# Patient Record
Sex: MALE | Race: WHITE | NOT HISPANIC OR LATINO | Employment: FULL TIME | URBAN - METROPOLITAN AREA
[De-identification: names, ages, dates, MRNs, and addresses within clinical notes are randomized per-mention and may not be internally consistent; named-entity substitution may affect disease eponyms.]

---

## 2017-01-03 NOTE — PROGRESS NOTES
"  SUBJECTIVE:                                                    Linden Cruz is a 46 year old male who presents to clinic today for the following health issues:    HPI    Patient in today for left ear issue. He has had left ear symptoms for the past month. He states he \"blew his nose and left ear drum popped\" while in [place] last month. The ear has been filling up with fluid since and has been painful with worsening ringing in the ear. He denies any drainage coming out of the ear. He has also had more sinus pressure and congestion lately and Flonase has been helping. He had a bottle of antibiotic ear drops that he had left over from a previous prescription that he used for 9 days but still feels ear is filled with fluid. He had similar symptoms last March and require 3 different antibiotics to clear the infection. He does fly a lot for work.     Problem list and histories reviewed & adjusted, as indicated.  Additional history: none    Problem list, Medication list, Allergies, and Medical/Social/Surgical histories reviewed in [facility] and updated as appropriate.    ROS:  GENERAL: Denies fever, fatigue, weakness, weight gain, or weight loss.  HEENT: Eyes-Denies pain, redness, loss of vision, double or blurred vision.     Ears/Nose- +Left ear pain/pressure and tinnitus, slight ear pressure on the right, sinus congestion. Denies loss of hearing, epistaxis, decreased sense of smell. Denies loss of sense of taste, dry mouth, or sore throat.   CARDIOVASCULAR: Denies chest pain, shortness of breath, irregular heartbeats,  palpitations, or edema.  RESPIRATORY: Denies cough, hemoptysis, and shortness of breath.    OBJECTIVE:                                                    /82 mmHg  Pulse 100  Temp(Src) 97.7  F (36.5  C) (Temporal)  Resp 12  Ht 5' 11\" (1.803 m)  Wt 216 lb (97.977 kg)  BMI 30.14 kg/m2  SpO2 96%  Body mass index is 30.14 kg/(m^2).  GENERAL: healthy, alert and no distress  EYES: Eyes grossly " normal to inspection, PERRL and conjunctivae and sclerae normal  HENT: Ear canals normal but bilateral TMs with mucoid effusions, more so on the left with left TM erythema. No obvious perforation or drainage. Nasal mucosa is erythematous.  NECK: no adenopathy, no asymmetry, masses, or scars and thyroid normal to palpation  RESP: lungs clear to auscultation - no rales, rhonchi or wheezes  CV: regular rate and rhythm, normal S1 S2, no S3 or S4, no murmur, click or rub, no peripheral edema and peripheral pulses strong       ASSESSMENT/PLAN:                                                        ICD-10-CM    1. Recurrent acute suppurative otitis media without spontaneous rupture of tympanic membrane of both sides H66.006 cefUROXime (CEFTIN) 500 MG tablet     predniSONE (DELTASONE) 20 MG tablet       He had similar symptoms last March and Ceftin seemed to work best to clear up the infection so will prescribe this as well as a 5 day course of prednisone to help with the increased sinus/ear pressure and inflammation.  Encouraged to drink plenty of fluids.  Continue with Flonase and also recommend he try over the counter Nettipot/sinus rinse.  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  Follow up if symptoms not improving.  If he continues to experience recurrent infections, may need to be seen by ENT.       Nik Nina PA-C  [facility]

## 2017-01-04 ENCOUNTER — OFFICE VISIT (OUTPATIENT)
Dept: FAMILY MEDICINE | Facility: OTHER | Age: 47
End: 2017-01-04
Payer: COMMERCIAL

## 2017-01-04 VITALS
RESPIRATION RATE: 12 BRPM | HEART RATE: 100 BPM | OXYGEN SATURATION: 96 % | HEIGHT: 71 IN | TEMPERATURE: 97.7 F | SYSTOLIC BLOOD PRESSURE: 128 MMHG | BODY MASS INDEX: 30.24 KG/M2 | WEIGHT: 216 LBS | DIASTOLIC BLOOD PRESSURE: 82 MMHG

## 2017-01-04 DIAGNOSIS — H66.006 RECURRENT ACUTE SUPPURATIVE OTITIS MEDIA WITHOUT SPONTANEOUS RUPTURE OF TYMPANIC MEMBRANE OF BOTH SIDES: Primary | ICD-10-CM

## 2017-01-04 PROCEDURE — 99213 OFFICE O/P EST LOW 20 MIN: CPT | Performed by: PHYSICIAN ASSISTANT

## 2017-01-04 RX ORDER — CEFUROXIME AXETIL 500 MG/1
500 TABLET ORAL 2 TIMES DAILY
Qty: 20 TABLET | Refills: 0 | Status: SHIPPED | OUTPATIENT
Start: 2017-01-04 | End: 2017-01-23

## 2017-01-04 RX ORDER — PREDNISONE 20 MG/1
20 TABLET ORAL DAILY
Qty: 5 TABLET | Refills: 0 | Status: SHIPPED | OUTPATIENT
Start: 2017-01-04 | End: 2017-01-23

## 2017-01-04 ASSESSMENT — PAIN SCALES - GENERAL: PAINLEVEL: NO PAIN (0)

## 2017-01-04 NOTE — MR AVS SNAPSHOT
After Visit Summary   1/4/2017    Linden Cruz    MRN: [id number]           Patient Information     Date Of Birth          1970        Visit Information        Provider Department      1/4/2017 10:15 AM Nik Nina PA-C [facility]        Today's Diagnoses     Recurrent acute suppurative otitis media without spontaneous rupture of tympanic membrane of both sides    -  1       Care Instructions    Will prescribe a 10 day course of antibiotics to take as directed. Take a probiotic or Activia yogurt daily while on this medication.  Will also prescribe a steroid to help with the sinus swelling and pressure.  Continue with the Flonase nasal spray. You can also try Nettipot or Sinu Rinse to help clear out your sinuses.  Drink plenty of fluids.  Follow up if symptoms are not improving.           Follow-ups after your visit        Who to contact     If you have questions or need follow up information about today's clinic visit or your schedule please contact [facility] directly at [phone].  Normal or non-critical lab and imaging results will be communicated to you by [employer], letter or phone within 4 business days after the clinic has received the results. If you do not hear from us within 7 days, please contact the clinic through [employer]occ[employer] or phone. If you have a critical or abnormal lab result, we will notify you by phone as soon as possible.  Submit refill requests through [employer] or call your pharmacy and they will forward the refill request to us. Please allow 3 business days for your refill to be completed.          Additional Information About Your Visit        Credit [employer] Information     [employer] gives you secure access to your electronic health record. If you see a primary care provider, you can also send messages to your care team and make appointments. If you have questions, please call your primary care clinic.  If you do not have a primary care  "provider, please call [phone] and they will assist you.        Care EveryWhere ID     This is your Care EveryWhere ID. This could be used by other organizations to access your [facility] medical records  [id number]        Your Vitals Were     Pulse Temperature Respirations Height BMI (Body Mass Index) Pulse Oximetry    100 97.7  F (36.5  C) (Temporal) 12 5' 11\" (1.803 m) 30.14 kg/m2 96%       Blood Pressure from Last 3 Encounters:   01/04/17 128/82   11/17/16 124/78   10/22/16 122/83    Weight from Last 3 Encounters:   01/04/17 216 lb (97.977 kg)   11/17/16 226 lb (102.513 kg)   08/24/16 224 lb (101.606 kg)              Today, you had the following     No orders found for display         Today's Medication Changes          These changes are accurate as of: 1/4/17 10:32 AM.  If you have any questions, ask your nurse or doctor.               Start taking these medicines.        Dose/Directions    cefUROXime 500 MG tablet   Commonly known as:  CEFTIN   Used for:  Recurrent acute suppurative otitis media without spontaneous rupture of tympanic membrane of both sides   Started by:  Nik Nina PA-C        Dose:  500 mg   Take 1 tablet (500 mg) by mouth 2 times daily   Quantity:  20 tablet   Refills:  0       predniSONE 20 MG tablet   Commonly known as:  DELTASONE   Used for:  Recurrent acute suppurative otitis media without spontaneous rupture of tympanic membrane of both sides   Started by:  Nik Nina PA-C        Dose:  20 mg   Take 1 tablet (20 mg) by mouth daily   Quantity:  5 tablet   Refills:  0            Where to get your medicines      These medications were sent to [place] [address] [facility] [place] - [address]  [address], [place] [address]     Phone:  [phone]    - cefUROXime 500 MG tablet  - predniSONE 20 MG tablet             Primary Care Provider Office Phone # Fax #    Nik Nina PA-C [phone]       [address]  [facility] [address]        Thank you!     Thank you for choosing [facility]  for your care. Our goal is always to provide you with excellent care. Hearing back from our patients is one way we can continue to improve our services. Please take a few minutes to complete the written survey that you may receive in the mail after your visit with us. Thank you!             Your Updated Medication List - Protect others around you: Learn how to safely use, store and throw away your medicines at www.disposemymeds.org.          This list is accurate as of: 1/4/17 10:32 AM.  Always use your most recent med list.                   Brand Name Dispense Instructions for use    ADVIL PO          cefUROXime 500 MG tablet    CEFTIN    20 tablet    Take 1 tablet (500 mg) by mouth 2 times daily       fluticasone 50 MCG/ACT spray    FLONASE    16 g    Spray 1-2 sprays into both nostrils daily       gemfibrozil 600 MG tablet    LOPID    180 tablet    Take 1 tablet (600 mg) by mouth 2 times daily       levothyroxine 125 MCG tablet    SYNTHROID/LEVOTHROID    90 tablet    Take 1 tablet (125 mcg) by mouth daily       MULTI VITAMIN MENS PO      Take  by mouth.       omeprazole 40 MG capsule    priLOSEC    90 capsule    TAKE ONE CAPSULE BY MOUTH EVERY DAY       predniSONE 20 MG tablet    DELTASONE    5 tablet    Take 1 tablet (20 mg) by mouth daily       SUMAtriptan 100 MG tablet    IMITREX    18 tablet    TAKE 1 TAB BY MOUTH WITH ONSET OF MIGRAINE, MAY REPEAT ONCE AFTER 2 HOURS. MAX 2 TABS/24 HOURS.       * tadalafil 20 MG tablet    CIALIS    6 tablet    Take 0.5-1 tablets (10-20 mg) by mouth daily as needed for erectile dysfunction Never use with nitroglycerin, terazosin or doxazosin.       * tadalafil 20 MG tablet    CIALIS    8 tablet    Take 0.5-1 tablets (10-20 mg) by mouth daily as needed for erectile dysfunction Never use with nitroglycerin, terazosin or doxazosin.       topiramate 50 MG tablet    TOPAMAX    60  tablet    TAKE ONE TABLET BY MOUTH TWICE DAILY       * Notice:  This list has 2 medication(s) that are the same as other medications prescribed for you. Read the directions carefully, and ask your doctor or other care provider to review them with you.

## 2017-01-04 NOTE — NURSING NOTE
"Chief Complaint   Patient presents with     Ear Problem     Panel Management     utd       Initial /82 mmHg  Pulse 100  Temp(Src) 97.7  F (36.5  C) (Temporal)  Resp 12  Ht 5' 11\" (1.803 m)  Wt 216 lb (97.977 kg)  BMI 30.14 kg/m2  SpO2 96% Estimated body mass index is 30.14 kg/(m^2) as calculated from the following:    Height as of this encounter: 5' 11\" (1.803 m).    Weight as of this encounter: 216 lb (97.977 kg).  BP completed using cuff size: regular    Precious Cruz CMA      "

## 2017-01-23 ENCOUNTER — OFFICE VISIT (OUTPATIENT)
Dept: OTOLARYNGOLOGY | Facility: CLINIC | Age: 47
End: 2017-01-23
Payer: COMMERCIAL

## 2017-01-23 ENCOUNTER — TELEPHONE (OUTPATIENT)
Dept: FAMILY MEDICINE | Facility: OTHER | Age: 47
End: 2017-01-23

## 2017-01-23 ENCOUNTER — OFFICE VISIT (OUTPATIENT)
Dept: AUDIOLOGY | Facility: CLINIC | Age: 47
End: 2017-01-23
Payer: COMMERCIAL

## 2017-01-23 VITALS — OXYGEN SATURATION: 96 % | TEMPERATURE: 97.8 F | WEIGHT: 222.1 LBS | HEIGHT: 72 IN | BODY MASS INDEX: 30.08 KG/M2

## 2017-01-23 DIAGNOSIS — H66.93 RECURRENT OTITIS MEDIA OF BOTH EARS: Primary | ICD-10-CM

## 2017-01-23 DIAGNOSIS — H65.23 CHRONIC SEROUS OTITIS MEDIA OF BOTH EARS: ICD-10-CM

## 2017-01-23 DIAGNOSIS — H90.5 SENSORINEURAL HEARING LOSS OF RIGHT EAR: ICD-10-CM

## 2017-01-23 DIAGNOSIS — H69.93 DYSFUNCTION OF EUSTACHIAN TUBE, BILATERAL: ICD-10-CM

## 2017-01-23 DIAGNOSIS — H93.12 TINNITUS, LEFT: ICD-10-CM

## 2017-01-23 DIAGNOSIS — J31.0 CHRONIC RHINITIS: Primary | ICD-10-CM

## 2017-01-23 DIAGNOSIS — H90.72 MIXED HEARING LOSS OF LEFT EAR: Primary | ICD-10-CM

## 2017-01-23 PROCEDURE — 99203 OFFICE O/P NEW LOW 30 MIN: CPT | Mod: 25 | Performed by: OTOLARYNGOLOGY

## 2017-01-23 PROCEDURE — 92567 TYMPANOMETRY: CPT | Performed by: AUDIOLOGIST

## 2017-01-23 PROCEDURE — 99207 ZZC NO CHARGE LOS: CPT | Performed by: AUDIOLOGIST

## 2017-01-23 PROCEDURE — 92557 COMPREHENSIVE HEARING TEST: CPT | Performed by: AUDIOLOGIST

## 2017-01-23 PROCEDURE — 92511 NASOPHARYNGOSCOPY: CPT | Performed by: OTOLARYNGOLOGY

## 2017-01-23 RX ORDER — FLUTICASONE PROPIONATE 50 MCG
2 SPRAY, SUSPENSION (ML) NASAL DAILY
Qty: 1 BOTTLE | Refills: 1 | Status: SHIPPED | OUTPATIENT
Start: 2017-01-23 | End: 2017-02-22

## 2017-01-23 NOTE — PROGRESS NOTES
AUDIOLOGY REPORT    SUMMARY: Audiology visit completed. See audiogram for results.      RECOMMENDATIONS: Follow-up with ENT.    Piyush Moraes  Audiologist  [place] License  #[id number]

## 2017-01-23 NOTE — TELEPHONE ENCOUNTER
Patient had been seen and was told that the next f/u he may need to go to the ENT. Pt at this time is looking at the next step he will need to take. He is looking for a return call to see if he need to see PCP before going to ENT or if he should just schedule with ENT next.     Alexandrea Suresh  Reception/ Scheduling

## 2017-01-23 NOTE — NURSING NOTE
Chief Complaint   Patient presents with     Consult     bilat ear problem       Initial Temp(Src) 97.8  F (36.6  C) (Temporal)  Ht 1.829 m (6')  Wt 100.744 kg (222 lb 1.6 oz)  BMI 30.12 kg/m2  SpO2 96% Estimated body mass index is 30.12 kg/(m^2) as calculated from the following:    Height as of this encounter: 1.829 m (6').    Weight as of this encounter: 100.744 kg (222 lb 1.6 oz).  BP completed using cuff size: NA (Not Taken)

## 2017-01-23 NOTE — TELEPHONE ENCOUNTER
I recommend he see ENT since he keeps getting these ear infections. Order has been placed. If he is having worsening symptoms before he can be seen by ENT, I will see him again.    Nik Nina PA-C

## 2017-01-23 NOTE — TELEPHONE ENCOUNTER
You saw him on 1 3/17    He had similar symptoms last March and Ceftin seemed to work best to clear up the infection so will prescribe this as well as a 5 day course of prednisone to help with the increased sinus/ear pressure and inflammation.  Encouraged to drink plenty of fluids.  Continue with Flonase and also recommend he try over the counter Nettipot/sinus rinse.  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  Follow up if symptoms not improving.  If he continues to experience recurrent infections, may need to be seen by ENT.
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
## 2017-02-08 ENCOUNTER — OFFICE VISIT (OUTPATIENT)
Dept: OTOLARYNGOLOGY | Facility: OTHER | Age: 47
End: 2017-02-08
Payer: COMMERCIAL

## 2017-02-08 DIAGNOSIS — H65.23 BILATERAL CHRONIC SEROUS OTITIS MEDIA: Primary | ICD-10-CM

## 2017-02-08 PROCEDURE — 99207 ZZC DROP WITH A PROCEDURE: CPT | Performed by: OTOLARYNGOLOGY

## 2017-02-08 PROCEDURE — 69433 CREATE EARDRUM OPENING: CPT | Mod: 50 | Performed by: OTOLARYNGOLOGY

## 2017-02-08 NOTE — PROGRESS NOTES
OTOLARYNGOLOGY PROCEDURE NOTE    SURGEON: Brittany Mejia.    ASSISTANT: none     PREOPERATIVE DIAGNOSIS: Chronic otitis media     POSTOPERATIVE DIAGNOSIS: Chronic otitis media.     SURGERY: Bilateral myringotomy with  Paparella 2000 type tube placement.     FINDINGS: very atelectatic ME space right ear, fluid bilat.    INDICATIONS: Above findings with mucoid fluid in the middle ear space.     BRIEF HISTORY: Patient is a 47 yo with a history of serous otitis media that was resistant to maximal medical therapy. The patient understands the risks and benefits of the surgery as well as alternatives, wishes to have it done and has agree to it.     DESCRIPTION OF PROCEDURE: The patient was taken to the procedure room,  appropriately positioned. We examined the left ear under the microscope. Cerumen was removed with a cerumen curet. TM was retracted fluid seen. Myringotomy was made post inferior  in a radial fashion close to umbo after top anasthesia with phenol. A large amount of serous fluid was suctioned, followed by placement of a  Paparella type tube. We next turned our attention to the right ear. We examined the right ear under the microscope. Again, cerumen was removed with a cerumen curet. TM was atelectatic draping the ossicles.  . Myringotomy was made post inferior in a radial fashion close to umbo. A small amount of serous fluid was suctioned, followed by placement of a  Paparella type tube. The patient tolerated procedure well and will return in 6 weeks..     BRITTANY MEJIA MD

## 2017-03-17 NOTE — PROGRESS NOTES
History of Present Illness - Linden Cruz is a 46 year old male who is status post bilateral myringotomy tube placement on 02/08/17.  There were no issues post operatively, and the patient is back to a regular diet and normal daily activity.  There has been drainage, but no  bleeding from the ears, no fevers or chills. He still has tinnitus on the left that start with initial ear infection 6 months ago but is not as  Severe as before and not disabling.    B/P: Data Unavailable, T: Data Unavailable, P: Data Unavailable, R: Data Unavailable  General - The patient is well nourished and well developed, and appears to have good nutritional status.    Head and Face - Normocephalic and atraumatic, with no gross asymmetry noted of the contour of the facial features.  The facial nerve is intact, with strong symmetric movements.  Eyes - Extraocular movements intact, and the pupils were reactive to light.  Sclera were not icteric or injected, conjunctiva were pink and moist.  Mouth - Examination of the oral cavity shows pink, healthy, moist mucosa.  No lesions or ulceration noted.  The dentition are in good repair.  The tongue is mobile and midline.  Ears - Examination of the ears showed myringotomy tubes in good position bilaterally.  The tympanic membranes were gray and translucent.  PE tubes in good position. No evidence of middle ear effusion, granulation tissue, or cholesteatoma.    Tympanometry - Pneumatic otoscopy was performed, both sides showed no movement of the tympanic membrane, consistent with open myringotomy tubes. Audio still shows SNHL drop at 2500 to 8000 Hz on the left.  We discuss this asymmetry and since word recognition is at 100% both sides and this was post infection issues would monitor closely.    A/P - Linden Cruz is status post bilateral myringotomy and tube placement.  No sign of complications at this point.  I have rediscussed water precautions, and will see the patient back in 6 months  for a routine tube check. I have also recommended the use of the post-op ear drops in the event of otorrhea during a URI.  If the drainage continues, however, they should come to me for earlier follow up. The patient will return in 9 to 10 months for tube/audio recheck may consider imaging in the future if asymmetry persists /worsens. Hearing protection discussed.  Savage Mejia MD

## 2017-03-20 ENCOUNTER — OFFICE VISIT (OUTPATIENT)
Dept: AUDIOLOGY | Facility: CLINIC | Age: 47
End: 2017-03-20
Payer: COMMERCIAL

## 2017-03-20 ENCOUNTER — OFFICE VISIT (OUTPATIENT)
Dept: OTOLARYNGOLOGY | Facility: CLINIC | Age: 47
End: 2017-03-20
Payer: COMMERCIAL

## 2017-03-20 VITALS — HEART RATE: 94 BPM | WEIGHT: 212 LBS | BODY MASS INDEX: 28.75 KG/M2 | OXYGEN SATURATION: 97 %

## 2017-03-20 DIAGNOSIS — E03.8 SUBCLINICAL HYPOTHYROIDISM: ICD-10-CM

## 2017-03-20 DIAGNOSIS — Z98.890 H/O MYRINGOTOMY: Primary | ICD-10-CM

## 2017-03-20 DIAGNOSIS — H90.3 SENSORINEURAL HEARING LOSS, ASYMMETRICAL: Primary | ICD-10-CM

## 2017-03-20 DIAGNOSIS — H93.12 LEFT-SIDED TINNITUS: ICD-10-CM

## 2017-03-20 DIAGNOSIS — H90.3 ASYMMETRICAL SENSORINEURAL HEARING LOSS: ICD-10-CM

## 2017-03-20 PROCEDURE — 92567 TYMPANOMETRY: CPT | Performed by: AUDIOLOGIST

## 2017-03-20 PROCEDURE — 99213 OFFICE O/P EST LOW 20 MIN: CPT | Performed by: OTOLARYNGOLOGY

## 2017-03-20 PROCEDURE — 99207 ZZC NO CHARGE LOS: CPT | Performed by: AUDIOLOGIST

## 2017-03-20 NOTE — MR AVS SNAPSHOT
After Visit Summary   3/20/2017    Linden Cruz    MRN: [id number]           Patient Information     Date Of Birth          1970        Visit Information        Provider Department      3/20/2017 8:30 AM Savage Mejia MD [facility]        Today's Diagnoses     H/O myringotomy    -  1    Asymmetrical sensorineural hearing loss           Follow-ups after your visit        Additional Services     AUDIOLOGY ADULT REFERRAL                 Your next 10 appointments already scheduled     Dec 18, 2017  8:00 AM CST   Return Visit with Piyush Mooney   [facility] ([facility]    [address] [place] [id number] [phone]            Dec 18, 2017  8:15 AM CST   Return Visit with Savage Mejia MD   [facility] ([address] [id number] [phone]              Who to contact     If you have questions or need follow up information about today's clinic visit or your schedule please contact [facility] directly at [phone].  Normal or non-critical lab and imaging results will be communicated to you by App DreamWorkshart, letter or phone within 4 business days after the clinic has received the results. If you do not hear from us within 7 days, please contact the clinic through Digital Lifeboatt or phone. If you have a critical or abnormal lab result, we will notify you by phone as soon as possible.  Submit refill requests through Questra or call your pharmacy and they will forward the refill request to us. Please allow 3 business days for your refill to be completed.          Additional Information About Your Visit        App DreamWorkshart Information     Questra gives you secure access to your electronic health record. If you see a primary care provider, you can also send messages to your care team and make appointments. If you have questions, please call your primary care  clinic.  If you do not have a primary care provider, please call [phone] and they will assist you.        Care EveryWhere ID     This is your Care EveryWhere ID. This could be used by other organizations to access your [place] medical records  [id number]        Your Vitals Were     Pulse Pulse Oximetry BMI (Body Mass Index)             94 97% 28.75 kg/m2          Blood Pressure from Last 3 Encounters:   01/04/17 128/82   11/17/16 124/78   10/22/16 122/83    Weight from Last 3 Encounters:   03/20/17 96.2 kg (212 lb)   01/23/17 100.7 kg (222 lb 1.6 oz)   01/04/17 98 kg (216 lb)              We Performed the Following     AUDIOLOGY ADULT REFERRAL        Primary Care Provider Office Phone # Fax #    Nik Nina PA-C [phone]       [facility] [address] [facility] [address]  [facility] [address]        Thank you!     Thank you for choosing [facility]  for your care. Our goal is always to provide you with excellent care. Hearing back from our patients is one way we can continue to improve our services. Please take a few minutes to complete the written survey that you may receive in the mail after your visit with us. Thank you!             Your Updated Medication List - Protect others around you: Learn how to safely use, store and throw away your medicines at www.disposemymeds.org.          This list is accurate as of: 3/20/17  9:41 AM.  Always use your most recent med list.                   Brand Name Dispense Instructions for use    ADVIL PO          fluticasone 50 MCG/ACT spray    FLONASE    16 g    Spray 1-2 sprays into both nostrils daily       gemfibrozil 600 MG tablet    LOPID    180 tablet    Take 1 tablet (600 mg) by mouth 2 times daily       levothyroxine 125 MCG tablet    SYNTHROID/LEVOTHROID    90 tablet    Take 1 tablet (125 mcg) by mouth daily       MULTI VITAMIN MENS PO      Take  by mouth.       omeprazole 40 MG capsule    priLOSEC    90 capsule     TAKE ONE CAPSULE BY MOUTH EVERY DAY       SUMAtriptan 100 MG tablet    IMITREX    18 tablet    TAKE 1 TAB BY MOUTH WITH ONSET OF MIGRAINE, MAY REPEAT ONCE AFTER 2 HOURS. MAX 2 TABS/24 HOURS.       tadalafil 20 MG tablet    CIALIS    8 tablet    Take 0.5-1 tablets (10-20 mg) by mouth daily as needed for erectile dysfunction Never use with nitroglycerin, terazosin or doxazosin.       topiramate 50 MG tablet    TOPAMAX    60 tablet    TAKE ONE TABLET BY MOUTH TWICE DAILY

## 2017-03-20 NOTE — MR AVS SNAPSHOT
After Visit Summary   3/20/2017    Linden Cruz    MRN: [id number]           Patient Information     Date Of Birth          1970        Visit Information        Provider Department      3/20/2017 8:15 AM Shahbaz Gregg [facility]        Today's Diagnoses     Sensorineural hearing loss, asymmetrical    -  1    Left-sided tinnitus           Follow-ups after your visit        Who to contact     If you have questions or need follow up information about today's clinic visit or your schedule please contact [facility] directly at [phone].  Normal or non-critical lab and imaging results will be communicated to you by Joyushart, letter or phone within 4 business days after the clinic has received the results. If you do not hear from us within 7 days, please contact the clinic through Neo Networkst or phone. If you have a critical or abnormal lab result, we will notify you by phone as soon as possible.  Submit refill requests through ReadyDock or call your pharmacy and they will forward the refill request to us. Please allow 3 business days for your refill to be completed.          Additional Information About Your Visit        MyChart Information     ReadyDock gives you secure access to your electronic health record. If you see a primary care provider, you can also send messages to your care team and make appointments. If you have questions, please call your primary care clinic.  If you do not have a primary care provider, please call [phone] and they will assist you.        Care EveryWhere ID     This is your Care EveryWhere ID. This could be used by other organizations to access your [place] medical records  [id number]         Blood Pressure from Last 3 Encounters:   01/04/17 128/82   11/17/16 124/78   10/22/16 122/83    Weight from Last 3 Encounters:   03/20/17 212 lb (96.2 kg)   01/23/17 222 lb 1.6 oz (100.7 kg)   01/04/17 216 lb (98 kg)              We  Performed the Following     AUDIOGRAM/TYMPANOGRAM - INTERFACE     TYMPANOMETRY        Primary Care Provider Office Phone # Fax #    Nik Nina PA-C [phone]       [facility] [address] [facility] [address]  [facility] [address]        Thank you!     Thank you for choosing [facility]  for your care. Our goal is always to provide you with excellent care. Hearing back from our patients is one way we can continue to improve our services. Please take a few minutes to complete the written survey that you may receive in the mail after your visit with us. Thank you!             Your Updated Medication List - Protect others around you: Learn how to safely use, store and throw away your medicines at www.disposemymeds.org.          This list is accurate as of: 3/20/17  8:48 AM.  Always use your most recent med list.                   Brand Name Dispense Instructions for use    ADVIL PO          fluticasone 50 MCG/ACT spray    FLONASE    16 g    Spray 1-2 sprays into both nostrils daily       gemfibrozil 600 MG tablet    LOPID    180 tablet    Take 1 tablet (600 mg) by mouth 2 times daily       levothyroxine 125 MCG tablet    SYNTHROID/LEVOTHROID    90 tablet    Take 1 tablet (125 mcg) by mouth daily       MULTI VITAMIN MENS PO      Take  by mouth.       omeprazole 40 MG capsule    priLOSEC    90 capsule    TAKE ONE CAPSULE BY MOUTH EVERY DAY       SUMAtriptan 100 MG tablet    IMITREX    18 tablet    TAKE 1 TAB BY MOUTH WITH ONSET OF MIGRAINE, MAY REPEAT ONCE AFTER 2 HOURS. MAX 2 TABS/24 HOURS.       tadalafil 20 MG tablet    CIALIS    8 tablet    Take 0.5-1 tablets (10-20 mg) by mouth daily as needed for erectile dysfunction Never use with nitroglycerin, terazosin or doxazosin.       topiramate 50 MG tablet    TOPAMAX    60 tablet    TAKE ONE TABLET BY MOUTH TWICE DAILY

## 2017-03-20 NOTE — NURSING NOTE
Chief Complaint   Patient presents with     Surgical Followup     Bilateral myringotomy with  Paparella 2000 type tube placement       Initial Pulse 94  Wt 96.2 kg (212 lb)  SpO2 97%  BMI 28.75 kg/m2 Estimated body mass index is 28.75 kg/(m^2) as calculated from the following:    Height as of 1/23/17: 1.829 m (6').    Weight as of this encounter: 96.2 kg (212 lb).  Medication Reconciliation: complete

## 2017-03-20 NOTE — TELEPHONE ENCOUNTER
synthroid     Last Written Prescription Date: 12/08/16  Last Quantity: 90, # refills: 0  Last Office Visit with [facility] or [facility] prescribing provider: 1/04/17        TSH   Date Value Ref Range Status   12/12/2016 1.88 0.40 - 4.00 mU/L Final

## 2017-03-20 NOTE — PROGRESS NOTES
Patient was referred to Audiology from ENT by Dr. Mejia for a hearing examination. Patient reports improvement in hearing post bilateral PE tube placement. Patient does report that he still is hearing ringing in his left ear.     Testing:    Otoscopy:   Clear canals free of cerumen with visible PE tubes bilaterally.      Tympanograms:   Tympanometric findings were unable to be obtained as a seal could not be maintained bilaterally.     Thresholds:   Puretone thresholds obtained with [employer] [id number] hadphones show normal hearing sensitivity through 4000 Hz then a mild to moderate sensorineural hearing loss in the right ear at 6000 Hz and beyond. The left ear shows normal hearing sensitivity through 2000 Hz then a drop to moderate sensorineural hearing loss at 3000 & 4000 Hz rising to mild sensorineural hearing loss at 6000 Hz and beyond  (see scanned audiogram). Speech reception thresholds were in agreement with puretone findings bilaterally. Speech discrimination scores were excellent bilaterally (Right: 100%; Left: 100%).     Discussed results with the patient.     Patient was returned to ENT for follow up.     Shahbaz Miller CCC-A  Licensed Audiologist  3/20/2017

## 2017-03-21 RX ORDER — LEVOTHYROXINE SODIUM 125 MCG
TABLET ORAL
Qty: 90 TABLET | Refills: 2 | Status: SHIPPED | OUTPATIENT
Start: 2017-03-21 | End: 2018-09-06

## 2017-03-21 NOTE — TELEPHONE ENCOUNTER
Prescription approved per [facility] Refill Protocol.  Katherin Ly, RN, BSN

## 2017-04-15 DIAGNOSIS — G43.109 MIGRAINE WITH AURA AND WITHOUT STATUS MIGRAINOSUS, NOT INTRACTABLE: ICD-10-CM

## 2017-04-17 RX ORDER — TOPIRAMATE 50 MG/1
TABLET, FILM COATED ORAL
Qty: 60 TABLET | Refills: 5 | Status: SHIPPED | OUTPATIENT
Start: 2017-04-17 | End: 2017-10-13

## 2017-04-17 NOTE — TELEPHONE ENCOUNTER
Routing refill request to provider for review/approval because:  A break in medication-should've been out 12/2016.    Katherin Ly, RN, BSN

## 2017-04-17 NOTE — TELEPHONE ENCOUNTER
topamax       Last Written Prescription Date: 06/30/16  Last Fill Quantity: 60, # refills: 5    Last Office Visit with [facility], [facility] or [facility] prescribing provider:   01/04/17  Future Office Visit:  NA    Creatinine   Date Value Ref Range Status   03/10/2016 0.97 0.66 - 1.25 mg/dL Final

## 2017-06-23 DIAGNOSIS — G43.109 MIGRAINE WITH AURA AND WITHOUT STATUS MIGRAINOSUS, NOT INTRACTABLE: ICD-10-CM

## 2017-06-26 RX ORDER — SUMATRIPTAN 100 MG/1
TABLET, FILM COATED ORAL
Qty: 18 TABLET | Refills: 5 | Status: SHIPPED | OUTPATIENT
Start: 2017-06-26 | End: 2018-02-22

## 2017-06-26 NOTE — TELEPHONE ENCOUNTER
imitrex      Last Written Prescription Date: 11/17/16  Last Fill Quantity: 18, # refills: 5  Last Office Visit with [facility], [facility] or [facility] prescribing provider: 01/04/17       BP Readings from Last 3 Encounters:   01/04/17 128/82   11/17/16 124/78   10/22/16 122/83

## 2017-06-26 NOTE — TELEPHONE ENCOUNTER
Prescription approved per [facility] Refill Protocol.  Jerry Saldivar, RN, BSN

## 2017-09-18 DIAGNOSIS — E78.1 HYPERTRIGLYCERIDEMIA: ICD-10-CM

## 2017-09-18 DIAGNOSIS — K21.9 GASTROESOPHAGEAL REFLUX DISEASE WITHOUT ESOPHAGITIS: ICD-10-CM

## 2017-09-18 NOTE — TELEPHONE ENCOUNTER
lopid       Last Written Prescription Date: 12/12/16  Last Fill Quantity: 180, # refills: 1    Last Office Visit with [facility], P or [facility] prescribing provider:  01/04/17   Future Office Visit:  NA    Cholesterol   Date Value Ref Range Status   12/12/2016 212 (H) <200 mg/dL Final     Comment:     Desirable:       <200 mg/dl     HDL Cholesterol   Date Value Ref Range Status   12/12/2016 37 (L) >39 mg/dL Final     LDL Cholesterol Calculated   Date Value Ref Range Status   12/12/2016 122 (H) <100 mg/dL Final     Comment:     Above desirable:  100-129 mg/dl   Borderline High:  130-159 mg/dL   High:             160-189 mg/dL   Very high:       >189 mg/dl       Triglycerides   Date Value Ref Range Status   12/12/2016 264 (H) <150 mg/dL Final     Comment:     Borderline high:  150-199 mg/dl   High:             200-499 mg/dl   Very high:       >499 mg/dl   Fasting specimen       Cholesterol/HDL Ratio   Date Value Ref Range Status   10/15/2015 10.6 (H) 0.0 - 5.0 Final     ALT   Date Value Ref Range Status   03/10/2016 30 0 - 70 U/L Final

## 2017-09-18 NOTE — TELEPHONE ENCOUNTER
prilosec      Last Written Prescription Date: 08/31/16  Last Fill Quantity: 90,  # refills: 3   Last Office Visit with [facility], [facility] or [facility] prescribing provider:   01/04/17

## 2017-09-19 RX ORDER — OMEPRAZOLE 40 MG/1
CAPSULE, DELAYED RELEASE ORAL
Qty: 90 CAPSULE | Refills: 0 | Status: SHIPPED | OUTPATIENT
Start: 2017-09-19 | End: 2018-01-07

## 2017-09-19 NOTE — TELEPHONE ENCOUNTER
omeprazole (PRILOSEC) 40 MG capsule  Prescription approved per [facility] Refill Protocol.    Patient is due for physical.     Please assist with scheduling.    Chrissy Fountain RN, BSN

## 2017-09-19 NOTE — TELEPHONE ENCOUNTER
gemfibrozil (LOPID) 600 MG tablet  Routing refill request to provider for review/approval because:  Labs out of range:  Fasting lipid panel  A break in medication, should have needed refills around 06/2017  Patient needs to be seen because:  Due for physical    Chrissy Fountain RN, BSN

## 2017-09-20 RX ORDER — GEMFIBROZIL 600 MG/1
TABLET, FILM COATED ORAL
Qty: 180 TABLET | Refills: 0 | Status: SHIPPED | OUTPATIENT
Start: 2017-09-20 | End: 2019-01-11

## 2017-10-06 ENCOUNTER — ALLIED HEALTH/NURSE VISIT (OUTPATIENT)
Dept: FAMILY MEDICINE | Facility: OTHER | Age: 47
End: 2017-10-06
Payer: COMMERCIAL

## 2017-10-06 DIAGNOSIS — Z23 NEED FOR PROPHYLACTIC VACCINATION AND INOCULATION AGAINST INFLUENZA: Primary | ICD-10-CM

## 2017-10-06 PROCEDURE — 90686 IIV4 VACC NO PRSV 0.5 ML IM: CPT

## 2017-10-06 PROCEDURE — 99207 ZZC NO CHARGE NURSE ONLY: CPT

## 2017-10-06 PROCEDURE — 90471 IMMUNIZATION ADMIN: CPT

## 2017-10-06 NOTE — MR AVS SNAPSHOT
After Visit Summary   10/6/2017    Linden Cruz    MRN: [id number]           Patient Information     Date Of Birth          1970        Visit Information        Provider Department      10/6/2017 3:30 PM NL FLU SHOT [facility]        Today's Diagnoses     Need for prophylactic vaccination and inoculation against influenza    -  1       Follow-ups after your visit        Your next 10 appointments already scheduled     Dec 18, 2017  8:00 AM CST   Return Visit with Piyush Hernandez   [facility] ([address] [id number] [phone]            Dec 18, 2017  8:15 AM CST   Return Visit with Savage Mejia MD   [facility] ([address] [address] [phone]              Who to contact     If you have questions or need follow up information about today's clinic visit or your schedule please contact [facility] directly at [phone].  Normal or non-critical lab and imaging results will be communicated to you by Likeable Localhart, letter or phone within 4 business days after the clinic has received the results. If you do not hear from us within 7 days, please contact the clinic through Likeable Localhart or phone. If you have a critical or abnormal lab result, we will notify you by phone as soon as possible.  Submit refill requests through [employer] or call your pharmacy and they will forward the refill request to us. Please allow 3 business days for your refill to be completed.          Additional Information About Your Visit        Likeable Localhart Information     [employer] gives you secure access to your electronic health record. If you see a primary care provider, you can also send messages to your care team and make appointments. If you have questions, please call your primary care clinic.  If you do not have a primary care provider, please  call [phone] and they will assist you.        Care EveryWhere ID     This is your Care EveryWhere ID. This could be used by other organizations to access your [place] medical records  [id number]         Blood Pressure from Last 3 Encounters:   01/04/17 128/82   11/17/16 124/78   10/22/16 122/83    Weight from Last 3 Encounters:   03/20/17 212 lb (96.2 kg)   01/23/17 222 lb 1.6 oz (100.7 kg)   01/04/17 216 lb (98 kg)              We Performed the Following     FLU VAC, SPLIT VIRUS IM > 3 YO (QUADRIVALENT) [57062]     Vaccine Administration, Initial [76928]        Primary Care Provider Office Phone # Fax #    Nik Nina PA-C [phone]       [address] [place] [address] [address]  [facility] [address]        Equal Access to Services     [facility] : Hadii aad ku hadasho Soomaali, waaxda luqadaha, qaybta kaalmada adeegyada, darien lawler haypinkyn richard shaw . So [facility] [phone].    ATENCIÓN: Si habla español, tiene a krishnamurthy disposición servicios gratuitos de asistencia lingüística. Llame al [phone].    We comply with applicable federal civil rights laws and [place] laws. We do not discriminate on the basis of race, color, national origin, age, disability, sex, sexual orientation, or gender identity.            Thank you!     Thank you for choosing [facility]  for your care. Our goal is always to provide you with excellent care. Hearing back from our patients is one way we can continue to improve our services. Please take a few minutes to complete the written survey that you may receive in the mail after your visit with us. Thank you!             Your Updated Medication List - Protect others around you: Learn how to safely use, store and throw away your medicines at www.disposemymeds.org.          This list is accurate as of: 10/6/17  3:49 PM.  Always use your most recent med list.                   Brand Name Dispense Instructions for use Diagnosis    ADVIL PO            fluticasone 50 MCG/ACT spray    FLONASE    16 g    Spray 1-2 sprays into both nostrils daily    Left otitis media with spontaneous rupture of eardrum, Chronic rhinitis       gemfibrozil 600 MG tablet    LOPID    180 tablet    TAKE 1 TABLET (600 MG) BY MOUTH 2 TIMES DAILY    Hypertriglyceridemia       MULTI VITAMIN MENS PO      Take  by mouth.        omeprazole 40 MG capsule    priLOSEC    90 capsule    TAKE ONE CAPSULE BY MOUTH EVERY DAY    Gastroesophageal reflux disease without esophagitis       SUMAtriptan 100 MG tablet    IMITREX    18 tablet    TAKE 1 TAB BY MOUTH WITH ONSET OF MIGRAINE, MAY REPEAT ONCE AFTER 2 HOURS. MAX 2 TABS/24 HOURS.    Migraine with aura and without status migrainosus, not intractable       SYNTHROID 125 MCG tablet   Generic drug:  levothyroxine     90 tablet    TAKE 1 TABLET BY MOUTH ONCE DAILY    Subclinical hypothyroidism       tadalafil 20 MG tablet    CIALIS    8 tablet    Take 0.5-1 tablets (10-20 mg) by mouth daily as needed for erectile dysfunction Never use with nitroglycerin, terazosin or doxazosin.    Erectile dysfunction, unspecified erectile dysfunction type       topiramate 50 MG tablet    TOPAMAX    60 tablet    TAKE 1 TABLET BY MOUTH TWICE A DAY    Migraine with aura and without status migrainosus, not intractable

## 2017-10-09 ENCOUNTER — OFFICE VISIT (OUTPATIENT)
Dept: ORTHOPEDICS | Facility: OTHER | Age: 47
End: 2017-10-09
Payer: COMMERCIAL

## 2017-10-09 VITALS — TEMPERATURE: 98.1 F | BODY MASS INDEX: 30.2 KG/M2 | HEIGHT: 72 IN | WEIGHT: 223 LBS

## 2017-10-09 DIAGNOSIS — S42.102A CLOSED FRACTURE OF LEFT SCAPULA, UNSPECIFIED PART OF SCAPULA, INITIAL ENCOUNTER: Primary | ICD-10-CM

## 2017-10-09 PROCEDURE — 23570 CLTX SCAPULAR FX W/O MNPJ: CPT | Performed by: PHYSICAL MEDICINE & REHABILITATION

## 2017-10-09 ASSESSMENT — PAIN SCALES - GENERAL: PAINLEVEL: MILD PAIN (3)

## 2017-10-09 NOTE — NURSING NOTE
Chief Complaint   Patient presents with     Consult     left shoulder injury- DOI: 10/7/2017       Initial Temp 98.1  F (36.7  C) (Temporal)  Ht 6' (1.829 m)  Wt 223 lb (101.2 kg)  BMI 30.24 kg/m2 Estimated body mass index is 30.24 kg/(m^2) as calculated from the following:    Height as of this encounter: 6' (1.829 m).    Weight as of this encounter: 223 lb (101.2 kg).  Medication Reconciliation: km Pradhan/SUNIHTA

## 2017-10-09 NOTE — PATIENT INSTRUCTIONS
Wear immobilizer, take off for bathing  Limit shoulder movement.  Do elbow and forearm range of motion as performed.  Use the percocet as needed for severe pain.    Follow up in 2 weeks.

## 2017-10-09 NOTE — MR AVS SNAPSHOT
After Visit Summary   10/9/2017    Linden Cruz    MRN: [id number]           Patient Information     Date Of Birth          1970        Visit Information        Provider Department      10/9/2017 3:20 PM Sierra Schwab MD [facility]        Care Instructions    Wear immobilizer, take off for bathing  Limit shoulder movement, elbow and forearm range of motion.  Use the percocet as needed for severe pain.    Follow up in 2 weeks.           Follow-ups after your visit        Your next 10 appointments already scheduled     Dec 18, 2017  8:00 AM CST   [facility] Visit with Piyush Hernandez   [facility]    [address] [place] [id number] [phone]            Dec 18, 2017  8:15 AM CST   Return Visit with Savage Mejia MD   [facility]    [address] [place] [id number] [phone]              Who to contact     If you have questions or need follow up information about today's clinic visit or your schedule please contact [facility] directly at [phone].  Normal or non-critical lab and imaging results will be communicated to you by [employer], letter or phone within 4 business days after the clinic has received the results. If you do not hear from us within 7 days, please contact the clinic through [employer] or phone. If you have a critical or abnormal lab result, we will notify you by phone as soon as possible.  Submit refill requests through [employer] or call your pharmacy and they will forward the refill request to us. Please allow 3 business days for your refill to be completed.          Additional Information About Your Visit        MyChart Information     [employer] gives you secure access to your electronic health record. If you see a primary care provider, you can also send messages to your care team and make appointments. If  you have questions, please call your primary care clinic.  If you do not have a primary care provider, please call [phone] and they will assist you.        Care EveryWhere ID     This is your Care EveryWhere ID. This could be used by other organizations to access your [facility] medical records  [id number]        Your Vitals Were     Temperature Height BMI (Body Mass Index)             98.1  F (36.7  C) (Temporal) 6' (1.829 m) 30.24 kg/m2          Blood Pressure from Last 3 Encounters:   01/04/17 128/82   11/17/16 124/78   10/22/16 122/83    Weight from Last 3 Encounters:   10/09/17 223 lb (101.2 kg)   03/20/17 212 lb (96.2 kg)   01/23/17 222 lb 1.6 oz (100.7 kg)              Today, you had the following     No orders found for display       Primary Care Provider Office Phone # Fax #    Nik Nina PA-C [phone]       [address] [place] [id number]        Equal Access to Services     [facility]: Hadii bernabe ku hadasho Sodanny, waaxda luqadaha, qaybta kaalmada brown, darien shaw . So [facility] [phone].    ATENCIÓN: Si habla español, tiene a krishnamurthy disposición servicios gratuitos de asistencia lingüística. [facility] [phone].    We comply with applicable federal civil rights laws and [place] laws. We do not discriminate on the basis of race, color, national origin, age, disability, sex, sexual orientation, or gender identity.            Thank you!     Thank you for choosing [facility]  for your care. Our goal is always to provide you with excellent care. Hearing back from our patients is one way we can continue to improve our services. Please take a few minutes to complete the written survey that you may receive in the mail after your visit with us. Thank you!             Your Updated Medication List - Protect others around you: Learn how to safely use, store and throw away your medicines at www.disposemymeds.org.           This list is accurate as of: 10/9/17  4:00 PM.  Always use your most recent med list.                   Brand Name Dispense Instructions for use Diagnosis    ADVIL PO           fluticasone 50 MCG/ACT spray    FLONASE    16 g    Spray 1-2 sprays into both nostrils daily    Left otitis media with spontaneous rupture of eardrum, Chronic rhinitis       gemfibrozil 600 MG tablet    LOPID    180 tablet    TAKE 1 TABLET (600 MG) BY MOUTH 2 TIMES DAILY    Hypertriglyceridemia       MULTI VITAMIN MENS PO      Take  by mouth.        omeprazole 40 MG capsule    priLOSEC    90 capsule    TAKE ONE CAPSULE BY MOUTH EVERY DAY    Gastroesophageal reflux disease without esophagitis       SUMAtriptan 100 MG tablet    IMITREX    18 tablet    TAKE 1 TAB BY MOUTH WITH ONSET OF MIGRAINE, MAY REPEAT ONCE AFTER 2 HOURS. MAX 2 TABS/24 HOURS.    Migraine with aura and without status migrainosus, not intractable       SYNTHROID 125 MCG tablet   Generic drug:  levothyroxine     90 tablet    TAKE 1 TABLET BY MOUTH ONCE DAILY    Subclinical hypothyroidism       tadalafil 20 MG tablet    CIALIS    8 tablet    Take 0.5-1 tablets (10-20 mg) by mouth daily as needed for erectile dysfunction Never use with nitroglycerin, terazosin or doxazosin.    Erectile dysfunction, unspecified erectile dysfunction type       topiramate 50 MG tablet    TOPAMAX    60 tablet    TAKE 1 TABLET BY MOUTH TWICE A DAY    Migraine with aura and without status migrainosus, not intractable
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
## 2017-10-11 NOTE — PATIENT INSTRUCTIONS
Preventive Health Recommendations  Male Ages 40 to 49    Yearly exam:             See your health care provider every year in order to  o   Review health changes.   o   Discuss preventive care.    o   Review your medicines if your doctor has prescribed any.    You should be tested each year for STDs (sexually transmitted diseases) if you re at risk.     Have a cholesterol test every 5 years.     Have a colonoscopy (test for colon cancer) if someone in your family has had colon cancer or polyps before age 50.     After age 45, have a diabetes test (fasting glucose). If you are at risk for diabetes, you should have this test every 3 years.      Talk with your health care provider about whether or not a prostate cancer screening test (PSA) is right for you.    Shots: Get a flu shot each year. Get a tetanus shot every 10 years.     Nutrition:    Eat at least 5 servings of fruits and vegetables daily.     Eat whole-grain bread, whole-wheat pasta and brown rice instead of white grains and rice.     Talk to your provider about Calcium and Vitamin D.     Lifestyle    Exercise for at least 150 minutes a week (30 minutes a day, 5 days a week). This will help you control your weight and prevent disease.     Limit alcohol to one drink per day.     No smoking.     Wear sunscreen to prevent skin cancer.     See your dentist every six months for an exam and cleaning.      Send a message to Dr. Schwab for a refill of the Percocet and ask her any other questions you may have.  Continue with the immobilizer.    Complete fasting labs next week.    Follow up annually if all labs are stable.

## 2017-10-13 ENCOUNTER — OFFICE VISIT (OUTPATIENT)
Dept: FAMILY MEDICINE | Facility: OTHER | Age: 47
End: 2017-10-13
Payer: COMMERCIAL

## 2017-10-13 ENCOUNTER — MYC MEDICAL ADVICE (OUTPATIENT)
Dept: ORTHOPEDICS | Facility: OTHER | Age: 47
End: 2017-10-13

## 2017-10-13 VITALS
BODY MASS INDEX: 30.34 KG/M2 | TEMPERATURE: 98.1 F | RESPIRATION RATE: 18 BRPM | SYSTOLIC BLOOD PRESSURE: 136 MMHG | HEIGHT: 72 IN | WEIGHT: 224 LBS | DIASTOLIC BLOOD PRESSURE: 80 MMHG | HEART RATE: 98 BPM | OXYGEN SATURATION: 97 %

## 2017-10-13 DIAGNOSIS — E78.1 HYPERTRIGLYCERIDEMIA: ICD-10-CM

## 2017-10-13 DIAGNOSIS — S42.102A CLOSED FRACTURE OF LEFT SCAPULA, UNSPECIFIED PART OF SCAPULA, INITIAL ENCOUNTER: Primary | ICD-10-CM

## 2017-10-13 DIAGNOSIS — R73.01 IMPAIRED FASTING GLUCOSE: ICD-10-CM

## 2017-10-13 DIAGNOSIS — E03.8 SUBCLINICAL HYPOTHYROIDISM: ICD-10-CM

## 2017-10-13 DIAGNOSIS — Z00.00 ENCOUNTER FOR ROUTINE ADULT HEALTH EXAMINATION WITHOUT ABNORMAL FINDINGS: Primary | ICD-10-CM

## 2017-10-13 DIAGNOSIS — K21.9 GASTROESOPHAGEAL REFLUX DISEASE WITHOUT ESOPHAGITIS: ICD-10-CM

## 2017-10-13 DIAGNOSIS — E78.5 HYPERLIPIDEMIA LDL GOAL <130: ICD-10-CM

## 2017-10-13 DIAGNOSIS — S42.102D CLOSED FRACTURE OF LEFT SCAPULA WITH ROUTINE HEALING, UNSPECIFIED PART OF SCAPULA, SUBSEQUENT ENCOUNTER: ICD-10-CM

## 2017-10-13 DIAGNOSIS — G43.109 MIGRAINE WITH AURA AND WITHOUT STATUS MIGRAINOSUS, NOT INTRACTABLE: ICD-10-CM

## 2017-10-13 PROCEDURE — 99396 PREV VISIT EST AGE 40-64: CPT | Performed by: PHYSICIAN ASSISTANT

## 2017-10-13 RX ORDER — TOPIRAMATE 50 MG/1
TABLET, FILM COATED ORAL
Qty: 60 TABLET | Refills: 5 | Status: SHIPPED | OUTPATIENT
Start: 2017-10-13 | End: 2018-09-14

## 2017-10-13 RX ORDER — OXYCODONE AND ACETAMINOPHEN 5; 325 MG/1; MG/1
1-2 TABLET ORAL EVERY 6 HOURS PRN
Qty: 20 TABLET | Refills: 0 | Status: SHIPPED | OUTPATIENT
Start: 2017-10-13 | End: 2018-09-14

## 2017-10-13 RX ORDER — GEMFIBROZIL 600 MG/1
TABLET, FILM COATED ORAL
Qty: 180 TABLET | Refills: 0 | Status: CANCELLED | OUTPATIENT
Start: 2017-10-13

## 2017-10-13 RX ORDER — OXYCODONE AND ACETAMINOPHEN 5; 325 MG/1; MG/1
1-2 TABLET ORAL
COMMUNITY
Start: 2017-10-07 | End: 2017-10-13

## 2017-10-13 RX ORDER — LEVOTHYROXINE SODIUM 125 UG/1
125 TABLET ORAL DAILY
Qty: 90 TABLET | Refills: 2 | Status: CANCELLED | OUTPATIENT
Start: 2017-10-13

## 2017-10-13 RX ORDER — OXYCODONE AND ACETAMINOPHEN 5; 325 MG/1; MG/1
1-2 TABLET ORAL
Status: CANCELLED | OUTPATIENT
Start: 2017-10-13

## 2017-10-13 RX ORDER — OMEPRAZOLE 40 MG/1
40 CAPSULE, DELAYED RELEASE ORAL DAILY
Qty: 90 CAPSULE | Refills: 0 | Status: CANCELLED | OUTPATIENT
Start: 2017-10-13

## 2017-10-13 RX ORDER — SUMATRIPTAN 100 MG/1
TABLET, FILM COATED ORAL
Qty: 18 TABLET | Refills: 5 | Status: CANCELLED | OUTPATIENT
Start: 2017-10-13

## 2017-10-13 NOTE — NURSING NOTE
"Chief Complaint   Patient presents with     Physical     Panel Management     height       Initial /80 (BP Location: Right arm, Patient Position: Chair, Cuff Size: Adult Regular)  Pulse 98  Temp 98.1  F (36.7  C) (Temporal)  Resp 18  Ht 5' 11.65\" (1.82 m)  Wt 224 lb (101.6 kg)  SpO2 97%  BMI 30.67 kg/m2 Estimated body mass index is 30.67 kg/(m^2) as calculated from the following:    Height as of this encounter: 5' 11.65\" (1.82 m).    Weight as of this encounter: 224 lb (101.6 kg).  Medication Reconciliation: complete     Precious Cruz CMA      "

## 2017-10-13 NOTE — TELEPHONE ENCOUNTER
Spoke with pt. Informed him his prescription is at the  at [place] for . Continue shoulder immobilization until follow up. He may do elbow range of motion until then.     All questions invited and answered.     Layne Adams M.Ed., ATR, ATC

## 2017-10-13 NOTE — MR AVS SNAPSHOT
After Visit Summary   10/13/2017    Linden Cruz    MRN: [id number]           Patient Information     Date Of Birth          1970        Visit Information        Provider Department      10/13/2017 9:00 AM Nik Nina PA-C [facility]        Today's Diagnoses     Encounter for routine adult health examination without abnormal findings    -  1    Subclinical hypothyroidism        Closed fracture of left scapula with routine healing, unspecified part of scapula, subsequent encounter        Migraine with aura and without status migrainosus, not intractable        Hypertriglyceridemia        Gastroesophageal reflux disease without esophagitis        Impaired fasting glucose        Hyperlipidemia LDL goal <130        Gastroesophageal reflux disease, esophagitis presence not specified          Care Instructions      Preventive Health Recommendations  Male Ages 40 to 49    Yearly exam:             See your health care provider every year in order to  o   Review health changes.   o   Discuss preventive care.    o   Review your medicines if your doctor has prescribed any.    You should be tested each year for STDs (sexually transmitted diseases) if you re at risk.     Have a cholesterol test every 5 years.     Have a colonoscopy (test for colon cancer) if someone in your family has had colon cancer or polyps before age 50.     After age 45, have a diabetes test (fasting glucose). If you are at risk for diabetes, you should have this test every 3 years.      Talk with your health care provider about whether or not a prostate cancer screening test (PSA) is right for you.    Shots: Get a flu shot each year. Get a tetanus shot every 10 years.     Nutrition:    Eat at least 5 servings of fruits and vegetables daily.     Eat whole-grain bread, whole-wheat pasta and brown rice instead of white grains and rice.     Talk to your provider about Calcium and Vitamin D.      Lifestyle    Exercise for at least 150 minutes a week (30 minutes a day, 5 days a week). This will help you control your weight and prevent disease.     Limit alcohol to one drink per day.     No smoking.     Wear sunscreen to prevent skin cancer.     See your dentist every six months for an exam and cleaning.      Send a message to Dr. Schwab for a refill of the Percocet and ask her any other questions you may have.  Continue with the immobilizer.    Complete fasting labs next week.    Follow up annually if all labs are stable.              Follow-ups after your visit        Follow-up notes from your care team     Return in about 1 year (around 10/13/2018) for Routine Visit, Lab Work.      Your next 10 appointments already scheduled     Oct 23, 2017  3:00 PM CDT   [facility] with Sierra Schwab MD   [facility]    [address] [place] [id number]   [phone]            Dec 18, 2017  8:00 AM CST   Return Visit with Piyush Hernandez   [facility] ([facility])    [address] [place] [address]   [phone]            Dec 18, 2017  8:15 AM CST   Return Visit with Savage Mejia MD   [facility] ([address] [address]   [phone]              Future tests that were ordered for you today     Open Future Orders        Priority Expected Expires Ordered    Comprehensive metabolic panel (BMP + Alb, Alk Phos, ALT, AST, Total. Bili, TP) Routine 10/18/2017 11/6/2017 10/13/2017    Lipid panel reflex to direct LDL Routine 10/18/2017 11/6/2017 10/13/2017    TSH with free T4 reflex Routine 10/18/2017 11/6/2017 10/13/2017            Who to contact     If you have questions or need follow up information about today's clinic visit or your schedule please contact [facility] directly at [phone].  Normal  "or non-critical lab and imaging results will be communicated to you by MyChart, letter or phone within 4 business days after the clinic has received the results. If you do not hear from us within 7 days, please contact the clinic through [employer] or phone. If you have a critical or abnormal lab result, we will notify you by phone as soon as possible.  Submit refill requests through [employer] or call your pharmacy and they will forward the refill request to us. Please allow 3 business days for your refill to be completed.          Additional Information About Your Visit        HowbuyharAVIA Information     [employer] gives you secure access to your electronic health record. If you see a primary care provider, you can also send messages to your care team and make appointments. If you have questions, please call your primary care clinic.  If you do not have a primary care provider, please call [phone] and they will assist you.        Care EveryWhere ID     This is your Care EveryWhere ID. This could be used by other organizations to access your [place] medical records  [id number]        Your Vitals Were     Pulse Temperature Respirations Height Pulse Oximetry BMI (Body Mass Index)    98 98.1  F (36.7  C) (Temporal) 18 5' 11.65\" (1.82 m) 97% 30.67 kg/m2       Blood Pressure from Last 3 Encounters:   10/13/17 136/80   01/04/17 128/82   11/17/16 124/78    Weight from Last 3 Encounters:   10/13/17 224 lb (101.6 kg)   10/09/17 223 lb (101.2 kg)   03/20/17 212 lb (96.2 kg)                 Today's Medication Changes          These changes are accurate as of: 10/13/17  9:38 AM.  If you have any questions, ask your nurse or doctor.               These medicines have changed or have updated prescriptions.        Dose/Directions    topiramate 50 MG tablet   Commonly known as:  TOPAMAX   This may have changed:  See the new instructions.   Used for:  Migraine with aura and without status migrainosus, not intractable   Changed by:  " Nik Nina PA-C        TAKE 1 TABLET BY MOUTH TWICE A DAY   Quantity:  60 tablet   Refills:  5            Where to get your medicines      These medications were sent to [place] [address] [place] [facility] [place] - [address] [facility]  [address] [facility], [place] [address]     Phone:  [phone]     topiramate 50 MG tablet                Primary Care Provider Office Phone # Fax #    Nik Nian PA-C [phone]       [address] [facility] [facility] [address]  [facility] [address]        Equal Access to Services     [facility]: Hadii aad ku hadasho Soomaali, waaxda luqadaha, qaybta kaalmada adeegyada, waxay nicolein haypinkyn richard shaw . So [facility] [phone].    ATENCIÓN: Si habla español, tiene a krishnamurthy disposición servicios gratuitos de asistencia lingüística. [facility] [phone].    We comply with applicable federal civil rights laws and [place] laws. We do not discriminate on the basis of race, color, national origin, age, disability, sex, sexual orientation, or gender identity.            Thank you!     Thank you for choosing [facility]  for your care. Our goal is always to provide you with excellent care. Hearing back from our patients is one way we can continue to improve our services. Please take a few minutes to complete the written survey that you may receive in the mail after your visit with us. Thank you!             Your Updated Medication List - Protect others around you: Learn how to safely use, store and throw away your medicines at www.disposemymeds.org.          This list is accurate as of: 10/13/17  9:38 AM.  Always use your most recent med list.                   Brand Name Dispense Instructions for use Diagnosis    ADVIL PO           fluticasone 50 MCG/ACT spray    FLONASE    16 g    Spray 1-2 sprays into both nostrils daily    Left otitis media with spontaneous rupture of eardrum, Chronic rhinitis       gemfibrozil 600 MG tablet    LOPID    180 tablet    TAKE 1 TABLET (600  MG) BY MOUTH 2 TIMES DAILY    Hypertriglyceridemia       MULTI VITAMIN MENS PO      Take  by mouth.        omeprazole 40 MG capsule    priLOSEC    90 capsule    TAKE ONE CAPSULE BY MOUTH EVERY DAY    Gastroesophageal reflux disease without esophagitis       oxyCODONE-acetaminophen 5-325 MG per tablet    PERCOCET     Take 1-2 tablets by mouth        SUMAtriptan 100 MG tablet    IMITREX    18 tablet    TAKE 1 TAB BY MOUTH WITH ONSET OF MIGRAINE, MAY REPEAT ONCE AFTER 2 HOURS. MAX 2 TABS/24 HOURS.    Migraine with aura and without status migrainosus, not intractable       SYNTHROID 125 MCG tablet   Generic drug:  levothyroxine     90 tablet    TAKE 1 TABLET BY MOUTH ONCE DAILY    Subclinical hypothyroidism       tadalafil 20 MG tablet    CIALIS    8 tablet    Take 0.5-1 tablets (10-20 mg) by mouth daily as needed for erectile dysfunction Never use with nitroglycerin, terazosin or doxazosin.    Erectile dysfunction, unspecified erectile dysfunction type       topiramate 50 MG tablet    TOPAMAX    60 tablet    TAKE 1 TABLET BY MOUTH TWICE A DAY    Migraine with aura and without status migrainosus, not intractable

## 2017-10-20 ENCOUNTER — OFFICE VISIT (OUTPATIENT)
Dept: ORTHOPEDICS | Facility: OTHER | Age: 47
End: 2017-10-20
Payer: COMMERCIAL

## 2017-10-20 ENCOUNTER — RADIANT APPOINTMENT (OUTPATIENT)
Dept: GENERAL RADIOLOGY | Facility: OTHER | Age: 47
End: 2017-10-20
Attending: PHYSICAL MEDICINE & REHABILITATION
Payer: COMMERCIAL

## 2017-10-20 ENCOUNTER — OFFICE VISIT (OUTPATIENT)
Dept: ORTHOPEDICS | Facility: CLINIC | Age: 47
End: 2017-10-20
Payer: COMMERCIAL

## 2017-10-20 VITALS — HEIGHT: 72 IN | TEMPERATURE: 97.6 F | BODY MASS INDEX: 30.34 KG/M2 | WEIGHT: 224 LBS

## 2017-10-20 VITALS
HEIGHT: 72 IN | WEIGHT: 224 LBS | SYSTOLIC BLOOD PRESSURE: 134 MMHG | BODY MASS INDEX: 30.34 KG/M2 | DIASTOLIC BLOOD PRESSURE: 92 MMHG

## 2017-10-20 DIAGNOSIS — S42.142D CLOSED DISPLACED FRACTURE OF GLENOID CAVITY OF LEFT SCAPULA WITH ROUTINE HEALING, SUBSEQUENT ENCOUNTER: Primary | ICD-10-CM

## 2017-10-20 DIAGNOSIS — S42.102A CLOSED FRACTURE OF LEFT SCAPULA, UNSPECIFIED PART OF SCAPULA, INITIAL ENCOUNTER: ICD-10-CM

## 2017-10-20 PROCEDURE — 73010 X-RAY EXAM OF SHOULDER BLADE: CPT | Mod: LT

## 2017-10-20 PROCEDURE — 99204 OFFICE O/P NEW MOD 45 MIN: CPT | Performed by: ORTHOPAEDIC SURGERY

## 2017-10-20 PROCEDURE — 99207 ZZC FRACTURE CARE IN GLOBAL PERIOD: CPT | Performed by: PHYSICAL MEDICINE & REHABILITATION

## 2017-10-20 RX ORDER — OXYCODONE AND ACETAMINOPHEN 5; 325 MG/1; MG/1
1 TABLET ORAL EVERY 6 HOURS PRN
Qty: 60 TABLET | Refills: 0 | Status: SHIPPED | OUTPATIENT
Start: 2017-10-20 | End: 2017-11-06

## 2017-10-20 ASSESSMENT — PAIN SCALES - GENERAL: PAINLEVEL: MODERATE PAIN (5)

## 2017-10-20 NOTE — PROGRESS NOTES
Sports Medicine Clinic Visit - Interim History October 20, 2017    Initial Visit Date 10/9/17  Initial Injury Date 10/7/17    PCP: Nik Nina REHANA Cruz is a 47 year old male who is seen in f/u up for a left scapular fracture.  Since last visit on 10/9/17 patient has continued to have pain. He reports slight improvement. He is able to put the splint on by himself most days. Showering and dressing has improved.  He rates the pain at a 3/10 currently.  Symptoms are relieved with Percocet and rest.  Symptoms are worsened by any outward movement of the shoulder. He is flying for work next week.     - Now ~ 2 weeks from initial injury      Review of Systems  Musculoskeletal: as above  Remainder of review of systems is negative including constitutional, eyes, ENT, CV, pulmonary, GI, , endocrine, skin, hematologic, and neurologic except as noted in HPI or medical history.    History reviewed. No pertinent past surgical/medical/family/social history other than as mentioned in HPI.    Past Medical History:   Diagnosis Date     Chest pain      Esophageal reflux      Migraines      Mixed hyperlipidemia      DANIEL (obstructive sleep apnea)      Other specified gastritis without mention of hemorrhage     H. Pylori, diag 12/06     Past Surgical History:   Procedure Laterality Date     CHOLECYSTECTOMY, LAPOROSCOPIC  2004     SURGICAL HISTORY OF -   12/2006    upper GI, B[place]gi     Family History   Problem Relation Age of Onset     DIABETES Maternal Grandmother      C.A.D. Maternal Grandfather      DIABETES Maternal Grandfather      CANCER Maternal Grandfather      lung cancer     CEREBROVASCULAR DISEASE Paternal Grandmother      C.A.D. Paternal Grandfather      Other - See Comments Mother      bilateral carotid occlusion, SVG bypass to both.     Asthma No family hx of      Hypertension No family hx of      Breast Cancer No family hx of      Cancer - colorectal No family hx of      Prostate Cancer No family hx of       Alcohol/Drug No family hx of      Allergies No family hx of      Alzheimer Disease No family hx of      Anesthesia Reaction No family hx of      Arthritis No family hx of      Blood Disease No family hx of      Cardiovascular No family hx of      Circulatory No family hx of      Congenital Anomalies No family hx of      Connective Tissue Disorder No family hx of      Depression No family hx of      Endocrine Disease No family hx of      Eye Disorder No family hx of      Genetic Disorder No family hx of      GASTROINTESTINAL DISEASE No family hx of      Genitourinary Problems No family hx of      Gynecology No family hx of      HEART DISEASE No family hx of      Lipids No family hx of      Musculoskeletal Disorder No family hx of      Neurologic Disorder No family hx of      Obesity No family hx of      OSTEOPOROSIS No family hx of      Psychotic Disorder No family hx of      Respiratory No family hx of      Thyroid Disease No family hx of      Hearing Loss No family hx of      Coronary Artery Disease No family hx of      Mental Illness No family hx of      Depression/Anxiety No family hx of      Known Genetic Syndrome No family hx of      Social History     Social History     Marital status:      Spouse name: N/A     Number of children: 0     Years of education: N/A     Occupational History      [employer] construction     Social History Main Topics     Smoking status: Former Smoker     Types: Cigarettes     Quit date: 1/1/2000     Smokeless tobacco: Never Used     Alcohol use 0.0 oz/week     0 Standard drinks or equivalent per week      Comment: 1/month     Drug use: No     Sexual activity: Yes     Partners: Female     Birth control/ protection: Surgical      Comment: vasectomy     Other Topics Concern     Parent/Sibling W/ Cabg, Mi Or Angioplasty Before 65f 55m? No     Caffeine Concern Yes     large intake per day     Special Diet Yes     low cholesterol      Exercise No     Social  "History Narrative    Dairy/d 2-3 servings/d.     Caffeine 2-3 servings/d    Exercise 1-2 x week    Sunscreen used - NO, uses sunscreen in summer months    Seatbelts used - YES    Working smoke/CO detectors in the home - YES    Guns stored in the home - YES    Self Breast Exams - NA    Self Testicular Exam - YES    Eye Exam up to date - YES    Dental Exam up to date - YES    Pap Smear up to date - NA    Mammogram up to date - NA    PSA up to date - NA    Dexa Scan up to date - NA    Flex Sig / Colonoscopy up to date - NA    Immunizations up to date - YES    Abuse: Current or Past(Physical, Sexual or Emotional)- NO    Do you feel safe in your environment - YES    Claudia Salamanca CMA  1/29/2010               Current Outpatient Prescriptions   Medication     topiramate (TOPAMAX) 50 MG tablet     oxyCODONE-acetaminophen (PERCOCET) 5-325 MG per tablet     gemfibrozil (LOPID) 600 MG tablet     omeprazole (PRILOSEC) 40 MG capsule     SUMAtriptan (IMITREX) 100 MG tablet     SYNTHROID 125 MCG tablet     tadalafil (CIALIS) 20 MG tablet     fluticasone (FLONASE) 50 MCG/ACT nasal spray     Ibuprofen (ADVIL PO)     Multiple Vitamin (MULTI VITAMIN MENS PO)     No current facility-administered medications for this visit.      Allergies   Allergen Reactions     No Known Drug Allergies          Objective:  BP (!) 134/92  Ht 5' 11.75\" (1.822 m)  Wt 224 lb (101.6 kg)  BMI 30.59 kg/m2    General: Alert and in no distress    Head: Normocephalic, atraumatic  Eyes: no scleral icterus or conjunctival erythema   Oropharynx:  Mucous membranes moist  Skin: no erythema, petechiae, or jaundice  CV: regular rhythm by palpation, 2+ distal pulses  Resp: normal respiratory effort without conversational dyspnea   Psych: normal mood and affect    Gait: Non-antalgic, appropriate coordination and balance   Neuro: Motor strength and sensation as noted below    Musculoskeletal:    Bilateral Shoulder exam    Inspection and Posture:       rounded " shoulders and upper back    Skin:        Ecchymosis on left anterior shoulder    Palpation:  -No real tenderness over the left shoulder    ROM:   -Shoulder ROM not tested today.      Strength:       -Shoulder not tested       elbow flexion 4+/5 left, 5/5 right       elbow extension 4/5 left, 5/5 right       wrist flexion 5/5 bilaterally       wrist extension 5/5 bilaterally        strength 5/5 bilaterally       finger abduction 5/5 bilaterally    Sensation:        normal sensation over shoulder and upper extremity     Radiology:  Independent visualization of images performed.  Recent Results (from the past 744 hour(s))   XR Scapula Left    Narrative    LEFT SCAPULA   10/20/2017 9:32 AM     HISTORY: Fracture of unspecified part of scapula, left shoulder,  initial encounter for closed fracture.    COMPARISON: Outside left shoulder x-rays dated 10/7/2017.       Impression    IMPRESSION: Comminuted fracture of the left scapula at the base of the  glenoid is slightly impacted on the current study which was not seen  on the prior study. Displaced small inferior fragment is again noted.  No other changes.    I discussed these findings and changes with Dr. Schwab on 10/20/2017  at 9:53 AM.       Assessment:  1. Closed fracture of left scapula with routine healing, unspecified part of scapula, subsequent encounter        Plan:  Discussed the assessment with the patient and developed a plan together:  -X-ray shows the comminuted fracture of the left scapula at the base of the glenoid that is now more impacted.  Displaced small inferior fragment is again seen.  He is continuing to have significant pain.  I have scheduled him to see Dr. Deng is morning for further evaluation adm to determine if a surgical approach is necessary.      -Follow up with me as needed.  Please call with questions or concerns.      Gladis Schwab MD, CAQ  [facility]

## 2017-10-20 NOTE — MR AVS SNAPSHOT
After Visit Summary   10/20/2017    Linden Cruz    MRN: [id number]           Patient Information     Date Of Birth          1970        Visit Information        Provider Department      10/20/2017 9:00 AM Sierra Schwab MD [facility]        Today's Diagnoses     Closed displaced fracture of glenoid cavity of left scapula with routine healing, subsequent encounter    -  1       Follow-ups after your visit        Follow-up notes from your care team     Return if symptoms worsen or fail to improve.      Your next 10 appointments already scheduled     Oct 20, 2017 10:40 AM CDT   New Visit with Johana Deng MD   [facility] ([address] [address] [phone]            Dec 18, 2017  8:00 AM CST   Return Visit with Piyush Hernandez   [facility] ([address] [address] [phone]            Dec 18, 2017  8:15 AM CST   [facility] with Savage [facility]ia MD   [facility]    [address] [place] [address] [phone]              Who to contact     If you have questions or need follow up information about today's clinic visit or your schedule please contact [facility] directly at [phone].  Normal or non-critical lab and imaging results will be communicated to you by MyChart, letter or phone within 4 business days after the clinic has received the results. If you do not hear from us within 7 days, please contact the clinic through MyChart or phone. If you have a critical or abnormal lab result, we will notify you by phone as soon as possible.  Submit refill requests through [employer] or call your pharmacy and they will forward the refill request to us. Please allow 3 business days for your refill to be completed.          Additional  "Information About Your Visit        MyChart Information     [employer] gives you secure access to your electronic health record. If you see a primary care provider, you can also send messages to your care team and make appointments. If you have questions, please call your primary care clinic.  If you do not have a primary care provider, please call [phone] and they will assist you.        Care EveryWhere ID     This is your Care EveryWhere ID. This could be used by other organizations to access your [place] medical records  [id number]        Your Vitals Were     Height BMI (Body Mass Index)                5' 11.75\" (1.822 m) 30.59 kg/m2           Blood Pressure from Last 3 Encounters:   10/20/17 (!) 134/92   10/13/17 136/80   01/04/17 128/82    Weight from Last 3 Encounters:   10/20/17 224 lb (101.6 kg)   10/13/17 224 lb (101.6 kg)   10/09/17 223 lb (101.2 kg)               Primary Care Provider Office Phone # Fax #    Nik Nina PA-C [phone]       [address] [facility] [address]  [facility] [address]        Equal Access to Services     [facility] : Hadii aad ku hadasho Soomaali, waaxda luqadaha, qaybta kaalmada adeegyada, darien lawler haypinkyn richard truongn ah. So [facility] [phone].    ATENCIÓN: Si habla español, tiene a krishnamurthy disposición servicios gratuitos de asistencia lingüística. Amara al [phone].    We comply with applicable federal civil rights laws and [place] laws. We do not discriminate on the basis of race, color, national origin, age, disability, sex, sexual orientation, or gender identity.            Thank you!     Thank you for choosing [facility]  for your care. Our goal is always to provide you with excellent care. Hearing back from our patients is one way we can continue to improve our services. Please take a few minutes to complete the written survey that you may receive in the mail after your visit with us. Thank you!             Your Updated " Medication List - Protect others around you: Learn how to safely use, store and throw away your medicines at www.disposemymeds.org.          This list is accurate as of: 10/20/17 10:10 AM.  Always use your most recent med list.                   Brand Name Dispense Instructions for use Diagnosis    ADVIL PO           fluticasone 50 MCG/ACT spray    FLONASE    16 g    Spray 1-2 sprays into both nostrils daily    Left otitis media with spontaneous rupture of eardrum, Chronic rhinitis       gemfibrozil 600 MG tablet    LOPID    180 tablet    TAKE 1 TABLET (600 MG) BY MOUTH 2 TIMES DAILY    Hypertriglyceridemia       MULTI VITAMIN MENS PO      Take  by mouth.        omeprazole 40 MG capsule    priLOSEC    90 capsule    TAKE ONE CAPSULE BY MOUTH EVERY DAY    Gastroesophageal reflux disease without esophagitis       oxyCODONE-acetaminophen 5-325 MG per tablet    PERCOCET    20 tablet    Take 1-2 tablets by mouth every 6 hours as needed for moderate to severe pain    Closed fracture of left scapula, unspecified part of scapula, initial encounter       SUMAtriptan 100 MG tablet    IMITREX    18 tablet    TAKE 1 TAB BY MOUTH WITH ONSET OF MIGRAINE, MAY REPEAT ONCE AFTER 2 HOURS. MAX 2 TABS/24 HOURS.    Migraine with aura and without status migrainosus, not intractable       SYNTHROID 125 MCG tablet   Generic drug:  levothyroxine     90 tablet    TAKE 1 TABLET BY MOUTH ONCE DAILY    Subclinical hypothyroidism       tadalafil 20 MG tablet    CIALIS    8 tablet    Take 0.5-1 tablets (10-20 mg) by mouth daily as needed for erectile dysfunction Never use with nitroglycerin, terazosin or doxazosin.    Erectile dysfunction, unspecified erectile dysfunction type       topiramate 50 MG tablet    TOPAMAX    60 tablet    TAKE 1 TABLET BY MOUTH TWICE A DAY    Migraine with aura and without status migrainosus, not intractable
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
## 2017-10-20 NOTE — MR AVS SNAPSHOT
After Visit Summary   10/20/2017    Linden Cruz    MRN: [id number]           Patient Information     Date Of Birth          1970        Visit Information        Provider Department      10/20/2017 10:40 AM Johana Deng MD [facility]        Today's Diagnoses     Closed fracture of left scapula, unspecified part of scapula, initial encounter           Follow-ups after your visit        Your next 10 appointments already scheduled     Nov 06, 2017  9:00 AM CST   Return Visit with Johana Deng MD   [facility] ([address] [id number] [phone]            Dec 18, 2017  8:00 AM CST   Return Visit with Piyush Hernandez   [facility] ([address] [id number] [phone]            Dec 18, 2017  8:15 AM CST   Return Visit with Savage Mejia MD   [facility] ([address] [phone]              Who to contact     If you have questions or need follow up information about today's clinic visit or your schedule please contact [facility] directly at [phone].  Normal or non-critical lab and imaging results will be communicated to you by MyChart, letter or phone within 4 business days after the clinic has received the results. If you do not hear from us within 7 days, please contact the clinic through MyChart or phone. If you have a critical or abnormal lab result, we will notify you by phone as soon as possible.  Submit refill requests through [employer] or call your pharmacy and they will forward the refill request to us. Please allow 3 business days for your refill to be completed.          Additional Information About Your Visit        [employer] Information     [employer] gives you secure access to your electronic health record.  "If you see a primary care provider, you can also send messages to your care team and make appointments. If you have questions, please call your primary care clinic.  If you do not have a primary care provider, please call [phone] and they will assist you.        Care EveryWhere ID     This is your Care EveryWhere ID. This could be used by other organizations to access your [place] medical records  [id number]        Your Vitals Were     Temperature Height BMI (Body Mass Index)             97.6  F (36.4  C) 1.822 m (5' 11.75\") 30.59 kg/m2          Blood Pressure from Last 3 Encounters:   10/20/17 (!) 134/92   10/13/17 136/80   01/04/17 128/82    Weight from Last 3 Encounters:   10/20/17 101.6 kg (224 lb)   10/20/17 101.6 kg (224 lb)   10/13/17 101.6 kg (224 lb)              Today, you had the following     No orders found for display         Today's Medication Changes          These changes are accurate as of: 10/20/17 12:47 PM.  If you have any questions, ask your nurse or doctor.               These medicines have changed or have updated prescriptions.        Dose/Directions    * oxyCODONE-acetaminophen 5-325 MG per tablet   Commonly known as:  PERCOCET   This may have changed:  Another medication with the same name was added. Make sure you understand how and when to take each.   Used for:  Closed fracture of left scapula, unspecified part of scapula, initial encounter   Changed by:  Sierra Schwab MD        Dose:  1-2 tablet   Take 1-2 tablets by mouth every 6 hours as needed for moderate to severe pain   Quantity:  20 tablet   Refills:  0       * oxyCODONE-acetaminophen 5-325 MG per tablet   Commonly known as:  PERCOCET   This may have changed:  You were already taking a medication with the same name, and this prescription was added. Make sure you understand how and when to take each.   Used for:  Closed fracture of left scapula, unspecified part of scapula, initial encounter   Changed by:  " Johana Deng MD        Dose:  1 tablet   Take 1 tablet by mouth every 6 hours as needed for moderate to severe pain   Quantity:  60 tablet   Refills:  0       * Notice:  This list has 2 medication(s) that are the same as other medications prescribed for you. Read the directions carefully, and ask your doctor or other care provider to review them with you.         Where to get your medicines      Some of these will need a paper prescription and others can be bought over the counter.  Ask your nurse if you have questions.     Bring a paper prescription for each of these medications     oxyCODONE-acetaminophen 5-325 MG per tablet                Primary Care Provider Office Phone # Fax #    Nik Daryl Nina PA-C [phone]       [address] [address]        Equal Access to Services     SMILEY CRUZ : Pedrito blanko Sodanny, waaxda luqadaha, qaybta kaalmada adeegyada, darien shaw . [facility] [phone].    ATENCIÓN: Si habla español, tiene a krishnamurthy disposición servicios gratuitos de asistencia lingüística. [facility] [phone].    We comply with applicable federal civil rights laws and [place] laws. We do not discriminate on the basis of race, color, national origin, age, disability, sex, sexual orientation, or gender identity.            Thank you!     Thank you for choosing [facility]  for your care. Our goal is always to provide you with excellent care. Hearing back from our patients is one way we can continue to improve our services. Please take a few minutes to complete the written survey that you may receive in the mail after your visit with us. Thank you!             Your Updated Medication List - Protect others around you: Learn how to safely use, store and throw away your medicines at www.disposemymeds.org.          This list is accurate as of: 10/20/17 12:47 PM.  Always use your most recent med list.                    Brand Name Dispense Instructions for use Diagnosis    ADVIL PO           fluticasone 50 MCG/ACT spray    FLONASE    16 g    Spray 1-2 sprays into both nostrils daily    Left otitis media with spontaneous rupture of eardrum, Chronic rhinitis       gemfibrozil 600 MG tablet    LOPID    180 tablet    TAKE 1 TABLET (600 MG) BY MOUTH 2 TIMES DAILY    Hypertriglyceridemia       MULTI VITAMIN MENS PO      Take  by mouth.        omeprazole 40 MG capsule    priLOSEC    90 capsule    TAKE ONE CAPSULE BY MOUTH EVERY DAY    Gastroesophageal reflux disease without esophagitis       * oxyCODONE-acetaminophen 5-325 MG per tablet    PERCOCET    20 tablet    Take 1-2 tablets by mouth every 6 hours as needed for moderate to severe pain    Closed fracture of left scapula, unspecified part of scapula, initial encounter       * oxyCODONE-acetaminophen 5-325 MG per tablet    PERCOCET    60 tablet    Take 1 tablet by mouth every 6 hours as needed for moderate to severe pain    Closed fracture of left scapula, unspecified part of scapula, initial encounter       SUMAtriptan 100 MG tablet    IMITREX    18 tablet    TAKE 1 TAB BY MOUTH WITH ONSET OF MIGRAINE, MAY REPEAT ONCE AFTER 2 HOURS. MAX 2 TABS/24 HOURS.    Migraine with aura and without status migrainosus, not intractable       SYNTHROID 125 MCG tablet   Generic drug:  levothyroxine     90 tablet    TAKE 1 TABLET BY MOUTH ONCE DAILY    Subclinical hypothyroidism       tadalafil 20 MG tablet    CIALIS    8 tablet    Take 0.5-1 tablets (10-20 mg) by mouth daily as needed for erectile dysfunction Never use with nitroglycerin, terazosin or doxazosin.    Erectile dysfunction, unspecified erectile dysfunction type       topiramate 50 MG tablet    TOPAMAX    60 tablet    TAKE 1 TABLET BY MOUTH TWICE A DAY    Migraine with aura and without status migrainosus, not intractable       * Notice:  This list has 2 medication(s) that are the same as other medications prescribed for you. Read  the directions carefully, and ask your doctor or other care provider to review them with you.
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
## 2017-10-30 ENCOUNTER — TELEPHONE (OUTPATIENT)
Dept: FAMILY MEDICINE | Facility: OTHER | Age: 47
End: 2017-10-30

## 2017-10-30 DIAGNOSIS — N52.9 ERECTILE DYSFUNCTION, UNSPECIFIED ERECTILE DYSFUNCTION TYPE: ICD-10-CM

## 2017-10-30 RX ORDER — TADALAFIL 20 MG/1
10-20 TABLET ORAL DAILY PRN
Qty: 8 TABLET | Refills: 11 | Status: CANCELLED | OUTPATIENT
Start: 2017-10-30

## 2017-11-01 ENCOUNTER — MYC MEDICAL ADVICE (OUTPATIENT)
Dept: FAMILY MEDICINE | Facility: OTHER | Age: 47
End: 2017-11-01

## 2017-11-01 DIAGNOSIS — M54.41 ACUTE RIGHT-SIDED LOW BACK PAIN WITH RIGHT-SIDED SCIATICA: Primary | ICD-10-CM

## 2017-11-01 NOTE — TELEPHONE ENCOUNTER
Will route to JM to review/advise. Are you willing to write referral or should patient be seen again? Last OV/physical 10/13.  Precious Cruz, CMA

## 2017-11-02 ENCOUNTER — THERAPY VISIT (OUTPATIENT)
Dept: PHYSICAL THERAPY | Facility: CLINIC | Age: 47
End: 2017-11-02
Payer: COMMERCIAL

## 2017-11-02 DIAGNOSIS — M54.41 ACUTE RIGHT-SIDED LOW BACK PAIN WITH RIGHT-SIDED SCIATICA: Primary | ICD-10-CM

## 2017-11-02 PROCEDURE — 97110 THERAPEUTIC EXERCISES: CPT | Mod: GP | Performed by: PHYSICAL THERAPIST

## 2017-11-02 PROCEDURE — 97140 MANUAL THERAPY 1/> REGIONS: CPT | Mod: GP | Performed by: PHYSICAL THERAPIST

## 2017-11-02 PROCEDURE — 97161 PT EVAL LOW COMPLEX 20 MIN: CPT | Mod: GP | Performed by: PHYSICAL THERAPIST
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
## 2017-11-06 ENCOUNTER — RADIANT APPOINTMENT (OUTPATIENT)
Dept: GENERAL RADIOLOGY | Facility: CLINIC | Age: 47
End: 2017-11-06
Attending: ORTHOPAEDIC SURGERY
Payer: COMMERCIAL

## 2017-11-06 ENCOUNTER — OFFICE VISIT (OUTPATIENT)
Dept: ORTHOPEDICS | Facility: CLINIC | Age: 47
End: 2017-11-06
Payer: COMMERCIAL

## 2017-11-06 VITALS — WEIGHT: 213 LBS | BODY MASS INDEX: 29.09 KG/M2 | TEMPERATURE: 97.3 F

## 2017-11-06 DIAGNOSIS — S42.102A CLOSED FRACTURE OF LEFT SCAPULA, UNSPECIFIED PART OF SCAPULA, INITIAL ENCOUNTER: ICD-10-CM

## 2017-11-06 DIAGNOSIS — S42.142D CLOSED DISPLACED FRACTURE OF GLENOID CAVITY OF LEFT SCAPULA WITH ROUTINE HEALING, SUBSEQUENT ENCOUNTER: ICD-10-CM

## 2017-11-06 DIAGNOSIS — S42.102S CLOSED FRACTURE OF LEFT SCAPULA, UNSPECIFIED PART OF SCAPULA, SEQUELA: Primary | ICD-10-CM

## 2017-11-06 DIAGNOSIS — S42.102S CLOSED FRACTURE OF LEFT SCAPULA, UNSPECIFIED PART OF SCAPULA, SEQUELA: ICD-10-CM

## 2017-11-06 PROCEDURE — 99214 OFFICE O/P EST MOD 30 MIN: CPT | Performed by: ORTHOPAEDIC SURGERY

## 2017-11-06 PROCEDURE — 73010 X-RAY EXAM OF SHOULDER BLADE: CPT | Mod: TC

## 2017-11-06 RX ORDER — OXYCODONE AND ACETAMINOPHEN 5; 325 MG/1; MG/1
1 TABLET ORAL EVERY 6 HOURS PRN
Qty: 60 TABLET | Refills: 0 | Status: SHIPPED | OUTPATIENT
Start: 2017-11-06 | End: 2017-12-08

## 2017-11-06 ASSESSMENT — PAIN SCALES - GENERAL: PAINLEVEL: MILD PAIN (2)
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
## 2017-11-06 NOTE — MR AVS SNAPSHOT
After Visit Summary   11/6/2017    Linden Cruz    MRN: [id number]           Patient Information     Date Of Birth          1970        Visit Information        Provider Department      11/6/2017 9:00 AM Johana Deng MD [facility]        Today's Diagnoses     Closed fracture of left scapula, unspecified part of scapula, sequela    -  1    Closed fracture of left scapula, unspecified part of scapula, initial encounter        Closed displaced fracture of glenoid cavity of left scapula with routine healing, subsequent encounter           Follow-ups after your visit        Additional Services     [facility] PT, HAND, AND CHIROPRACTIC REFERRAL       **This order will print in the [facility] Scheduling Office**    Physical Therapy, Hand Therapy and Chiropractic Care are available through:    *[facility]  *[facility]  *[facility]    Call one number to schedule at any of the above locations: [phone].    Your provider has referred you to: Physical Therapy at [facility] or [facility]    Indication/Reason for Referral: left shoulder ROM  Onset of Illness: 10/7/17  Therapy Orders: Evaluate and Treat  Special Programs: already enrolled for right hip  Special Request: Toni Weaver      Additional Comments for the Therapist or Chiropractor:     Please be aware that coverage of these services is subject to the terms and limitations of your health insurance plan.  Call member services at your health plan with any benefit or coverage questions.      Please bring the following to your appointment:    *Your personal calendar for scheduling future appointments  *Comfortable clothing                  Your next 10 appointments already scheduled     Nov 10, 2017  2:50 PM CST   [facility] Spine with Keenan Bryant PT   [facility] ([facility]  )    [address]  [facility] [address]    [phone]            Nov 13, 2017  9:00 AM CST   [facility] Spine with Keenan Bryant PT   [facility] ([facility] [place]  )    [address]  [place] [address]   [phone]            Dec 08, 2017 11:10 AM CST   [facility] with Joh[facility]eng MD   [facility]    [address] [place] [id number] [phone]            Dec 18, 2017  8:00 AM CST   Return Visit with Piyush Hernandez   [facility] ([address] [phone] [phone]            Dec 18, 2017  8:15 AM CST   Return Visit with Savage Mejia MD   [facility] ([address] [phone] [phone]              Who to contact     If you have questions or need follow up information about today's clinic visit or your schedule please contact [facility] directly at [phone].  Normal or non-critical lab and imaging results will be communicated to you by WestWinghart, letter or phone within 4 business days after the clinic has received the results. If you do not hear from us within 7 days, please contact the clinic through [employer]Wing[employer] or phone. If you have a critical or abnormal lab result, we will notify you by phone as soon as possible.  Submit refill requests through ChartsNow (now Music[employer]ubed) or call your pharmacy and they will forward the refill request to us. Please allow 3 business days for your refill to be completed.          Additional Information About Your Visit        WestWinghart Information     [employer] (now [employer]) gives you secure access to your electronic health record. If you see a primary care provider, you can also send messages to your care team and make appointments. If you have questions, please call your primary care clinic.  If you do not have a primary care provider, please call  [phone] and they will assist you.        Care EveryWhere ID     This is your Care EveryWhere ID. This could be used by other organizations to access your [place] medical records  [id number]        Your Vitals Were     Temperature BMI (Body Mass Index)                97.3  F (36.3  C) 29.09 kg/m2           Blood Pressure from Last 3 Encounters:   10/20/17 (!) 134/92   10/13/17 136/80   01/04/17 128/82    Weight from Last 3 Encounters:   11/06/17 96.6 kg (213 lb)   10/20/17 101.6 kg (224 lb)   10/20/17 101.6 kg (224 lb)              We Performed the Following     [facility] PT, HAND, AND CHIROPRACTIC REFERRAL          Where to get your medicines      Some of these will need a paper prescription and others can be bought over the counter.  Ask your nurse if you have questions.     Bring a paper prescription for each of these medications     oxyCODONE-acetaminophen 5-325 MG per tablet          Primary Care Provider Office Phone # Fax #    Nik Nina PA-C [phone]       [address] [place] [address] 100  [facility] [address]        Equal Access to Services     [facility]: Hadii bernabe blanko Sodanny, waaxda luqadaha, qaybta kaalmada adealberto, darien shaw . So [facility] [phone].    ATENCIÓN: Si habla español, tiene a krishnamurthy disposición servicios gratuitos de asistencia lingüística. Llame al [phone].    We comply with applicable federal civil rights laws and [place] laws. We do not discriminate on the basis of race, color, national origin, age, disability, sex, sexual orientation, or gender identity.            Thank you!     Thank you for choosing [facility]  for your care. Our goal is always to provide you with excellent care. Hearing back from our patients is one way we can continue to improve our services. Please take a few minutes to complete the written survey that you may receive in the mail after your visit with us. Thank you!             Your  Updated Medication List - Protect others around you: Learn how to safely use, store and throw away your medicines at www.disposemymeds.org.          This list is accurate as of: 11/6/17  4:12 PM.  Always use your most recent med list.                   Brand Name Dispense Instructions for use Diagnosis    ADVIL PO           fluticasone 50 MCG/ACT spray    FLONASE    16 g    Spray 1-2 sprays into both nostrils daily    Left otitis media with spontaneous rupture of eardrum, Chronic rhinitis       gemfibrozil 600 MG tablet    LOPID    180 tablet    TAKE 1 TABLET (600 MG) BY MOUTH 2 TIMES DAILY    Hypertriglyceridemia       MULTI VITAMIN MENS PO      Take  by mouth.        omeprazole 40 MG capsule    priLOSEC    90 capsule    TAKE ONE CAPSULE BY MOUTH EVERY DAY    Gastroesophageal reflux disease without esophagitis       * oxyCODONE-acetaminophen 5-325 MG per tablet    PERCOCET    20 tablet    Take 1-2 tablets by mouth every 6 hours as needed for moderate to severe pain    Closed fracture of left scapula, unspecified part of scapula, initial encounter       * oxyCODONE-acetaminophen 5-325 MG per tablet    PERCOCET    60 tablet    Take 1 tablet by mouth every 6 hours as needed for moderate to severe pain    Closed fracture of left scapula, unspecified part of scapula, initial encounter       SUMAtriptan 100 MG tablet    IMITREX    18 tablet    TAKE 1 TAB BY MOUTH WITH ONSET OF MIGRAINE, MAY REPEAT ONCE AFTER 2 HOURS. MAX 2 TABS/24 HOURS.    Migraine with aura and without status migrainosus, not intractable       SYNTHROID 125 MCG tablet   Generic drug:  levothyroxine     90 tablet    TAKE 1 TABLET BY MOUTH ONCE DAILY    Subclinical hypothyroidism       tadalafil 20 MG tablet    CIALIS    8 tablet    Take 0.5-1 tablets (10-20 mg) by mouth daily as needed for erectile dysfunction Never use with nitroglycerin, terazosin or doxazosin.    Erectile dysfunction, unspecified erectile dysfunction type       topiramate 50 MG  tablet    TOPAMAX    60 tablet    TAKE 1 TABLET BY MOUTH TWICE A DAY    Migraine with aura and without status migrainosus, not intractable       * Notice:  This list has 2 medication(s) that are the same as other medications prescribed for you. Read the directions carefully, and ask your doctor or other care provider to review them with you.
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
## 2017-11-07 ENCOUNTER — TELEPHONE (OUTPATIENT)
Dept: FAMILY MEDICINE | Facility: OTHER | Age: 47
End: 2017-11-07

## 2017-11-07 NOTE — TELEPHONE ENCOUNTER
Prior Authorization Retail Medication Request  Medication/Dose: Cialis   Diagnosis and ICD code:   New/Renewal/Insurance Change PA:   Previously Tried and Failed Therapies:     Insurance ID (if provided): [employer]   Insurance Phone (if provided): [phone]    Any additional info from fax request:     If you received a fax notification from an outside Pharmacy:  Pharmacy Name: [facility]   Pharmacy #:  Pharmacy Fax:

## 2017-11-08 NOTE — TELEPHONE ENCOUNTER
[facility] Prior Authorization Team   Phone: [phone]  Fax: [phone]    PA Initiation    Medication: Cialis   Insurance Company: [employer] - Phone [phone] Fax [phone]  Pharmacy Filling the Rx: [id number] [place] [facility] [place] [facility] [address] [address] [place] [address]  Filling Pharmacy Phone: [phone]  Filling Pharmacy Fax: [phone]  Start Date: 11/8/2017

## 2017-11-10 ENCOUNTER — THERAPY VISIT (OUTPATIENT)
Dept: PHYSICAL THERAPY | Facility: CLINIC | Age: 47
End: 2017-11-10
Payer: COMMERCIAL

## 2017-11-10 DIAGNOSIS — M54.41 ACUTE RIGHT-SIDED LOW BACK PAIN WITH RIGHT-SIDED SCIATICA: ICD-10-CM

## 2017-11-10 PROCEDURE — 97140 MANUAL THERAPY 1/> REGIONS: CPT | Mod: GP | Performed by: PHYSICAL THERAPIST

## 2017-11-10 PROCEDURE — 97110 THERAPEUTIC EXERCISES: CPT | Mod: GP | Performed by: PHYSICAL THERAPIST

## 2017-11-13 ENCOUNTER — THERAPY VISIT (OUTPATIENT)
Dept: PHYSICAL THERAPY | Facility: CLINIC | Age: 47
End: 2017-11-13
Payer: COMMERCIAL

## 2017-11-13 DIAGNOSIS — M54.41 ACUTE RIGHT-SIDED LOW BACK PAIN WITH RIGHT-SIDED SCIATICA: ICD-10-CM

## 2017-11-13 PROCEDURE — 97140 MANUAL THERAPY 1/> REGIONS: CPT | Mod: GP | Performed by: PHYSICAL THERAPIST

## 2017-11-13 PROCEDURE — 97110 THERAPEUTIC EXERCISES: CPT | Mod: GP | Performed by: PHYSICAL THERAPIST

## 2017-11-21 ENCOUNTER — THERAPY VISIT (OUTPATIENT)
Dept: PHYSICAL THERAPY | Facility: CLINIC | Age: 47
End: 2017-11-21
Payer: COMMERCIAL

## 2017-11-21 DIAGNOSIS — R73.01 IMPAIRED FASTING GLUCOSE: ICD-10-CM

## 2017-11-21 DIAGNOSIS — E78.5 HYPERLIPIDEMIA LDL GOAL <130: ICD-10-CM

## 2017-11-21 DIAGNOSIS — M25.512 ACUTE PAIN OF LEFT SHOULDER: Primary | ICD-10-CM

## 2017-11-21 DIAGNOSIS — E03.8 SUBCLINICAL HYPOTHYROIDISM: ICD-10-CM

## 2017-11-21 DIAGNOSIS — E78.1 HYPERTRIGLYCERIDEMIA: ICD-10-CM

## 2017-11-21 DIAGNOSIS — Z00.00 ENCOUNTER FOR ROUTINE ADULT HEALTH EXAMINATION WITHOUT ABNORMAL FINDINGS: ICD-10-CM

## 2017-11-21 LAB
ALBUMIN SERPL-MCNC: 4.3 G/DL (ref 3.4–5)
ALP SERPL-CCNC: 134 U/L (ref 40–150)
ALT SERPL W P-5'-P-CCNC: 28 U/L (ref 0–70)
ANION GAP SERPL CALCULATED.3IONS-SCNC: 8 MMOL/L (ref 3–14)
AST SERPL W P-5'-P-CCNC: 17 U/L (ref 0–45)
BILIRUB SERPL-MCNC: 0.4 MG/DL (ref 0.2–1.3)
BUN SERPL-MCNC: 16 MG/DL (ref 7–30)
CALCIUM SERPL-MCNC: 8.6 MG/DL (ref 8.5–10.1)
CHLORIDE SERPL-SCNC: 110 MMOL/L (ref 94–109)
CHOLEST SERPL-MCNC: 189 MG/DL
CO2 SERPL-SCNC: 22 MMOL/L (ref 20–32)
CREAT SERPL-MCNC: 0.87 MG/DL (ref 0.66–1.25)
GFR SERPL CREATININE-BSD FRML MDRD: >90 ML/MIN/1.7M2
GLUCOSE SERPL-MCNC: 105 MG/DL (ref 70–99)
HDLC SERPL-MCNC: 42 MG/DL
LDLC SERPL CALC-MCNC: 97 MG/DL
NONHDLC SERPL-MCNC: 147 MG/DL
POTASSIUM SERPL-SCNC: 4.1 MMOL/L (ref 3.4–5.3)
PROT SERPL-MCNC: 7.9 G/DL (ref 6.8–8.8)
SODIUM SERPL-SCNC: 140 MMOL/L (ref 133–144)
TRIGL SERPL-MCNC: 248 MG/DL
TSH SERPL DL<=0.005 MIU/L-ACNC: 3.39 MU/L (ref 0.4–4)

## 2017-11-21 PROCEDURE — 80053 COMPREHEN METABOLIC PANEL: CPT | Performed by: PHYSICIAN ASSISTANT

## 2017-11-21 PROCEDURE — 80061 LIPID PANEL: CPT | Performed by: PHYSICIAN ASSISTANT

## 2017-11-21 PROCEDURE — 97110 THERAPEUTIC EXERCISES: CPT | Mod: GP | Performed by: PHYSICAL THERAPIST

## 2017-11-21 PROCEDURE — 84443 ASSAY THYROID STIM HORMONE: CPT | Performed by: PHYSICIAN ASSISTANT

## 2017-11-21 PROCEDURE — 97161 PT EVAL LOW COMPLEX 20 MIN: CPT | Mod: GP | Performed by: PHYSICAL THERAPIST

## 2017-11-21 PROCEDURE — 36415 COLL VENOUS BLD VENIPUNCTURE: CPT | Performed by: PHYSICIAN ASSISTANT

## 2017-11-21 NOTE — MR AVS SNAPSHOT
After Visit Summary   11/21/2017    Linden Cruz    MRN: [id number]           Patient Information     Date Of Birth          1970        Visit Information        Provider Department      11/21/2017 8:00 AM Keenan Bryant, PT [facility]        Today's Diagnoses     Acute pain of left shoulder    -  1       Follow-ups after your visit        Your next 10 appointments already scheduled     Nov 30, 2017  3:10 PM CST   [facility] Extremity with Keenan Bryant PT   [place] [facility] ([facility] [place]  )    [address]  [facility] [address]   [phone]            Dec 08, 2017 11:10 AM CST   Return Visit with Johana Deng MD   [facility] ([address] [address] [phone]            Dec 08, 2017  2:50 PM CST   [facility] Extremity with Keenan Bryant PT   [facility] ([facility] [place]  )    [address]  [facility] [address]   [phone]            Dec 18, 2017  8:00 AM CST   Return Visit with Piyush Hernandez   [facility] ([address] [phone] [phone]            Dec 18, 2017  8:15 AM CST   Return Visit with Savage Mejia MD   [facility] ([address] [phone] [phone]              Who to contact     If you have questions or need follow up information about today's clinic visit or your schedule please contact [facility] [facility] directly at [phone].  Normal or non-critical lab and imaging results will be communicated to you by MyChart, letter or phone within 4 business days after the clinic has received the results. If you do not hear from us  within 7 days, please contact the clinic through [employer] or phone. If you have a critical or abnormal lab result, we will notify you by phone as soon as possible.  Submit refill requests through Apica or call your pharmacy and they will forward the refill request to us. Please allow 3 business days for your refill to be completed.          Additional Information About Your Visit        Issuehart Information     Apica gives you secure access to your electronic health record. If you see a primary care provider, you can also send messages to your care team and make appointments. If you have questions, please call your primary care clinic.  If you do not have a primary care provider, please call [phone] and they will assist you.        Care EveryWhere ID     This is your Care EveryWhere ID. This could be used by other organizations to access your [place] medical records  [id number]         Blood Pressure from Last 3 Encounters:   10/20/17 (!) 134/92   10/13/17 136/80   01/04/17 128/82    Weight from Last 3 Encounters:   11/06/17 96.6 kg (213 lb)   10/20/17 101.6 kg (224 lb)   10/20/17 101.6 kg (224 lb)              We Performed the Following     [facility] PT EVAL, LOW COMPLEXITY     [facility] INITIAL EVAL REPORT     THERAPEUTIC EXERCISES        Primary Care Provider Office Phone # Fax #    Nik Nina PA-C [phone]       [address] [address]        Equal Access to Services     [facility]: Hadii aad ku hadasho Soomaali, waaxda luqadaha, qaybta kaalmada adeegyada, darien shaw . So [facility] [phone].    ATENCIÓN: Si habla español, tiene a krishnamurthy disposición servicios gratuitos de asistencia lingüística. Llame al [phone].    We comply with applicable federal civil rights laws and [place] laws. We do not discriminate on the basis of race, color, national origin, age, disability, sex, sexual orientation, or gender identity.            Thank you!      Thank you for choosing [place] [facility]  for your care. Our goal is always to provide you with excellent care. Hearing back from our patients is one way we can continue to improve our services. Please take a few minutes to complete the written survey that you may receive in the mail after your visit with us. Thank you!             Your Updated Medication List - Protect others around you: Learn how to safely use, store and throw away your medicines at www.disposemymeds.org.          This list is accurate as of: 11/21/17  9:18 AM.  Always use your most recent med list.                   Brand Name Dispense Instructions for use Diagnosis    ADVIL PO           fluticasone 50 MCG/ACT spray    FLONASE    16 g    Spray 1-2 sprays into both nostrils daily    Left otitis media with spontaneous rupture of eardrum, Chronic rhinitis       gemfibrozil 600 MG tablet    LOPID    180 tablet    TAKE 1 TABLET (600 MG) BY MOUTH 2 TIMES DAILY    Hypertriglyceridemia       MULTI VITAMIN MENS PO      Take  by mouth.        omeprazole 40 MG capsule    priLOSEC    90 capsule    TAKE ONE CAPSULE BY MOUTH EVERY DAY    Gastroesophageal reflux disease without esophagitis       * oxyCODONE-acetaminophen 5-325 MG per tablet    PERCOCET    20 tablet    Take 1-2 tablets by mouth every 6 hours as needed for moderate to severe pain    Closed fracture of left scapula, unspecified part of scapula, initial encounter       * oxyCODONE-acetaminophen 5-325 MG per tablet    PERCOCET    60 tablet    Take 1 tablet by mouth every 6 hours as needed for moderate to severe pain    Closed fracture of left scapula, unspecified part of scapula, initial encounter       SUMAtriptan 100 MG tablet    IMITREX    18 tablet    TAKE 1 TAB BY MOUTH WITH ONSET OF MIGRAINE, MAY REPEAT ONCE AFTER 2 HOURS. MAX 2 TABS/24 HOURS.    Migraine with aura and without status migrainosus, not intractable       SYNTHROID 125 MCG tablet    Generic drug:  levothyroxine     90 tablet    TAKE 1 TABLET BY MOUTH ONCE DAILY    Subclinical hypothyroidism       tadalafil 20 MG tablet    CIALIS    8 tablet    Take 0.5-1 tablets (10-20 mg) by mouth daily as needed for erectile dysfunction Never use with nitroglycerin, terazosin or doxazosin.    Erectile dysfunction, unspecified erectile dysfunction type       topiramate 50 MG tablet    TOPAMAX    60 tablet    TAKE 1 TABLET BY MOUTH TWICE A DAY    Migraine with aura and without status migrainosus, not intractable       * Notice:  This list has 2 medication(s) that are the same as other medications prescribed for you. Read the directions carefully, and ask your doctor or other care provider to review them with you.
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
## 2017-11-23 ENCOUNTER — MYC MEDICAL ADVICE (OUTPATIENT)
Dept: FAMILY MEDICINE | Facility: OTHER | Age: 47
End: 2017-11-23

## 2017-11-23 DIAGNOSIS — E03.8 SUBCLINICAL HYPOTHYROIDISM: ICD-10-CM

## 2017-11-23 DIAGNOSIS — N52.9 ERECTILE DYSFUNCTION, UNSPECIFIED ERECTILE DYSFUNCTION TYPE: ICD-10-CM

## 2017-11-24 RX ORDER — TADALAFIL 20 MG/1
TABLET ORAL
Qty: 8 TABLET | Refills: 10 | Status: SHIPPED | OUTPATIENT
Start: 2017-11-24 | End: 2018-12-14

## 2017-11-24 RX ORDER — LEVOTHYROXINE SODIUM 125 UG/1
125 TABLET ORAL DAILY
Qty: 90 TABLET | Refills: 2 | Status: CANCELLED | OUTPATIENT
Start: 2017-11-24

## 2017-11-24 NOTE — TELEPHONE ENCOUNTER
cialis  Prescription approved per [facility] [place] [place] Refill Protocol.    Synthroid  rx was sent to pharmacy on 3/21/17 for a 3 month supply with 2 refills.  Pt shouldn't be out until the end of December.    Kym Damon RN

## 2017-11-30 ENCOUNTER — MYC MEDICAL ADVICE (OUTPATIENT)
Dept: OTOLARYNGOLOGY | Facility: CLINIC | Age: 47
End: 2017-11-30

## 2017-11-30 ENCOUNTER — THERAPY VISIT (OUTPATIENT)
Dept: PHYSICAL THERAPY | Facility: CLINIC | Age: 47
End: 2017-11-30
Payer: COMMERCIAL

## 2017-11-30 DIAGNOSIS — M54.41 ACUTE RIGHT-SIDED LOW BACK PAIN WITH RIGHT-SIDED SCIATICA: ICD-10-CM

## 2017-11-30 PROCEDURE — 97140 MANUAL THERAPY 1/> REGIONS: CPT | Mod: GP | Performed by: PHYSICAL THERAPIST

## 2017-11-30 PROCEDURE — 97110 THERAPEUTIC EXERCISES: CPT | Mod: GP | Performed by: PHYSICAL THERAPIST

## 2017-12-01 NOTE — TELEPHONE ENCOUNTER
Writer had to adjust appt by 15 minutes. LM for pt to disregard [employer] message and informed of new appt time.  Rhonda Williamson RN  [facility]  12/1/2017 1:49 PM

## 2017-12-08 ENCOUNTER — RADIANT APPOINTMENT (OUTPATIENT)
Dept: GENERAL RADIOLOGY | Facility: CLINIC | Age: 47
End: 2017-12-08
Attending: ORTHOPAEDIC SURGERY
Payer: COMMERCIAL

## 2017-12-08 ENCOUNTER — THERAPY VISIT (OUTPATIENT)
Dept: PHYSICAL THERAPY | Facility: CLINIC | Age: 47
End: 2017-12-08
Payer: COMMERCIAL

## 2017-12-08 ENCOUNTER — OFFICE VISIT (OUTPATIENT)
Dept: ORTHOPEDICS | Facility: CLINIC | Age: 47
End: 2017-12-08
Payer: COMMERCIAL

## 2017-12-08 VITALS — HEIGHT: 71 IN | TEMPERATURE: 98.2 F | WEIGHT: 209 LBS | BODY MASS INDEX: 29.26 KG/M2

## 2017-12-08 DIAGNOSIS — S42.102A CLOSED FRACTURE OF LEFT SCAPULA, UNSPECIFIED PART OF SCAPULA, INITIAL ENCOUNTER: ICD-10-CM

## 2017-12-08 DIAGNOSIS — M25.512 ACUTE PAIN OF LEFT SHOULDER: ICD-10-CM

## 2017-12-08 DIAGNOSIS — S42.142D CLOSED DISPLACED FRACTURE OF GLENOID CAVITY OF LEFT SCAPULA WITH ROUTINE HEALING, SUBSEQUENT ENCOUNTER: Primary | ICD-10-CM

## 2017-12-08 DIAGNOSIS — S42.142D CLOSED DISPLACED FRACTURE OF GLENOID CAVITY OF LEFT SCAPULA WITH ROUTINE HEALING, SUBSEQUENT ENCOUNTER: ICD-10-CM

## 2017-12-08 PROCEDURE — 73010 X-RAY EXAM OF SHOULDER BLADE: CPT | Mod: TC

## 2017-12-08 PROCEDURE — 97140 MANUAL THERAPY 1/> REGIONS: CPT | Mod: GP | Performed by: PHYSICAL THERAPIST

## 2017-12-08 PROCEDURE — 99214 OFFICE O/P EST MOD 30 MIN: CPT | Performed by: ORTHOPAEDIC SURGERY

## 2017-12-08 PROCEDURE — 97110 THERAPEUTIC EXERCISES: CPT | Mod: GP | Performed by: PHYSICAL THERAPIST

## 2017-12-08 RX ORDER — OXYCODONE AND ACETAMINOPHEN 5; 325 MG/1; MG/1
1-2 TABLET ORAL
Qty: 50 TABLET | Refills: 0 | Status: SHIPPED | OUTPATIENT
Start: 2017-12-08 | End: 2018-09-14

## 2017-12-08 ASSESSMENT — PAIN SCALES - GENERAL: PAINLEVEL: MILD PAIN (3)
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
## 2017-12-08 NOTE — MR AVS SNAPSHOT
After Visit Summary   12/8/2017    Linden Cruz    MRN: [id number]           Patient Information     Date Of Birth          1970        Visit Information        Provider Department      12/8/2017 11:10 AM Johana Deng MD [facility]        Today's Diagnoses     Closed displaced fracture of glenoid cavity of left scapula with routine healing, subsequent encounter    -  1    Closed fracture of left scapula, unspecified part of scapula, initial encounter          Care Instructions    Encounter Diagnosis   Name Primary?     Closed displaced fracture of glenoid cavity of left scapula with routine healing, subsequent encounter Yes     Rest, ice and elevate above heart level as needed for pain control  1. We feel that the bone healing is done but there is still recovery that needs to take place.   2. range of motion is the most important now.  Stretching in the shower is good too.  Wall walks there is good.    3.  We have exercises below to do.    4. Gentle use and movement.  5. Listen to your body and let the pain guide your activity, as in slow down if you are having a lot of pain, but increase activity gradually as the pain decreases.   6. No sling at this point.  7. Difficulty sleeping is very common.  We recommend a recliner or lots of pillows.  8. We refilled you pain meds to take before bed.  We did percocet #50 tabs to take at night.  9. Continue formal physical therapy.    10. We talked about a cortisone injection today, but we decided to hold off for now.    11.  Follow up with Johana Deng MD and/or Anselmo Enrique PA-C in 4-6 weeks. For one last xray.      Exercises for Shoulder Flexibility: External Rotation  This stretch can help restore shoulder flexibility and relieve pain over time. When stretching, be sure to breathe deeply. Follow any special instructions from your doctor or physical therapist:     a doorway. Grasp the doorjamb with the hand on  the frozen side. Your arm should be bent.    With the other hand, hold the elbow on the frozen side firmly against your body.    Standing in the same spot, rotate your body away from the doorjamb. Stop when you feel the stretch in the shoulder. At first, try to hold the stretch for 5 seconds.    Work up to doing 3 sets of this stretch, 3 times a day. Work up to holding the stretch for 30 to 60 seconds.  Note: Keep your arms as still as you can. Over time, rotate your body a little more to enhance the stretch. But be careful not to twist your back.        Exercises for Shoulder Flexibility: Internal Rotation    This stretch can help restore shoulder flexibility and relieve pain over time. When stretching, be sure to breathe deeply. Follow any special instructions from your healthcare provider or physical therapist.    While seated, move the arm on the side you want to stretch toward the middle of your back. The palm of your hand should face out.    Cup your other hand under the hand that s behind your back. Gently push your cupped hand upward until you feel the stretch in the shoulder. Try to hold the stretch for 5 seconds.    Work up to doing 3 sets of this stretch, 3 times a day. Work up to holding the stretch for 30 to 60 seconds.  Note: Keep your back straight. It s OK if your hand can t reach the middle of your back. Instead, start the stretch with your hand as close as you can get it to the middle of your back.      Exercises for Shoulder Flexibility: Wall Walk  Improving your flexibility can reduce pain. Stretching exercises also can help increase your range of pain-free motion. Breathe normally when you exercise. Use smooth, fluid movements.  Note: Follow any special instructions you are given. If you feel pain, stop the exercise. If the pain continues after stopping, call your healthcare provider:    Stand with your shoulder about 2 feet from the wall.    Raise your arm to shoulder level and gently  walk   your fingers up the wall as high as you can.    Hold for a few seconds. Then walk your fingers back down.    Repeat 3 times. Move closer to the wall as you repeat.    Build up to holding each stretch for 30 seconds.  Caution: Do this stretch only if your healthcare provider recommends it. Don t do it when you are first injured.            7438-3215 [employer]. [address] [place] [address]. All rights reserved. This information is not intended as a substitute for professional medical care. Always follow your healthcare professional's instructions.          [employer] and [employer] may offer reliable information regarding your diagnosis and treatment plan.    THANK YOU for coming in today. If you receive a survey via Fuze or mail please let us know if there was anything you especially appreciated today or if there is any way we can improve our clinic. We appreciate your input.    GENERAL INFORMATION:  Our hours are:  Monday :     Clinic 7:30 AM-430 PM ([facility])  Tuesday:      Operating Room All Day ([facility])  Wednesday: Clinic 7:30 AM - 11:15 AM ([facility])             Clinic 1:00 PM - 4:00PM ([facility])  Thursday:     Administrative Day  Friday:          Clinic 7:30 AM - 11:15 AM ([facility])            Clinic 1:00 PM - 4:00 PM ([facility])      Bone and Joint Service Line for any issues or concerns: [phone]      We are not in the office Thursdays. Therefore non- urgent calls and medical messages received on Thursday will be addressed when we are back in the office on Wednesday. Urgent matters will be reviewed and addressed by one of our partners in the office as needed.    If lab work was done today as part of your evaluation you will generally be contacted via Fuze, mail, or phone with the results within 1-5 days. If there is an alarming  result we will contact you by phone. Lab results come back at varying times, I generally wait until all labs are resulted before making comments on results. Please note labs are automatically released to [employer] (if you have signed up for it) once available-at times you may see these prior to my having a chance to review them as well.    If you need refills please contact your pharmacist. They will send a refill request to me to review. Please allow 3 business days for us to process all refill requests. All narcotic refills should be handled in the clinic at the time of your visit.           Follow-ups after your visit        Your next 10 appointments already scheduled     Dec 08, 2017  2:50 PM CST   KARLY Extremity with Keenan Bryant PT   [facility] ([facility]  )    [address]  [facility] [id number] [phone]            Dec 11, 2017  9:00 AM CST   Return Visit with Piyush Hernandez   [facility] ([address] [id number] [phone]            Dec 11, 2017  9:30 AM CST   Return Visit with Savage Mejia MD   [facility] ([address] [phone] [phone]            Dec 15, 2017  3:30 PM CST   KARLY Extremity with Keenan Bryant PT   [facility] ([facility]  )    [address]  [facility] [address]   [phone]              Who to contact     If you have questions or need follow up information about today's clinic visit or your schedule please contact [facility] directly at [phone].  Normal or non-critical lab and imaging results will be communicated to you by P[employer]P-Nexthart, letter or phone within 4 business days after the clinic has received the results. If you do not hear from us within 7 days, please  "contact the clinic through [employer] or phone. If you have a critical or abnormal lab result, we will notify you by phone as soon as possible.  Submit refill requests through [employer] or call your pharmacy and they will forward the refill request to us. Please allow 3 business days for your refill to be completed.          Additional Information About Your Visit        [employer] Information     [employer] gives you secure access to your electronic health record. If you see a primary care provider, you can also send messages to your care team and make appointments. If you have questions, please call your primary care clinic.  If you do not have a primary care provider, please call [phone] and they will assist you.        Care EveryWhere ID     This is your Care EveryWhere ID. This could be used by other organizations to access your [facility] medical records  [id number]        Your Vitals Were     Temperature Height BMI (Body Mass Index)             98.2  F (36.8  C) 1.803 m (5' 11\") 29.15 kg/m2          Blood Pressure from Last 3 Encounters:   10/20/17 (!) 134/92   10/13/17 136/80   01/04/17 128/82    Weight from Last 3 Encounters:   12/08/17 94.8 kg (209 lb)   11/06/17 96.6 kg (213 lb)   10/20/17 101.6 kg (224 lb)                 Today's Medication Changes          These changes are accurate as of: 12/8/17 12:28 PM.  If you have any questions, ask your nurse or doctor.               These medicines have changed or have updated prescriptions.        Dose/Directions    * oxyCODONE-acetaminophen 5-325 MG per tablet   Commonly known as:  PERCOCET   This may have changed:  Another medication with the same name was changed. Make sure you understand how and when to take each.   Used for:  Closed fracture of left scapula, unspecified part of scapula, initial encounter   Changed by:  Sierra Schwab MD        Dose:  1-2 tablet   Take 1-2 tablets by mouth every 6 hours as needed for moderate to severe pain "   Quantity:  20 tablet   Refills:  0       * oxyCODONE-acetaminophen 5-325 MG per tablet   Commonly known as:  PERCOCET   This may have changed:    - how much to take  - when to take this   Used for:  Closed fracture of left scapula, unspecified part of scapula, initial encounter   Changed by:  Johana Deng MD        Dose:  1-2 tablet   Take 1-2 tablets by mouth nightly as needed for moderate to severe pain   Quantity:  50 tablet   Refills:  0       * Notice:  This list has 2 medication(s) that are the same as other medications prescribed for you. Read the directions carefully, and ask your doctor or other care provider to review them with you.         Where to get your medicines      Some of these will need a paper prescription and others can be bought over the counter.  Ask your nurse if you have questions.     Bring a paper prescription for each of these medications     oxyCODONE-acetaminophen 5-325 MG per tablet                Primary Care Provider Office Phone # Fax #    Nik Nina PA-C [phone]       [address] [place] [id number]        Equal Access to Services     [facility]: Hadii bernabe orta hadasho Soomaali, waaxda luqadaha, qaybta kaalmada adeegyadebra, darien shaw . So [facility] [phone].    ATENCIÓN: Si habla español, tiene a krishnamurthy disposición servicios gratuitos de asistencia lingüística. [facility] [phone].    We comply with applicable federal civil rights laws and [place] laws. We do not discriminate on the basis of race, color, national origin, age, disability, sex, sexual orientation, or gender identity.            Thank you!     Thank you for choosing [facility]  for your care. Our goal is always to provide you with excellent care. Hearing back from our patients is one way we can continue to improve our services. Please take a few minutes to complete the written survey that you may receive in the mail after  your visit with us. Thank you!             Your Updated Medication List - Protect others around you: Learn how to safely use, store and throw away your medicines at www.disposemymeds.org.          This list is accurate as of: 12/8/17 12:28 PM.  Always use your most recent med list.                   Brand Name Dispense Instructions for use Diagnosis    ADVIL PO           fluticasone 50 MCG/ACT spray    FLONASE    16 g    Spray 1-2 sprays into both nostrils daily    Left otitis media with spontaneous rupture of eardrum, Chronic rhinitis       gemfibrozil 600 MG tablet    LOPID    180 tablet    TAKE 1 TABLET (600 MG) BY MOUTH 2 TIMES DAILY    Hypertriglyceridemia       MULTI VITAMIN MENS PO      Take  by mouth.        omeprazole 40 MG capsule    priLOSEC    90 capsule    TAKE ONE CAPSULE BY MOUTH EVERY DAY    Gastroesophageal reflux disease without esophagitis       * oxyCODONE-acetaminophen 5-325 MG per tablet    PERCOCET    20 tablet    Take 1-2 tablets by mouth every 6 hours as needed for moderate to severe pain    Closed fracture of left scapula, unspecified part of scapula, initial encounter       * oxyCODONE-acetaminophen 5-325 MG per tablet    PERCOCET    50 tablet    Take 1-2 tablets by mouth nightly as needed for moderate to severe pain    Closed fracture of left scapula, unspecified part of scapula, initial encounter       SUMAtriptan 100 MG tablet    IMITREX    18 tablet    TAKE 1 TAB BY MOUTH WITH ONSET OF MIGRAINE, MAY REPEAT ONCE AFTER 2 HOURS. MAX 2 TABS/24 HOURS.    Migraine with aura and without status migrainosus, not intractable       SYNTHROID 125 MCG tablet   Generic drug:  levothyroxine     90 tablet    TAKE 1 TABLET BY MOUTH ONCE DAILY    Subclinical hypothyroidism       tadalafil 20 MG tablet    CIALIS    8 tablet    Take 0.5-1 tablets as needed for erectile dysfunction. Never use with nitroglycerin, terazosin or doxazosin.    Erectile dysfunction, unspecified erectile dysfunction type        topiramate 50 MG tablet    TOPAMAX    60 tablet    TAKE 1 TABLET BY MOUTH TWICE A DAY    Migraine with aura and without status migrainosus, not intractable       * Notice:  This list has 2 medication(s) that are the same as other medications prescribed for you. Read the directions carefully, and ask your doctor or other care provider to review them with you.

## 2017-12-08 NOTE — PROGRESS NOTES
History of Present Illness - Linden Cruz is a 47 year old male presenting in clinic today for a recheck on ears.      Present Symptoms include: Hearing loss in right ear and they are getting worse.  Linden denies otolgia and otorhea.    He is feeling better.  The left ear is doing well.  He was told the right tube was out at his routine physical.      Past Medical History -   Past Medical History:   Diagnosis Date     Chest pain      Esophageal reflux      Migraines      Mixed hyperlipidemia      DANIEL (obstructive sleep apnea)      Other specified gastritis without mention of hemorrhage     H. Pylori, diag 12/06       Current Medications -   Current Outpatient Prescriptions:      tadalafil (CIALIS) 20 MG tablet, Take 0.5-1 tablets as needed for erectile dysfunction. Never use with nitroglycerin, terazosin or doxazosin., Disp: 8 tablet, Rfl: 10     oxyCODONE-acetaminophen (PERCOCET) 5-325 MG per tablet, Take 1 tablet by mouth every 6 hours as needed for moderate to severe pain, Disp: 60 tablet, Rfl: 0     topiramate (TOPAMAX) 50 MG tablet, TAKE 1 TABLET BY MOUTH TWICE A DAY, Disp: 60 tablet, Rfl: 5     oxyCODONE-acetaminophen (PERCOCET) 5-325 MG per tablet, Take 1-2 tablets by mouth every 6 hours as needed for moderate to severe pain, Disp: 20 tablet, Rfl: 0     gemfibrozil (LOPID) 600 MG tablet, TAKE 1 TABLET (600 MG) BY MOUTH 2 TIMES DAILY, Disp: 180 tablet, Rfl: 0     omeprazole (PRILOSEC) 40 MG capsule, TAKE ONE CAPSULE BY MOUTH EVERY DAY, Disp: 90 capsule, Rfl: 0     SUMAtriptan (IMITREX) 100 MG tablet, TAKE 1 TAB BY MOUTH WITH ONSET OF MIGRAINE, MAY REPEAT ONCE AFTER 2 HOURS. MAX 2 TABS/24 HOURS., Disp: 18 tablet, Rfl: 5     SYNTHROID 125 MCG tablet, TAKE 1 TABLET BY MOUTH ONCE DAILY, Disp: 90 tablet, Rfl: 2     fluticasone (FLONASE) 50 MCG/ACT nasal spray, Spray 1-2 sprays into both nostrils daily (Patient not taking: Reported on 10/13/2017), Disp: 16 g, Rfl: 3     Ibuprofen (ADVIL PO), , Disp: , Rfl:       Multiple Vitamin (MULTI VITAMIN MENS PO), Take  by mouth., Disp: , Rfl:     Allergies -   Allergies   Allergen Reactions     No Known Drug Allergies        Social History -   Social History     Social History     Marital status:      Spouse name: N/A     Number of children: 0     Years of education: N/A     Occupational History      [employer]     road construction     Social History Main Topics     Smoking status: Former Smoker     Types: Cigarettes     Quit date: 1/1/2000     Smokeless tobacco: Never Used     Alcohol use 0.0 oz/week     0 Standard drinks or equivalent per week      Comment: 1/month     Drug use: No     Sexual activity: Yes     Partners: Female     Birth control/ protection: Surgical      Comment: vasectomy     Other Topics Concern     Parent/Sibling W/ Cabg, Mi Or Angioplasty Before 65f 55m? No     Caffeine Concern Yes     large intake per day     Special Diet Yes     low cholesterol      Exercise No     Social History Narrative    Dairy/d 2-3 servings/d.     Caffeine 2-3 servings/d    Exercise 1-2 x week    Sunscreen used - NO, uses sunscreen in summer months    Seatbelts used - YES    Working smoke/CO detectors in the home - YES    Guns stored in the home - YES    Self Breast Exams - NA    Self Testicular Exam - YES    Eye Exam up to date - YES    Dental Exam up to date - YES    Pap Smear up to date - NA    Mammogram up to date - NA    PSA up to date - NA    Dexa Scan up to date - NA    Flex Sig / Colonoscopy up to date - NA    Immunizations up to date - YES    Abuse: Current or Past(Physical, Sexual or Emotional)- NO    Do you feel safe in your environment - YES    Claudia Salamanca [facility]  1/29/2010               Family History -   Family History   Problem Relation Age of Onset     DIABETES Maternal Grandmother      C.A.D. Maternal Grandfather      DIABETES Maternal Grandfather      CANCER Maternal Grandfather      lung cancer     CEREBROVASCULAR DISEASE Paternal Grandmother       WILMRA Paternal Grandfather      Other - See Comments Mother      bilateral carotid occlusion, SVG bypass to both.     Asthma No family hx of      Hypertension No family hx of      Breast Cancer No family hx of      Cancer - colorectal No family hx of      Prostate Cancer No family hx of      Alcohol/Drug No family hx of      Allergies No family hx of      Alzheimer Disease No family hx of      Anesthesia Reaction No family hx of      Arthritis No family hx of      Blood Disease No family hx of      Cardiovascular No family hx of      Circulatory No family hx of      Congenital Anomalies No family hx of      Connective Tissue Disorder No family hx of      Depression No family hx of      Endocrine Disease No family hx of      Eye Disorder No family hx of      Genetic Disorder No family hx of      GASTROINTESTINAL DISEASE No family hx of      Genitourinary Problems No family hx of      Gynecology No family hx of      HEART DISEASE No family hx of      Lipids No family hx of      Musculoskeletal Disorder No family hx of      Neurologic Disorder No family hx of      Obesity No family hx of      OSTEOPOROSIS No family hx of      Psychotic Disorder No family hx of      Respiratory No family hx of      Thyroid Disease No family hx of      Hearing Loss No family hx of      Coronary Artery Disease No family hx of      Mental Illness No family hx of      Depression/Anxiety No family hx of      Known Genetic Syndrome No family hx of        Review of Systems - As per HPI and PMHx, otherwise review of system review of the head and neck negative.    Physical Exam  Pulse 91  Wt 93.9 kg (207 lb)  SpO2 98%  BMI 28.87 kg/m2  BMI: Body mass index is 28.87 kg/(m^2).    General - The patient is well nourished and well developed, and appears to have good nutritional status.  Alert and oriented to person and place, answers questions and cooperates with examination appropriately.    SKIN - No suspicious lesions or rashes.  Respiration -  No respiratory distress.  Head and Face - Normocephalic and atraumatic, with no gross asymmetry noted of the contour of the facial features.  The facial nerve is intact, with strong symmetric movements.    Voice and Breathing - The patient was breathing comfortably without the use of accessory muscles. There was no wheezing, stridor, or stertor.  The patients voice was clear and strong, and had appropriate pitch and quality.    Ears - Bilateral pinna and EACs with normal appearing overlying skin. Left ear tube is still in and looks very good.  The right tube is out and he has fluid behind the TM.    Eyes - Extraocular movements intact.  Sclera were not icteric or injected, conjunctiva were pink and moist.    Mouth - Examination of the oral cavity showed pink, healthy oral mucosa. No lesions or ulcerations noted.  The tongue was mobile and midline, and the dentition were in good condition.      Throat - The walls of the oropharynx were smooth, pink, moist, symmetric, and had no lesions or ulcerations.  The tonsillar pillars and soft palate were symmetric.  The uvula was midline on elevation.    Neck - Normal midline excursion of the laryngotracheal complex during swallowing.  Full range of motion on passive movement.  Palpation of the occipital, submental, submandibular, internal jugular chain, and supraclavicular nodes did not demonstrate any abnormal lymph nodes or masses.  The carotid pulse was palpable bilaterally.  Palpation of the thyroid was soft and smooth, with no nodules or goiter appreciated.  The trachea was mobile and midline.    Nose - External contour is symmetric, no gross deflection or scars.  Nasal mucosa is pink and moist with no abnormal mucus.  The septum was midline and non-obstructive, turbinates of normal size and position.  No polyps, masses, or purulence noted on examination.    Neuro - Nonfocal neuro exam is normal, CN 2 through 12 intact, normal gait and muscle tone.      Performed in  clinic today:  Audiologic Studies - An audiogram and tympanogram were performed today as part of the evaluation and personally reviewed. The tympanogram shows Type AS curves on the right and Type could not seal curves on the left, with flat canal volumes and middle ear pressures.  The audiogram showed moderate conductive loss on the right and mild SNHL loss on the left.            A/P - Linden Cruz is a 47 year old male with history of ear tubes and Serous otitis media in the right ear.      Since he has fluid again in the right ear I would like to place triune tube.  At that time I would like to replace both tubes with Triune tubes since he has a long history of ear infections and fluid.  I will have staff schedule this for a Wednesday in [place].      Progress note was partially captured by Johana Blanchard MA and reviewed/addended by Dr. Mejia for accuracy and content.      Savage Mejia MD
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
## 2017-12-11 ENCOUNTER — OFFICE VISIT (OUTPATIENT)
Dept: AUDIOLOGY | Facility: CLINIC | Age: 47
End: 2017-12-11
Payer: COMMERCIAL

## 2017-12-11 ENCOUNTER — OFFICE VISIT (OUTPATIENT)
Dept: OTOLARYNGOLOGY | Facility: CLINIC | Age: 47
End: 2017-12-11
Payer: COMMERCIAL

## 2017-12-11 ENCOUNTER — THERAPY VISIT (OUTPATIENT)
Dept: PHYSICAL THERAPY | Facility: CLINIC | Age: 47
End: 2017-12-11
Payer: COMMERCIAL

## 2017-12-11 VITALS — HEART RATE: 91 BPM | WEIGHT: 207 LBS | OXYGEN SATURATION: 98 % | BODY MASS INDEX: 28.87 KG/M2

## 2017-12-11 DIAGNOSIS — H65.23 BILATERAL CHRONIC SEROUS OTITIS MEDIA: Primary | ICD-10-CM

## 2017-12-11 DIAGNOSIS — M54.41 ACUTE RIGHT-SIDED LOW BACK PAIN WITH RIGHT-SIDED SCIATICA: ICD-10-CM

## 2017-12-11 DIAGNOSIS — H90.A22 SENSORINEURAL HEARING LOSS (SNHL) OF LEFT EAR WITH RESTRICTED HEARING OF RIGHT EAR: ICD-10-CM

## 2017-12-11 DIAGNOSIS — H90.A11 CONDUCTIVE HEARING LOSS OF RIGHT EAR WITH RESTRICTED HEARING OF LEFT EAR: Primary | ICD-10-CM

## 2017-12-11 PROCEDURE — 99207 ZZC NO CHARGE LOS: CPT | Performed by: AUDIOLOGIST

## 2017-12-11 PROCEDURE — 99213 OFFICE O/P EST LOW 20 MIN: CPT | Performed by: OTOLARYNGOLOGY

## 2017-12-11 PROCEDURE — 92557 COMPREHENSIVE HEARING TEST: CPT | Performed by: AUDIOLOGIST

## 2017-12-11 PROCEDURE — 97110 THERAPEUTIC EXERCISES: CPT | Mod: GP | Performed by: PHYSICAL THERAPIST

## 2017-12-11 PROCEDURE — 97140 MANUAL THERAPY 1/> REGIONS: CPT | Mod: GP | Performed by: PHYSICAL THERAPIST

## 2017-12-11 PROCEDURE — 92550 TYMPANOMETRY & REFLEX THRESH: CPT | Mod: 52 | Performed by: AUDIOLOGIST

## 2017-12-11 NOTE — MR AVS SNAPSHOT
After Visit Summary   12/11/2017    Linden Cruz    MRN: [id number]           Patient Information     Date Of Birth          1970        Visit Information        Provider Department      12/11/2017 9:00 AM Ai Valle AuD [facility]        Today's Diagnoses     Conductive hearing loss of right ear with restricted hearing of left ear    -  1    Sensorineural hearing loss (SNHL) of left ear with restricted hearing of right ear           Follow-ups after your visit        Your next 10 appointments already scheduled     Dec 11, 2017  1:30 PM CST   KARLY Spine with Keenan Bryant, PT   [place] [facility] ([facility]  )    [address]  [facility] [address]   [phone]            Dec 15, 2017  3:30 PM CST   KARLY Extremity with Keenan Bryant PT   [place] [facility] ([facility]  )    [address]  [facility] [address]   [phone]            Dec 20, 2017 11:30 AM CST   Return Visit with Savage Mejia MD   [facility]    [address] [place] [address]  [address]  [facility] [address]   [phone]            Dec 22, 2017  7:00 AM CST   KARLY Extremity with Keenan Bryant PT   [place] [facility] ([facility]  )    [address]  [facility] [address]   [phone]            Jan 08, 2018  9:00 AM CST   Return Visit with Johana Deng MD   [facility]    [address] [facility] [address] [phone]              Who to contact     If you have questions or need follow up information about today's clinic visit or your schedule please contact [facility] directly at [phone].  Normal or non-critical lab and imaging results will be communicated to you by MyChart, letter or  phone within 4 business days after the clinic has received the results. If you do not hear from us within 7 days, please contact the clinic through [employer] Wallet or phone. If you have a critical or abnormal lab result, we will notify you by phone as soon as possible.  Submit refill requests through [employer] Wallet or call your pharmacy and they will forward the refill request to us. Please allow 3 business days for your refill to be completed.          Additional Information About Your Visit        [employer] Top-Level Domain Information     Advice Wallet gives you secure access to your electronic health record. If you see a primary care provider, you can also send messages to your care team and make appointments. If you have questions, please call your primary care clinic.  If you do not have a primary care provider, please call [phone] and they will assist you.        Care EveryWhere ID     This is your Care EveryWhere ID. This could be used by other organizations to access your [place] medical records  [id number]         Blood Pressure from Last 3 Encounters:   10/20/17 (!) 134/92   10/13/17 136/80   01/04/17 128/82    Weight from Last 3 Encounters:   12/11/17 207 lb (93.9 kg)   12/08/17 209 lb (94.8 kg)   11/06/17 213 lb (96.6 kg)              We Performed the Following     AUDIOGRAM/TYMPANOGRAM - INTERFACE     COMPREHENSIVE HEARING TEST     TYMPANOMETRY AND REFLEX THRESHOLD MEASUREMENTS        Primary Care Provider Office Phone # Fax #    Nik Nina PA-C [phone]       [address] [facility]  [facility] [address]        Equal Access to Services     AFSANEH CRUZ : Hadii aad ku hadasho Soomaali, waaxda luqadaha, qaybta kaalmada adeegyada, darien shaw . So [facility] [phone].    ATENCIÓN: Si habla español, tiene a krishnamurthy disposición servicios gratuitos de asistencia lingüística. Llame al [phone].    We comply with applicable federal civil rights laws and [place] laws. We do not discriminate  on the basis of race, color, national origin, age, disability, sex, sexual orientation, or gender identity.            Thank you!     Thank you for choosing [facility]  for your care. Our goal is always to provide you with excellent care. Hearing back from our patients is one way we can continue to improve our services. Please take a few minutes to complete the written survey that you may receive in the mail after your visit with us. Thank you!             Your Updated Medication List - Protect others around you: Learn how to safely use, store and throw away your medicines at www.disposemymeds.org.          This list is accurate as of: 12/11/17 11:23 AM.  Always use your most recent med list.                   Brand Name Dispense Instructions for use Diagnosis    ADVIL PO           fluticasone 50 MCG/ACT spray    FLONASE    16 g    Spray 1-2 sprays into both nostrils daily    Left otitis media with spontaneous rupture of eardrum, Chronic rhinitis       gemfibrozil 600 MG tablet    LOPID    180 tablet    TAKE 1 TABLET (600 MG) BY MOUTH 2 TIMES DAILY    Hypertriglyceridemia       MULTI VITAMIN MENS PO      Take  by mouth.        omeprazole 40 MG capsule    priLOSEC    90 capsule    TAKE ONE CAPSULE BY MOUTH EVERY DAY    Gastroesophageal reflux disease without esophagitis       * oxyCODONE-acetaminophen 5-325 MG per tablet    PERCOCET    20 tablet    Take 1-2 tablets by mouth every 6 hours as needed for moderate to severe pain    Closed fracture of left scapula, unspecified part of scapula, initial encounter       * oxyCODONE-acetaminophen 5-325 MG per tablet    PERCOCET    50 tablet    Take 1-2 tablets by mouth nightly as needed for moderate to severe pain    Closed fracture of left scapula, unspecified part of scapula, initial encounter       SUMAtriptan 100 MG tablet    IMITREX    18 tablet    TAKE 1 TAB BY MOUTH WITH ONSET OF MIGRAINE, MAY REPEAT ONCE AFTER 2 HOURS. MAX 2 TABS/24 HOURS.    Migraine  with aura and without status migrainosus, not intractable       SYNTHROID 125 MCG tablet   Generic drug:  levothyroxine     90 tablet    TAKE 1 TABLET BY MOUTH ONCE DAILY    Subclinical hypothyroidism       tadalafil 20 MG tablet    CIALIS    8 tablet    Take 0.5-1 tablets as needed for erectile dysfunction. Never use with nitroglycerin, terazosin or doxazosin.    Erectile dysfunction, unspecified erectile dysfunction type       topiramate 50 MG tablet    TOPAMAX    60 tablet    TAKE 1 TABLET BY MOUTH TWICE A DAY    Migraine with aura and without status migrainosus, not intractable       * Notice:  This list has 2 medication(s) that are the same as other medications prescribed for you. Read the directions carefully, and ask your doctor or other care provider to review them with you.

## 2017-12-11 NOTE — NURSING NOTE
"Chief Complaint   Patient presents with     RECHECK     Ear Problem       Initial Pulse 91  Wt 93.9 kg (207 lb)  SpO2 98%  BMI 28.87 kg/m2 Estimated body mass index is 28.87 kg/(m^2) as calculated from the following:    Height as of 12/8/17: 1.803 m (5' 11\").    Weight as of this encounter: 93.9 kg (207 lb).  Medication Reconciliation: complete  "

## 2017-12-11 NOTE — MR AVS SNAPSHOT
After Visit Summary   12/11/2017    Linden Cruz    MRN: [id number]           Patient Information     Date Of Birth          1970        Visit Information        Provider Department      12/11/2017 9:30 AM Savage Mejia MD [facility]        Today's Diagnoses     H/O myringotomy    -  1       Follow-ups after your visit        Additional Services     AUDIOLOGY ADULT REFERRAL                 Your next 10 appointments already scheduled     Dec 11, 2017  1:30 PM CST   KARLY Spine with Keenan Bryant, PT   [place] [facility] ([facility]  )    [address]  [facility] [address]   [phone]            Dec 15, 2017  3:30 PM CST   KARLY Extremity with Keenan Bryant PT   [place] [facility] ([facility]  )    [address]  [facility] [address]   [phone]            Dec 20, 2017 11:30 AM CST   Return Visit with Savage [facility]jia MD   [facility]    [address] [place] [address]  [address]  [facility] [address]   [phone]            Dec 22, 2017  7:00 AM CST   KARLY Extremity with Keenan Bryant PT   [place] [facility] ([facility]  )    [address]  [facility] [address]   [phone]            Jan 08, 2018  9:00 AM CST   Return Visit with Johana Deng MD   [facility]    [address] [place] [id number] [phone]              Who to contact     If you have questions or need follow up information about today's clinic visit or your schedule please contact [facility] directly at [phone].  Normal or non-critical lab and imaging results will be communicated to you by MyChart, letter or phone within 4 business days after the clinic has received the results. If you do not hear  from us within 7 days, please contact the clinic through AppTap or phone. If you have a critical or abnormal lab result, we will notify you by phone as soon as possible.  Submit refill requests through AppTap or call your pharmacy and they will forward the refill request to us. Please allow 3 business days for your refill to be completed.          Additional Information About Your Visit        [email or web address] Information     AppTap gives you secure access to your electronic health record. If you see a primary care provider, you can also send messages to your care team and make appointments. If you have questions, please call your primary care clinic.  If you do not have a primary care provider, please call [phone] and they will assist you.        Care EveryWhere ID     This is your Care EveryWhere ID. This could be used by other organizations to access your [place] medical records  [id number]        Your Vitals Were     Pulse Pulse Oximetry BMI (Body Mass Index)             91 98% 28.87 kg/m2          Blood Pressure from Last 3 Encounters:   10/20/17 (!) 134/92   10/13/17 136/80   01/04/17 128/82    Weight from Last 3 Encounters:   12/11/17 93.9 kg (207 lb)   12/08/17 94.8 kg (209 lb)   11/06/17 96.6 kg (213 lb)              We Performed the Following     AUDIOLOGY ADULT REFERRAL        Primary Care Provider Office Phone # Fax #    Nik Nina PA-C [phone]       [address] [facility] [address]  [facility] [address]        Equal Access to Services     [facility] NANCY : Hadii aad ku hadasho Soomaali, waaxda luqadaha, qaybta kaalmada adeegyada, waxTheLocker nuris shaw . So [facility] [phone].    ATENCIÓN: Si habla lidia, tiene a krishnamurthy disposición servicios gratuitos de asistencia lingüística. Amara al [phone].    We comply with applicable federal civil rights laws and [place] laws. We do not discriminate on the basis of race, color, national origin, age, disability, sex, sexual  orientation, or gender identity.            Thank you!     Thank you for choosing [facility]  for your care. Our goal is always to provide you with excellent care. Hearing back from our patients is one way we can continue to improve our services. Please take a few minutes to complete the written survey that you may receive in the mail after your visit with us. Thank you!             Your Updated Medication List - Protect others around you: Learn how to safely use, store and throw away your medicines at www.disposemymeds.org.          This list is accurate as of: 12/11/17 10:30 AM.  Always use your most recent med list.                   Brand Name Dispense Instructions for use Diagnosis    ADVIL PO           fluticasone 50 MCG/ACT spray    FLONASE    16 g    Spray 1-2 sprays into both nostrils daily    Left otitis media with spontaneous rupture of eardrum, Chronic rhinitis       gemfibrozil 600 MG tablet    LOPID    180 tablet    TAKE 1 TABLET (600 MG) BY MOUTH 2 TIMES DAILY    Hypertriglyceridemia       MULTI VITAMIN MENS PO      Take  by mouth.        omeprazole 40 MG capsule    priLOSEC    90 capsule    TAKE ONE CAPSULE BY MOUTH EVERY DAY    Gastroesophageal reflux disease without esophagitis       * oxyCODONE-acetaminophen 5-325 MG per tablet    PERCOCET    20 tablet    Take 1-2 tablets by mouth every 6 hours as needed for moderate to severe pain    Closed fracture of left scapula, unspecified part of scapula, initial encounter       * oxyCODONE-acetaminophen 5-325 MG per tablet    PERCOCET    50 tablet    Take 1-2 tablets by mouth nightly as needed for moderate to severe pain    Closed fracture of left scapula, unspecified part of scapula, initial encounter       SUMAtriptan 100 MG tablet    IMITREX    18 tablet    TAKE 1 TAB BY MOUTH WITH ONSET OF MIGRAINE, MAY REPEAT ONCE AFTER 2 HOURS. MAX 2 TABS/24 HOURS.    Migraine with aura and without status migrainosus, not intractable       SYNTHROID  125 MCG tablet   Generic drug:  levothyroxine     90 tablet    TAKE 1 TABLET BY MOUTH ONCE DAILY    Subclinical hypothyroidism       tadalafil 20 MG tablet    CIALIS    8 tablet    Take 0.5-1 tablets as needed for erectile dysfunction. Never use with nitroglycerin, terazosin or doxazosin.    Erectile dysfunction, unspecified erectile dysfunction type       topiramate 50 MG tablet    TOPAMAX    60 tablet    TAKE 1 TABLET BY MOUTH TWICE A DAY    Migraine with aura and without status migrainosus, not intractable       * Notice:  This list has 2 medication(s) that are the same as other medications prescribed for you. Read the directions carefully, and ask your doctor or other care provider to review them with you.

## 2017-12-11 NOTE — PROGRESS NOTES
AUDIOLOGY REPORT     SUMMARY: Audiology visit completed. See audiogram for results.     RECOMMENDATIONS: Follow-up with ENT    Tracy Waldrop Licensed Audiologist #[id number]

## 2017-12-11 NOTE — LETTER
12/11/2017         RE: Linden Cruz  [address] [facility] [address]        Dear Colleague,    Thank you for referring your patient, Linden Cruz, to the [facility]. Please see a copy of my visit note below.    History of Present Illness - Linden Cruz is a 47 year old male presenting in clinic today for a recheck on ears.      Present Symptoms include: Hearing loss in right ear and they are getting worse.  Linden denies otolgia and otorhea.    He is feeling better.  The left ear is doing well.  He was told the right tube was out at his routine physical.      Past Medical History -   Past Medical History:   Diagnosis Date     Chest pain      Esophageal reflux      Migraines      Mixed hyperlipidemia      DANIEL (obstructive sleep apnea)      Other specified gastritis without mention of hemorrhage     H. Pylori, diag 12/06       Current Medications -   Current Outpatient Prescriptions:      tadalafil (CIALIS) 20 MG tablet, Take 0.5-1 tablets as needed for erectile dysfunction. Never use with nitroglycerin, terazosin or doxazosin., Disp: 8 tablet, Rfl: 10     oxyCODONE-acetaminophen (PERCOCET) 5-325 MG per tablet, Take 1 tablet by mouth every 6 hours as needed for moderate to severe pain, Disp: 60 tablet, Rfl: 0     topiramate (TOPAMAX) 50 MG tablet, TAKE 1 TABLET BY MOUTH TWICE A DAY, Disp: 60 tablet, Rfl: 5     oxyCODONE-acetaminophen (PERCOCET) 5-325 MG per tablet, Take 1-2 tablets by mouth every 6 hours as needed for moderate to severe pain, Disp: 20 tablet, Rfl: 0     gemfibrozil (LOPID) 600 MG tablet, TAKE 1 TABLET (600 MG) BY MOUTH 2 TIMES DAILY, Disp: 180 tablet, Rfl: 0     omeprazole (PRILOSEC) 40 MG capsule, TAKE ONE CAPSULE BY MOUTH EVERY DAY, Disp: 90 capsule, Rfl: 0     SUMAtriptan (IMITREX) 100 MG tablet, TAKE 1 TAB BY MOUTH WITH ONSET OF MIGRAINE, MAY REPEAT ONCE AFTER 2 HOURS. MAX 2 TABS/24 HOURS., Disp: 18 tablet, Rfl: 5     SYNTHROID 125 MCG tablet, TAKE 1  TABLET BY MOUTH ONCE DAILY, Disp: 90 tablet, Rfl: 2     fluticasone (FLONASE) 50 MCG/ACT nasal spray, Spray 1-2 sprays into both nostrils daily (Patient not taking: Reported on 10/13/2017), Disp: 16 g, Rfl: 3     Ibuprofen (ADVIL PO), , Disp: , Rfl:      Multiple Vitamin (MULTI VITAMIN MENS PO), Take  by mouth., Disp: , Rfl:     Allergies -   Allergies   Allergen Reactions     No Known Drug Allergies        Social History -   Social History     Social History     Marital status:      Spouse name: N/A     Number of children: 0     Years of education: N/A     Occupational History      [employer]     road construction     Social History Main Topics     Smoking status: Former Smoker     Types: Cigarettes     Quit date: 1/1/2000     Smokeless tobacco: Never Used     Alcohol use 0.0 oz/week     0 Standard drinks or equivalent per week      Comment: 1/month     Drug use: No     Sexual activity: Yes     Partners: Female     Birth control/ protection: Surgical      Comment: vasectomy     Other Topics Concern     Parent/Sibling W/ Cabg, Mi Or Angioplasty Before 65f 55m? No     Caffeine Concern Yes     large intake per day     Special Diet Yes     low cholesterol      Exercise No     Social History Narrative    Dairy/d 2-3 servings/d.     Caffeine 2-3 servings/d    Exercise 1-2 x week    Sunscreen used - NO, uses sunscreen in summer months    Seatbelts used - YES    Working smoke/CO detectors in the home - YES    Guns stored in the home - YES    Self Breast Exams - NA    Self Testicular Exam - YES    Eye Exam up to date - YES    Dental Exam up to date - YES    Pap Smear up to date - NA    Mammogram up to date - NA    PSA up to date - NA    Dexa Scan up to date - NA    Flex Sig / Colonoscopy up to date - NA    Immunizations up to date - YES    Abuse: Current or Past(Physical, Sexual or Emotional)- NO    Do you feel safe in your environment - YES    Claudia Salamanca CMA  1/29/2010               Family History -    Family History   Problem Relation Age of Onset     DIABETES Maternal Grandmother      C.A.D. Maternal Grandfather      DIABETES Maternal Grandfather      CANCER Maternal Grandfather      lung cancer     CEREBROVASCULAR DISEASE Paternal Grandmother      C.A.D. Paternal Grandfather      Other - See Comments Mother      bilateral carotid occlusion, SVG bypass to both.     Asthma No family hx of      Hypertension No family hx of      Breast Cancer No family hx of      Cancer - colorectal No family hx of      Prostate Cancer No family hx of      Alcohol/Drug No family hx of      Allergies No family hx of      Alzheimer Disease No family hx of      Anesthesia Reaction No family hx of      Arthritis No family hx of      Blood Disease No family hx of      Cardiovascular No family hx of      Circulatory No family hx of      Congenital Anomalies No family hx of      Connective Tissue Disorder No family hx of      Depression No family hx of      Endocrine Disease No family hx of      Eye Disorder No family hx of      Genetic Disorder No family hx of      GASTROINTESTINAL DISEASE No family hx of      Genitourinary Problems No family hx of      Gynecology No family hx of      HEART DISEASE No family hx of      Lipids No family hx of      Musculoskeletal Disorder No family hx of      Neurologic Disorder No family hx of      Obesity No family hx of      OSTEOPOROSIS No family hx of      Psychotic Disorder No family hx of      Respiratory No family hx of      Thyroid Disease No family hx of      Hearing Loss No family hx of      Coronary Artery Disease No family hx of      Mental Illness No family hx of      Depression/Anxiety No family hx of      Known Genetic Syndrome No family hx of        Review of Systems - As per HPI and PMHx, otherwise review of system review of the head and neck negative.    Physical Exam  Pulse 91  Wt 93.9 kg (207 lb)  SpO2 98%  BMI 28.87 kg/m2  BMI: Body mass index is 28.87 kg/(m^2).    General - The  patient is well nourished and well developed, and appears to have good nutritional status.  Alert and oriented to person and place, answers questions and cooperates with examination appropriately.    SKIN - No suspicious lesions or rashes.  Respiration - No respiratory distress.  Head and Face - Normocephalic and atraumatic, with no gross asymmetry noted of the contour of the facial features.  The facial nerve is intact, with strong symmetric movements.    Voice and Breathing - The patient was breathing comfortably without the use of accessory muscles. There was no wheezing, stridor, or stertor.  The patients voice was clear and strong, and had appropriate pitch and quality.    Ears - Bilateral pinna and EACs with normal appearing overlying skin. Left ear tube is still in and looks very good.  The right tube is out and he has fluid behind the TM.    Eyes - Extraocular movements intact.  Sclera were not icteric or injected, conjunctiva were pink and moist.    Mouth - Examination of the oral cavity showed pink, healthy oral mucosa. No lesions or ulcerations noted.  The tongue was mobile and midline, and the dentition were in good condition.      Throat - The walls of the oropharynx were smooth, pink, moist, symmetric, and had no lesions or ulcerations.  The tonsillar pillars and soft palate were symmetric.  The uvula was midline on elevation.    Neck - Normal midline excursion of the laryngotracheal complex during swallowing.  Full range of motion on passive movement.  Palpation of the occipital, submental, submandibular, internal jugular chain, and supraclavicular nodes did not demonstrate any abnormal lymph nodes or masses.  The carotid pulse was palpable bilaterally.  Palpation of the thyroid was soft and smooth, with no nodules or goiter appreciated.  The trachea was mobile and midline.    Nose - External contour is symmetric, no gross deflection or scars.  Nasal mucosa is pink and moist with no abnormal mucus.   The septum was midline and non-obstructive, turbinates of normal size and position.  No polyps, masses, or purulence noted on examination.    Neuro - Nonfocal neuro exam is normal, CN 2 through 12 intact, normal gait and muscle tone.      Performed in clinic today:  Audiologic Studies - An audiogram and tympanogram were performed today as part of the evaluation and personally reviewed. The tympanogram shows Type AS curves on the right and Type could not seal curves on the left, with flat canal volumes and middle ear pressures.  The audiogram showed moderate conductive loss on the right and mild SNHL loss on the left.            A/P - Linden Cruz is a 47 year old male with history of ear tubes and Serous otitis media in the right ear.      Since he has fluid again in the right ear I would like to place triune tube.  At that time I would like to replace both tubes with Triune tubes since he has a long history of ear infections and fluid.  I will have staff schedule this for a Wednesday in [place].      Progress note was partially captured by Johana Blanchard MA and reviewed/addended by Dr. Mejia for accuracy and content.      Savage Mejia MD        Again, thank you for allowing me to participate in the care of your patient.        Sincerely,        Savage Mejia MD, MD

## 2017-12-15 ENCOUNTER — THERAPY VISIT (OUTPATIENT)
Dept: PHYSICAL THERAPY | Facility: CLINIC | Age: 47
End: 2017-12-15
Payer: COMMERCIAL

## 2017-12-15 DIAGNOSIS — M25.512 ACUTE PAIN OF LEFT SHOULDER: ICD-10-CM

## 2017-12-15 PROCEDURE — 97110 THERAPEUTIC EXERCISES: CPT | Mod: GP | Performed by: PHYSICAL THERAPIST

## 2017-12-15 PROCEDURE — 97140 MANUAL THERAPY 1/> REGIONS: CPT | Mod: GP | Performed by: PHYSICAL THERAPIST

## 2017-12-20 ENCOUNTER — OFFICE VISIT (OUTPATIENT)
Dept: OTOLARYNGOLOGY | Facility: OTHER | Age: 47
End: 2017-12-20
Payer: COMMERCIAL

## 2017-12-20 DIAGNOSIS — H65.23 BILATERAL CHRONIC SEROUS OTITIS MEDIA: Primary | ICD-10-CM

## 2017-12-20 PROCEDURE — 99207 ZZC DROP WITH A PROCEDURE: CPT | Mod: 25 | Performed by: OTOLARYNGOLOGY

## 2017-12-20 PROCEDURE — 69433 CREATE EARDRUM OPENING: CPT | Mod: 50 | Performed by: OTOLARYNGOLOGY

## 2017-12-20 NOTE — MR AVS SNAPSHOT
After Visit Summary   12/20/2017    Linden Cruz    MRN: [id number]           Patient Information     Date Of Birth          1970        Visit Information        Provider Department      12/20/2017 11:30 AM Savage Mejia MD [facility]        Today's Diagnoses     Bilateral chronic serous otitis media    -  1       Follow-ups after your visit        Your next 10 appointments already scheduled     Dec 22, 2017  7:00 AM CST   KARLY Extremity with Keenan Bryant PT   [facility] ([facility]  )    [address]  [facility] [address]   [phone]            Dec 29, 2017  4:50 PM CST   KARLY Extremity with Keenan Bryant PT   [facility] ([facility]  )    [address]  [facility] [address]   [phone]            Jan 08, 2018  9:00 AM CST   Return Visit with Johana Deng MD   [facility] ([address] [address] [phone]            Jan 29, 2018  9:30 AM CST   Return Visit with Piyush Mooney   [facility] ([address] [id number] [phone]            Jan 29, 2018 10:00 AM CST   Return Visit with Savage Mejia MD   [facility] ([address] [id number] [phone]              Who to contact     If you have questions or need follow up information about today's clinic visit or your schedule please contact [facility] directly at [phone].  Normal or non-critical lab and imaging results will be communicated to you by MyChart, letter or phone within 4 business days after the clinic has received the results. If you do not hear from us within 7 days, please contact the clinic through MyChart or phone.  If you have a critical or abnormal lab result, we will notify you by phone as soon as possible.  Submit refill requests through [employer] or call your pharmacy and they will forward the refill request to us. Please allow 3 business days for your refill to be completed.          Additional Information About Your Visit        [employer] Information     [employer] gives you secure access to your electronic health record. If you see a primary care provider, you can also send messages to your care team and make appointments. If you have questions, please call your primary care clinic.  If you do not have a primary care provider, please call [phone] and they will assist you.        Care EveryWhere ID     This is your Care EveryWhere ID. This could be used by other organizations to access your [facility] medical records  [id number]         Blood Pressure from Last 3 Encounters:   10/20/17 (!) 134/92   10/13/17 136/80   01/04/17 128/82    Weight from Last 3 Encounters:   12/11/17 93.9 kg (207 lb)   12/08/17 94.8 kg (209 lb)   11/06/17 96.6 kg (213 lb)              We Performed the Following     TYMPANOSTOMY W LOCAL/TOPICAL ANESTH        Primary Care Provider Office Phone # Fax #    Nik Nina PA-C [phone]       [address] [place] [address]        Equal Access to Services     [facility]: Hadii aad ku hadasho Soomaali, waaxda luqadaha, qaybta kaalmada adeegyada, darien shaw . So [facility] [phone].    ATENCIÓN: Si habla español, tiene a krishnamurthy disposición servicios gratuitos de asistencia lingüística. Llame al [phone].    We comply with applicable federal civil rights laws and [place] laws. We do not discriminate on the basis of race, color, national origin, age, disability, sex, sexual orientation, or gender identity.            Thank you!     Thank you for choosing [facility]  for your care. Our goal is always to provide you with  excellent care. Hearing back from our patients is one way we can continue to improve our services. Please take a few minutes to complete the written survey that you may receive in the mail after your visit with us. Thank you!             Your Updated Medication List - Protect others around you: Learn how to safely use, store and throw away your medicines at www.disposemymeds.org.          This list is accurate as of: 12/20/17  1:55 PM.  Always use your most recent med list.                   Brand Name Dispense Instructions for use Diagnosis    ADVIL PO           fluticasone 50 MCG/ACT spray    FLONASE    16 g    Spray 1-2 sprays into both nostrils daily    Left otitis media with spontaneous rupture of eardrum, Chronic rhinitis       gemfibrozil 600 MG tablet    LOPID    180 tablet    TAKE 1 TABLET (600 MG) BY MOUTH 2 TIMES DAILY    Hypertriglyceridemia       MULTI VITAMIN MENS PO      Take  by mouth.        omeprazole 40 MG capsule    priLOSEC    90 capsule    TAKE ONE CAPSULE BY MOUTH EVERY DAY    Gastroesophageal reflux disease without esophagitis       * oxyCODONE-acetaminophen 5-325 MG per tablet    PERCOCET    20 tablet    Take 1-2 tablets by mouth every 6 hours as needed for moderate to severe pain    Closed fracture of left scapula, unspecified part of scapula, initial encounter       * oxyCODONE-acetaminophen 5-325 MG per tablet    PERCOCET    50 tablet    Take 1-2 tablets by mouth nightly as needed for moderate to severe pain    Closed fracture of left scapula, unspecified part of scapula, initial encounter       SUMAtriptan 100 MG tablet    IMITREX    18 tablet    TAKE 1 TAB BY MOUTH WITH ONSET OF MIGRAINE, MAY REPEAT ONCE AFTER 2 HOURS. MAX 2 TABS/24 HOURS.    Migraine with aura and without status migrainosus, not intractable       SYNTHROID 125 MCG tablet   Generic drug:  levothyroxine     90 tablet    TAKE 1 TABLET BY MOUTH ONCE DAILY    Subclinical hypothyroidism       tadalafil 20 MG tablet    CIALIS     8 tablet    Take 0.5-1 tablets as needed for erectile dysfunction. Never use with nitroglycerin, terazosin or doxazosin.    Erectile dysfunction, unspecified erectile dysfunction type       topiramate 50 MG tablet    TOPAMAX    60 tablet    TAKE 1 TABLET BY MOUTH TWICE A DAY    Migraine with aura and without status migrainosus, not intractable       * Notice:  This list has 2 medication(s) that are the same as other medications prescribed for you. Read the directions carefully, and ask your doctor or other care provider to review them with you.

## 2017-12-20 NOTE — LETTER
12/20/2017         RE: Linden Cruz  [address] [facility] [address]        Dear Colleague,    Thank you for referring your patient, Linden Cruz, to the [facility]. Please see a copy of my visit note below.    Procedure: Bilateral Myringotomy with Tube Placement      Technique- After discussion of the risks and benefits of myringotomy, including the risk of otorrhea and residual persistent perforation, informed consent was signed and placed in the chart.  I began with the right side.  I proceeded to position the patient in a semi-supine position in the examination chair.  Using the binocular surgical microscope, I then proceeded to clean the canal of cerumen and squamous debris.  I was able to see the tympanic membrane.  Using a small suction tip, I applied a tiny coating of phenol onto the tympanic membrane.  After visualizing a good maria elena, I then proceeded to use a myringotomy knife to make a radially oriented incision in the tympanic membrane.  the middle ear was filled with fluid. I suctioned the serous fluid.  Next, I proceeded to place a triune tube through the incision. I repeated the procedure on the left side. After confirming good positioning and a clearly visible open tube, the procedure was complete.  Significant amount of fluid and atelectasis was appreciated on the right side.      A/P - The patient was instructed on water precautions and given a prescription for otic antibiotic drops to use for three days.  I will see them in 2 to 4 weeks for follow up and repeat audiogram.  The patient was instructed to call in or return sooner if there was any drainage from the ear.      This document serves as a record of the services and decisions personally performed and made by Dr. Savage Mejia MD. It was created on his behalf by Yuliana Holloway, a trained medical scribe. The creation of this document is based the provider's statements to the medical  zeynep.  Yuliana Holloway 1:05 PM 12/20/2017    Provider:   The information in this document, created by the medical scribe for me, accurately reflects the services I personally performed and the decisions made by me. I have reviewed and approved this document for accuracy prior to leaving the patient care area.  Dr. Savage Mejia MD 1:05 PM 12/20/2017      Again, thank you for allowing me to participate in the care of your patient.        Sincerely,        Savage Mejia MD, MD

## 2017-12-20 NOTE — PROGRESS NOTES
Procedure: Bilateral Myringotomy with Tube Placement      Technique- After discussion of the risks and benefits of myringotomy, including the risk of otorrhea and residual persistent perforation, informed consent was signed and placed in the chart.  I began with the right side.  I proceeded to position the patient in a semi-supine position in the examination chair.  Using the binocular surgical microscope, I then proceeded to clean the canal of cerumen and squamous debris.  I was able to see the tympanic membrane.  Using a small suction tip, I applied a tiny coating of phenol onto the tympanic membrane.  After visualizing a good maria elena, I then proceeded to use a myringotomy knife to make a radially oriented incision in the tympanic membrane.  the middle ear was filled with fluid. I suctioned the serous fluid.  Next, I proceeded to place a triune tube through the incision. I repeated the procedure on the left side. After confirming good positioning and a clearly visible open tube, the procedure was complete.  Significant amount of fluid and atelectasis was appreciated on the right side.      A/P - The patient was instructed on water precautions and given a prescription for otic antibiotic drops to use for three days.  I will see them in 2 to 4 weeks for follow up and repeat audiogram.  The patient was instructed to call in or return sooner if there was any drainage from the ear.      This document serves as a record of the services and decisions personally performed and made by Dr. Savage Mejia MD. It was created on his behalf by Yuliana Holloway, a trained medical scribe. The creation of this document is based the provider's statements to the medical scribe.  Yuliana Holloway 1:05 PM 12/20/2017    Provider:   The information in this document, created by the medical scribe for me, accurately reflects the services I personally performed and the decisions made by me. I have reviewed and approved this document for  accuracy prior to leaving the patient care area.  Dr. Savage Mejia MD 1:05 PM 12/20/2017

## 2017-12-22 ENCOUNTER — THERAPY VISIT (OUTPATIENT)
Dept: PHYSICAL THERAPY | Facility: CLINIC | Age: 47
End: 2017-12-22
Payer: COMMERCIAL

## 2017-12-22 DIAGNOSIS — M54.41 ACUTE RIGHT-SIDED LOW BACK PAIN WITH RIGHT-SIDED SCIATICA: ICD-10-CM

## 2017-12-22 PROCEDURE — 97110 THERAPEUTIC EXERCISES: CPT | Mod: GP | Performed by: PHYSICAL THERAPIST

## 2017-12-22 PROCEDURE — 97140 MANUAL THERAPY 1/> REGIONS: CPT | Mod: GP | Performed by: PHYSICAL THERAPIST

## 2017-12-22 NOTE — PROGRESS NOTES
[facility] Evaluation  Subjective:    HPI  Oswestry Score: 28 %                 Objective:    System    Physical Exam    General     ROS    Assessment/Plan:      PROGRESS  REPORT    Progress reporting period is from 11/2/17 to 12/22/17.       SUBJECTIVE  Subjective changes noted by patient:  low back has been more sore in last week, having some pain into R thigh, flying to [place] for work did irritate his low back some, will have some pain with driving using R leg forward on gas pedal, sleeping continues to be a struggle (mostly related to shoulder issue)    Current Pain level: 4/10.     Previous pain level was  NA Initial Pain level: 8/10.   Changes in function:  Yes (See Goal flowsheet attached for changes in current functional level)  Adverse reaction to treatment or activity: activity - prolonged sitting/standing    OBJECTIVE  Changes noted in objective findings:    Objective: - standing forward flex, - march, lumbar ROM: flex 90%, ext 75%, R SB 90%, LS B 90%, hypomobile T6-T12, pain PA L2, hypertonicity R QL, repeated ext on REPEX significant improvement in pain low back,      ASSESSMENT/PLAN  Updated problem list and treatment plan: Diagnosis 1:  Low back pain consistent with L2-L3 disc irritation with radic to R thigh    Pain -  hot/cold therapy, manual therapy, education, directional preference exercise and home program  Decreased ROM/flexibility - manual therapy, therapeutic exercise and home program  Decreased joint mobility - manual therapy, therapeutic exercise and home program  Decreased function - therapeutic activities and home program  STG/LTGs have been met or progress has been made towards goals:  Yes (See Goal flow sheet completed today.)  Assessment of Progress: The patient's condition is improving.  Self Management Plans:  Patient has been instructed in a home treatment program.  I have re-evaluated this patient and find that the nature, scope, duration and intensity of the  therapy is appropriate for the medical condition of the patient.  Linden continues to require the following intervention to meet STG and LTG's:  PT    Recommendations:  This patient would benefit from continued therapy.     Frequency:  1 X/2 week, once daily  Duration:  for 8 weeks          Please refer to the daily flowsheet for treatment today, total treatment time and time spent performing 1:1 timed codes.      Keenan Bryant,PT, DPT, OCS

## 2017-12-22 NOTE — MR AVS SNAPSHOT
After Visit Summary   12/22/2017    Linden Cruz    MRN: [id number]           Patient Information     Date Of Birth          1970        Visit Information        Provider Department      12/22/2017 7:00 AM Keenan Bryant, PT [facility] Physical Therapy        Today's Diagnoses     Acute right-sided low back pain with right-sided sciatica           Follow-ups after your visit        Your next 10 appointments already scheduled     Dec 29, 2017  4:50 PM CST   KARLY Extremity with Keenan Bryant PT   [facility] Physical Therapy ([place]  )    [address]  [facility] [address]   [phone]            Jan 08, 2018  9:00 AM CST   Return Visit with Johana Deng MD   [address] [address]   [phone]            Jan 29, 2018  9:30 AM CST   Return Visit with Piyush Mooney   [facility] ([address] [address]   [phone]            Jan 29, 2018 10:00 AM CST   Return Visit with Savage Mejia MD   [facility] ([address] [address]   [phone]              Who to contact     If you have questions or need follow up information about today's clinic visit or your schedule please contact [facility] PHYSICAL THERAPY directly at [phone].  Normal or non-critical lab and imaging results will be communicated to you by MyChart, letter or phone within 4 business days after the clinic has received the results. If you do not hear from us within 7 days, please contact the clinic through MyChart or phone. If you have a critical or abnormal lab result, we will notify you by phone as soon as possible.  Submit refill requests through [employer] or call  your pharmacy and they will forward the refill request to us. Please allow 3 business days for your refill to be completed.          Additional Information About Your Visit        [employer] Information     [employer] gives you secure access to your electronic health record. If you see a primary care provider, you can also send messages to your care team and make appointments. If you have questions, please call your primary care clinic.  If you do not have a primary care provider, please call [phone] and they will assist you.        Care EveryWhere ID     This is your Care EveryWhere ID. This could be used by other organizations to access your [facility] medical records  [id number]         Blood Pressure from Last 3 Encounters:   10/20/17 (!) 134/92   10/13/17 136/80   01/04/17 128/82    Weight from Last 3 Encounters:   12/11/17 93.9 kg (207 lb)   12/08/17 94.8 kg (209 lb)   11/06/17 96.6 kg (213 lb)              We Performed the Following     KARLY PROGRESS NOTES REPORT     MANUAL THER TECH,1+REGIONS,EA 15 MIN     THERAPEUTIC EXERCISES        Primary Care Provider Office Phone # Fax #    Nik Nina PA-C [phone]       [address]  [facility] [address]        Equal Access to Services     SMILEY CRUZ : Hadii aad ku hadasho Soomaali, waaxda luqadaha, qaybta kaalmada adeegyada, darien benavides. So [facility] [phone].    ATENCIÓN: Si habla español, tiene a krishnamurthy disposición servicios gratuitos de asistencia lingüística. Llmariah al [phone].    We comply with applicable federal civil rights laws and [place] laws. We do not discriminate on the basis of race, color, national origin, age, disability, sex, sexual orientation, or gender identity.            Thank you!     Thank you for choosing [facility]  for your care. Our goal is always to provide you with excellent care. Hearing back from our patients is one way  we can continue to improve our services. Please take a few minutes to complete the written survey that you may receive in the mail after your visit with us. Thank you!             Your Updated Medication List - Protect others around you: Learn how to safely use, store and throw away your medicines at www.disposemymeds.org.          This list is accurate as of: 12/22/17  8:13 AM.  Always use your most recent med list.                   Brand Name Dispense Instructions for use Diagnosis    ADVIL PO           fluticasone 50 MCG/ACT spray    FLONASE    16 g    Spray 1-2 sprays into both nostrils daily    Left otitis media with spontaneous rupture of eardrum, Chronic rhinitis       gemfibrozil 600 MG tablet    LOPID    180 tablet    TAKE 1 TABLET (600 MG) BY MOUTH 2 TIMES DAILY    Hypertriglyceridemia       MULTI VITAMIN MENS PO      Take  by mouth.        omeprazole 40 MG capsule    priLOSEC    90 capsule    TAKE ONE CAPSULE BY MOUTH EVERY DAY    Gastroesophageal reflux disease without esophagitis       * oxyCODONE-acetaminophen 5-325 MG per tablet    PERCOCET    20 tablet    Take 1-2 tablets by mouth every 6 hours as needed for moderate to severe pain    Closed fracture of left scapula, unspecified part of scapula, initial encounter       * oxyCODONE-acetaminophen 5-325 MG per tablet    PERCOCET    50 tablet    Take 1-2 tablets by mouth nightly as needed for moderate to severe pain    Closed fracture of left scapula, unspecified part of scapula, initial encounter       SUMAtriptan 100 MG tablet    IMITREX    18 tablet    TAKE 1 TAB BY MOUTH WITH ONSET OF MIGRAINE, MAY REPEAT ONCE AFTER 2 HOURS. MAX 2 TABS/24 HOURS.    Migraine with aura and without status migrainosus, not intractable       SYNTHROID 125 MCG tablet   Generic drug:  levothyroxine     90 tablet    TAKE 1 TABLET BY MOUTH ONCE DAILY    Subclinical hypothyroidism       tadalafil 20 MG tablet    CIALIS    8 tablet    Take 0.5-1 tablets as needed for erectile  dysfunction. Never use with nitroglycerin, terazosin or doxazosin.    Erectile dysfunction, unspecified erectile dysfunction type       topiramate 50 MG tablet    TOPAMAX    60 tablet    TAKE 1 TABLET BY MOUTH TWICE A DAY    Migraine with aura and without status migrainosus, not intractable       * Notice:  This list has 2 medication(s) that are the same as other medications prescribed for you. Read the directions carefully, and ask your doctor or other care provider to review them with you.

## 2017-12-29 ENCOUNTER — THERAPY VISIT (OUTPATIENT)
Dept: PHYSICAL THERAPY | Facility: CLINIC | Age: 47
End: 2017-12-29
Payer: COMMERCIAL

## 2017-12-29 DIAGNOSIS — M25.512 ACUTE PAIN OF LEFT SHOULDER: ICD-10-CM

## 2017-12-29 PROCEDURE — 97140 MANUAL THERAPY 1/> REGIONS: CPT | Mod: GP | Performed by: PHYSICAL THERAPIST

## 2017-12-29 PROCEDURE — 97110 THERAPEUTIC EXERCISES: CPT | Mod: GP | Performed by: PHYSICAL THERAPIST

## 2018-01-07 DIAGNOSIS — K21.9 GASTROESOPHAGEAL REFLUX DISEASE WITHOUT ESOPHAGITIS: ICD-10-CM

## 2018-01-08 ENCOUNTER — THERAPY VISIT (OUTPATIENT)
Dept: PHYSICAL THERAPY | Facility: CLINIC | Age: 48
End: 2018-01-08
Payer: COMMERCIAL

## 2018-01-08 ENCOUNTER — OFFICE VISIT (OUTPATIENT)
Dept: ORTHOPEDICS | Facility: CLINIC | Age: 48
End: 2018-01-08
Payer: COMMERCIAL

## 2018-01-08 ENCOUNTER — RADIANT APPOINTMENT (OUTPATIENT)
Dept: GENERAL RADIOLOGY | Facility: CLINIC | Age: 48
End: 2018-01-08
Attending: ORTHOPAEDIC SURGERY
Payer: COMMERCIAL

## 2018-01-08 VITALS — HEIGHT: 71 IN | TEMPERATURE: 97.7 F | BODY MASS INDEX: 29.26 KG/M2 | WEIGHT: 209 LBS

## 2018-01-08 DIAGNOSIS — M25.512 ACUTE PAIN OF LEFT SHOULDER: ICD-10-CM

## 2018-01-08 DIAGNOSIS — M75.02 ADHESIVE CAPSULITIS OF LEFT SHOULDER: ICD-10-CM

## 2018-01-08 DIAGNOSIS — S42.142D CLOSED DISPLACED FRACTURE OF GLENOID CAVITY OF LEFT SCAPULA WITH ROUTINE HEALING, SUBSEQUENT ENCOUNTER: Primary | ICD-10-CM

## 2018-01-08 DIAGNOSIS — S42.142D CLOSED DISPLACED FRACTURE OF GLENOID CAVITY OF LEFT SCAPULA WITH ROUTINE HEALING, SUBSEQUENT ENCOUNTER: ICD-10-CM

## 2018-01-08 PROCEDURE — 73010 X-RAY EXAM OF SHOULDER BLADE: CPT | Mod: TC

## 2018-01-08 PROCEDURE — 20610 DRAIN/INJ JOINT/BURSA W/O US: CPT | Mod: LT | Performed by: ORTHOPAEDIC SURGERY

## 2018-01-08 PROCEDURE — 97110 THERAPEUTIC EXERCISES: CPT | Mod: GP | Performed by: PHYSICAL THERAPIST

## 2018-01-08 PROCEDURE — 97140 MANUAL THERAPY 1/> REGIONS: CPT | Mod: GP | Performed by: PHYSICAL THERAPIST

## 2018-01-08 PROCEDURE — 99213 OFFICE O/P EST LOW 20 MIN: CPT | Mod: 25 | Performed by: ORTHOPAEDIC SURGERY

## 2018-01-08 ASSESSMENT — PAIN SCALES - GENERAL: PAINLEVEL: MILD PAIN (3)

## 2018-01-08 NOTE — MR AVS SNAPSHOT
After Visit Summary   1/8/2018    Linden Cruz    MRN: [id number]           Patient Information     Date Of Birth          1970        Visit Information        Provider Department      1/8/2018 7:40 AM Keenan Bryant, PT [facility] Physical Therapy        Today's Diagnoses     Acute pain of left shoulder           Follow-ups after your visit        Your next 10 appointments already scheduled     Jan 08, 2018  8:45 AM CST   (Arrive by 8:30 AM)   XR SCAPULA LEFT with PHXRSP1   [address] [address]              Please bring a list of your current medicines to your exam. (Include vitamins, minerals and over-thecounter medicines.) Leave your valuables at home.  Tell your doctor if there is a chance you may be pregnant.  You do not need to do anything special for this exam.            Jan 08, 2018  9:00 AM CST   Return Visit with Johana Deng MD   [facility] ([address] [address]   [phone]            Jan 19, 2018  2:50 PM CST   KARLY Extremity with Keenan Bryant PT   [place] [facility] ([facility]  )    [address] [place] [address] [address]  [facility] [address]   [phone]            Jan 29, 2018  9:30 AM CST   Return Visit with Piyush Mooney   [address] [address]   [phone]            Jan 29, 2018 10:00 AM CST   Return Visit with Savage Mejia MD   [facility] ([address] [address] [phone]              Future tests that were ordered for you today     Open Future Orders        Priority Expected Expires Ordered    XR Scapula Left Routine 1/5/2018 1/5/2019 1/5/2018            Who to  contact     If you have questions or need follow up information about today's clinic visit or your schedule please contact [facility] directly at [phone].  Normal or non-critical lab and imaging results will be communicated to you by MyChart, letter or phone within 4 business days after the clinic has received the results. If you do not hear from us within 7 days, please contact the clinic through MyChart or phone. If you have a critical or abnormal lab result, we will notify you by phone as soon as possible.  Submit refill requests through myhomemove or call your pharmacy and they will forward the refill request to us. Please allow 3 business days for your refill to be completed.          Additional Information About Your Visit        [employer] Information     [employer] gives you secure access to your electronic health record. If you see a primary care provider, you can also send messages to your care team and make appointments. If you have questions, please call your primary care clinic.  If you do not have a primary care provider, please call [phone] and they will assist you.        Care EveryWhere ID     This is your Care EveryWhere ID. This could be used by other organizations to access your [facility] medical records  [id number]         Blood Pressure from Last 3 Encounters:   10/20/17 (!) 134/92   10/13/17 136/80   01/04/17 128/82    Weight from Last 3 Encounters:   12/11/17 93.9 kg (207 lb)   12/08/17 94.8 kg (209 lb)   11/06/17 96.6 kg (213 lb)              We Performed the Following     KARLY PROGRESS NOTES REPORT     MANUAL THER TECH,1+REGIONS,EA 15 MIN     THERAPEUTIC EXERCISES        Primary Care Provider Office Phone # Fax #    Nik Nina PA-C [phone]       [address] [facility] [facility] [address]  [facility] [address]        Equal Access to Services     SMILEY CRUZ : Pedrito Corona, bianka buckley, vel dasilva,  darien lawler saundrapinkyghazal lindquist'aan ah. So [facility] [phone].    ATENCIÓN: Si nixonla lidia, tiene a krishnamurthy disposición servicios gratuitos de asistencia lingüística. Amara larson [phone].    We comply with applicable federal civil rights laws and [place] laws. We do not discriminate on the basis of race, color, national origin, age, disability, sex, sexual orientation, or gender identity.            Thank you!     Thank you for choosing [facility]  for your care. Our goal is always to provide you with excellent care. Hearing back from our patients is one way we can continue to improve our services. Please take a few minutes to complete the written survey that you may receive in the mail after your visit with us. Thank you!             Your Updated Medication List - Protect others around you: Learn how to safely use, store and throw away your medicines at www.disposemymeds.org.          This list is accurate as of: 1/8/18  8:34 AM.  Always use your most recent med list.                   Brand Name Dispense Instructions for use Diagnosis    ADVIL PO           fluticasone 50 MCG/ACT spray    FLONASE    16 g    Spray 1-2 sprays into both nostrils daily    Left otitis media with spontaneous rupture of eardrum, Chronic rhinitis       gemfibrozil 600 MG tablet    LOPID    180 tablet    TAKE 1 TABLET (600 MG) BY MOUTH 2 TIMES DAILY    Hypertriglyceridemia       MULTI VITAMIN MENS PO      Take  by mouth.        omeprazole 40 MG capsule    priLOSEC    90 capsule    TAKE ONE CAPSULE BY MOUTH EVERY DAY    Gastroesophageal reflux disease without esophagitis       * oxyCODONE-acetaminophen 5-325 MG per tablet    PERCOCET    20 tablet    Take 1-2 tablets by mouth every 6 hours as needed for moderate to severe pain    Closed fracture of left scapula, unspecified part of scapula, initial encounter       * oxyCODONE-acetaminophen 5-325 MG per tablet    PERCOCET    50 tablet     Take 1-2 tablets by mouth nightly as needed for moderate to severe pain    Closed fracture of left scapula, unspecified part of scapula, initial encounter       SUMAtriptan 100 MG tablet    IMITREX    18 tablet    TAKE 1 TAB BY MOUTH WITH ONSET OF MIGRAINE, MAY REPEAT ONCE AFTER 2 HOURS. MAX 2 TABS/24 HOURS.    Migraine with aura and without status migrainosus, not intractable       SYNTHROID 125 MCG tablet   Generic drug:  levothyroxine     90 tablet    TAKE 1 TABLET BY MOUTH ONCE DAILY    Subclinical hypothyroidism       tadalafil 20 MG tablet    CIALIS    8 tablet    Take 0.5-1 tablets as needed for erectile dysfunction. Never use with nitroglycerin, terazosin or doxazosin.    Erectile dysfunction, unspecified erectile dysfunction type       topiramate 50 MG tablet    TOPAMAX    60 tablet    TAKE 1 TABLET BY MOUTH TWICE A DAY    Migraine with aura and without status migrainosus, not intractable       * Notice:  This list has 2 medication(s) that are the same as other medications prescribed for you. Read the directions carefully, and ask your doctor or other care provider to review them with you.
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
## 2018-01-08 NOTE — MR AVS SNAPSHOT
After Visit Summary   1/8/2018    Linden Cruz    MRN: [id number]           Patient Information     Date Of Birth          1970        Visit Information        Provider Department      1/8/2018 9:00 AM Johana Deng MD [facility]        Today's Diagnoses     Closed displaced fracture of glenoid cavity of left scapula with routine healing, subsequent encounter    -  1      Care Instructions      Frozen Shoulder (Adhesive Capsulitis)     Frozen shoulder is when pain and stiffness make it difficult to move your shoulder normally. This condition is also called adhesive capsulitis.  The shoulder is a ball-and-socket joint. The ball on the upper arm bone fits into a socket on the shoulder blade. The joint is covered by strong connective tissue called the shoulder capsule.  If the shoulder capsule becomes inflamed and swollen, movement is painful. Bands of scar tissue form on the joint s surface. This limits your shoulder's range of motion.  In most cases, only one shoulder at a time is affected. Some people may get frozen shoulder in the other shoulder, at a later time.  Frozen shoulder develops slowly in 3 stages. Each stage can last a few months or longer:  1. Painful stage. Pain occurs when raising your arm up or to the side, or when reaching behind your back. Your range of motion decreases. The pain may be worse at night and keep you from sleeping.  2. Frozen stage. Shoulder may be less painful, but it is stiffer. Range of motion is very limited.  3. Thawing stage. Range of motion starts to improve with treatment.  Experts don t know what causes frozen shoulder. It may occur after an injury such as a fracture. Or it may happen if the shoulder is immobile for a long time, such as after surgery. It may also be an autoimmune response. Risk factors include being over age 40, or having diabetes, heart disease, lung disease, or an overactive thyroid.  The diagnosis is made by  physical exam and an X-ray of the shoulder joint. Sometimes an MRI is done to look for other causes.  Mild cases may be treated with a home exercise program and anti-inflammatory medicines. More severe cases require physical therapy. In some cases a steroid is shot, or injected, into the shoulder area. In hard to treat cases, forced movement of the shoulder is done while you are asleep under general anesthesia. This will break up scar tissue and increase your range of motion. In rare cases, surgery may be needed to remove the scar tissue. Over time, most people with frozen shoulder get back nearly all range of motion without pain. Recovery may take a few months up to 1 year. You may still feel some stiffness.  Home care    If physical therapy was prescribed, keep up with any home exercise program you were given.    Keep using the affected shoulder and arm as much as possible.    You may use over-the-counter pain medicine to control pain, unless another pain medicine was prescribed. Anti-inflammatory pain medicines may work better than acetaminophen. If you have chronic liver or kidney disease or ever had a stomach ulcer or GI bleeding, talk with your healthcare provider before using these medicines.  Follow-up care  Follow up with your healthcare provider, or as advised. Or follow up sooner if pain increases or your shoulder motion decreases.  If X-rays or an MRI were done, you will be told of any new findings that may affect your care.  When to seek medical advice  Call your healthcare provider right away if any of these occur:    Your shoulder is red or swollen    Your arm feels weak or numb    Your shoulder movement decreases    Your shoulder pain increases even after using pain medicines  Date Last Reviewed: 11/24/2015 2000-2017 [employer]. [address] [place] [address], [place] [address]. All rights reserved. This information is not intended as a substitute for professional medical care. Always  follow your healthcare professional's instructions.        Understanding Frozen Shoulder    Frozen shoulder is a condition where the shoulder becomes painful and hard to move. The condition is sometimes called adhesive capsulitis.  The shoulder is a joint that is made up of many parts. These parts allow you to raise, rotate, and swing your arm. The parts of a normal shoulder are:    Humeral head. The ball at the top of the upper arm bone (humerus).    Scapula. The shoulder blade.    Glenoid. The shallow socket on the scapula. (The humeral head rests on the glenoid.)    Capsule. A sheet of tough tissue that encloses the joint and joins the ball to the socket.  With frozen shoulder, the capsule thickens, and shrinks and pulls in (contracts). It is not clear why this happens. It may be from swelling and irritation, or from scar tissue forming. Over time, this may result in pain, stiffness, and loss of movement in the shoulder.  What causes frozen shoulder?  Experts don t know for sure why frozen shoulder occurs. Some things can make the condition more likely. These include:    Being a woman    Being 40 to 60 years old    Having certain health conditions, such as diabetes or thyroid disease    Taking certain medicines    Not using the shoulder for a prolonged period of time, such as after an injury or surgery  Symptoms of frozen shoulder  Frozen shoulder typically occurs in 3 stages. Each stage will vary, but often lasts a few months or longer:    Freezing stage. The shoulder is very painful. Pain often gets worse when moving your arm and at night during sleep. The shoulder gradually becomes stiffer.    Frozen stage. The shoulder is very stiff and hard to move. Pain may be less than in the first stage. It may be hard to do daily tasks, such as dressing or bathing.    Thawing stage. Pain and stiffness slowly get better. In time, normal or almost normal use of the shoulder returns.  Treatment for frozen shoulder  Most  cases of frozen shoulder get better, even with no treatment. Treatment is done to help speed healing and help regain as much joint movement as possible. The best course of treatment will depend on your needs. It may include one or more of the following:    Prescription or over-the-counter pain medicines. These help relieve pain and reduce swelling.    Stretching exercises. These help restore movement to the shoulder. You may do them on your own or under the care of a physical therapist.    Cortisone shots. These are given into your shoulder joint. They can t cure frozen shoulder. But they can help give short-term relief from symptoms and allow you to do exercises without too much pain.    Cold packs and heat packs. These can help relieve symptoms.  Keep in mind that getting better is a slow process. It can take a year or longer. If your shoulder doesn t get better on its own in this time, you may need surgery. This is done to loosen the tight tissues in the shoulder joint.  When to call your healthcare provider  Call your healthcare provider right away if you have any of these:    Fever of 100.4 F (38 C) or higher, or as directed    Symptoms that don t get better, or get worse    New symptoms   Date Last Reviewed: 3/10/2016    7490-9429 [employer]. [address] [place] [address], [place] [address]. All rights reserved. This information is not intended as a substitute for professional medical care. Always follow your healthcare professional's instructions.        Treating Frozen Shoulder: Preparing for Your Exercises  Doing special exercises is the first way to treat frozen shoulder. You may see a physical therapist who can help you learn to do them. If these exercises don t help, you may need further medical treatment.  Shoulder stretches    Doing stretches is often the best way to treat frozen shoulder. Stretching each day can help lessen the pain and restore shoulder flexibility. But it often takes a  "significant amount of time before you notice results. Try to be patient.  To warm up, do the  pendulum.  While standing, let the hand on your frozen side dangle freely as you hold the back of a chair or a table top with your other hand. Slowly make circles and side-to-side motions with the frozen arm.  Physical therapy  Your healthcare provider may refer you to physical therapy. This hands-on care helps you learn how to do stretching exercises at home. A physical therapist may also work on restoring your shoulder flexibility. To do this, he or she may gently stretch and move your frozen shoulder.  Tips for shoulder stretches    Anti-inflammatory medicines can help relieve pain. This may help you do your stretches. Your healthcare provider can tell you more.    Mild and moist heat can help loosen your shoulder. Try taking a warm shower or bath just before you stretch.    A cold or ice pack can limit pain and swelling. Try icing your shoulder for a few minutes after you do your stretches.  Date Last Reviewed: 10/14/2015    1690-7937 [employer]. [address] [place] [address]. All rights reserved. This information is not intended as a substitute for professional medical care. Always follow your healthcare professional's instructions.        \"Reaching Up\" for Shoulder Flexibility    This stretch can help restore shoulder flexibility and relieve pain over time. When stretching, be sure to breathe deeply. And follow any special instructions from your doctor or therapist.  4. Raise the hand on the side you want to stretch as high as you can. Then grasp a stable surface, such as a bookcase or a doorframe, with the same hand.  5. Keeping your arm straight, lower your body by bending your knees. Stop when you feel the stretch in the shoulder. Try to hold the stretch for 5 seconds.  6. Work up to doing 3 sets of this stretch, 3 times a day. Work up to holding the stretch for 30 to 60 seconds.  Note: " Your back should remain straight. To enhance the stretch over time, try to bend your knees lower. Or, raise your arm higher at the start of the stretch.     Frozen shoulder  Frozen shoulder is another name for adhesive capsulitis. This causes restricted movement in the shoulder. If you have frozen shoulder, this stretch may cause discomfort, especially when you first get started. A few months may pass before you achieve the results you want. But once your shoulder heals, it rarely becomes frozen again. So stick to your stretching program. If you have any questions, be sure to ask your healthcare provider.   Date Last Reviewed: 10/14/2015    9799-8221 [employer]. [address] [place] [address], [place] [address]. All rights reserved. This information is not intended as a substitute for professional medical care. Always follow your healthcare professional's instructions.        Exercises for Shoulder Flexibility: External Rotation    This stretch can help restore shoulder flexibility and relieve pain over time. When stretching, be sure to breathe deeply. Follow any special instructions from your doctor or physical therapist:  7.  a doorway. Grasp the doorjamb with the hand on the frozen side. Your arm should be bent.  8. With the other hand, hold the elbow on the frozen side firmly against your body.  9. Standing in the same spot, rotate your body away from the doorjamb. Stop when you feel the stretch in the shoulder. At first, try to hold the stretch for 5 seconds.  10. Work up to doing 3 sets of this stretch, 3 times a day. Work up to holding the stretch for 30 to 60 seconds.  Note: Keep your arms as still as you can. Over time, rotate your body a little more to enhance the stretch. But be careful not to twist your back.  Frozen shoulder  Frozen shoulder is another name for adhesive capsulitis, which causes restricted movement in the shoulder. If you have frozen shoulder, this stretch may cause  discomfort, especially when you first get started. A few months may pass before you achieve the results you want. But once your shoulder heals, it rarely becomes frozen again. So stick to your stretching program. If you have any questions, be sure to ask your doctor.   Date Last Reviewed: 8/16/2015 2000-2017 The [employer]. [address] [place] [address], [place] [address]. All rights reserved. This information is not intended as a substitute for professional medical care. Always follow your healthcare professional's instructions.        Exercises for Shoulder Flexibility: Internal Rotation    This stretch can help restore shoulder flexibility and relieve pain over time. When stretching, be sure to breathe deeply. Follow any special instructions from your healthcare provider or physical therapist.  11. While seated, move the arm on the side you want to stretch toward the middle of your back. The palm of your hand should face out.  12. Cup your other hand under the hand that s behind your back. Gently push your cupped hand upward until you feel the stretch in the shoulder. Try to hold the stretch for 5 seconds.  13. Work up to doing 3 sets of this stretch, 3 times a day. Work up to holding the stretch for 30 to 60 seconds.  Note: Keep your back straight. It s OK if your hand can t reach the middle of your back. Instead, start the stretch with your hand as close as you can get it to the middle of your back.     Frozen shoulder  Frozen shoulder is another name for adhesive capsulitis. This causes restricted movement in the shoulder. If you have frozen shoulder, this stretch may cause discomfort, especially when you first get started. A few months may pass before you achieve the results you want. But once your shoulder heals, it rarely becomes frozen again. So stick to your stretching program. If you have any questions, be sure to ask your healthcare provider.   Date Last Reviewed: 10/14/2015    8402-6416 The  [employer]. [address] [place] [address]. All rights reserved. This information is not intended as a substitute for professional medical care. Always follow your healthcare professional's instructions.        Exercises for Shoulder Flexibility: Wall Walk    Improving your flexibility can reduce pain. Stretching exercises also can help increase your range of pain-free motion. Breathe normally when you exercise. Use smooth, fluid movements.  Note: Follow any special instructions you are given. If you feel pain, stop the exercise. If the pain continues after stopping, call your healthcare provider:    Stand with your shoulder about 2 feet from the wall.    Raise your arm to shoulder level and gently  walk  your fingers up the wall as high as you can.    Hold for a few seconds. Then walk your fingers back down.    Repeat 3 times. Move closer to the wall as you repeat.    Build up to holding each stretch for 30 seconds.  Caution: Do this stretch only if your healthcare provider recommends it. Don t do it when you are first injured.       Date Last Reviewed: 8/16/2015 2000-2017 [employer]. [address] [place] [address]. All rights reserved. This information is not intended as a substitute for professional medical care. Always follow your healthcare professional's instructions.        Shoulder Flexion (Flexibility)    14. Sit in a chair sideways next to a table. Lay your right arm on the table, pointed forward.  15. Slowly lean forward.  Slide your arm forward on the table. Feel the stretch in your right shoulder.  16. Hold for 5 seconds. Slowly sit back up.  17. Repeat 5 times.  18. Repeat this exercise 3 times a day, or as instructed.  Date Last Reviewed: 3/10/2016    9457-3953 [employer]. [address] [place] [address]. All rights reserved. This information is not intended as a substitute for professional medical care. Always follow your  healthcare professional's instructions.                Follow-ups after your visit        Your next 10 appointments already scheduled     Jan 19, 2018  2:50 PM CST   KARLY Extremity with Keenan Bryant PT   [facility] ([facility]  )    [address]  [place] [address]   [phone]            Jan 29, 2018  9:30 AM CST   Return Visit with Piyush Mooney   [facility] ([facility])    [address] [place] [id number] [phone]            Jan 29, 2018 10:00 AM CST   Return Visit with Savage Mejia MD   [facility] ([address] [address] [phone]              Who to contact     If you have questions or need follow up information about today's clinic visit or your schedule please contact [facility] directly at [phone].  Normal or non-critical lab and imaging results will be communicated to you by MyChart, letter or phone within 4 business days after the clinic has received the results. If you do not hear from us within 7 days, please contact the clinic through MyChart or phone. If you have a critical or abnormal lab result, we will notify you by phone as soon as possible.  Submit refill requests through [employer] or call your pharmacy and they will forward the refill request to us. Please allow 3 business days for your refill to be completed.          Additional Information About Your Visit        [employer] Information     [employer] gives you secure access to your electronic health record. If you see a primary care provider, you can also send messages to your care team and make appointments. If you have questions, please call your primary care clinic.  If you do not have a primary care provider, please call [phone] and they will assist you.        Care EveryWhere ID     This is your Care EveryWhere  "ID. This could be used by other organizations to access your [facility] medical records  [id number]        Your Vitals Were     Temperature Height BMI (Body Mass Index)             97.7  F (36.5  C) 1.791 m (5' 10.5\") 29.56 kg/m2          Blood Pressure from Last 3 Encounters:   10/20/17 (!) 134/92   10/13/17 136/80   01/04/17 128/82    Weight from Last 3 Encounters:   01/08/18 94.8 kg (209 lb)   12/11/17 93.9 kg (207 lb)   12/08/17 94.8 kg (209 lb)               Primary Care Provider Office Phone # Fax #    Nik Nina PA-C [phone]       [address] [place] [id number]        Equal Access to Services     [facility] NANCY : Pedrito bravo Sodanny, waaxda luqadaha, qaybta kaalmada brown, darien shaw . So [facility] [phone].    ATENCIÓN: Si habla español, tiene a krishnamurthy disposición servicios gratuitos de asistencia lingüística. Amara al [phone].    We comply with applicable federal civil rights laws and [place] laws. We do not discriminate on the basis of race, color, national origin, age, disability, sex, sexual orientation, or gender identity.            Thank you!     Thank you for choosing [facility]  for your care. Our goal is always to provide you with excellent care. Hearing back from our patients is one way we can continue to improve our services. Please take a few minutes to complete the written survey that you may receive in the mail after your visit with us. Thank you!             Your Updated Medication List - Protect others around you: Learn how to safely use, store and throw away your medicines at www.disposemymeds.org.          This list is accurate as of: 1/8/18  9:34 AM.  Always use your most recent med list.                   Brand Name Dispense Instructions for use Diagnosis    ADVIL PO           fluticasone 50 MCG/ACT spray    FLONASE    16 g    Spray 1-2 sprays into both nostrils daily    Left otitis media " with spontaneous rupture of eardrum, Chronic rhinitis       gemfibrozil 600 MG tablet    LOPID    180 tablet    TAKE 1 TABLET (600 MG) BY MOUTH 2 TIMES DAILY    Hypertriglyceridemia       MULTI VITAMIN MENS PO      Take  by mouth.        omeprazole 40 MG capsule    priLOSEC    90 capsule    TAKE ONE CAPSULE BY MOUTH EVERY DAY    Gastroesophageal reflux disease without esophagitis       * oxyCODONE-acetaminophen 5-325 MG per tablet    PERCOCET    20 tablet    Take 1-2 tablets by mouth every 6 hours as needed for moderate to severe pain    Closed fracture of left scapula, unspecified part of scapula, initial encounter       * oxyCODONE-acetaminophen 5-325 MG per tablet    PERCOCET    50 tablet    Take 1-2 tablets by mouth nightly as needed for moderate to severe pain    Closed fracture of left scapula, unspecified part of scapula, initial encounter       SUMAtriptan 100 MG tablet    IMITREX    18 tablet    TAKE 1 TAB BY MOUTH WITH ONSET OF MIGRAINE, MAY REPEAT ONCE AFTER 2 HOURS. MAX 2 TABS/24 HOURS.    Migraine with aura and without status migrainosus, not intractable       SYNTHROID 125 MCG tablet   Generic drug:  levothyroxine     90 tablet    TAKE 1 TABLET BY MOUTH ONCE DAILY    Subclinical hypothyroidism       tadalafil 20 MG tablet    CIALIS    8 tablet    Take 0.5-1 tablets as needed for erectile dysfunction. Never use with nitroglycerin, terazosin or doxazosin.    Erectile dysfunction, unspecified erectile dysfunction type       topiramate 50 MG tablet    TOPAMAX    60 tablet    TAKE 1 TABLET BY MOUTH TWICE A DAY    Migraine with aura and without status migrainosus, not intractable       * Notice:  This list has 2 medication(s) that are the same as other medications prescribed for you. Read the directions carefully, and ask your doctor or other care provider to review them with you.

## 2018-01-08 NOTE — LETTER
[facility]  [address]  [facility] [id number] [phone]    2018    Re: Linden Cruz   :   1970  MRN:  [id number]   REFERRING PHYSICIAN:   Johana Deng    [facility]    Date of Initial Evaluation:  ***  Visits:  Rxs Used: 5  Reason for Referral:  Acute pain of left shoulder    EVALUATION SUMMARY    Subjective:  HPI                    Objective:  System    Physical Exam    General     ROS    Assessment/Plan:    PROGRESS  REPORT    Progress reporting period is from 17 to 18.       SUBJECTIVE  Subjective changes noted by patient:  shoulder seems to be more sore in last couple of weeks, pain seems to come and go in shoulder, sleeping continues to be a challenge, pain with laying on L side >5 min, getting dressing going pretty well, but continues to be a little slow, able to wash his hair with his L arm with pain, able to tuck shirt with L hand with some pain    Current Pain level: 2/10 (worst 6/10).     Previous pain level was  NA Initial Pain level: 8/10.   Changes in function:  Yes (See Goal flowsheet attached for changes in current functional level)  Adverse reaction to treatment or activity: activity - laying on L side, quick stretching of L shoulder    OBJECTIVE  Changes noted in objective findings:    Objective: PROM: flex 170, abd 130, ER(90) 80, IR(90) 40, AROM: flex 135, abd 122, ER(0) 70, IR(0) T12, + full can L, tender L supraspinatus tendon, MMT: shoulder abd 4-/5, ER 4/5, IR 4/5     ASSESSMENT/PLAN  Updated problem list and treatment plan: Diagnosis 1:  L scap fracture with tendinitis of L supraspintaus    Pain -  hot/cold therapy, manual therapy, self management, education and home program  Decreased ROM/flexibility - manual therapy, therapeutic exercise and home program  Decreased strength - therapeutic exercise, therapeutic activities and home  program  Decreased function - therapeutic activities and home program  STG/LTGs have been met or progress has been made towards goals:  Yes (See Goal flow sheet completed today.)  Assessment of Progress: The patient's condition is improving.  Self Management Plans:  Patient has been instructed in a home treatment program.  I have re-evaluated this patient and find that the nature, scope, duration and intensity of the therapy is appropriate for the medical condition of the patient.  Linden continues to require the following intervention to meet STG and LTG's:  PT    Recommendations:  This patient would benefit from continued therapy.     Frequency:  1 X week, once daily  Duration:  for 6 weeks.      Keenan Bryant,PT, DPT, OCS        Thank you for your referral.    INQUIRIES  Therapist:   [facility]  [address] [place], [place] [address] [address]  [facility] [address]  Phone: [phone]  Fax: [phone]
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
## 2018-01-08 NOTE — PATIENT INSTRUCTIONS
Frozen Shoulder (Adhesive Capsulitis)     Frozen shoulder is when pain and stiffness make it difficult to move your shoulder normally. This condition is also called adhesive capsulitis.  The shoulder is a ball-and-socket joint. The ball on the upper arm bone fits into a socket on the shoulder blade. The joint is covered by strong connective tissue called the shoulder capsule.  If the shoulder capsule becomes inflamed and swollen, movement is painful. Bands of scar tissue form on the joint s surface. This limits your shoulder's range of motion.  In most cases, only one shoulder at a time is affected. Some people may get frozen shoulder in the other shoulder, at a later time.  Frozen shoulder develops slowly in 3 stages. Each stage can last a few months or longer:  1. Painful stage. Pain occurs when raising your arm up or to the side, or when reaching behind your back. Your range of motion decreases. The pain may be worse at night and keep you from sleeping.  2. Frozen stage. Shoulder may be less painful, but it is stiffer. Range of motion is very limited.  3. Thawing stage. Range of motion starts to improve with treatment.  Experts don t know what causes frozen shoulder. It may occur after an injury such as a fracture. Or it may happen if the shoulder is immobile for a long time, such as after surgery. It may also be an autoimmune response. Risk factors include being over age 40, or having diabetes, heart disease, lung disease, or an overactive thyroid.  The diagnosis is made by physical exam and an X-ray of the shoulder joint. Sometimes an MRI is done to look for other causes.  Mild cases may be treated with a home exercise program and anti-inflammatory medicines. More severe cases require physical therapy. In some cases a steroid is shot, or injected, into the shoulder area. In hard to treat cases, forced movement of the shoulder is done while you are asleep under general anesthesia. This will break up scar  tissue and increase your range of motion. In rare cases, surgery may be needed to remove the scar tissue. Over time, most people with frozen shoulder get back nearly all range of motion without pain. Recovery may take a few months up to 1 year. You may still feel some stiffness.  Home care    If physical therapy was prescribed, keep up with any home exercise program you were given.    Keep using the affected shoulder and arm as much as possible.    You may use over-the-counter pain medicine to control pain, unless another pain medicine was prescribed. Anti-inflammatory pain medicines may work better than acetaminophen. If you have chronic liver or kidney disease or ever had a stomach ulcer or GI bleeding, talk with your healthcare provider before using these medicines.  Follow-up care  Follow up with your healthcare provider, or as advised. Or follow up sooner if pain increases or your shoulder motion decreases.  If X-rays or an MRI were done, you will be told of any new findings that may affect your care.  When to seek medical advice  Call your healthcare provider right away if any of these occur:    Your shoulder is red or swollen    Your arm feels weak or numb    Your shoulder movement decreases    Your shoulder pain increases even after using pain medicines  Date Last Reviewed: 11/24/2015 2000-2017 [employer]. [address] [place] [address]. All rights reserved. This information is not intended as a substitute for professional medical care. Always follow your healthcare professional's instructions.        Understanding Frozen Shoulder    Frozen shoulder is a condition where the shoulder becomes painful and hard to move. The condition is sometimes called adhesive capsulitis.  The shoulder is a joint that is made up of many parts. These parts allow you to raise, rotate, and swing your arm. The parts of a normal shoulder are:    Humeral head. The ball at the top of the upper arm bone  (humerus).    Scapula. The shoulder blade.    Glenoid. The shallow socket on the scapula. (The humeral head rests on the glenoid.)    Capsule. A sheet of tough tissue that encloses the joint and joins the ball to the socket.  With frozen shoulder, the capsule thickens, and shrinks and pulls in (contracts). It is not clear why this happens. It may be from swelling and irritation, or from scar tissue forming. Over time, this may result in pain, stiffness, and loss of movement in the shoulder.  What causes frozen shoulder?  Experts don t know for sure why frozen shoulder occurs. Some things can make the condition more likely. These include:    Being a woman    Being 40 to 60 years old    Having certain health conditions, such as diabetes or thyroid disease    Taking certain medicines    Not using the shoulder for a prolonged period of time, such as after an injury or surgery  Symptoms of frozen shoulder  Frozen shoulder typically occurs in 3 stages. Each stage will vary, but often lasts a few months or longer:    Freezing stage. The shoulder is very painful. Pain often gets worse when moving your arm and at night during sleep. The shoulder gradually becomes stiffer.    Frozen stage. The shoulder is very stiff and hard to move. Pain may be less than in the first stage. It may be hard to do daily tasks, such as dressing or bathing.    Thawing stage. Pain and stiffness slowly get better. In time, normal or almost normal use of the shoulder returns.  Treatment for frozen shoulder  Most cases of frozen shoulder get better, even with no treatment. Treatment is done to help speed healing and help regain as much joint movement as possible. The best course of treatment will depend on your needs. It may include one or more of the following:    Prescription or over-the-counter pain medicines. These help relieve pain and reduce swelling.    Stretching exercises. These help restore movement to the shoulder. You may do them on your  own or under the care of a physical therapist.    Cortisone shots. These are given into your shoulder joint. They can t cure frozen shoulder. But they can help give short-term relief from symptoms and allow you to do exercises without too much pain.    Cold packs and heat packs. These can help relieve symptoms.  Keep in mind that getting better is a slow process. It can take a year or longer. If your shoulder doesn t get better on its own in this time, you may need surgery. This is done to loosen the tight tissues in the shoulder joint.  When to call your healthcare provider  Call your healthcare provider right away if you have any of these:    Fever of 100.4 F (38 C) or higher, or as directed    Symptoms that don t get better, or get worse    New symptoms   Date Last Reviewed: 3/10/2016    8422-6684 [employer]. [address] [place] [address]. All rights reserved. This information is not intended as a substitute for professional medical care. Always follow your healthcare professional's instructions.        Treating Frozen Shoulder: Preparing for Your Exercises  Doing special exercises is the first way to treat frozen shoulder. You may see a physical therapist who can help you learn to do them. If these exercises don t help, you may need further medical treatment.  Shoulder stretches    Doing stretches is often the best way to treat frozen shoulder. Stretching each day can help lessen the pain and restore shoulder flexibility. But it often takes a significant amount of time before you notice results. Try to be patient.  To warm up, do the  pendulum.  While standing, let the hand on your frozen side dangle freely as you hold the back of a chair or a table top with your other hand. Slowly make circles and side-to-side motions with the frozen arm.  Physical therapy  Your healthcare provider may refer you to physical therapy. This hands-on care helps you learn how to do stretching exercises at  "home. A physical therapist may also work on restoring your shoulder flexibility. To do this, he or she may gently stretch and move your frozen shoulder.  Tips for shoulder stretches    Anti-inflammatory medicines can help relieve pain. This may help you do your stretches. Your healthcare provider can tell you more.    Mild and moist heat can help loosen your shoulder. Try taking a warm shower or bath just before you stretch.    A cold or ice pack can limit pain and swelling. Try icing your shoulder for a few minutes after you do your stretches.  Date Last Reviewed: 10/14/2015    2011-5919 [employer]. [address] [place] [address]. All rights reserved. This information is not intended as a substitute for professional medical care. Always follow your healthcare professional's instructions.        \"Reaching Up\" for Shoulder Flexibility    This stretch can help restore shoulder flexibility and relieve pain over time. When stretching, be sure to breathe deeply. And follow any special instructions from your doctor or therapist.  4. Raise the hand on the side you want to stretch as high as you can. Then grasp a stable surface, such as a bookcase or a doorframe, with the same hand.  5. Keeping your arm straight, lower your body by bending your knees. Stop when you feel the stretch in the shoulder. Try to hold the stretch for 5 seconds.  6. Work up to doing 3 sets of this stretch, 3 times a day. Work up to holding the stretch for 30 to 60 seconds.  Note: Your back should remain straight. To enhance the stretch over time, try to bend your knees lower. Or, raise your arm higher at the start of the stretch.     Frozen shoulder  Frozen shoulder is another name for adhesive capsulitis. This causes restricted movement in the shoulder. If you have frozen shoulder, this stretch may cause discomfort, especially when you first get started. A few months may pass before you achieve the results you want. But " once your shoulder heals, it rarely becomes frozen again. So stick to your stretching program. If you have any questions, be sure to ask your healthcare provider.   Date Last Reviewed: 10/14/2015    [id number] [employer]. [address] [place] [address]. All rights reserved. This information is not intended as a substitute for professional medical care. Always follow your healthcare professional's instructions.        Exercises for Shoulder Flexibility: External Rotation    This stretch can help restore shoulder flexibility and relieve pain over time. When stretching, be sure to breathe deeply. Follow any special instructions from your doctor or physical therapist:  7.  a doorway. Grasp the doorjamb with the hand on the frozen side. Your arm should be bent.  8. With the other hand, hold the elbow on the frozen side firmly against your body.  9. Standing in the same spot, rotate your body away from the doorjamb. Stop when you feel the stretch in the shoulder. At first, try to hold the stretch for 5 seconds.  10. Work up to doing 3 sets of this stretch, 3 times a day. Work up to holding the stretch for 30 to 60 seconds.  Note: Keep your arms as still as you can. Over time, rotate your body a little more to enhance the stretch. But be careful not to twist your back.  Frozen shoulder  Frozen shoulder is another name for adhesive capsulitis, which causes restricted movement in the shoulder. If you have frozen shoulder, this stretch may cause discomfort, especially when you first get started. A few months may pass before you achieve the results you want. But once your shoulder heals, it rarely becomes frozen again. So stick to your stretching program. If you have any questions, be sure to ask your doctor.   Date Last Reviewed: 8/16/2015 2000-2017 [employer]. [address] [place] [address]. All rights reserved. This information is not intended as a substitute for  professional medical care. Always follow your healthcare professional's instructions.        Exercises for Shoulder Flexibility: Internal Rotation    This stretch can help restore shoulder flexibility and relieve pain over time. When stretching, be sure to breathe deeply. Follow any special instructions from your healthcare provider or physical therapist.  11. While seated, move the arm on the side you want to stretch toward the middle of your back. The palm of your hand should face out.  12. Cup your other hand under the hand that s behind your back. Gently push your cupped hand upward until you feel the stretch in the shoulder. Try to hold the stretch for 5 seconds.  13. Work up to doing 3 sets of this stretch, 3 times a day. Work up to holding the stretch for 30 to 60 seconds.  Note: Keep your back straight. It s OK if your hand can t reach the middle of your back. Instead, start the stretch with your hand as close as you can get it to the middle of your back.     Frozen shoulder  Frozen shoulder is another name for adhesive capsulitis. This causes restricted movement in the shoulder. If you have frozen shoulder, this stretch may cause discomfort, especially when you first get started. A few months may pass before you achieve the results you want. But once your shoulder heals, it rarely becomes frozen again. So stick to your stretching program. If you have any questions, be sure to ask your healthcare provider.   Date Last Reviewed: 10/14/2015    2158-8229 [facility]. [address] [place] [address]. All rights reserved. This information is not intended as a substitute for professional medical care. Always follow your healthcare professional's instructions.        Exercises for Shoulder Flexibility: Wall Walk    Improving your flexibility can reduce pain. Stretching exercises also can help increase your range of pain-free motion. Breathe normally when you exercise. Use smooth, fluid  movements.  Note: Follow any special instructions you are given. If you feel pain, stop the exercise. If the pain continues after stopping, call your healthcare provider:    Stand with your shoulder about 2 feet from the wall.    Raise your arm to shoulder level and gently  walk  your fingers up the wall as high as you can.    Hold for a few seconds. Then walk your fingers back down.    Repeat 3 times. Move closer to the wall as you repeat.    Build up to holding each stretch for 30 seconds.  Caution: Do this stretch only if your healthcare provider recommends it. Don t do it when you are first injured.       Date Last Reviewed: 8/16/2015 2000-2017 [employer]. [address] [place] [address]. All rights reserved. This information is not intended as a substitute for professional medical care. Always follow your healthcare professional's instructions.        Shoulder Flexion (Flexibility)    14. Sit in a chair sideways next to a table. Lay your right arm on the table, pointed forward.  15. Slowly lean forward.  Slide your arm forward on the table. Feel the stretch in your right shoulder.  16. Hold for 5 seconds. Slowly sit back up.  17. Repeat 5 times.  18. Repeat this exercise 3 times a day, or as instructed.  Date Last Reviewed: 3/10/2016    9943-0668 [employer]. [address] [place] [address]. All rights reserved. This information is not intended as a substitute for professional medical care. Always follow your healthcare professional's instructions.

## 2018-01-08 NOTE — PROGRESS NOTES
Subjective:  HPI                    Objective:  System    Physical Exam    General     ROS    Assessment/Plan:    PROGRESS  REPORT    Progress reporting period is from 11/21/17 to 1/8/18.       SUBJECTIVE  Subjective changes noted by patient:  shoulder seems to be more sore in last couple of weeks, pain seems to come and go in shoulder, sleeping continues to be a challenge, pain with laying on L side >5 min, getting dressing going pretty well, but continues to be a little slow, able to wash his hair with his L arm with pain, able to tuck shirt with L hand with some pain    Current Pain level: 2/10 (worst 6/10).     Previous pain level was  NA Initial Pain level: 8/10.   Changes in function:  Yes (See Goal flowsheet attached for changes in current functional level)  Adverse reaction to treatment or activity: activity - laying on L side, quick stretching of L shoulder    OBJECTIVE  Changes noted in objective findings:    Objective: PROM: flex 170, abd 130, ER(90) 80, IR(90) 40, AROM: flex 135, abd 122, ER(0) 70, IR(0) T12, + full can L, tender L supraspinatus tendon, MMT: shoulder abd 4-/5, ER 4/5, IR 4/5     ASSESSMENT/PLAN  Updated problem list and treatment plan: Diagnosis 1:  L scap fracture with tendinitis of L supraspintaus    Pain -  hot/cold therapy, manual therapy, self management, education and home program  Decreased ROM/flexibility - manual therapy, therapeutic exercise and home program  Decreased strength - therapeutic exercise, therapeutic activities and home program  Decreased function - therapeutic activities and home program  STG/LTGs have been met or progress has been made towards goals:  Yes (See Goal flow sheet completed today.)  Assessment of Progress: The patient's condition is improving.  Self Management Plans:  Patient has been instructed in a home treatment program.  I have re-evaluated this patient and find that the nature, scope, duration and intensity of the therapy is appropriate for the  medical condition of the patient.  Linden continues to require the following intervention to meet STG and LTG's:  PT    Recommendations:  This patient would benefit from continued therapy.     Frequency:  1 X week, once daily  Duration:  for 6 weeks          Please refer to the daily flowsheet for treatment today, total treatment time and time spent performing 1:1 timed codes.      Keenan Bryant,PT, DPT, OCS
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
## 2018-01-09 RX ORDER — OMEPRAZOLE 40 MG/1
CAPSULE, DELAYED RELEASE ORAL
Qty: 90 CAPSULE | Refills: 2 | Status: SHIPPED | OUTPATIENT
Start: 2018-01-09 | End: 2018-12-19

## 2018-01-09 NOTE — TELEPHONE ENCOUNTER
"Requested Prescriptions   Pending Prescriptions Disp Refills     omeprazole (PRILOSEC) 40 MG capsule [Pharmacy Med Name: OMEPRAZOLE DR 40 MG CAPSULE] 90 capsule 0     Sig: TAKE ONE CAPSULE BY MOUTH EVERY DAY    PPI Protocol Passed    1/8/2018  4:31 PM       Passed - Not on Clopidogrel (unless Pantoprazole ordered)       Passed - No diagnosis of osteoporosis on record       Passed - Recent or future visit with authorizing provider's specialty    Patient had office visit in the last year or has a visit in the next 30 days with authorizing provider.  See \"Patient Info\" tab in inbasket, or \"Choose Columns\" in Meds & Orders section of the refill encounter.     10/13/2017         Passed - Patient is age 18 or older        Prescription approved per [facility] Refill Protocol.  Jerry Saldivar, RN, BSN        "

## 2018-01-19 ENCOUNTER — THERAPY VISIT (OUTPATIENT)
Dept: PHYSICAL THERAPY | Facility: CLINIC | Age: 48
End: 2018-01-19
Payer: COMMERCIAL

## 2018-01-19 DIAGNOSIS — M25.512 ACUTE PAIN OF LEFT SHOULDER: ICD-10-CM

## 2018-01-19 PROCEDURE — 97110 THERAPEUTIC EXERCISES: CPT | Mod: GP | Performed by: PHYSICAL THERAPIST

## 2018-01-19 PROCEDURE — 97140 MANUAL THERAPY 1/> REGIONS: CPT | Mod: GP | Performed by: PHYSICAL THERAPIST

## 2018-01-19 PROCEDURE — 97112 NEUROMUSCULAR REEDUCATION: CPT | Mod: GP | Performed by: PHYSICAL THERAPIST

## 2018-02-12 ENCOUNTER — THERAPY VISIT (OUTPATIENT)
Dept: PHYSICAL THERAPY | Facility: CLINIC | Age: 48
End: 2018-02-12
Payer: COMMERCIAL

## 2018-02-12 DIAGNOSIS — M25.512 ACUTE PAIN OF LEFT SHOULDER: ICD-10-CM

## 2018-02-12 PROCEDURE — 97140 MANUAL THERAPY 1/> REGIONS: CPT | Mod: GP | Performed by: PHYSICAL THERAPIST

## 2018-02-12 PROCEDURE — 97110 THERAPEUTIC EXERCISES: CPT | Mod: GP | Performed by: PHYSICAL THERAPIST

## 2018-02-12 PROCEDURE — 97112 NEUROMUSCULAR REEDUCATION: CPT | Mod: GP | Performed by: PHYSICAL THERAPIST

## 2018-02-16 ENCOUNTER — THERAPY VISIT (OUTPATIENT)
Dept: PHYSICAL THERAPY | Facility: CLINIC | Age: 48
End: 2018-02-16
Payer: COMMERCIAL

## 2018-02-16 DIAGNOSIS — M54.41 ACUTE RIGHT-SIDED LOW BACK PAIN WITH RIGHT-SIDED SCIATICA: ICD-10-CM

## 2018-02-16 PROCEDURE — 97110 THERAPEUTIC EXERCISES: CPT | Mod: GP | Performed by: PHYSICAL THERAPIST

## 2018-02-16 PROCEDURE — 97140 MANUAL THERAPY 1/> REGIONS: CPT | Mod: GP | Performed by: PHYSICAL THERAPIST

## 2018-02-16 NOTE — MR AVS SNAPSHOT
After Visit Summary   2/16/2018    Linden Cruz    MRN: [id number]           Patient Information     Date Of Birth          1970        Visit Information        Provider Department      2/16/2018 3:30 PM Keenan Bryant, PT [facility]        Today's Diagnoses     Acute right-sided low back pain with right-sided sciatica           Follow-ups after your visit        Your next 10 appointments already scheduled     Feb 19, 2018  9:20 AM CST   Return Visit with Johana Deng MD   [facility] ([address] [address]   [phone]            Feb 26, 2018  7:00 AM CST   KARLY Extremity with Keenan Bryant PT   [facility] ([facility] [place]  )    [address] [place] [address]. [address]  [facility] [address]   [phone]            Feb 26, 2018  8:45 AM CST   Return Visit with Piyush Hernandez   [facility] ([facility])    [address] [place] [address] [phone]            Feb 26, 2018  9:15 AM CST   Return Visit with Savage Mejia MD   [facility] ([address] [address] [phone]              Who to contact     If you have questions or need follow up information about today's clinic visit or your schedule please contact [facility] directly at [phone].  Normal or non-critical lab and imaging results will be communicated to you by MyChart, letter or phone within 4 business days after the clinic has received the results. If you do not hear from us within 7 days, please contact the clinic through MyChart or phone. If you have a critical or abnormal lab result, we will notify you by phone as soon as possible.  Submit refill requests through [employer] or call  your pharmacy and they will forward the refill request to us. Please allow 3 business days for your refill to be completed.          Additional Information About Your Visit        [employer] Information     [employer] gives you secure access to your electronic health record. If you see a primary care provider, you can also send messages to your care team and make appointments. If you have questions, please call your primary care clinic.  If you do not have a primary care provider, please call [phone] and they will assist you.        Care EveryWhere ID     This is your Care EveryWhere ID. This could be used by other organizations to access your [place] medical records  [id number]         Blood Pressure from Last 3 Encounters:   10/20/17 (!) 134/92   10/13/17 136/80   01/04/17 128/82    Weight from Last 3 Encounters:   01/08/18 94.8 kg (209 lb)   12/11/17 93.9 kg (207 lb)   12/08/17 94.8 kg (209 lb)              We Performed the Following     [facility] PROGRESS NOTES REPORT     MANUAL THER TECH,1+REGIONS,EA 15 MIN     THERAPEUTIC EXERCISES        Primary Care Provider Office Phone # Fax #    Nik Nina PA-C [phone]       [address] [facility] [address]  [facility] [address]        Equal Access to Services     SMILEY CRUZ : Hadii aad ku hadasho Soomaali, waaxda luqadaha, qaybta kaalmada adeegyada, darien benavides. So [facility] [phone].    ATENCIÓN: Si habla español, tiene a krishnamurthy disposición servicios gratuitos de asistencia lingüística. Llame al [phone].    We comply with applicable federal civil rights laws and [place] laws. We do not discriminate on the basis of race, color, national origin, age, disability, sex, sexual orientation, or gender identity.            Thank you!     Thank you for choosing [facility]  for your care. Our goal is always to provide you with excellent care. Hearing back from our patients is one way  we can continue to improve our services. Please take a few minutes to complete the written survey that you may receive in the mail after your visit with us. Thank you!             Your Updated Medication List - Protect others around you: Learn how to safely use, store and throw away your medicines at www.disposemymeds.org.          This list is accurate as of 2/16/18  5:33 PM.  Always use your most recent med list.                   Brand Name Dispense Instructions for use Diagnosis    ADVIL PO           fluticasone 50 MCG/ACT spray    FLONASE    16 g    Spray 1-2 sprays into both nostrils daily    Left otitis media with spontaneous rupture of eardrum, Chronic rhinitis       gemfibrozil 600 MG tablet    LOPID    180 tablet    TAKE 1 TABLET (600 MG) BY MOUTH 2 TIMES DAILY    Hypertriglyceridemia       MULTI VITAMIN MENS PO      Take  by mouth.        omeprazole 40 MG capsule    priLOSEC    90 capsule    TAKE ONE CAPSULE BY MOUTH EVERY DAY    Gastroesophageal reflux disease without esophagitis       * oxyCODONE-acetaminophen 5-325 MG per tablet    PERCOCET    20 tablet    Take 1-2 tablets by mouth every 6 hours as needed for moderate to severe pain    Closed fracture of left scapula, unspecified part of scapula, initial encounter       * oxyCODONE-acetaminophen 5-325 MG per tablet    PERCOCET    50 tablet    Take 1-2 tablets by mouth nightly as needed for moderate to severe pain    Closed fracture of left scapula, unspecified part of scapula, initial encounter       SUMAtriptan 100 MG tablet    IMITREX    18 tablet    TAKE 1 TAB BY MOUTH WITH ONSET OF MIGRAINE, MAY REPEAT ONCE AFTER 2 HOURS. MAX 2 TABS/24 HOURS.    Migraine with aura and without status migrainosus, not intractable       SYNTHROID 125 MCG tablet   Generic drug:  levothyroxine     90 tablet    TAKE 1 TABLET BY MOUTH ONCE DAILY    Subclinical hypothyroidism       tadalafil 20 MG tablet    CIALIS    8 tablet    Take 0.5-1 tablets as needed for erectile  dysfunction. Never use with nitroglycerin, terazosin or doxazosin.    Erectile dysfunction, unspecified erectile dysfunction type       topiramate 50 MG tablet    TOPAMAX    60 tablet    TAKE 1 TABLET BY MOUTH TWICE A DAY    Migraine with aura and without status migrainosus, not intractable       * Notice:  This list has 2 medication(s) that are the same as other medications prescribed for you. Read the directions carefully, and ask your doctor or other care provider to review them with you.

## 2018-02-16 NOTE — LETTER
[facility]  [address]  [facility] [id number] [phone]    2018    Re: Linden Cruz   :   1970  MRN:  [id number]   REFERRING PHYSICIAN:   Johana Deng    [facility]    Date of Initial Evaluation:  ***  Visits:  Rxs Used: 6  Reason for Referral:  Acute right-sided low back pain with right-sided sciatica    EVALUATION SUMMARY    Subjective:  HPI                    Objective:  System    Physical Exam    General     ROS    Assessment/Plan:    PROGRESS  REPORT    Progress reporting period is from 17 to 18.       SUBJECTIVE  Subjective changes noted by patient:  continues to have some extra pain in low back, pain has been worse into lateral R thigh today, noticing more pain when he is carrying backpack (~30 lbs) over R shoulder, pain starts to increase with sitting >45 min, pain starts to increase with standing >30 min    Current Pain level: 4/10.     Previous pain level was  4/10 Initial Pain level: 8/10.   Changes in function:  Yes (See Goal flowsheet attached for changes in current functional level)  Adverse reaction to treatment or activity: activity - prolonged sitting on plane, carrying luggage    OBJECTIVE  Changes noted in objective findings:    Objective: lumbar ROM: flex 90%, ext 75%, RBS 80%, L SB 80%, repeated side glides hip to L: elimination of pain in lateral R thigh and back pain, no chane in numbness, + glute med tendinopathy R, numbness in R lateral thigh improved after cross friction massage to R glute med, double leg lowerin%, MMT hip ext 4+/5 B    ASSESSMENT/PLAN  Updated problem list and treatment plan: Diagnosis 1:  Low back pain consistent with L2-L3 disc irritation with radic to R thigh, made worse with glute med irritation and lateral femoral cutaneous n irritation    Pain -  hot/cold therapy, manual therapy, self management,  education, directional preference exercise and home program  Decreased strength - therapeutic exercise, therapeutic activities and home program  Decreased function - therapeutic activities and home program  STG/LTGs have been met or progress has been made towards goals:  Yes (See Goal flow sheet completed today.)  Assessment of Progress: The patient's condition is unchanged since last PN.  Poor compliance with HEP since doing a lot of traveling for work.  Self Management Plans:  Patient has been instructed in a home treatment program.  I have re-evaluated this patient and find that the nature, scope, duration and intensity of the therapy is appropriate for the medical condition of the patient.  Linden continues to require the following intervention to meet STG and LTG's:  PT    Recommendations:  This patient would benefit from continued therapy.     Frequency:  1 X/2 week, once daily  Duration:  for 6 weeks      Keenan Bryant,PT, DPT, OCS          Thank you for your referral.    INQUIRIES  Therapist:   [facility]  [address]  [facility] [address]  Phone: [phone]  Fax: [phone]

## 2018-02-16 NOTE — PROGRESS NOTES
Subjective:  HPI                    Objective:  System    Physical Exam    General     ROS    Assessment/Plan:    PROGRESS  REPORT    Progress reporting period is from 17 to 18.       SUBJECTIVE  Subjective changes noted by patient:  continues to have some extra pain in low back, pain has been worse into lateral R thigh today, noticing more pain when he is carrying backpack (~30 lbs) over R shoulder, pain starts to increase with sitting >45 min, pain starts to increase with standing >30 min    Current Pain level: 4/10.     Previous pain level was  4/10 Initial Pain level: 8/10.   Changes in function:  Yes (See Goal flowsheet attached for changes in current functional level)  Adverse reaction to treatment or activity: activity - prolonged sitting on plane, carrying luggage    OBJECTIVE  Changes noted in objective findings:    Objective: lumbar ROM: flex 90%, ext 75%, RBS 80%, L SB 80%, repeated side glides hip to L: elimination of pain in lateral R thigh and back pain, no chane in numbness, + glute med tendinopathy R, numbness in R lateral thigh improved after cross friction massage to R glute med, double leg lowerin%, MMT hip ext 4+/5 B    ASSESSMENT/PLAN  Updated problem list and treatment plan: Diagnosis 1:  Low back pain consistent with L2-L3 disc irritation with radic to R thigh, made worse with glute med irritation and lateral femoral cutaneous n irritation    Pain -  hot/cold therapy, manual therapy, self management, education, directional preference exercise and home program  Decreased strength - therapeutic exercise, therapeutic activities and home program  Decreased function - therapeutic activities and home program  STG/LTGs have been met or progress has been made towards goals:  Yes (See Goal flow sheet completed today.)  Assessment of Progress: The patient's condition is unchanged since last PN.  Poor compliance with HEP since doing a lot of traveling for work.  Self Management Plans:   Patient has been instructed in a home treatment program.  I have re-evaluated this patient and find that the nature, scope, duration and intensity of the therapy is appropriate for the medical condition of the patient.  Linden continues to require the following intervention to meet STG and LTG's:  PT    Recommendations:  This patient would benefit from continued therapy.     Frequency:  1 X/2 week, once daily  Duration:  for 6 weeks        Please refer to the daily flowsheet for treatment today, total treatment time and time spent performing 1:1 timed codes.      Keenan Bryant,PT, DPT, OCS

## 2018-02-19 ENCOUNTER — OFFICE VISIT (OUTPATIENT)
Dept: ORTHOPEDICS | Facility: CLINIC | Age: 48
End: 2018-02-19
Payer: COMMERCIAL

## 2018-02-19 VITALS — BODY MASS INDEX: 28.84 KG/M2 | TEMPERATURE: 97.2 F | HEIGHT: 71 IN | WEIGHT: 206 LBS

## 2018-02-19 DIAGNOSIS — S42.102D CLOSED FRACTURE OF LEFT SCAPULA WITH ROUTINE HEALING, UNSPECIFIED PART OF SCAPULA, SUBSEQUENT ENCOUNTER: Primary | ICD-10-CM

## 2018-02-19 DIAGNOSIS — M75.02 ADHESIVE CAPSULITIS OF LEFT SHOULDER: ICD-10-CM

## 2018-02-19 PROCEDURE — 99214 OFFICE O/P EST MOD 30 MIN: CPT | Performed by: ORTHOPAEDIC SURGERY

## 2018-02-19 ASSESSMENT — PAIN SCALES - GENERAL: PAINLEVEL: NO PAIN (0)

## 2018-02-19 NOTE — MR AVS SNAPSHOT
After Visit Summary   2/19/2018    Linden Cruz    MRN: [id number]           Patient Information     Date Of Birth          1970        Visit Information        Provider Department      2/19/2018 9:20 AM Johana Deng MD [facility]        Today's Diagnoses     Closed fracture of left scapula with routine healing, unspecified part of scapula, subsequent encounter    -  1    Adhesive capsulitis of left shoulder          Care Instructions    Encounter Diagnoses   Name Primary?     Closed fracture of left scapula with routine healing, unspecified part of scapula, subsequent encounter Yes     Adhesive capsulitis of left shoulder      Rest, ice and elevate above heart level as needed for pain control  Plan:  1. Xrays: xrays not needed today, looked healed last visit.  2. Anticoagulation: Not needed.    3. Pain Medication: Not needed.    4. Weight Bearing: Weight bearing as tolerated left upper extremity   5. Activity/Therapy: continue with formal physical therapy they did excellent getting your range of motion back.  Keep working on strength.  It will come.  6. Cast/Splint/Brace/Sling: Not needed.    7. Work: Not needed.    Follow-up:  Follow up with Johana Deng MD and/or Anselmo Enrique PA-C on an as needed basis.     [employer] and [employer] may offer reliable information regarding your diagnosis and treatment plan.    THANK YOU for coming in today. If you receive a survey via [employer] or mail please let us know if there was anything you especially appreciated today or if there is any way we can improve our clinic. We appreciate your input.    GENERAL INFORMATION:  Our hours are:  Monday :     Clinic 7:30 AM-430 PM ([facility])  Tuesday:      Operating Room All Day ([facility])  Wednesday: Clinic 7:30 AM - 11:15 AM ([facility])                        Clinic 1:00 PM - 4:00PM ([facility]  [place])  Thursday:     Administrative Day  Friday:          Clinic 7:30 AM - 11:15 AM ([facility])                       Clinic 1:00 PM - 4:00 PM ([facility])    Bone and Joint Service Line for any issues or concerns: [phone]      We are not in the office Thursdays. Therefore non- urgent calls and medical messages received on Thursday will be addressed when we are back in the office on Wednesday. Urgent matters will be reviewed and addressed by one of our partners in the office as needed.    If lab work was done today as part of your evaluation you will generally be contacted via [employer], mail, or phone with the results within 1-5 days. If there is an alarming result we will contact you by phone. Lab results come back at varying times, I generally wait until all labs are resulted before making comments on results. Please note labs are automatically released to [employer] (if you have signed up for it) once available-at times you may see these prior to my having a chance to review them as well.    If you need refills please contact your pharmacist. They will send a refill request to me to review. Please allow 3 business days for us to process all refill requests. All narcotic refills should be handled in the clinic at the time of your visit.           Follow-ups after your visit        Your next 10 appointments already scheduled     Feb 26, 2018  7:00 AM CST   KARLY Extremity with Keenan Bryant PT   [facility] ([facility]  )    [address]  [facility] [id number] [phone]            Feb 26, 2018  8:45 AM CST   [facility] Visit with Piyush Hernandez   [facility]    [address] [facility] [id number] [phone]            Feb 26, 2018  9:15 AM CST   Return Visit with Savage Mejia MD   [facility] ([facility]  "[place])    [address] [address] [phone]              Who to contact     If you have questions or need follow up information about today's clinic visit or your schedule please contact [facility] directly at [phone].  Normal or non-critical lab and imaging results will be communicated to you by MyChart, letter or phone within 4 business days after the clinic has received the results. If you do not hear from us within 7 days, please contact the clinic through [employer]ung[employer] or phone. If you have a critical or abnormal lab result, we will notify you by phone as soon as possible.  Submit refill requests through [employer]ooki or call your pharmacy and they will forward the refill request to us. Please allow 3 business days for your refill to be completed.          Additional Information About Your Visit        MyChart Information     [employer] gives you secure access to your electronic health record. If you see a primary care provider, you can also send messages to your care team and make appointments. If you have questions, please call your primary care clinic.  If you do not have a primary care provider, please call [phone] and they will assist you.        Care EveryWhere ID     This is your Care EveryWhere ID. This could be used by other organizations to access your [facility] medical records  [id number]        Your Vitals Were     Temperature Height BMI (Body Mass Index)             97.2  F (36.2  C) 1.791 m (5' 10.5\") 29.14 kg/m2          Blood Pressure from Last 3 Encounters:   10/20/17 (!) 134/92   10/13/17 136/80   01/04/17 128/82    Weight from Last 3 Encounters:   02/19/18 93.4 kg (206 lb)   01/08/18 94.8 kg (209 lb)   12/11/17 93.9 kg (207 lb)              Today, you had the following     No orders found for display       Primary Care Provider Office Phone # Fax #    Nik Nina PA-C [phone]       [address] " [address]        Equal Access to Services     [facility] : Hadii bernabe orta lawrence Corona, waaxda luqadaha, qaybta kaalmada brown, darien benavides. So [facility] [phone].    ATENCIÓN: Si habla español, tiene a krishnamurthy disposición servicios gratuitos de asistencia lingüística. Llame al [phone].    We comply with applicable federal civil rights laws and [place] laws. We do not discriminate on the basis of race, color, national origin, age, disability, sex, sexual orientation, or gender identity.            Thank you!     Thank you for choosing [facility]  for your care. Our goal is always to provide you with excellent care. Hearing back from our patients is one way we can continue to improve our services. Please take a few minutes to complete the written survey that you may receive in the mail after your visit with us. Thank you!             Your Updated Medication List - Protect others around you: Learn how to safely use, store and throw away your medicines at www.disposemymeds.org.          This list is accurate as of 2/19/18  9:55 AM.  Always use your most recent med list.                   Brand Name Dispense Instructions for use Diagnosis    ADVIL PO           fluticasone 50 MCG/ACT spray    FLONASE    16 g    Spray 1-2 sprays into both nostrils daily    Left otitis media with spontaneous rupture of eardrum, Chronic rhinitis       gemfibrozil 600 MG tablet    LOPID    180 tablet    TAKE 1 TABLET (600 MG) BY MOUTH 2 TIMES DAILY    Hypertriglyceridemia       MULTI VITAMIN MENS PO      Take  by mouth.        omeprazole 40 MG capsule    priLOSEC    90 capsule    TAKE ONE CAPSULE BY MOUTH EVERY DAY    Gastroesophageal reflux disease without esophagitis       * oxyCODONE-acetaminophen 5-325 MG per tablet    PERCOCET    20 tablet    Take 1-2 tablets by mouth every 6 hours as needed for moderate to severe pain    Closed fracture of left scapula, unspecified part of scapula,  initial encounter       * oxyCODONE-acetaminophen 5-325 MG per tablet    PERCOCET    50 tablet    Take 1-2 tablets by mouth nightly as needed for moderate to severe pain    Closed fracture of left scapula, unspecified part of scapula, initial encounter       SUMAtriptan 100 MG tablet    IMITREX    18 tablet    TAKE 1 TAB BY MOUTH WITH ONSET OF MIGRAINE, MAY REPEAT ONCE AFTER 2 HOURS. MAX 2 TABS/24 HOURS.    Migraine with aura and without status migrainosus, not intractable       SYNTHROID 125 MCG tablet   Generic drug:  levothyroxine     90 tablet    TAKE 1 TABLET BY MOUTH ONCE DAILY    Subclinical hypothyroidism       tadalafil 20 MG tablet    CIALIS    8 tablet    Take 0.5-1 tablets as needed for erectile dysfunction. Never use with nitroglycerin, terazosin or doxazosin.    Erectile dysfunction, unspecified erectile dysfunction type       topiramate 50 MG tablet    TOPAMAX    60 tablet    TAKE 1 TABLET BY MOUTH TWICE A DAY    Migraine with aura and without status migrainosus, not intractable       * Notice:  This list has 2 medication(s) that are the same as other medications prescribed for you. Read the directions carefully, and ask your doctor or other care provider to review them with you.

## 2018-02-19 NOTE — PATIENT INSTRUCTIONS
Encounter Diagnoses   Name Primary?     Closed fracture of left scapula with routine healing, unspecified part of scapula, subsequent encounter Yes     Adhesive capsulitis of left shoulder      Rest, ice and elevate above heart level as needed for pain control  Plan:  1. Xrays: xrays not needed today, looked healed last visit.  2. Anticoagulation: Not needed.    3. Pain Medication: Not needed.    4. Weight Bearing: Weight bearing as tolerated left upper extremity   5. Activity/Therapy: continue with formal physical therapy they did excellent getting your range of motion back.  Keep working on strength.  It will come.  6. Cast/Splint/Brace/Sling: Not needed.    7. Work: Not needed.    Follow-up:  Follow up with Johana Deng MD and/or Anselmo Enrique PA-C on an as needed basis.     [facility] and [employer] may offer reliable information regarding your diagnosis and treatment plan.    THANK YOU for coming in today. If you receive a survey via [employer] technology or mail please let us know if there was anything you especially appreciated today or if there is any way we can improve our clinic. We appreciate your input.    GENERAL INFORMATION:  Our hours are:  Monday :     Clinic 7:30 AM-430 PM ([facility])  Tuesday:      Operating Room All Day ([facility])  Wednesday: Clinic 7:30 AM - 11:15 AM ([facility])                        Clinic 1:00 PM - 4:00PM ([facility])  Thursday:     Administrative Day  Friday:          Clinic 7:30 AM - 11:15 AM ([facility])                       Clinic 1:00 PM - 4:00 PM ([facility])    Bone and Joint Service Line for any issues or concerns: [phone]      We are not in the office Thursdays. Therefore non- urgent calls and medical messages received on Thursday will be addressed when we are back in the office on Wednesday. Urgent matters will be reviewed and addressed  by one of our partners in the office as needed.    If lab work was done today as part of your evaluation you will generally be contacted via [employer], mail, or phone with the results within 1-5 days. If there is an alarming result we will contact you by phone. Lab results come back at varying times, I generally wait until all labs are resulted before making comments on results. Please note labs are automatically released to [employer] (if you have signed up for it) once available-at times you may see these prior to my having a chance to review them as well.    If you need refills please contact your pharmacist. They will send a refill request to me to review. Please allow 3 business days for us to process all refill requests. All narcotic refills should be handled in the clinic at the time of your visit.
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
## 2018-02-22 DIAGNOSIS — G43.109 MIGRAINE WITH AURA AND WITHOUT STATUS MIGRAINOSUS, NOT INTRACTABLE: ICD-10-CM

## 2018-02-23 RX ORDER — SUMATRIPTAN 100 MG/1
TABLET, FILM COATED ORAL
Qty: 18 TABLET | Refills: 5 | Status: SHIPPED | OUTPATIENT
Start: 2018-02-23 | End: 2018-09-21

## 2018-02-23 NOTE — TELEPHONE ENCOUNTER
Imitrex:  Prescription approved per [facility] Refill Protocol.  Last FP BP was at goal.     Deb Sevilla, RN, BSN

## 2018-02-23 NOTE — PROGRESS NOTES
History of Present Illness - Linden Cruz is a 47 year old male who is status post bilateral myringotomy tube placement on 12/20/17.  There were no issues post operatively, and the patient is back to a regular diet and normal daily activity.  There has been no drainage or bleeding from the ears, no fevers or chills.      Physical Exam:  Vitals - Pulse 85  Wt 93.9 kg (207 lb)  SpO2 97%  BMI 29.28 kg/m2    General - The patient is well nourished and well developed, and appears to have good nutritional status.      Head and Face - Normocephalic and atraumatic, with no gross asymmetry noted of the contour of the facial features.  The facial nerve is intact, with strong symmetric movements.    Eyes - Extraocular movements intact, and the pupils were reactive to light.  Sclera were not icteric or injected, conjunctiva were pink and moist.  Mouth - Examination of the oral cavity shows pink, healthy, moist mucosa.  No lesions or ulceration noted.  The dentition are in good repair.  The tongue is mobile and midline.    Ears - Examination of the ears showed myringotomy tubes in good position bilaterally.  The tympanic membranes were gray and translucent.  No evidence of middle ear effusion, granulation tissue, or cholesteatoma.      Preformed in clinic today  Audiologic Studies - An audiogram were performed today as part of the evaluation and personally reviewed. The audiogram showed mild hearing loss on the right and mild hearing loss on the left.        A/P - Linden Cruz is status post bilateral myringotomy and tube placement.  No sign of complications at this point.  I have rediscussed water precautions, and will see the patient back in 9 months for a routine tube check. I have also recommended the use of the post-op ear drops in the event of otorrhea during a URI.  If the drainage continues, however, they should come to me for earlier follow up.      This document serves as a record of the services and  decisions personally performed and made by Dr. Savage Mejia MD. It was created on his behalf by Yuliana Holloway, a trained medical scribe. The creation of this document is based the provider's statements to the medical scribe.  Yuliana Holloway 10:07 AM 2/26/2018.    Provider:   The information in this document, created by the medical scribe for me, accurately reflects the services I personally performed and the decisions made by me. I have reviewed and approved this document for accuracy prior to leaving the patient care area.  Dr. Savage Mejia MD 10:07 AM 2/26/2018    Savage Mejia MD

## 2018-02-26 ENCOUNTER — OFFICE VISIT (OUTPATIENT)
Dept: OTOLARYNGOLOGY | Facility: CLINIC | Age: 48
End: 2018-02-26
Payer: COMMERCIAL

## 2018-02-26 ENCOUNTER — OFFICE VISIT (OUTPATIENT)
Dept: AUDIOLOGY | Facility: CLINIC | Age: 48
End: 2018-02-26
Payer: COMMERCIAL

## 2018-02-26 ENCOUNTER — THERAPY VISIT (OUTPATIENT)
Dept: PHYSICAL THERAPY | Facility: CLINIC | Age: 48
End: 2018-02-26
Payer: COMMERCIAL

## 2018-02-26 VITALS — OXYGEN SATURATION: 97 % | BODY MASS INDEX: 29.28 KG/M2 | HEART RATE: 85 BPM | WEIGHT: 207 LBS

## 2018-02-26 DIAGNOSIS — H90.42 SENSORINEURAL HEARING LOSS (SNHL) OF LEFT EAR WITH UNRESTRICTED HEARING OF RIGHT EAR: Primary | ICD-10-CM

## 2018-02-26 DIAGNOSIS — M25.512 ACUTE PAIN OF LEFT SHOULDER: ICD-10-CM

## 2018-02-26 DIAGNOSIS — Z98.890 H/O MYRINGOTOMY: Primary | ICD-10-CM

## 2018-02-26 PROCEDURE — 97140 MANUAL THERAPY 1/> REGIONS: CPT | Mod: GP | Performed by: PHYSICAL THERAPIST

## 2018-02-26 PROCEDURE — 92567 TYMPANOMETRY: CPT | Performed by: AUDIOLOGIST

## 2018-02-26 PROCEDURE — 99207 ZZC NO CHARGE LOS: CPT | Performed by: AUDIOLOGIST

## 2018-02-26 PROCEDURE — 92557 COMPREHENSIVE HEARING TEST: CPT | Performed by: AUDIOLOGIST

## 2018-02-26 PROCEDURE — 97110 THERAPEUTIC EXERCISES: CPT | Mod: GP | Performed by: PHYSICAL THERAPIST

## 2018-02-26 PROCEDURE — 99213 OFFICE O/P EST LOW 20 MIN: CPT | Performed by: OTOLARYNGOLOGY

## 2018-02-26 PROCEDURE — 97112 NEUROMUSCULAR REEDUCATION: CPT | Mod: GP | Performed by: PHYSICAL THERAPIST

## 2018-02-26 NOTE — LETTER
2/26/2018         RE: Linden Cruz  [address] [facility] [address]        Dear Colleague,    Thank you for referring your patient, Linden Cruz, to the [facility]. Please see a copy of my visit note below.    History of Present Illness - Linden Cruz is a 47 year old male who is status post bilateral myringotomy tube placement on 12/20/17.  There were no issues post operatively, and the patient is back to a regular diet and normal daily activity.  There has been no drainage or bleeding from the ears, no fevers or chills.      Physical Exam:  Vitals - Pulse 85  Wt 93.9 kg (207 lb)  SpO2 97%  BMI 29.28 kg/m2    General - The patient is well nourished and well developed, and appears to have good nutritional status.      Head and Face - Normocephalic and atraumatic, with no gross asymmetry noted of the contour of the facial features.  The facial nerve is intact, with strong symmetric movements.    Eyes - Extraocular movements intact, and the pupils were reactive to light.  Sclera were not icteric or injected, conjunctiva were pink and moist.  Mouth - Examination of the oral cavity shows pink, healthy, moist mucosa.  No lesions or ulceration noted.  The dentition are in good repair.  The tongue is mobile and midline.    Ears - Examination of the ears showed myringotomy tubes in good position bilaterally.  The tympanic membranes were gray and translucent.  No evidence of middle ear effusion, granulation tissue, or cholesteatoma.      Preformed in clinic today  Audiologic Studies - An audiogram were performed today as part of the evaluation and personally reviewed. The audiogram showed mild hearing loss on the right and mild hearing loss on the left.        A/P - Linden Cruz is status post bilateral myringotomy and tube placement.  No sign of complications at this point.  I have rediscussed water precautions, and will see the patient back in 9 months for a routine tube  check. I have also recommended the use of the post-op ear drops in the event of otorrhea during a URI.  If the drainage continues, however, they should come to me for earlier follow up.      This document serves as a record of the services and decisions personally performed and made by Dr. Savage Mejia MD. It was created on his behalf by Yuliana Holloway, a trained medical scribe. The creation of this document is based the provider's statements to the medical scribe.  Yuliana Holloway 10:07 AM 2/26/2018.    Provider:   The information in this document, created by the medical scribe for me, accurately reflects the services I personally performed and the decisions made by me. I have reviewed and approved this document for accuracy prior to leaving the patient care area.  Dr. Savage Mejia MD 10:07 AM 2/26/2018    Savage Mejia MD      Again, thank you for allowing me to participate in the care of your patient.        Sincerely,        Savage Mejia MD, MD

## 2018-02-26 NOTE — MR AVS SNAPSHOT
After Visit Summary   2/26/2018    Linden Cruz    MRN: [id number]           Patient Information     Date Of Birth          1970        Visit Information        Provider Department      2/26/2018 8:45 AM Ai Valle AuD [facility]        Today's Diagnoses     Sensorineural hearing loss (SNHL) of left ear with unrestricted hearing of right ear    -  1       Follow-ups after your visit        Your next 10 appointments already scheduled     Nov 19, 2018  8:30 AM CST   New Visit with Savage Mejia MD   [facility]    [address] [place] [id number] [phone]              Who to contact     If you have questions or need follow up information about today's clinic visit or your schedule please contact [facility] directly at [phone].  Normal or non-critical lab and imaging results will be communicated to you by MyChart, letter or phone within 4 business days after the clinic has received the results. If you do not hear from us within 7 days, please contact the clinic through [email or web address] or phone. If you have a critical or abnormal lab result, we will notify you by phone as soon as possible.  Submit refill requests through [employer] or call your pharmacy and they will forward the refill request to us. Please allow 3 business days for your refill to be completed.          Additional Information About Your Visit        MyChart Information     [employer] gives you secure access to your electronic health record. If you see a primary care provider, you can also send messages to your care team and make appointments. If you have questions, please call your primary care clinic.  If you do not have a primary care provider, please call [phone] and they will assist you.        Care EveryWhere ID     This is your Care EveryWhere ID. This could be used by other organizations to access your [facility]  records  [id number]         Blood Pressure from Last 3 Encounters:   10/20/17 (!) 134/92   10/13/17 136/80   01/04/17 128/82    Weight from Last 3 Encounters:   02/26/18 207 lb (93.9 kg)   02/19/18 206 lb (93.4 kg)   01/08/18 209 lb (94.8 kg)              We Performed the Following     AUDIOGRAM/TYMPANOGRAM - INTERFACE     COMPREHENSIVE HEARING TEST     TYMPANOMETRY        Primary Care Provider Office Phone # Fax #    Nik Nina PA-C [phone]       [address] [facility] [address]  [facility] [address]        Equal Access to Services     [facility] : Hadii aad ku hadasho Somarloali, waaxda luqadaha, qaybta kaalmada adeveroyada, darien shaw . So [facility] [phone].    ATENCIÓN: Si habla español, tiene a krishnamurthy disposición servicios gratuitos de asistencia lingüística. [facility] [phone].    We comply with applicable federal civil rights laws and [place] laws. We do not discriminate on the basis of race, color, national origin, age, disability, sex, sexual orientation, or gender identity.            Thank you!     Thank you for choosing [facility]  for your care. Our goal is always to provide you with excellent care. Hearing back from our patients is one way we can continue to improve our services. Please take a few minutes to complete the written survey that you may receive in the mail after your visit with us. Thank you!             Your Updated Medication List - Protect others around you: Learn how to safely use, store and throw away your medicines at www.disposemymeds.org.          This list is accurate as of 2/26/18 11:58 AM.  Always use your most recent med list.                   Brand Name Dispense Instructions for use Diagnosis    ADVIL PO           fluticasone 50 MCG/ACT spray    FLONASE    16 g    Spray 1-2 sprays into both nostrils daily    Left otitis media with spontaneous rupture of eardrum, Chronic rhinitis       gemfibrozil 600 MG tablet     LOPID    180 tablet    TAKE 1 TABLET (600 MG) BY MOUTH 2 TIMES DAILY    Hypertriglyceridemia       MULTI VITAMIN MENS PO      Take  by mouth.        omeprazole 40 MG capsule    priLOSEC    90 capsule    TAKE ONE CAPSULE BY MOUTH EVERY DAY    Gastroesophageal reflux disease without esophagitis       * oxyCODONE-acetaminophen 5-325 MG per tablet    PERCOCET    20 tablet    Take 1-2 tablets by mouth every 6 hours as needed for moderate to severe pain    Closed fracture of left scapula, unspecified part of scapula, initial encounter       * oxyCODONE-acetaminophen 5-325 MG per tablet    PERCOCET    50 tablet    Take 1-2 tablets by mouth nightly as needed for moderate to severe pain    Closed fracture of left scapula, unspecified part of scapula, initial encounter       SUMAtriptan 100 MG tablet    IMITREX    18 tablet    TAKE 1 TAB BY MOUTH WITH ONSET OF MIGRAINE, MAY REPEAT ONCE AFTER 2 HOURS. MAX 2 TABS/24 HOURS.    Migraine with aura and without status migrainosus, not intractable       SYNTHROID 125 MCG tablet   Generic drug:  levothyroxine     90 tablet    TAKE 1 TABLET BY MOUTH ONCE DAILY    Subclinical hypothyroidism       tadalafil 20 MG tablet    CIALIS    8 tablet    Take 0.5-1 tablets as needed for erectile dysfunction. Never use with nitroglycerin, terazosin or doxazosin.    Erectile dysfunction, unspecified erectile dysfunction type       topiramate 50 MG tablet    TOPAMAX    60 tablet    TAKE 1 TABLET BY MOUTH TWICE A DAY    Migraine with aura and without status migrainosus, not intractable       * Notice:  This list has 2 medication(s) that are the same as other medications prescribed for you. Read the directions carefully, and ask your doctor or other care provider to review them with you.

## 2018-02-26 NOTE — PROGRESS NOTES
AUDIOLOGY REPORT     SUMMARY: Audiology visit completed. See audiogram for results.     RECOMMENDATIONS: Follow-up with ENT    Tracy Waldrop Licensed Audiologist #[id number]

## 2018-02-26 NOTE — MR AVS SNAPSHOT
After Visit Summary   2/26/2018    Linden Cruz    MRN: [id number]           Patient Information     Date Of Birth          1970        Visit Information        Provider Department      2/26/2018 9:15 AM Savage Mejia MD [facility]        Today's Diagnoses     H/O myringotomy    -  1       Follow-ups after your visit        Additional Services     AUDIOLOGY ADULT REFERRAL                 Your next 10 appointments already scheduled     Nov 19, 2018  8:30 AM CST   New Visit with Savage Mejia MD   [facility] ([facility])    [address] [place] [id number] [phone]              Who to contact     If you have questions or need follow up information about today's clinic visit or your schedule please contact [facility] directly at [phone].  Normal or non-critical lab and imaging results will be communicated to you by LOFTYhart, letter or phone within 4 business days after the clinic has received the results. If you do not hear from us within 7 days, please contact the clinic through [employer]hart or phone. If you have a critical or abnormal lab result, we will notify you by phone as soon as possible.  Submit refill requests through [employer] or call your pharmacy and they will forward the refill request to us. Please allow 3 business days for your refill to be completed.          Additional Information About Your Visit        MyChart Information     [employer] gives you secure access to your electronic health record. If you see a primary care provider, you can also send messages to your care team and make appointments. If you have questions, please call your primary care clinic.  If you do not have a primary care provider, please call [phone] and they will assist you.        Care EveryWhere ID     This is your Care EveryWhere ID. This could be used by other organizations to access your [facility]  records  [id number]        Your Vitals Were     Pulse Pulse Oximetry BMI (Body Mass Index)             85 97% 29.28 kg/m2          Blood Pressure from Last 3 Encounters:   10/20/17 (!) 134/92   10/13/17 136/80   01/04/17 128/82    Weight from Last 3 Encounters:   02/26/18 93.9 kg (207 lb)   02/19/18 93.4 kg (206 lb)   01/08/18 94.8 kg (209 lb)              We Performed the Following     AUDIOLOGY ADULT REFERRAL        Primary Care Provider Office Phone # Fax #    Nik Nina PA-C [phone]       [address] [address]        Equal Access to Services     SMILEY CRUZ : Hadii bernabe blanko Sodanny, waaxda luqadaha, qaybta kaalmada adeegyada, darien shaw . So [facility] [phone].    ATENCIÓN: Si habla español, tiene a krishnamurthy disposición servicios gratuitos de asistencia lingüística. [facility] [phone].    We comply with applicable federal civil rights laws and [place] laws. We do not discriminate on the basis of race, color, national origin, age, disability, sex, sexual orientation, or gender identity.            Thank you!     Thank you for choosing [facility]  for your care. Our goal is always to provide you with excellent care. Hearing back from our patients is one way we can continue to improve our services. Please take a few minutes to complete the written survey that you may receive in the mail after your visit with us. Thank you!             Your Updated Medication List - Protect others around you: Learn how to safely use, store and throw away your medicines at www.disposemymeds.org.          This list is accurate as of 2/26/18  3:41 PM.  Always use your most recent med list.                   Brand Name Dispense Instructions for use Diagnosis    ADVIL PO           fluticasone 50 MCG/ACT spray    FLONASE    16 g    Spray 1-2 sprays into both nostrils daily    Left otitis media with spontaneous rupture of eardrum, Chronic  rhinitis       gemfibrozil 600 MG tablet    LOPID    180 tablet    TAKE 1 TABLET (600 MG) BY MOUTH 2 TIMES DAILY    Hypertriglyceridemia       MULTI VITAMIN MENS PO      Take  by mouth.        omeprazole 40 MG capsule    priLOSEC    90 capsule    TAKE ONE CAPSULE BY MOUTH EVERY DAY    Gastroesophageal reflux disease without esophagitis       * oxyCODONE-acetaminophen 5-325 MG per tablet    PERCOCET    20 tablet    Take 1-2 tablets by mouth every 6 hours as needed for moderate to severe pain    Closed fracture of left scapula, unspecified part of scapula, initial encounter       * oxyCODONE-acetaminophen 5-325 MG per tablet    PERCOCET    50 tablet    Take 1-2 tablets by mouth nightly as needed for moderate to severe pain    Closed fracture of left scapula, unspecified part of scapula, initial encounter       SUMAtriptan 100 MG tablet    IMITREX    18 tablet    TAKE 1 TAB BY MOUTH WITH ONSET OF MIGRAINE, MAY REPEAT ONCE AFTER 2 HOURS. MAX 2 TABS/24 HOURS.    Migraine with aura and without status migrainosus, not intractable       SYNTHROID 125 MCG tablet   Generic drug:  levothyroxine     90 tablet    TAKE 1 TABLET BY MOUTH ONCE DAILY    Subclinical hypothyroidism       tadalafil 20 MG tablet    CIALIS    8 tablet    Take 0.5-1 tablets as needed for erectile dysfunction. Never use with nitroglycerin, terazosin or doxazosin.    Erectile dysfunction, unspecified erectile dysfunction type       topiramate 50 MG tablet    TOPAMAX    60 tablet    TAKE 1 TABLET BY MOUTH TWICE A DAY    Migraine with aura and without status migrainosus, not intractable       * Notice:  This list has 2 medication(s) that are the same as other medications prescribed for you. Read the directions carefully, and ask your doctor or other care provider to review them with you.

## 2018-02-26 NOTE — NURSING NOTE
"Chief Complaint   Patient presents with     RECHECK     Ear Problem       Initial Pulse 85  Wt 93.9 kg (207 lb)  SpO2 97%  BMI 29.28 kg/m2 Estimated body mass index is 29.28 kg/(m^2) as calculated from the following:    Height as of 2/19/18: 1.791 m (5' 10.5\").    Weight as of this encounter: 93.9 kg (207 lb).  Medication Reconciliation: complete  "

## 2018-02-26 NOTE — MR AVS SNAPSHOT
After Visit Summary   2/26/2018    Linden Cruz    MRN: [id number]           Patient Information     Date Of Birth          1970        Visit Information        Provider Department      2/26/2018 7:00 AM Keenan Bryant PT [facility]        Today's Diagnoses     Acute pain of left shoulder           Follow-ups after your visit        Your next 10 appointments already scheduled     Feb 26, 2018  8:45 AM CST   Return Visit with Piyush Hernandez   [address] [id number] [phone]            Feb 26, 2018  9:15 AM CST   Return Visit with Savage Mejia MD   [address] [id number] [phone]              Who to contact     If you have questions or need follow up information about today's clinic visit or your schedule please contact [facility] directly at [phone].  Normal or non-critical lab and imaging results will be communicated to you by [employer]hart, letter or phone within 4 business days after the clinic has received the results. If you do not hear from us within 7 days, please contact the clinic through [employer] or phone. If you have a critical or abnormal lab result, we will notify you by phone as soon as possible.  Submit refill requests through [employer] or call your pharmacy and they will forward the refill request to us. Please allow 3 business days for your refill to be completed.          Additional Information About Your Visit        [employer]hart Information     [employer] gives you secure access to your electronic health record. If you see a primary care provider, you can also send messages to your care team and make appointments. If you have questions, please call your primary care clinic.  If you do not have a  primary care provider, please call [phone] and they will assist you.        Care EveryWhere ID     This is your Care EveryWhere ID. This could be used by other organizations to access your [place] medical records  [id number]         Blood Pressure from Last 3 Encounters:   10/20/17 (!) 134/92   10/13/17 136/80   01/04/17 128/82    Weight from Last 3 Encounters:   02/19/18 93.4 kg (206 lb)   01/08/18 94.8 kg (209 lb)   12/11/17 93.9 kg (207 lb)              We Performed the Following     MANUAL THER TECH,1+REGIONS,EA 15 MIN     NEUROMUSCULAR RE-EDUCATION     THERAPEUTIC EXERCISES        Primary Care Provider Office Phone # Fax #    Nik Nina PA-C [phone]       [address] [facility] [address]  [facility] [address]        Equal Access to Services     [facility]: Hadii aad ku hadasho Soomaali, waaxda luqadaha, qaybta kaalmada adeegyada, darien lawler hayangeles shaw . So [facility] [phone].    ATENCIÓN: Si habla español, tiene a krishnamurthy disposición servicios gratuitos de asistencia lingüística. Opheliaame al [phone].    We comply with applicable federal civil rights laws and [place] laws. We do not discriminate on the basis of race, color, national origin, age, disability, sex, sexual orientation, or gender identity.            Thank you!     Thank you for choosing [facility]  for your care. Our goal is always to provide you with excellent care. Hearing back from our patients is one way we can continue to improve our services. Please take a few minutes to complete the written survey that you may receive in the mail after your visit with us. Thank you!             Your Updated Medication List - Protect others around you: Learn how to safely use, store and throw away your medicines at www.disposemymeds.org.          This list is accurate as of 2/26/18  7:44 AM.  Always use your most recent med list.                   Brand Name Dispense  Instructions for use Diagnosis    ADVIL PO           fluticasone 50 MCG/ACT spray    FLONASE    16 g    Spray 1-2 sprays into both nostrils daily    Left otitis media with spontaneous rupture of eardrum, Chronic rhinitis       gemfibrozil 600 MG tablet    LOPID    180 tablet    TAKE 1 TABLET (600 MG) BY MOUTH 2 TIMES DAILY    Hypertriglyceridemia       MULTI VITAMIN MENS PO      Take  by mouth.        omeprazole 40 MG capsule    priLOSEC    90 capsule    TAKE ONE CAPSULE BY MOUTH EVERY DAY    Gastroesophageal reflux disease without esophagitis       * oxyCODONE-acetaminophen 5-325 MG per tablet    PERCOCET    20 tablet    Take 1-2 tablets by mouth every 6 hours as needed for moderate to severe pain    Closed fracture of left scapula, unspecified part of scapula, initial encounter       * oxyCODONE-acetaminophen 5-325 MG per tablet    PERCOCET    50 tablet    Take 1-2 tablets by mouth nightly as needed for moderate to severe pain    Closed fracture of left scapula, unspecified part of scapula, initial encounter       SUMAtriptan 100 MG tablet    IMITREX    18 tablet    TAKE 1 TAB BY MOUTH WITH ONSET OF MIGRAINE, MAY REPEAT ONCE AFTER 2 HOURS. MAX 2 TABS/24 HOURS.    Migraine with aura and without status migrainosus, not intractable       SYNTHROID 125 MCG tablet   Generic drug:  levothyroxine     90 tablet    TAKE 1 TABLET BY MOUTH ONCE DAILY    Subclinical hypothyroidism       tadalafil 20 MG tablet    CIALIS    8 tablet    Take 0.5-1 tablets as needed for erectile dysfunction. Never use with nitroglycerin, terazosin or doxazosin.    Erectile dysfunction, unspecified erectile dysfunction type       topiramate 50 MG tablet    TOPAMAX    60 tablet    TAKE 1 TABLET BY MOUTH TWICE A DAY    Migraine with aura and without status migrainosus, not intractable       * Notice:  This list has 2 medication(s) that are the same as other medications prescribed for you. Read the directions carefully, and ask your doctor or other  care provider to review them with you.

## 2018-04-16 PROBLEM — M25.512 ACUTE PAIN OF LEFT SHOULDER: Status: RESOLVED | Noted: 2017-11-21 | Resolved: 2018-04-16

## 2018-04-16 PROBLEM — M54.41 ACUTE RIGHT-SIDED LOW BACK PAIN WITH RIGHT-SIDED SCIATICA: Status: RESOLVED | Noted: 2017-11-02 | Resolved: 2018-04-16

## 2018-04-16 NOTE — PROGRESS NOTES
Discharge Note    Progress reporting period is from initial eval to Feb 26, 2018.     Linden failed to return for next follow up visit and current status is unknown.  Please see information below for last relevant information on current status.  Patient seen for 8 visits.  SUBJECTIVE  Subjective changes noted by patient:  lifting arm getting better, will wake at night if he lays on his L shoulder, no longer limiting activity with L arm during day, will get sore if he over does things with his L arm, mild soreness with shoveling yesterday  .  Current pain level is 2/10.     Previous pain level was  8/10.   Changes in function:  Yes (See Goal flowsheet attached for changes in current functional level)  Adverse reaction to treatment or activity: None    OBJECTIVE  Changes noted in objective findings: - full can, + empty can, AROM: shoulder flex 150, abd 140, ER(0) 75, IR(0) T10     ASSESSMENT/PLAN  Diagnosis: L scapular fx   DIAGP:  The encounter diagnosis was Acute pain of left shoulder.  Updated problem list and treatment plan:   Pain - HEP  Decreased ROM/flexibility - HEP  STG/LTGs have been met or progress has been made towards goals:  Yes, please see goal flowsheet for most current information  Assessment of Progress: current status is unknown.    Last current status: Pt is progressing slower than anticipated   Self Management Plans:  HEP  I have re-evaluated this patient and find that the nature, scope, duration and intensity of the therapy is appropriate for the medical condition of the patient.  Linden continues to require the following intervention to meet STG and LTG's:  HEP.    Recommendations:  Discharge with current home program.  Patient to follow up with MD as needed.    Please refer to the daily flowsheet for treatment today, total treatment time and time spent performing 1:1 timed codes.    Keenan Bryant,PT, DPT, OCS

## 2018-04-16 NOTE — PROGRESS NOTES
Discharge Note    Progress reporting period is from initial eval to 2018.     Linden failed to return for next follow up visit and current status is unknown.  Please see information below for last relevant information on current status.  Patient seen for 6 visits.  SUBJECTIVE  Subjective changes noted by patient:  continues to have some extra pain in low back, pain has been worse into lateral R thigh today, noticing more pain when he is carrying backpack (~30 lbs) over R shoulder, pain starts to increase with sitting >45 min, pain starts to increase with standing >30 min  .  Current pain level is 4/10.     Previous pain level was  8/10.   Changes in function:  Yes (See Goal flowsheet attached for changes in current functional level)  Adverse reaction to treatment or activity: None    OBJECTIVE  Changes noted in objective findings: lumbar ROM: flex 90%, ext 75%, RBS 80%, L SB 80%, repeated side glides hip to L: elimination of pain in lateral R thigh and back pain, no chane in numbness, + glute med tendinopathy R, numbness in R lateral thigh improved after cross friction massage to R glute med, double leg lowerin%, MMT hip ext 4+/5 B      ASSESSMENT/PLAN  Diagnosis: low back pain   DIAGP:  The encounter diagnosis was Acute right-sided low back pain with right-sided sciatica.  Updated problem list and treatment plan:   Pain - HEP  Decreased ROM/flexibility - HEP  Decreased function - HEP  STG/LTGs have been met or progress has been made towards goals:  Yes, please see goal flowsheet for most current information  Assessment of Progress: current status is unknown.    Last current status:     Self Management Plans:  HEP  I have re-evaluated this patient and find that the nature, scope, duration and intensity of the therapy is appropriate for the medical condition of the patient.  Linden continues to require the following intervention to meet STG and LTG's:  HEP.    Recommendations:  Discharge with current  home program.  Patient to follow up with MD as needed.    Please refer to the daily flowsheet for treatment today, total treatment time and time spent performing 1:1 timed codes.    Keenan Bryant,PT, DPT, OCS

## 2018-06-05 NOTE — PROGRESS NOTES
History of Present Illness - Linden Cruz is a 47 year old male presenting in clinic today for a recheck on Patient presents with:  RECHECK: bilateral ears    Lately patient reports that he has had congestion and sinus symptoms. He does have a history of T-tubes in bilateral ears that have improved his hearing.    Past Medical History -   Past Medical History:   Diagnosis Date     Chest pain      Esophageal reflux      Migraines      Mixed hyperlipidemia      DANIEL (obstructive sleep apnea)      Other specified gastritis without mention of hemorrhage     H. Pylori, diag 12/06       Current Medications -   Current Outpatient Prescriptions:      fluticasone (FLONASE) 50 MCG/ACT nasal spray, Spray 1-2 sprays into both nostrils daily, Disp: 16 g, Rfl: 3     gemfibrozil (LOPID) 600 MG tablet, TAKE 1 TABLET (600 MG) BY MOUTH 2 TIMES DAILY, Disp: 180 tablet, Rfl: 0     Ibuprofen (ADVIL PO), , Disp: , Rfl:      Multiple Vitamin (MULTI VITAMIN MENS PO), Take  by mouth., Disp: , Rfl:      omeprazole (PRILOSEC) 40 MG capsule, TAKE ONE CAPSULE BY MOUTH EVERY DAY, Disp: 90 capsule, Rfl: 2     oxyCODONE-acetaminophen (PERCOCET) 5-325 MG per tablet, Take 1-2 tablets by mouth nightly as needed for moderate to severe pain, Disp: 50 tablet, Rfl: 0     oxyCODONE-acetaminophen (PERCOCET) 5-325 MG per tablet, Take 1-2 tablets by mouth every 6 hours as needed for moderate to severe pain, Disp: 20 tablet, Rfl: 0     SUMAtriptan (IMITREX) 100 MG tablet, TAKE 1 TAB BY MOUTH WITH ONSET OF MIGRAINE, MAY REPEAT ONCE AFTER 2 HOURS. MAX 2 TABS/24 HOURS., Disp: 18 tablet, Rfl: 5     SYNTHROID 125 MCG tablet, TAKE 1 TABLET BY MOUTH ONCE DAILY, Disp: 90 tablet, Rfl: 2     tadalafil (CIALIS) 20 MG tablet, Take 0.5-1 tablets as needed for erectile dysfunction. Never use with nitroglycerin, terazosin or doxazosin., Disp: 8 tablet, Rfl: 10     topiramate (TOPAMAX) 50 MG tablet, TAKE 1 TABLET BY MOUTH TWICE A DAY, Disp: 60 tablet, Rfl: 5    Allergies  -   Allergies   Allergen Reactions     No Known Drug Allergies        Social History -   Social History     Social History     Marital status:      Spouse name: N/A     Number of children: 0     Years of education: N/A     Occupational History      [employer]     road construction     Social History Main Topics     Smoking status: Former Smoker     Types: Cigarettes     Quit date: 1/1/2000     Smokeless tobacco: Never Used     Alcohol use 0.0 oz/week     0 Standard drinks or equivalent per week      Comment: 1/month     Drug use: No     Sexual activity: Yes     Partners: Female     Birth control/ protection: Surgical      Comment: vasectomy     Other Topics Concern     Parent/Sibling W/ Cabg, Mi Or Angioplasty Before 65f 55m? No     Caffeine Concern Yes     large intake per day     Special Diet Yes     low cholesterol      Exercise No     Social History Narrative    Dairy/d 2-3 servings/d.     Caffeine 2-3 servings/d    Exercise 1-2 x week    Sunscreen used - NO, uses sunscreen in summer months    Seatbelts used - YES    Working smoke/CO detectors in the home - YES    Guns stored in the home - YES    Self Breast Exams - NA    Self Testicular Exam - YES    Eye Exam up to date - YES    Dental Exam up to date - YES    Pap Smear up to date - NA    Mammogram up to date - NA    PSA up to date - NA    Dexa Scan up to date - NA    Flex Sig / Colonoscopy up to date - NA    Immunizations up to date - YES    Abuse: Current or Past(Physical, Sexual or Emotional)- NO    Do you feel safe in your environment - YES    Claudia [facility]  1/29/2010               Family History -   Family History   Problem Relation Age of Onset     DIABETES Maternal Grandmother      C.A.D. Maternal Grandfather      DIABETES Maternal Grandfather      CANCER Maternal Grandfather      lung cancer     CEREBROVASCULAR DISEASE Paternal Grandmother      C.A.D. Paternal Grandfather      Other - See Comments Mother      bilateral carotid  "occlusion, SVG bypass to both.     Asthma No family hx of      Hypertension No family hx of      Breast Cancer No family hx of      Cancer - colorectal No family hx of      Prostate Cancer No family hx of      Alcohol/Drug No family hx of      Allergies No family hx of      Alzheimer Disease No family hx of      Anesthesia Reaction No family hx of      Arthritis No family hx of      Blood Disease No family hx of      Cardiovascular No family hx of      Circulatory No family hx of      Congenital Anomalies No family hx of      Connective Tissue Disorder No family hx of      Depression No family hx of      Endocrine Disease No family hx of      Eye Disorder No family hx of      Genetic Disorder No family hx of      GASTROINTESTINAL DISEASE No family hx of      Genitourinary Problems No family hx of      Gynecology No family hx of      HEART DISEASE No family hx of      Lipids No family hx of      Musculoskeletal Disorder No family hx of      Neurologic Disorder No family hx of      Obesity No family hx of      OSTEOPOROSIS No family hx of      Psychotic Disorder No family hx of      Respiratory No family hx of      Thyroid Disease No family hx of      Hearing Loss No family hx of      Coronary Artery Disease No family hx of      Mental Illness No family hx of      Depression/Anxiety No family hx of      Known Genetic Syndrome No family hx of        Review of Systems - As per HPI and PMHx, otherwise review of system review of the head and neck negative.    Physical Exam  /80  Temp 97.3  F (36.3  C) (Temporal)  Ht 1.791 m (5' 10.5\")  Wt 96.2 kg (212 lb)  BMI 29.99 kg/m2  BMI: Body mass index is 29.99 kg/(m^2).    General - The patient is well nourished and well developed, and appears to have good nutritional status.  Alert and oriented to person and place, answers questions and cooperates with examination appropriately.    SKIN - No suspicious lesions or rashes.  Respiration - No respiratory distress.  Head " and Face - Normocephalic and atraumatic, with no gross asymmetry noted of the contour of the facial features.  The facial nerve is intact, with strong symmetric movements.    Voice and Breathing - The patient was breathing comfortably without the use of accessory muscles. The patients voice was clear and strong, and had appropriate pitch and quality.    Ears - Bilateral pinna and EACs with normal appearing overlying skin. Patient has a posterior perforation on the left that is 5%. The t-tube on the left is out and is sitting in the ear canal. There is a complete atelectasis of the middle ear space on the right.    Eyes - Extraocular movements intact.  Sclera were not icteric or injected, conjunctiva were pink and moist.    Mouth - Examination of the oral cavity showed pink, healthy oral mucosa. No lesions or ulcerations noted.  The tongue was mobile and midline, and the dentition were in good condition.      Throat - The walls of the oropharynx were smooth, pink, moist, symmetric, and had no lesions or ulcerations.  The tonsillar pillars and soft palate were symmetric. Tonsils are 1+. The uvula was midline on elevation.    Neck - Normal midline excursion of the laryngotracheal complex during swallowing.  Full range of motion on passive movement.  Palpation of the occipital, submental, submandibular, internal jugular chain, and supraclavicular nodes did not demonstrate any abnormal lymph nodes or masses.  The carotid pulse was palpable bilaterally.  Palpation of the thyroid was soft and smooth, with no nodules or goiter appreciated.  The trachea was mobile and midline.    Nose - External contour is symmetric, no gross deflection or scars.  Nasal mucosa is pink and moist with no abnormal mucus.  The septum was midline and non-obstructive, turbinates of large size and position.  No polyps, masses, or purulence noted on examination.    Neuro - Nonfocal neuro exam is normal, CN 2 through 12 intact, normal gait and muscle  tone.      Performed in clinic today:  Audiologic Studies - An audiogram and tympanogram were performed today as part of the evaluation and personally reviewed. The tympanogram shows Type B curves on the right and Type B curves on the left, with normal canal volume and middle ear pressure on the right and high canal volumes and middle ear pressures.  The audiogram showed mild conductive hearing loss on the right and mild SNHL in the high frequencies on the left.        A/P - Linden Cruz is a 47 year old male Patient presents with:  RECHECK: bilateral ears    I discussed replacing both tubes with t-tubes. Patient is agreeable to this plan. He will come in on 06/20/2018 for replacement of these tubes with conventional T tubes.     At Linden next appointment they will need a hearing test.      This document serves as a record of the services and decisions personally performed and made by Dr. Savage Mejia MD. It was created on his behalf by Yuliana Holloway, a trained medical scribe. The creation of this document is based the provider's statements to the medical scribe.  Yuliana Holloway 6/7/2018    Provider:   The information in this document, created by the medical scribe for me, accurately reflects the services I personally performed and the decisions made by me. I have reviewed and approved this document for accuracy prior to leaving the patient care area.  Dr. Savage Mejia MD 6/7/2018    Savage Mejia MD

## 2018-06-07 ENCOUNTER — OFFICE VISIT (OUTPATIENT)
Dept: OTOLARYNGOLOGY | Facility: CLINIC | Age: 48
End: 2018-06-07
Payer: COMMERCIAL

## 2018-06-07 ENCOUNTER — OFFICE VISIT (OUTPATIENT)
Dept: AUDIOLOGY | Facility: CLINIC | Age: 48
End: 2018-06-07
Payer: COMMERCIAL

## 2018-06-07 VITALS
HEIGHT: 71 IN | WEIGHT: 212 LBS | TEMPERATURE: 97.3 F | SYSTOLIC BLOOD PRESSURE: 114 MMHG | DIASTOLIC BLOOD PRESSURE: 80 MMHG | BODY MASS INDEX: 29.68 KG/M2

## 2018-06-07 DIAGNOSIS — H90.A32 MIXED CONDUCTIVE AND SENSORINEURAL HEARING LOSS OF LEFT EAR WITH RESTRICTED HEARING OF RIGHT EAR: ICD-10-CM

## 2018-06-07 DIAGNOSIS — H90.6 MIXED CONDUCTIVE AND SENSORINEURAL HEARING LOSS OF BOTH EARS: ICD-10-CM

## 2018-06-07 DIAGNOSIS — J34.3 HYPERTROPHY, NASAL, TURBINATE: Primary | ICD-10-CM

## 2018-06-07 DIAGNOSIS — H90.A11 CONDUCTIVE HEARING LOSS OF RIGHT EAR WITH RESTRICTED HEARING OF LEFT EAR: Primary | ICD-10-CM

## 2018-06-07 PROCEDURE — 92567 TYMPANOMETRY: CPT | Performed by: AUDIOLOGIST

## 2018-06-07 PROCEDURE — 92557 COMPREHENSIVE HEARING TEST: CPT | Performed by: AUDIOLOGIST

## 2018-06-07 PROCEDURE — 99207 ZZC NO CHARGE LOS: CPT | Performed by: AUDIOLOGIST

## 2018-06-07 PROCEDURE — 99213 OFFICE O/P EST LOW 20 MIN: CPT | Performed by: OTOLARYNGOLOGY

## 2018-06-07 RX ORDER — FLUTICASONE PROPIONATE 50 MCG
1-2 SPRAY, SUSPENSION (ML) NASAL DAILY
Qty: 1 BOTTLE | Refills: 3 | Status: SHIPPED | OUTPATIENT
Start: 2018-06-07 | End: 2020-04-16

## 2018-06-07 NOTE — PROGRESS NOTES
AUDIOLOGY REPORT     SUMMARY: Audiology visit completed. See audiogram for results.     RECOMMENDATIONS: Follow-up with ENT    Tracy Waldrop Licensed Audiologist #[id number]

## 2018-06-07 NOTE — LETTER
6/7/2018         RE: Linden Cruz  [id number] [facility] [id number]        Dear Colleague,    Thank you for referring your patient, Linden Cruz, to the [facility]. Please see a copy of my visit note below.    History of Present Illness - Linden Cruz is a 47 year old male presenting in clinic today for a recheck on Patient presents with:  RECHECK: bilateral ears    Lately patient reports that he has had congestion and sinus symptoms. He does have a history of T-tubes in bilateral ears that have improved his hearing.    Past Medical History -   Past Medical History:   Diagnosis Date     Chest pain      Esophageal reflux      Migraines      Mixed hyperlipidemia      DANIEL (obstructive sleep apnea)      Other specified gastritis without mention of hemorrhage     H. Pylori, diag 12/06       Current Medications -   Current Outpatient Prescriptions:      fluticasone (FLONASE) 50 MCG/ACT nasal spray, Spray 1-2 sprays into both nostrils daily, Disp: 16 g, Rfl: 3     gemfibrozil (LOPID) 600 MG tablet, TAKE 1 TABLET (600 MG) BY MOUTH 2 TIMES DAILY, Disp: 180 tablet, Rfl: 0     Ibuprofen (ADVIL PO), , Disp: , Rfl:      Multiple Vitamin (MULTI VITAMIN MENS PO), Take  by mouth., Disp: , Rfl:      omeprazole (PRILOSEC) 40 MG capsule, TAKE ONE CAPSULE BY MOUTH EVERY DAY, Disp: 90 capsule, Rfl: 2     oxyCODONE-acetaminophen (PERCOCET) 5-325 MG per tablet, Take 1-2 tablets by mouth nightly as needed for moderate to severe pain, Disp: 50 tablet, Rfl: 0     oxyCODONE-acetaminophen (PERCOCET) 5-325 MG per tablet, Take 1-2 tablets by mouth every 6 hours as needed for moderate to severe pain, Disp: 20 tablet, Rfl: 0     SUMAtriptan (IMITREX) 100 MG tablet, TAKE 1 TAB BY MOUTH WITH ONSET OF MIGRAINE, MAY REPEAT ONCE AFTER 2 HOURS. MAX 2 TABS/24 HOURS., Disp: 18 tablet, Rfl: 5     SYNTHROID 125 MCG tablet, TAKE 1 TABLET BY MOUTH ONCE DAILY, Disp: 90 tablet, Rfl: 2     tadalafil (CIALIS) 20 MG  tablet, Take 0.5-1 tablets as needed for erectile dysfunction. Never use with nitroglycerin, terazosin or doxazosin., Disp: 8 tablet, Rfl: 10     topiramate (TOPAMAX) 50 MG tablet, TAKE 1 TABLET BY MOUTH TWICE A DAY, Disp: 60 tablet, Rfl: 5    Allergies -   Allergies   Allergen Reactions     No Known Drug Allergies        Social History -   Social History     Social History     Marital status:      Spouse name: N/A     Number of children: 0     Years of education: N/A     Occupational History      [employer]     road construction     Social History Main Topics     Smoking status: Former Smoker     Types: Cigarettes     Quit date: 1/1/2000     Smokeless tobacco: Never Used     Alcohol use 0.0 oz/week     0 Standard drinks or equivalent per week      Comment: 1/month     Drug use: No     Sexual activity: Yes     Partners: Female     Birth control/ protection: Surgical      Comment: vasectomy     Other Topics Concern     Parent/Sibling W/ Cabg, Mi Or Angioplasty Before 65f 55m? No     Caffeine Concern Yes     large intake per day     Special Diet Yes     low cholesterol      Exercise No     Social History Narrative    Dairy/d 2-3 servings/d.     Caffeine 2-3 servings/d    Exercise 1-2 x week    Sunscreen used - NO, uses sunscreen in summer months    Seatbelts used - YES    Working smoke/CO detectors in the home - YES    Guns stored in the home - YES    Self Breast Exams - NA    Self Testicular Exam - YES    Eye Exam up to date - YES    Dental Exam up to date - YES    Pap Smear up to date - NA    Mammogram up to date - NA    PSA up to date - NA    Dexa Scan up to date - NA    Flex Sig / Colonoscopy up to date - NA    Immunizations up to date - YES    Abuse: Current or Past(Physical, Sexual or Emotional)- NO    Do you feel safe in your environment - YES    Claudia Salamanca CMA  1/29/2010               Family History -   Family History   Problem Relation Age of Onset     DIABETES Maternal Grandmother       "C.A.D. Maternal Grandfather      DIABETES Maternal Grandfather      CANCER Maternal Grandfather      lung cancer     CEREBROVASCULAR DISEASE Paternal Grandmother      C.A.D. Paternal Grandfather      Other - See Comments Mother      bilateral carotid occlusion, SVG bypass to both.     Asthma No family hx of      Hypertension No family hx of      Breast Cancer No family hx of      Cancer - colorectal No family hx of      Prostate Cancer No family hx of      Alcohol/Drug No family hx of      Allergies No family hx of      Alzheimer Disease No family hx of      Anesthesia Reaction No family hx of      Arthritis No family hx of      Blood Disease No family hx of      Cardiovascular No family hx of      Circulatory No family hx of      Congenital Anomalies No family hx of      Connective Tissue Disorder No family hx of      Depression No family hx of      Endocrine Disease No family hx of      Eye Disorder No family hx of      Genetic Disorder No family hx of      GASTROINTESTINAL DISEASE No family hx of      Genitourinary Problems No family hx of      Gynecology No family hx of      HEART DISEASE No family hx of      Lipids No family hx of      Musculoskeletal Disorder No family hx of      Neurologic Disorder No family hx of      Obesity No family hx of      OSTEOPOROSIS No family hx of      Psychotic Disorder No family hx of      Respiratory No family hx of      Thyroid Disease No family hx of      Hearing Loss No family hx of      Coronary Artery Disease No family hx of      Mental Illness No family hx of      Depression/Anxiety No family hx of      Known Genetic Syndrome No family hx of        Review of Systems - As per HPI and PMHx, otherwise review of system review of the head and neck negative.    Physical Exam  /80  Temp 97.3  F (36.3  C) (Temporal)  Ht 1.791 m (5' 10.5\")  Wt 96.2 kg (212 lb)  BMI 29.99 kg/m2  BMI: Body mass index is 29.99 kg/(m^2).    General - The patient is well nourished and well " developed, and appears to have good nutritional status.  Alert and oriented to person and place, answers questions and cooperates with examination appropriately.    SKIN - No suspicious lesions or rashes.  Respiration - No respiratory distress.  Head and Face - Normocephalic and atraumatic, with no gross asymmetry noted of the contour of the facial features.  The facial nerve is intact, with strong symmetric movements.    Voice and Breathing - The patient was breathing comfortably without the use of accessory muscles. The patients voice was clear and strong, and had appropriate pitch and quality.    Ears - Bilateral pinna and EACs with normal appearing overlying skin. Patient has a posterior perforation on the left that is 5%. The t-tube on the left is out and is sitting in the ear canal. There is a complete atelectasis of the middle ear space on the right.    Eyes - Extraocular movements intact.  Sclera were not icteric or injected, conjunctiva were pink and moist.    Mouth - Examination of the oral cavity showed pink, healthy oral mucosa. No lesions or ulcerations noted.  The tongue was mobile and midline, and the dentition were in good condition.      Throat - The walls of the oropharynx were smooth, pink, moist, symmetric, and had no lesions or ulcerations.  The tonsillar pillars and soft palate were symmetric. Tonsils are 1+. The uvula was midline on elevation.    Neck - Normal midline excursion of the laryngotracheal complex during swallowing.  Full range of motion on passive movement.  Palpation of the occipital, submental, submandibular, internal jugular chain, and supraclavicular nodes did not demonstrate any abnormal lymph nodes or masses.  The carotid pulse was palpable bilaterally.  Palpation of the thyroid was soft and smooth, with no nodules or goiter appreciated.  The trachea was mobile and midline.    Nose - External contour is symmetric, no gross deflection or scars.  Nasal mucosa is pink and moist  with no abnormal mucus.  The septum was midline and non-obstructive, turbinates of large size and position.  No polyps, masses, or purulence noted on examination.    Neuro - Nonfocal neuro exam is normal, CN 2 through 12 intact, normal gait and muscle tone.      Performed in clinic today:  Audiologic Studies - An audiogram and tympanogram were performed today as part of the evaluation and personally reviewed. The tympanogram shows Type B curves on the right and Type B curves on the left, with normal canal volume and middle ear pressure on the right and high canal volumes and middle ear pressures.  The audiogram showed mild conductive hearing loss on the right and mild SNHL in the high frequencies on the left.        A/P - Linden Cruz is a 47 year old male Patient presents with:  RECHECK: bilateral ears    I discussed replacing both tubes with t-tubes. Patient is agreeable to this plan. He will come in on 06/20/2018 for replacement of these tubes with conventional T tubes.     At Linden next appointment they will need a hearing test.      This document serves as a record of the services and decisions personally performed and made by Dr. Savage Mejia MD. It was created on his behalf by Yuliana Holloway, a trained medical scribe. The creation of this document is based the provider's statements to the medical scribe.  Yuliana Holloway 6/7/2018    Provider:   The information in this document, created by the medical scribe for me, accurately reflects the services I personally performed and the decisions made by me. I have reviewed and approved this document for accuracy prior to leaving the patient care area.  Dr. Savage Mejia MD 6/7/2018    Savage Mejia MD          Again, thank you for allowing me to participate in the care of your patient.        Sincerely,        Savage Mejia MD, MD

## 2018-06-07 NOTE — MR AVS SNAPSHOT
After Visit Summary   6/7/2018    Linden Cruz    MRN: [id number]           Patient Information     Date Of Birth          1970        Visit Information        Provider Department      6/7/2018 8:30 AM Savage Mejia MD [facility]        Today's Diagnoses     Hypertrophy, nasal, turbinate    -  1    Mixed conductive and sensorineural hearing loss of both ears           Follow-ups after your visit        Your next 10 appointments already scheduled     Jun 20, 2018  2:00 PM CDT   [facility] Visit with Savage Mejia MD   [facility]    [address]  [address]  [facility] [address] [phone]            Nov 19, 2018  8:00 AM CST   [facility]    [address] [place] [address] [phone]            Nov 19, 2018  8:30 AM CST   [facility] Visit with Savage Mejia MD   [facility]    [address] [place] [address] [phone]              Who to contact     If you have questions or need follow up information about today's clinic visit or your schedule please contact [facility] directly at [phone].  Normal or non-critical lab and imaging results will be communicated to you by MyChart, letter or phone within 4 business days after the clinic has received the results. If you do not hear from us within 7 days, please contact the clinic through MyChart or phone. If you have a critical or abnormal lab result, we will notify you by phone as soon as possible.  Submit refill requests through Om[employer] or call your pharmacy and they will forward the refill request to us. Please allow 3 business days for your refill to be completed.          Additional Information About Your Visit        [employer] Information     Om[employer] gives you secure access to your  "electronic health record. If you see a primary care provider, you can also send messages to your care team and make appointments. If you have questions, please call your primary care clinic.  If you do not have a primary care provider, please call [phone] and they will assist you.        Care EveryWhere ID     This is your Care EveryWhere ID. This could be used by other organizations to access your [facility] medical records  [id number]        Your Vitals Were     Temperature Height BMI (Body Mass Index)             97.3  F (36.3  C) (Temporal) 1.791 m (5' 10.5\") 29.99 kg/m2          Blood Pressure from Last 3 Encounters:   06/07/18 114/80   10/20/17 (!) 134/92   10/13/17 136/80    Weight from Last 3 Encounters:   06/07/18 96.2 kg (212 lb)   02/26/18 93.9 kg (207 lb)   02/19/18 93.4 kg (206 lb)              Today, you had the following     No orders found for display         Today's Medication Changes          These changes are accurate as of 6/7/18  9:55 AM.  If you have any questions, ask your nurse or doctor.               These medicines have changed or have updated prescriptions.        Dose/Directions    * fluticasone 50 MCG/ACT spray   Commonly known as:  FLONASE   This may have changed:  Another medication with the same name was added. Make sure you understand how and when to take each.   Used for:  Left otitis media with spontaneous rupture of eardrum, Chronic rhinitis   Changed by:  Savage Mejia MD        Dose:  1-2 spray   Spray 1-2 sprays into both nostrils daily   Quantity:  16 g   Refills:  3       * fluticasone 50 MCG/ACT spray   Commonly known as:  FLONASE   This may have changed:  You were already taking a medication with the same name, and this prescription was added. Make sure you understand how and when to take each.   Used for:  Hypertrophy, nasal, turbinate   Changed by:  Savage Mejia MD        Dose:  1-2 spray   Spray 1-2 sprays into both nostrils daily   Quantity:  1 Bottle "   Refills:  3       * Notice:  This list has 2 medication(s) that are the same as other medications prescribed for you. Read the directions carefully, and ask your doctor or other care provider to review them with you.         Where to get your medicines      These medications were sent to [facility] - [place], [place] - [address]  [address], [place] [address]     Phone:  [phone]     fluticasone 50 MCG/ACT spray                Primary Care Provider Office Phone # Fax #    Nik Nina PA-C [phone]       [address] [facility] [address]  [facility] [address]        Equal Access to Services     [facility] : Hadii bernabe blanko Chloe, waaxda luqadaha, qaybta kaalmada adeveroyada, darien shaw . So [facility] [phone].    ATENCIÓN: Si habla español, tiene a krishnamurthy disposición servicios gratuitos de asistencia lingüística. Ll[facility] [phone].    We comply with applicable federal civil rights laws and [place] laws. We do not discriminate on the basis of race, color, national origin, age, disability, sex, sexual orientation, or gender identity.            Thank you!     Thank you for choosing [facility]  for your care. Our goal is always to provide you with excellent care. Hearing back from our patients is one way we can continue to improve our services. Please take a few minutes to complete the written survey that you may receive in the mail after your visit with us. Thank you!             Your Updated Medication List - Protect others around you: Learn how to safely use, store and throw away your medicines at www.disposemymeds.org.          This list is accurate as of 6/7/18  9:55 AM.  Always use your most recent med list.                   Brand Name Dispense Instructions for use Diagnosis    ADVIL PO           * fluticasone 50 MCG/ACT spray    FLONASE    16 g    Spray 1-2 sprays into both nostrils daily    Left otitis media with spontaneous  rupture of eardrum, Chronic rhinitis       * fluticasone 50 MCG/ACT spray    FLONASE    1 Bottle    Spray 1-2 sprays into both nostrils daily    Hypertrophy, nasal, turbinate       gemfibrozil 600 MG tablet    LOPID    180 tablet    TAKE 1 TABLET (600 MG) BY MOUTH 2 TIMES DAILY    Hypertriglyceridemia       MULTI VITAMIN MENS PO      Take  by mouth.        omeprazole 40 MG capsule    priLOSEC    90 capsule    TAKE ONE CAPSULE BY MOUTH EVERY DAY    Gastroesophageal reflux disease without esophagitis       * oxyCODONE-acetaminophen 5-325 MG per tablet    PERCOCET    20 tablet    Take 1-2 tablets by mouth every 6 hours as needed for moderate to severe pain    Closed fracture of left scapula, unspecified part of scapula, initial encounter       * oxyCODONE-acetaminophen 5-325 MG per tablet    PERCOCET    50 tablet    Take 1-2 tablets by mouth nightly as needed for moderate to severe pain    Closed fracture of left scapula, unspecified part of scapula, initial encounter       SUMAtriptan 100 MG tablet    IMITREX    18 tablet    TAKE 1 TAB BY MOUTH WITH ONSET OF MIGRAINE, MAY REPEAT ONCE AFTER 2 HOURS. MAX 2 TABS/24 HOURS.    Migraine with aura and without status migrainosus, not intractable       SYNTHROID 125 MCG tablet   Generic drug:  levothyroxine     90 tablet    TAKE 1 TABLET BY MOUTH ONCE DAILY    Subclinical hypothyroidism       tadalafil 20 MG tablet    CIALIS    8 tablet    Take 0.5-1 tablets as needed for erectile dysfunction. Never use with nitroglycerin, terazosin or doxazosin.    Erectile dysfunction, unspecified erectile dysfunction type       topiramate 50 MG tablet    TOPAMAX    60 tablet    TAKE 1 TABLET BY MOUTH TWICE A DAY    Migraine with aura and without status migrainosus, not intractable       * Notice:  This list has 4 medication(s) that are the same as other medications prescribed for you. Read the directions carefully, and ask your doctor or other care provider to review them with you.

## 2018-06-13 NOTE — PROGRESS NOTES
Procedure: Bilateral myringotomy with Tube Placement    Technique- After discussion of the risks and benefits of myringotomy, including the risk of otorrhea and residual persistent perforation, informed consent was signed and placed in the chart.  I began with the left side.  I proceeded to position the patient in a semi-supine position in the examination chair.  Using the binocular surgical microscope, I then proceeded to clean the canal of cerumen and squamous debris.  I was able to see the tympanic membrane and existing posterior middle perforation appears to be 10% of the drum.  It is not epithelialized yet.  Middle ear space is clear.  To give the perforation open and hopefully to achieve epithelialization I placed Triune tube..   On the right side significant atelectasis of the middle ear is appreciated with the drum being like cellophane on the promontory.  Using a small suction tip, I applied a tiny coating of phenol onto the tympanic membrane.  After visualizing a good maria elena, I then proceeded to use a myringotomy knife to make a radially oriented incision in the tympanic membrane.  the middle ear was collapsed completely.  Next, I proceeded to place a 1.  2 7 mm inner diameter Paparella type II tube through the incision.  After confirming good positioning and a clearly visible open tube, the procedure was complete.    A/P - The patient was instructed on water precautions and given a prescription for otic antibiotic drops to use for three days.  I will see them in 2 to 4 weeks for follow up and repeat audiogram.  The patient was instructed to call in or return sooner if there was any drainage from the ear.

## 2018-06-20 ENCOUNTER — OFFICE VISIT (OUTPATIENT)
Dept: OTOLARYNGOLOGY | Facility: OTHER | Age: 48
End: 2018-06-20
Payer: COMMERCIAL

## 2018-06-20 VITALS
WEIGHT: 215.75 LBS | HEART RATE: 72 BPM | TEMPERATURE: 97 F | DIASTOLIC BLOOD PRESSURE: 78 MMHG | SYSTOLIC BLOOD PRESSURE: 132 MMHG | BODY MASS INDEX: 30.52 KG/M2

## 2018-06-20 DIAGNOSIS — H65.23 BILATERAL CHRONIC SEROUS OTITIS MEDIA: Primary | ICD-10-CM

## 2018-06-20 PROCEDURE — 99207 ZZC DROP WITH A PROCEDURE: CPT | Mod: 25 | Performed by: OTOLARYNGOLOGY

## 2018-06-20 PROCEDURE — 69433 CREATE EARDRUM OPENING: CPT | Mod: 50 | Performed by: OTOLARYNGOLOGY

## 2018-06-20 NOTE — MR AVS SNAPSHOT
After Visit Summary   6/20/2018    Linden Cruz    MRN: [id number]           Patient Information     Date Of Birth          1970        Visit Information        Provider Department      6/20/2018 2:00 PM Savage Mejia MD [facility]        Today's Diagnoses     Bilateral chronic serous otitis media    -  1       Follow-ups after your visit        Your next 10 appointments already scheduled     Aug 20, 2018  8:45 AM CDT   Return Visit with Savage Mejia MD   [facility] ([address] [address] [phone]            Nov 19, 2018  8:00 AM CST   Return Visit with Piyush Hernandez   [facility] ([address] [address] [phone]              Who to contact     If you have questions or need follow up information about today's clinic visit or your schedule please contact [facility] directly at [phone].  Normal or non-critical lab and imaging results will be communicated to you by MyChart, letter or phone within 4 business days after the clinic has received the results. If you do not hear from us within 7 days, please contact the clinic through [employer] Beauty[employer] or phone. If you have a critical or abnormal lab result, we will notify you by phone as soon as possible.  Submit refill requests through Net[employer] or call your pharmacy and they will forward the refill request to us. Please allow 3 business days for your refill to be completed.          Additional Information About Your Visit        You[employer] Beauty[employer] Information     [employer] gives you secure access to your electronic health record. If you see a primary care provider, you can also send messages to your care team and make appointments. If you have questions, please call your primary care clinic.  If you do not have a primary care provider, please call [phone] and they  will assist you.        Care EveryWhere ID     This is your Care EveryWhere ID. This could be used by other organizations to access your [place] medical records  [id number]        Your Vitals Were     Pulse Temperature BMI (Body Mass Index)             72 97  F (36.1  C) (Temporal) 30.52 kg/m2          Blood Pressure from Last 3 Encounters:   06/20/18 132/78   06/07/18 114/80   10/20/17 (!) 134/92    Weight from Last 3 Encounters:   06/20/18 97.9 kg (215 lb 12 oz)   06/07/18 96.2 kg (212 lb)   02/26/18 93.9 kg (207 lb)              We Performed the Following     TYMPANOSTOMY W LOCAL/TOPICAL ANESTH        Primary Care Provider Office Phone # Fax #    Nik Nina PA-C [phone]       [address]  [facility] [address]        Equal Access to Services     : Hadii aad ku hadasho Sodanny, waaxda luqadaha, qaybta kaalmada adeegyada, darien shaw . So [facility] [phone].    ATENCIÓN: Si habla español, tiene a krishnamurthy disposición servicios gratuitos de asistencia lingüística. Opheliaame al [phone].    We comply with applicable federal civil rights laws and [place] laws. We do not discriminate on the basis of race, color, national origin, age, disability, sex, sexual orientation, or gender identity.            Thank you!     Thank you for choosing [facility]  for your care. Our goal is always to provide you with excellent care. Hearing back from our patients is one way we can continue to improve our services. Please take a few minutes to complete the written survey that you may receive in the mail after your visit with us. Thank you!             Your Updated Medication List - Protect others around you: Learn how to safely use, store and throw away your medicines at www.disposemymeds.org.          This list is accurate as of 6/20/18  3:42 PM.  Always use your most recent med list.                   Brand Name Dispense Instructions for use  Diagnosis    ADVIL PO           * fluticasone 50 MCG/ACT spray    FLONASE    16 g    Spray 1-2 sprays into both nostrils daily    Left otitis media with spontaneous rupture of eardrum, Chronic rhinitis       * fluticasone 50 MCG/ACT spray    FLONASE    1 Bottle    Spray 1-2 sprays into both nostrils daily    Hypertrophy, nasal, turbinate       gemfibrozil 600 MG tablet    LOPID    180 tablet    TAKE 1 TABLET (600 MG) BY MOUTH 2 TIMES DAILY    Hypertriglyceridemia       MULTI VITAMIN MENS PO      Take  by mouth.        omeprazole 40 MG capsule    priLOSEC    90 capsule    TAKE ONE CAPSULE BY MOUTH EVERY DAY    Gastroesophageal reflux disease without esophagitis       * oxyCODONE-acetaminophen 5-325 MG per tablet    PERCOCET    20 tablet    Take 1-2 tablets by mouth every 6 hours as needed for moderate to severe pain    Closed fracture of left scapula, unspecified part of scapula, initial encounter       * oxyCODONE-acetaminophen 5-325 MG per tablet    PERCOCET    50 tablet    Take 1-2 tablets by mouth nightly as needed for moderate to severe pain    Closed fracture of left scapula, unspecified part of scapula, initial encounter       SUMAtriptan 100 MG tablet    IMITREX    18 tablet    TAKE 1 TAB BY MOUTH WITH ONSET OF MIGRAINE, MAY REPEAT ONCE AFTER 2 HOURS. MAX 2 TABS/24 HOURS.    Migraine with aura and without status migrainosus, not intractable       SYNTHROID 125 MCG tablet   Generic drug:  levothyroxine     90 tablet    TAKE 1 TABLET BY MOUTH ONCE DAILY    Subclinical hypothyroidism       tadalafil 20 MG tablet    CIALIS    8 tablet    Take 0.5-1 tablets as needed for erectile dysfunction. Never use with nitroglycerin, terazosin or doxazosin.    Erectile dysfunction, unspecified erectile dysfunction type       topiramate 50 MG tablet    TOPAMAX    60 tablet    TAKE 1 TABLET BY MOUTH TWICE A DAY    Migraine with aura and without status migrainosus, not intractable       * Notice:  This list has 4 medication(s)  that are the same as other medications prescribed for you. Read the directions carefully, and ask your doctor or other care provider to review them with you.

## 2018-07-08 DIAGNOSIS — G43.109 MIGRAINE WITH AURA AND WITHOUT STATUS MIGRAINOSUS, NOT INTRACTABLE: ICD-10-CM

## 2018-07-10 RX ORDER — TOPIRAMATE 50 MG/1
TABLET, FILM COATED ORAL
Qty: 60 TABLET | Refills: 4 | Status: SHIPPED | OUTPATIENT
Start: 2018-07-10 | End: 2018-11-23

## 2018-07-10 NOTE — TELEPHONE ENCOUNTER
"Requested Prescriptions   Pending Prescriptions Disp Refills     topiramate (TOPAMAX) 50 MG tablet [Pharmacy Med Name: TOPIRAMATE 50 MG TABLET] 60 tablet 4     Sig: TAKE 1 TABLET BY MOUTH TWICE A DAY    Anti-Seizure Meds Protocol  Failed    7/8/2018 11:30 AM       Failed - Review Authorizing provider's last note.     Refer to last progress notes: confirm request is for original authorizing provider (cannot be through other providers).         Passed - Recent (12 mo) or future (30 days) visit within the authorizing provider's specialty    Patient had office visit in the last 12 months or has a visit in the next 30 days with authorizing provider or within the authorizing provider's specialty.  See \"Patient Info\" tab in inbasket, or \"Choose Columns\" in Meds & Orders section of the refill encounter.           Passed - Normal CBC on file in past 26 months    Recent Labs   Lab Test  08/24/16   0828   WBC  7.8   RBC  4.61   HGB  14.5   HCT  42.9   PLT  299       For [facility] ONLY: [id number] = WBC, [id number] = RBC         Passed - Normal ALT or AST on file in past 26 months    Recent Labs   Lab Test  11/21/17   0853   ALT  28     Recent Labs   Lab Test  11/21/17   0853   AST  17            Passed - Normal platelet count on file in past 26 months    Recent Labs   Lab Test  08/24/16   0828   PLT  299               Last filled 10/13/2017    Prescription approved per [facility] Refill Protocol.  Jerry Saldivar, RN, BSN        "

## 2018-09-06 DIAGNOSIS — E03.8 SUBCLINICAL HYPOTHYROIDISM: ICD-10-CM

## 2018-09-06 NOTE — TELEPHONE ENCOUNTER
"Requested Prescriptions   Pending Prescriptions Disp Refills     levothyroxine (SYNTHROID/LEVOTHROID) 125 MCG tablet [Pharmacy Med Name: LEVOTHYROXINE 125 MCG TABLET] 90 tablet 2     Sig: TAKE 1 TABLET BY MOUTH ONCE DAILY    Thyroid Protocol Passed    9/6/2018  9:41 AM       Passed - Patient is 12 years or older       Passed - Recent (12 mo) or future (30 days) visit within the authorizing provider's specialty    Patient had office visit in the last 12 months or has a visit in the next 30 days with authorizing provider or within the authorizing provider's specialty.  See \"Patient Info\" tab in inbasket, or \"Choose Columns\" in Meds & Orders section of the refill encounter.           Passed - Normal TSH on file in past 12 months    Recent Labs   Lab Test  11/21/17   0853   TSH  3.39              SYNTHROID 125 MCG tablet  Routing refill request to provider for review/approval because:  A break in medication, should have needed refills 12/2017  Chrissy Fountain RN, BSN                 "

## 2018-09-07 RX ORDER — LEVOTHYROXINE SODIUM 125 UG/1
TABLET ORAL
Qty: 30 TABLET | Refills: 0 | Status: SHIPPED | OUTPATIENT
Start: 2018-09-07 | End: 2018-09-14

## 2018-09-10 ENCOUNTER — TELEPHONE (OUTPATIENT)
Dept: FAMILY MEDICINE | Facility: OTHER | Age: 48
End: 2018-09-10

## 2018-09-10 NOTE — TELEPHONE ENCOUNTER
Left message for patient to call back. Please relay information below.  Lori BLANDON CMA ([facility])

## 2018-09-10 NOTE — TELEPHONE ENCOUNTER
Patient scheduled an appointment for Friday 09-14-18 with Nik Nina.  Please advise for triage due to reason for visit.  Please see my chart message below.  Thank you    Patient Comments:      Primary Care     Have an issue with right testicle.  Some swelling and      pain.

## 2018-09-10 NOTE — PROGRESS NOTES
"  SUBJECTIVE:   Linden Cruz is a 48 year old male who presents to clinic today for the following health issues:    HPI     Genitourinary - Male  Onset: 1+ month    Description:   Dysuria (painful urination): no   Hematuria (blood in urine): no   Hesitancy (delay in urine): no   Retention (unable to empty): no   Decrease in urinary flow: no     Progression of Symptoms:  worsening    Accompanying Signs & Symptoms:  Fever: no   Back/Flank pain: no   Urethral discharge: no   Testicle lumps/masses/pain: YES- right sided  Testicle Swelling: YES  Nausea and/or vomiting: YES- nausea  Abdominal pain: YES- \"a little discomfort\"    History:   History of similar issues: YES- previously seen on 08/24/2016. Occasional discomfort over the years since having vasectomy.  History of kidney stones/UTI's/hernias: no   Personal or Family history of Prostate problems: no  Sexually active: YES    Precipitating factors:   no    Alleviating factors:  no    He had similar pain 2 years ago with normal labs and a normal scrotal ultrasound. The current pain has been occurring for over 1 month with aching upon walking and tenderness to palpation along with swelling. There is no redness or penile discharge. No urinary changes and no new sexua partners. He has mild associated lower abdominal discomfort.      Triage note from 09/10: \"Right testicular pain and swelling for 3 weeks. Occurred previously after vasectomy, due tot a duct blockage. Denies discoloration, fever, change in urination stream, pain with urination.   Onset/duration:  3 weeks  Precip. factors:  Unknown   Associated symptoms:  Swollen right testicle and tissue around it.\"    Problem list and histories reviewed & adjusted, as indicated.  Additional history: none    ROS:  GENERAL: Denies fever, fatigue, weakness, weight gain, or weight loss.  CARDIOVASCULAR: Denies chest pain, shortness of breath, irregular heartbeats,  palpitations, or edema.  RESPIRATORY: Denies cough, " hemoptysis, and shortness of breath.  GASTROINTESTINAL: Denies nausea, vomiting, change in appetite, abdominal pain, diarrhea, or constipation.  GENITOURINARY: Denies increased frequency, urgency, dysuria, hematuria, or incontinence.     OBJECTIVE:     /86  Pulse 102  Temp 97.8  F (36.6  C) (Temporal)  Resp 16  Wt 219 lb (99.3 kg)  SpO2 96%  BMI 30.98 kg/m2  Body mass index is 30.98 kg/(m^2).  GENERAL: healthy, alert and no distress  RESP: lungs clear to auscultation - no rales, rhonchi or wheezes  CV: regular rate and rhythm, normal S1 S2, no S3 or S4, no murmur, click or rub  ABDOMEN: soft, nontender, no hepatosplenomegaly, no masses and bowel sounds normal   (male): No genital lesions or urethral discharge. Tenderness and swelling along the right epididymis. No erythema. Normal cremasteric reflex.    ASSESSMENT/PLAN:       ICD-10-CM    1. Epididymitis N45.1 levofloxacin (LEVAQUIN) 500 MG tablet   2. Subclinical hypothyroidism E03.9 levothyroxine (SYNTHROID/LEVOTHROID) 125 MCG tablet         Findings and symptoms are consistent with epididymitis.  Will treat with a 10 day course of Levaquin and I recommend a probiotic while on this.  I recommend NSAIDs/Tylenol as needed for pain along with keeping the scrotum elevated with briefs. He can also try cool compresses or ice.  I recommend avoiding intercourse until finished with the antibiotics.  If symptoms are worsening or not improving, he will contact the clinic.    Follow up in November for an annual physical with updated fasting labs.      Nik Nina PA-C  [facility]

## 2018-09-11 NOTE — TELEPHONE ENCOUNTER
Left message for patient to return call to clinic.  Please inform of message below and help schedule an appointment with labs.    Rhonda Gary, [facility]  September 11, 2018

## 2018-09-11 NOTE — TELEPHONE ENCOUNTER
Patient is currently in the [facility] and informed the RN of this after recommendations to be seen today were discussed.     Linden Cruz is a 48 year old male who calls with right testicular pain.    NURSING ASSESSMENT:  Description:  Right testicular pain and swelling for 3 weeks. Occurred previously after vasectomy, due tot a duct blockage. Denies discoloration, fever, change in urination stream, pain with urination.   Onset/duration:  3 weeks  Precip. factors:  Unknown   Associated symptoms:  Swollen right testicle and tissue around it   Improves/worsens symptoms:  worseing  Pain scale (0-10)   4/10, dull ache  Last exam/Treatment:  10/13/2017  Allergies:   Allergies   Allergen Reactions     No Known Drug Allergies      NURSING PLAN: Nursing advice to patient to be seen within 2-4 hours    RECOMMENDED DISPOSITION:  See in 2-4 hours - right testicular pain   Will comply with recommendation: Yes  If further questions/concerns or if symptoms do not improve, worsen or new symptoms develop, call your PCP or [facility] as soon as possible.    NOTES:  Disposition was determined by the first positive assessment question, therefore all previous assessment questions were negative    Guideline used:  Telephone Triage Protocols for Nurses, Fifth Edition, Mahnaz Rodgers  Genital Problems, Male  Nursing Judgment    Chrissy Fountain, RN, BSN

## 2018-09-14 ENCOUNTER — OFFICE VISIT (OUTPATIENT)
Dept: FAMILY MEDICINE | Facility: OTHER | Age: 48
End: 2018-09-14
Payer: COMMERCIAL

## 2018-09-14 VITALS
SYSTOLIC BLOOD PRESSURE: 124 MMHG | RESPIRATION RATE: 16 BRPM | TEMPERATURE: 97.8 F | BODY MASS INDEX: 30.98 KG/M2 | HEART RATE: 102 BPM | DIASTOLIC BLOOD PRESSURE: 86 MMHG | WEIGHT: 219 LBS | OXYGEN SATURATION: 96 %

## 2018-09-14 DIAGNOSIS — E03.8 SUBCLINICAL HYPOTHYROIDISM: ICD-10-CM

## 2018-09-14 DIAGNOSIS — N45.1 EPIDIDYMITIS: Primary | ICD-10-CM

## 2018-09-14 PROCEDURE — 99213 OFFICE O/P EST LOW 20 MIN: CPT | Performed by: PHYSICIAN ASSISTANT

## 2018-09-14 RX ORDER — LEVOTHYROXINE SODIUM 125 UG/1
125 TABLET ORAL DAILY
Qty: 90 TABLET | Refills: 0 | Status: SHIPPED | OUTPATIENT
Start: 2018-09-14 | End: 2019-01-03

## 2018-09-14 RX ORDER — LEVOFLOXACIN 500 MG/1
500 TABLET, FILM COATED ORAL DAILY
Qty: 10 TABLET | Refills: 0 | Status: SHIPPED | OUTPATIENT
Start: 2018-09-14 | End: 2018-09-24

## 2018-09-14 ASSESSMENT — PAIN SCALES - GENERAL: PAINLEVEL: MILD PAIN (3)

## 2018-09-14 NOTE — NURSING NOTE
"Chief Complaint   Patient presents with     Testicular/scrotal Pain     Panel Management     flu, tdap, hiv       Initial /86  Pulse 102  Temp 97.8  F (36.6  C) (Temporal)  Resp 16  Wt 219 lb (99.3 kg)  SpO2 96%  BMI 30.98 kg/m2 Estimated body mass index is 30.98 kg/(m^2) as calculated from the following:    Height as of 6/7/18: 5' 10.5\" (1.791 m).    Weight as of this encounter: 219 lb (99.3 kg).  Medication Reconciliation: complete    Precious Cruz CMA      "

## 2018-09-14 NOTE — MR AVS SNAPSHOT
After Visit Summary   9/14/2018    Linden Cruz    MRN: [id number]           Patient Information     Date Of Birth          1970        Visit Information        Provider Department      9/14/2018 1:00 PM Nik Nina PA-C [facility]        Today's Diagnoses     Epididymitis    -  1    Subclinical hypothyroidism          Care Instructions    Your symptoms are consistent with epididymitis which is an infection of the epididymis behind the scrotum.  Will prescribe Levaquin to take daily for 10 days.  Keep the scrotum elevated and can use ice, ibuprofen and Tylenol as needed.  If not improving or you develops any fevers, let me know.      Follow up in 2 months for an annual physical.     Epididymitis  Inflammation of the epididymis can cause pain and swelling in your scrotum. The epididymis is a small tube next to the testicle that stores sperm. Epididymitis is usually caused by an infection. In sexually active men, it is often caused by a sexually transmitted disease (STD) such as chlamydia or gonorrhea. In boys and in men over 40, it can be from bacteria from other parts of the urinary tract (not an STD infection).  Symptoms may begin with pain in the lower belly (abdomen) or low back. The pain then spreads down into the scrotum. Usually only one side is affected. The testicle and scrotum swell and become very painful and red. You may have fever and a burning when passing urine. Sometimes you may have a discharge from the penis.  Treatment is with antibiotics, and anti-inflammatory and pain medicines. The condition should get better over the first few days of treatment. But it will take several weeks for all the swelling and discomfort to go away. If your healthcare provider suspects that an STD is the cause, your sexual partners may need to be treated.  Home care  The following will help you care for yourself at home:    Support the scrotum. When lying down, place a  rolled towel under the scrotum. When walking, use an athletic supporter or 2 pairs of jockey-style underwear.    To relieve pain, put ice packs on the inflamed area. You can make your own ice pack by putting ice cubes in a sealed plastic bag wrapped in a thin towel.    You may use over-the-counter medicines to control pain, unless another medicine was given. If you have chronic liver or kidney disease, talk with your healthcare provider before taking these medicines. Also talk with your provider if you've ever had a stomach ulcer or GI bleeding.    Rest in bed for the first few days until the fever, pain, and swelling get better. It may take several weeks for all of the swelling to go away.    Constipation can make you strain. This makes the pain worse. Avoid constipation by eating natural laxatives such as prunes, fresh fruits, and whole-grain cereals. If necessary, use a mild over-the-counter laxative for constipation. Mineral oil can be used to keep the stools soft.    Do not have sex until you have finished all treatment and all symptoms have cleared.    Take all medicine as directed. Do not miss any doses and do not stop early even if you feel better.  Follow-up care  Follow up with your healthcare provider, or as advised, to be sure you are responding properly to treatment. If a culture was taken, you may call for the result as directed. A culture test can ensure that you are on the correct antibiotic.   When to seek medical advice  Call your healthcare provider right away if any of these occur:    Fever of 100.4 F (38 C), or as directed by your healthcare provider    Increasing pain or swelling of the testicle after starting treatment    Pressure or pain in your bladder that gets worse    Unable to pass urine for 8 hours  Date Last Reviewed: 9/1/2016 2000-2017 [employer]. [address] [place] [address], [place] [address]. All rights reserved. This information is not intended as a substitute for  professional medical care. Always follow your healthcare professional's instructions.                Follow-ups after your visit        Your next 10 appointments already scheduled     Sep 24, 2018 10:00 AM CDT   Return Visit with Piyush Hernandez   [facility] ([address] [id number] [phone]            Sep 24, 2018 10:30 AM CDT   New Visit with Savage Mejia MD   [facility] ([address] [id number] [phone]            Nov 19, 2018  8:00 AM CST   Return Visit with Piyush Hernandez   [facility] ([address] [id number] [phone]              Who to contact     If you have questions or need follow up information about today's clinic visit or your schedule please contact [facility] directly at [phone].  Normal or non-critical lab and imaging results will be communicated to you by [employer], letter or phone within 4 business days after the clinic has received the results. If you do not hear from us within 7 days, please contact the clinic through [employer] or phone. If you have a critical or abnormal lab result, we will notify you by phone as soon as possible.  Submit refill requests through [employer] or call your pharmacy and they will forward the refill request to us. Please allow 3 business days for your refill to be completed.          Additional Information About Your Visit        [employer] Information     [employer] gives you secure access to your electronic health record. If you see a primary care provider, you can also send messages to your care team and make appointments. If you have questions, please call your primary care clinic.  If you do not have a primary care provider, please call [phone] and they will assist you.        Care EveryWhere ID     This  is your Care EveryWhere ID. This could be used by other organizations to access your [facility] medical records  [id number]        Your Vitals Were     Pulse Temperature Respirations Pulse Oximetry BMI (Body Mass Index)       102 97.8  F (36.6  C) (Temporal) 16 96% 30.98 kg/m2        Blood Pressure from Last 3 Encounters:   09/14/18 124/86   06/20/18 132/78   06/07/18 114/80    Weight from Last 3 Encounters:   09/14/18 219 lb (99.3 kg)   06/20/18 215 lb 12 oz (97.9 kg)   06/07/18 212 lb (96.2 kg)              Today, you had the following     No orders found for display         Today's Medication Changes          These changes are accurate as of 9/14/18  1:34 PM.  If you have any questions, ask your nurse or doctor.               Start taking these medicines.        Dose/Directions    levofloxacin 500 MG tablet   Commonly known as:  LEVAQUIN   Used for:  Epididymitis   Started by:  Nik Nina PA-C        Dose:  500 mg   Take 1 tablet (500 mg) by mouth daily   Quantity:  10 tablet   Refills:  0         These medicines have changed or have updated prescriptions.        Dose/Directions    fluticasone 50 MCG/ACT spray   Commonly known as:  FLONASE   This may have changed:  Another medication with the same name was removed. Continue taking this medication, and follow the directions you see here.   Used for:  Hypertrophy, nasal, turbinate   Changed by:  Nik Nina PA-C        Dose:  1-2 spray   Spray 1-2 sprays into both nostrils daily   Quantity:  1 Bottle   Refills:  3       levothyroxine 125 MCG tablet   Commonly known as:  SYNTHROID/LEVOTHROID   This may have changed:  See the new instructions.   Used for:  Subclinical hypothyroidism   Changed by:  Nik Nina PA-C        Dose:  125 mcg   Take 1 tablet (125 mcg) by mouth daily   Quantity:  90 tablet   Refills:  0       topiramate 50 MG tablet   Commonly known as:  TOPAMAX   This may have changed:  Another medication with the same name  was removed. Continue taking this medication, and follow the directions you see here.   Used for:  Migraine with aura and without status migrainosus, not intractable   Changed by:  Nik Nina PA-C        TAKE 1 TABLET BY MOUTH TWICE A DAY   Quantity:  60 tablet   Refills:  4         Stop taking these medicines if you haven't already. Please contact your care team if you have questions.     oxyCODONE-acetaminophen 5-325 MG per tablet   Commonly known as:  PERCOCET   Stopped by:  Nik Nina PA-C                Where to get your medicines      These medications were sent to [facility] [address] [facility] - [place], [place] - [address]  [address], [facility] [address]     Phone:  [phone]     levofloxacin 500 MG tablet    levothyroxine 125 MCG tablet                Primary Care Provider Office Phone # Fax #    Nik Nina PA-C [phone]       [address] [facility] [address]  [facility] [address]        Equal Access to Services     [facility]: Hadii aad ku hadasho Soomaali, waaxda luqadaha, qaybta kaalmada adeegyada, waxay idiin hayaan richard shaw . So [facility] [phone].    ATENCIÓN: Si habla español, tiene a krishnamurthy disposición servicios gratuitos de asistencia lingüística. [facility] [phone].    We comply with applicable federal civil rights laws and [place] laws. We do not discriminate on the basis of race, color, national origin, age, disability, sex, sexual orientation, or gender identity.            Thank you!     Thank you for choosing [facility]  for your care. Our goal is always to provide you with excellent care. Hearing back from our patients is one way we can continue to improve our services. Please take a few minutes to complete the written survey that you may receive in the mail after your visit with us. Thank you!             Your Updated Medication List - Protect others around you: Learn how to safely use, store and throw away your medicines at  www.disposemymeds.org.          This list is accurate as of 9/14/18  1:34 PM.  Always use your most recent med list.                   Brand Name Dispense Instructions for use Diagnosis    ADVIL PO           fluticasone 50 MCG/ACT spray    FLONASE    1 Bottle    Spray 1-2 sprays into both nostrils daily    Hypertrophy, nasal, turbinate       gemfibrozil 600 MG tablet    LOPID    180 tablet    TAKE 1 TABLET (600 MG) BY MOUTH 2 TIMES DAILY    Hypertriglyceridemia       levofloxacin 500 MG tablet    LEVAQUIN    10 tablet    Take 1 tablet (500 mg) by mouth daily    Epididymitis       levothyroxine 125 MCG tablet    SYNTHROID/LEVOTHROID    90 tablet    Take 1 tablet (125 mcg) by mouth daily    Subclinical hypothyroidism       MULTI VITAMIN MENS PO      Take  by mouth.        omeprazole 40 MG capsule    priLOSEC    90 capsule    TAKE ONE CAPSULE BY MOUTH EVERY DAY    Gastroesophageal reflux disease without esophagitis       SUMAtriptan 100 MG tablet    IMITREX    18 tablet    TAKE 1 TAB BY MOUTH WITH ONSET OF MIGRAINE, MAY REPEAT ONCE AFTER 2 HOURS. MAX 2 TABS/24 HOURS.    Migraine with aura and without status migrainosus, not intractable       tadalafil 20 MG tablet    CIALIS    8 tablet    Take 0.5-1 tablets as needed for erectile dysfunction. Never use with nitroglycerin, terazosin or doxazosin.    Erectile dysfunction, unspecified erectile dysfunction type       topiramate 50 MG tablet    TOPAMAX    60 tablet    TAKE 1 TABLET BY MOUTH TWICE A DAY    Migraine with aura and without status migrainosus, not intractable

## 2018-09-14 NOTE — PATIENT INSTRUCTIONS
Your symptoms are consistent with epididymitis which is an infection of the epididymis behind the scrotum.  Will prescribe Levaquin to take daily for 10 days.  Keep the scrotum elevated and can use ice, ibuprofen and Tylenol as needed.  If not improving or you develops any fevers, let me know.      Follow up in 2 months for an annual physical.     Epididymitis  Inflammation of the epididymis can cause pain and swelling in your scrotum. The epididymis is a small tube next to the testicle that stores sperm. Epididymitis is usually caused by an infection. In sexually active men, it is often caused by a sexually transmitted disease (STD) such as chlamydia or gonorrhea. In boys and in men over 40, it can be from bacteria from other parts of the urinary tract (not an STD infection).  Symptoms may begin with pain in the lower belly (abdomen) or low back. The pain then spreads down into the scrotum. Usually only one side is affected. The testicle and scrotum swell and become very painful and red. You may have fever and a burning when passing urine. Sometimes you may have a discharge from the penis.  Treatment is with antibiotics, and anti-inflammatory and pain medicines. The condition should get better over the first few days of treatment. But it will take several weeks for all the swelling and discomfort to go away. If your healthcare provider suspects that an STD is the cause, your sexual partners may need to be treated.  Home care  The following will help you care for yourself at home:    Support the scrotum. When lying down, place a rolled towel under the scrotum. When walking, use an athletic supporter or 2 pairs of jockey-style underwear.    To relieve pain, put ice packs on the inflamed area. You can make your own ice pack by putting ice cubes in a sealed plastic bag wrapped in a thin towel.    You may use over-the-counter medicines to control pain, unless another medicine was given. If you have chronic liver or  kidney disease, talk with your healthcare provider before taking these medicines. Also talk with your provider if you've ever had a stomach ulcer or GI bleeding.    Rest in bed for the first few days until the fever, pain, and swelling get better. It may take several weeks for all of the swelling to go away.    Constipation can make you strain. This makes the pain worse. Avoid constipation by eating natural laxatives such as prunes, fresh fruits, and whole-grain cereals. If necessary, use a mild over-the-counter laxative for constipation. Mineral oil can be used to keep the stools soft.    Do not have sex until you have finished all treatment and all symptoms have cleared.    Take all medicine as directed. Do not miss any doses and do not stop early even if you feel better.  Follow-up care  Follow up with your healthcare provider, or as advised, to be sure you are responding properly to treatment. If a culture was taken, you may call for the result as directed. A culture test can ensure that you are on the correct antibiotic.   When to seek medical advice  Call your healthcare provider right away if any of these occur:    Fever of 100.4 F (38 C), or as directed by your healthcare provider    Increasing pain or swelling of the testicle after starting treatment    Pressure or pain in your bladder that gets worse    Unable to pass urine for 8 hours  Date Last Reviewed: 9/1/2016 2000-2017 [employer]. [address] [place] [address], [place] [address]. All rights reserved. This information is not intended as a substitute for professional medical care. Always follow your healthcare professional's instructions.

## 2018-09-21 DIAGNOSIS — G43.109 MIGRAINE WITH AURA AND WITHOUT STATUS MIGRAINOSUS, NOT INTRACTABLE: ICD-10-CM

## 2018-09-21 RX ORDER — SUMATRIPTAN 100 MG/1
TABLET, FILM COATED ORAL
Qty: 18 TABLET | Refills: 5 | Status: SHIPPED | OUTPATIENT
Start: 2018-09-21 | End: 2019-01-11

## 2018-09-21 NOTE — TELEPHONE ENCOUNTER
"Requested Prescriptions   Pending Prescriptions Disp Refills     SUMAtriptan (IMITREX) 100 MG tablet [Pharmacy Med Name: SUMATRIPTAN SUCC 100 MG TABLET] 18 tablet 5     Sig: TAKE 1 TAB BY MOUTH WITH ONSET OF MIGRAINE, MAY REPEAT ONCE AFTER 2 HOURS. MAX 2 TABS/24 HOURS.    Serotonin Agonists Failed    9/21/2018 10:03 AM       Failed - Serotonin Agonist request needs review.    Please review patient's record. If patient has had 8 or more treatments in the past month,  please forward to provider.         Passed - Blood pressure under 140/90 in past 12 months    BP Readings from Last 3 Encounters:   09/14/18 124/86   06/20/18 132/78   06/07/18 114/80                Passed - Recent (12 mo) or future (30 days) visit within the authorizing provider's specialty    Patient had office visit in the last 12 months or has a visit in the next 30 days with authorizing provider or within the authorizing provider's specialty.  See \"Patient Info\" tab in inbasket, or \"Choose Columns\" in Meds & Orders section of the refill encounter.           Passed - Patient is age 18 or older          Prescription approved per [facility] [place].    Alonso Camacho, RN, BSN      "

## 2018-09-21 NOTE — PROGRESS NOTES
History of Present Illness - Linden Cruz is a 48 year old male presenting in clinic today for a recheck on Patient presents with:  Ear Tube Follow Up    Patient reports that he seems to be hearing better after a triune tube on the left and a normal PE-tube on the right. Patient still has a slight plugging sensation in his ears.       Body mass index is 30.98 kg/(m^2).    Weight management plan: Patient was referred to their PCP to discuss a diet and exercise plan.    BP Readings from Last 1 Encounters:   09/14/18 124/86       Patient declined BP in clinic today    Linden IS NOT a smoker/uses chewing tobacco.  Linden already quit      Past Medical History -   Past Medical History:   Diagnosis Date     Chest pain      Esophageal reflux      Migraines      Mixed hyperlipidemia      DANIEL (obstructive sleep apnea)      Other specified gastritis without mention of hemorrhage     H. Pylori, diag 12/06       Current Medications -   Current Outpatient Prescriptions:      fluticasone (FLONASE) 50 MCG/ACT spray, Spray 1-2 sprays into both nostrils daily, Disp: 1 Bottle, Rfl: 3     gemfibrozil (LOPID) 600 MG tablet, TAKE 1 TABLET (600 MG) BY MOUTH 2 TIMES DAILY, Disp: 180 tablet, Rfl: 0     Ibuprofen (ADVIL PO), , Disp: , Rfl:      levothyroxine (SYNTHROID/LEVOTHROID) 125 MCG tablet, Take 1 tablet (125 mcg) by mouth daily, Disp: 90 tablet, Rfl: 0     Multiple Vitamin (MULTI VITAMIN MENS PO), Take  by mouth., Disp: , Rfl:      omeprazole (PRILOSEC) 40 MG capsule, TAKE ONE CAPSULE BY MOUTH EVERY DAY, Disp: 90 capsule, Rfl: 2     SUMAtriptan (IMITREX) 100 MG tablet, TAKE 1 TAB BY MOUTH WITH ONSET OF MIGRAINE, MAY REPEAT ONCE AFTER 2 HOURS. MAX 2 TABS/24 HOURS., Disp: 18 tablet, Rfl: 5     tadalafil (CIALIS) 20 MG tablet, Take 0.5-1 tablets as needed for erectile dysfunction. Never use with nitroglycerin, terazosin or doxazosin., Disp: 8 tablet, Rfl: 10     topiramate (TOPAMAX) 50 MG tablet, TAKE 1 TABLET BY MOUTH TWICE A DAY,  Disp: 60 tablet, Rfl: 4    Allergies -   Allergies   Allergen Reactions     No Known Drug Allergies        Social History -   Social History     Social History     Marital status:      Spouse name: N/A     Number of children: 0     Years of education: N/A     Occupational History      [employer] construction     Social History Main Topics     Smoking status: Former Smoker     Types: Cigarettes     Quit date: 1/1/2000     Smokeless tobacco: Never Used     Alcohol use 0.0 oz/week     0 Standard drinks or equivalent per week      Comment: 1/month     Drug use: No     Sexual activity: Yes     Partners: Female     Birth control/ protection: Surgical      Comment: vasectomy     Other Topics Concern     Parent/Sibling W/ Cabg, Mi Or Angioplasty Before 65f 55m? No     Caffeine Concern Yes     large intake per day     Special Diet Yes     low cholesterol      Exercise No     Social History Narrative    Dairy/d 2-3 servings/d.     Caffeine 2-3 servings/d    Exercise 1-2 x week    Sunscreen used - NO, uses sunscreen in summer months    Seatbelts used - YES    Working smoke/CO detectors in the home - YES    Guns stored in the home - YES    Self Breast Exams - NA    Self Testicular Exam - YES    Eye Exam up to date - YES    Dental Exam up to date - YES    Pap Smear up to date - NA    Mammogram up to date - NA    PSA up to date - NA    Dexa Scan up to date - NA    Flex Sig / Colonoscopy up to date - NA    Immunizations up to date - YES    Abuse: Current or Past(Physical, Sexual or Emotional)- NO    Do you feel safe in your environment - YES    Claudia Salamanca [facility]  1/29/2009               Family History -   Family History   Problem Relation Age of Onset     Diabetes Maternal Grandmother      C.A.D. Maternal Grandfather      Diabetes Maternal Grandfather      Cancer Maternal Grandfather      lung cancer     Cerebrovascular Disease Paternal Grandmother      C.A.D. Paternal Grandfather      Other - See Comments  Mother      bilateral carotid occlusion, SVG bypass to both.     Asthma No family hx of      Hypertension No family hx of      Breast Cancer No family hx of      Cancer - colorectal No family hx of      Prostate Cancer No family hx of      Alcohol/Drug No family hx of      Allergies No family hx of      Alzheimer Disease No family hx of      Anesthesia Reaction No family hx of      Arthritis No family hx of      Blood Disease No family hx of      Cardiovascular No family hx of      Circulatory No family hx of      Congenital Anomalies No family hx of      Connective Tissue Disorder No family hx of      Depression No family hx of      Endocrine Disease No family hx of      Eye Disorder No family hx of      Genetic Disorder No family hx of      GASTROINTESTINAL DISEASE No family hx of      Genitourinary Problems No family hx of      Gynecology No family hx of      HEART DISEASE No family hx of      Lipids No family hx of      Musculoskeletal Disorder No family hx of      Neurologic Disorder No family hx of      Obesity No family hx of      Osteoporosis No family hx of      Psychotic Disorder No family hx of      Respiratory No family hx of      Thyroid Disease No family hx of      Hearing Loss No family hx of      Coronary Artery Disease No family hx of      Mental Illness No family hx of      Depression/Anxiety No family hx of      Known Genetic Syndrome No family hx of        Review of Systems - As per HPI and PMHx, otherwise review of system review of the head and neck negative.    Physical Exam  Pulse 89  Wt 99.3 kg (219 lb)  SpO2 97%  BMI 30.98 kg/m2  BMI: Body mass index is 30.98 kg/(m^2).    General - The patient is well nourished and well developed, and appears to have good nutritional status.  Alert and oriented to person and place, answers questions and cooperates with examination appropriately.    SKIN - No suspicious lesions or rashes.  Respiration - No respiratory distress.  Head and Face -  Normocephalic and atraumatic, with no gross asymmetry noted of the contour of the facial features.  The facial nerve is intact, with strong symmetric movements.    Voice and Breathing - The patient was breathing comfortably without the use of accessory muscles. The patients voice was clear and strong, and had appropriate pitch and quality.    Ears - Bilateral pinna and EACs with normal appearing overlying skin. Tympanic membrane intact with good mobility on pneumatic otoscopy bilaterally. Bony landmarks of the ossicular chain are normal. The tympanic membranes are normal in appearance. No retraction, perforation, or masses.  No fluid or purulence was seen in the external canal or the middle ear.     Eyes - Extraocular movements intact.  Sclera were not icteric or injected, conjunctiva were pink and moist.    Mouth - Examination of the oral cavity showed pink, healthy oral mucosa. No lesions or ulcerations noted.  The tongue was mobile and midline, and the dentition were in good condition.      Throat - The walls of the oropharynx were smooth, pink, moist, symmetric, and had no lesions or ulcerations.  The tonsillar pillars and soft palate were symmetric. The uvula was midline on elevation.    Neck - Normal midline excursion of the laryngotracheal complex during swallowing.  Full range of motion on passive movement.  Palpation of the occipital, submental, submandibular, internal jugular chain, and supraclavicular nodes did not demonstrate any abnormal lymph nodes or masses.  The carotid pulse was palpable bilaterally.  Palpation of the thyroid was soft and smooth, with no nodules or goiter appreciated.  The trachea was mobile and midline.    Nose - External contour is symmetric, no gross deflection or scars.  Nasal mucosa is pink and moist with no abnormal mucus.  The septum was midline and non-obstructive, turbinates of normal size and position.  No polyps, masses, or purulence noted on examination.    Neuro -  Nonfocal neuro exam is normal, CN 2 through 12 intact, normal gait and muscle tone.      Performed in clinic today:  Audiologic Studies - An audiogram and tympanogram were performed today as part of the evaluation and personally reviewed. The tympanogram shows Type B curves on the right and Type B curves on the left, with high canal volumes and middle ear pressures.  The audiogram showed mild high frequency hearing loss on the right and mild to moderate high frequency hearing loss on the left.      Hearing on the right has significantly improved at low frequencies since last visit with complete closure of AB  Gap.    A/P - Linden Cruz is a 48 year old male Patient presents with:  Ear Tube Follow Up    Patient is doing well and his hearing has improved remarkably. I discussed water precautions.     Linden should follow up in 6 months.      At Linden next appointment they will need a hearing test.      This document serves as a record of the services and decisions personally performed and made by Dr. Savage Mejia MD. It was created on his behalf by Yuliana Holloway, a trained medical scribe. The creation of this document is based the provider's statements to the medical scribe.  Yuliana Holloway 9/24/2018    Provider:   The information in this document, created by the medical scribe for me, accurately reflects the services I personally performed and the decisions made by me. I have reviewed and approved this document for accuracy prior to leaving the patient care area.  Dr. Savage Mejia MD 9/24/2018    Savage Mejia MD.

## 2018-09-24 ENCOUNTER — OFFICE VISIT (OUTPATIENT)
Dept: OTOLARYNGOLOGY | Facility: CLINIC | Age: 48
End: 2018-09-24
Payer: COMMERCIAL

## 2018-09-24 ENCOUNTER — ALLIED HEALTH/NURSE VISIT (OUTPATIENT)
Dept: FAMILY MEDICINE | Facility: OTHER | Age: 48
End: 2018-09-24
Payer: COMMERCIAL

## 2018-09-24 ENCOUNTER — OFFICE VISIT (OUTPATIENT)
Dept: AUDIOLOGY | Facility: CLINIC | Age: 48
End: 2018-09-24
Payer: COMMERCIAL

## 2018-09-24 VITALS — BODY MASS INDEX: 30.98 KG/M2 | HEART RATE: 89 BPM | OXYGEN SATURATION: 97 % | WEIGHT: 219 LBS

## 2018-09-24 DIAGNOSIS — H69.93 EUSTACHIAN TUBE DYSFUNCTION, BILATERAL: Primary | ICD-10-CM

## 2018-09-24 DIAGNOSIS — H90.72 MIXED CONDUCTIVE AND SENSORINEURAL HEARING LOSS OF LEFT EAR WITH UNRESTRICTED HEARING OF RIGHT EAR: ICD-10-CM

## 2018-09-24 DIAGNOSIS — Z96.22 S/P MYRINGOTOMY WITH INSERTION OF TUBE: Primary | ICD-10-CM

## 2018-09-24 DIAGNOSIS — Z23 NEED FOR PROPHYLACTIC VACCINATION AND INOCULATION AGAINST INFLUENZA: Primary | ICD-10-CM

## 2018-09-24 PROCEDURE — 90686 IIV4 VACC NO PRSV 0.5 ML IM: CPT

## 2018-09-24 PROCEDURE — 90471 IMMUNIZATION ADMIN: CPT

## 2018-09-24 PROCEDURE — 92557 COMPREHENSIVE HEARING TEST: CPT | Performed by: AUDIOLOGIST

## 2018-09-24 PROCEDURE — 99207 ZZC NO CHARGE LOS: CPT | Performed by: AUDIOLOGIST

## 2018-09-24 PROCEDURE — 99213 OFFICE O/P EST LOW 20 MIN: CPT | Performed by: OTOLARYNGOLOGY

## 2018-09-24 PROCEDURE — 99207 ZZC NO CHARGE NURSE ONLY: CPT

## 2018-09-24 PROCEDURE — 92567 TYMPANOMETRY: CPT | Performed by: AUDIOLOGIST

## 2018-09-24 NOTE — PROGRESS NOTES
Injectable Influenza Immunization Documentation    1.  Is the person to be vaccinated sick today?   No    2. Does the person to be vaccinated have an allergy to a component   of the vaccine?   No  Egg Allergy Algorithm Link    3. Has the person to be vaccinated ever had a serious reaction   to influenza vaccine in the past?   No    4. Has the person to be vaccinated ever had Guillain-Barré syndrome?   No    Form completed by Emma Santiago CMA     Prior to injection verified patient identity using patient's name and date of birth.  Due to injection administration, patient instructed to remain in clinic for 15 minutes  afterwards, and to report any adverse reaction to me immediately.

## 2018-09-24 NOTE — LETTER
9/24/2018         RE: Linden Cruz  [id number] [id number]        Dear Colleague,    Thank you for referring your patient, Linden Cruz, to the [facility]. Please see a copy of my visit note below.    History of Present Illness - Linden Cruz is a 48 year old male presenting in clinic today for a recheck on Patient presents with:  Ear Tube Follow Up    Patient reports that he seems to be hearing better after a triune tube on the left and a normal PE-tube on the right. Patient still has a slight plugging sensation in his ears.       Body mass index is 30.98 kg/(m^2).    Weight management plan: Patient was referred to their PCP to discuss a diet and exercise plan.    BP Readings from Last 1 Encounters:   09/14/18 124/86       Patient declined BP in clinic today    Linden IS NOT a smoker/uses chewing tobacco.  Linden already quit      Past Medical History -   Past Medical History:   Diagnosis Date     Chest pain      Esophageal reflux      Migraines      Mixed hyperlipidemia      DANIEL (obstructive sleep apnea)      Other specified gastritis without mention of hemorrhage     H. Pylori, diag 12/06       Current Medications -   Current Outpatient Prescriptions:      fluticasone (FLONASE) 50 MCG/ACT spray, Spray 1-2 sprays into both nostrils daily, Disp: 1 Bottle, Rfl: 3     gemfibrozil (LOPID) 600 MG tablet, TAKE 1 TABLET (600 MG) BY MOUTH 2 TIMES DAILY, Disp: 180 tablet, Rfl: 0     Ibuprofen (ADVIL PO), , Disp: , Rfl:      levothyroxine (SYNTHROID/LEVOTHROID) 125 MCG tablet, Take 1 tablet (125 mcg) by mouth daily, Disp: 90 tablet, Rfl: 0     Multiple Vitamin (MULTI VITAMIN MENS PO), Take  by mouth., Disp: , Rfl:      omeprazole (PRILOSEC) 40 MG capsule, TAKE ONE CAPSULE BY MOUTH EVERY DAY, Disp: 90 capsule, Rfl: 2     SUMAtriptan (IMITREX) 100 MG tablet, TAKE 1 TAB BY MOUTH WITH ONSET OF MIGRAINE, MAY REPEAT ONCE AFTER 2 HOURS. MAX 2 TABS/24 HOURS., Disp: 18 tablet,  Rfl: 5     tadalafil (CIALIS) 20 MG tablet, Take 0.5-1 tablets as needed for erectile dysfunction. Never use with nitroglycerin, terazosin or doxazosin., Disp: 8 tablet, Rfl: 10     topiramate (TOPAMAX) 50 MG tablet, TAKE 1 TABLET BY MOUTH TWICE A DAY, Disp: 60 tablet, Rfl: 4    Allergies -   Allergies   Allergen Reactions     No Known Drug Allergies        Social History -   Social History     Social History     Marital status:      Spouse name: N/A     Number of children: 0     Years of education: N/A     Occupational History      [employer]     road construction     Social History Main Topics     Smoking status: Former Smoker     Types: Cigarettes     Quit date: 1/1/2000     Smokeless tobacco: Never Used     Alcohol use 0.0 oz/week     0 Standard drinks or equivalent per week      Comment: 1/month     Drug use: No     Sexual activity: Yes     Partners: Female     Birth control/ protection: Surgical      Comment: vasectomy     Other Topics Concern     Parent/Sibling W/ Cabg, Mi Or Angioplasty Before 65f 55m? No     Caffeine Concern Yes     large intake per day     Special Diet Yes     low cholesterol      Exercise No     Social History Narrative    Dairy/d 2-3 servings/d.     Caffeine 2-3 servings/d    Exercise 1-2 x week    Sunscreen used - NO, uses sunscreen in summer months    Seatbelts used - YES    Working smoke/CO detectors in the home - YES    Guns stored in the home - YES    Self Breast Exams - NA    Self Testicular Exam - YES    Eye Exam up to date - YES    Dental Exam up to date - YES    Pap Smear up to date - NA    Mammogram up to date - NA    PSA up to date - NA    Dexa Scan up to date - NA    Flex Sig / Colonoscopy up to date - NA    Immunizations up to date - YES    Abuse: Current or Past(Physical, Sexual or Emotional)- NO    Do you feel safe in your environment - YES    Claudia Salamanca CMA  1/29/2010               Family History -   Family History   Problem Relation Age of Onset      Diabetes Maternal Grandmother      C.A.D. Maternal Grandfather      Diabetes Maternal Grandfather      Cancer Maternal Grandfather      lung cancer     Cerebrovascular Disease Paternal Grandmother      C.A.D. Paternal Grandfather      Other - See Comments Mother      bilateral carotid occlusion, SVG bypass to both.     Asthma No family hx of      Hypertension No family hx of      Breast Cancer No family hx of      Cancer - colorectal No family hx of      Prostate Cancer No family hx of      Alcohol/Drug No family hx of      Allergies No family hx of      Alzheimer Disease No family hx of      Anesthesia Reaction No family hx of      Arthritis No family hx of      Blood Disease No family hx of      Cardiovascular No family hx of      Circulatory No family hx of      Congenital Anomalies No family hx of      Connective Tissue Disorder No family hx of      Depression No family hx of      Endocrine Disease No family hx of      Eye Disorder No family hx of      Genetic Disorder No family hx of      GASTROINTESTINAL DISEASE No family hx of      Genitourinary Problems No family hx of      Gynecology No family hx of      HEART DISEASE No family hx of      Lipids No family hx of      Musculoskeletal Disorder No family hx of      Neurologic Disorder No family hx of      Obesity No family hx of      Osteoporosis No family hx of      Psychotic Disorder No family hx of      Respiratory No family hx of      Thyroid Disease No family hx of      Hearing Loss No family hx of      Coronary Artery Disease No family hx of      Mental Illness No family hx of      Depression/Anxiety No family hx of      Known Genetic Syndrome No family hx of        Review of Systems - As per HPI and PMHx, otherwise review of system review of the head and neck negative.    Physical Exam  Pulse 89  Wt 99.3 kg (219 lb)  SpO2 97%  BMI 30.98 kg/m2  BMI: Body mass index is 30.98 kg/(m^2).    General - The patient is well nourished and well developed, and  appears to have good nutritional status.  Alert and oriented to person and place, answers questions and cooperates with examination appropriately.    SKIN - No suspicious lesions or rashes.  Respiration - No respiratory distress.  Head and Face - Normocephalic and atraumatic, with no gross asymmetry noted of the contour of the facial features.  The facial nerve is intact, with strong symmetric movements.    Voice and Breathing - The patient was breathing comfortably without the use of accessory muscles. The patients voice was clear and strong, and had appropriate pitch and quality.    Ears - Bilateral pinna and EACs with normal appearing overlying skin. Tympanic membrane intact with good mobility on pneumatic otoscopy bilaterally. Bony landmarks of the ossicular chain are normal. The tympanic membranes are normal in appearance. No retraction, perforation, or masses.  No fluid or purulence was seen in the external canal or the middle ear.     Eyes - Extraocular movements intact.  Sclera were not icteric or injected, conjunctiva were pink and moist.    Mouth - Examination of the oral cavity showed pink, healthy oral mucosa. No lesions or ulcerations noted.  The tongue was mobile and midline, and the dentition were in good condition.      Throat - The walls of the oropharynx were smooth, pink, moist, symmetric, and had no lesions or ulcerations.  The tonsillar pillars and soft palate were symmetric. The uvula was midline on elevation.    Neck - Normal midline excursion of the laryngotracheal complex during swallowing.  Full range of motion on passive movement.  Palpation of the occipital, submental, submandibular, internal jugular chain, and supraclavicular nodes did not demonstrate any abnormal lymph nodes or masses.  The carotid pulse was palpable bilaterally.  Palpation of the thyroid was soft and smooth, with no nodules or goiter appreciated.  The trachea was mobile and midline.    Nose - External contour is  symmetric, no gross deflection or scars.  Nasal mucosa is pink and moist with no abnormal mucus.  The septum was midline and non-obstructive, turbinates of normal size and position.  No polyps, masses, or purulence noted on examination.    Neuro - Nonfocal neuro exam is normal, CN 2 through 12 intact, normal gait and muscle tone.      Performed in clinic today:  Audiologic Studies - An audiogram and tympanogram were performed today as part of the evaluation and personally reviewed. The tympanogram shows Type B curves on the right and Type B curves on the left, with high canal volumes and middle ear pressures.  The audiogram showed mild high frequency hearing loss on the right and mild to moderate high frequency hearing loss on the left.      Hearing on the right has significantly improved at low frequencies since last visit with complete closure of AB  Gap.    A/P - Linden Cruz is a 48 year old male Patient presents with:  Ear Tube Follow Up    Patient is doing well and his hearing has improved remarkably. I discussed water precautions.     Linden should follow up in 6 months.      At Linden next appointment they will need a hearing test.      This document serves as a record of the services and decisions personally performed and made by Dr. Savage Mejia MD. It was created on his behalf by Yuliana Holloway, a trained medical scribe. The creation of this document is based the provider's statements to the medical scribe.  Yuliana Holloway 9/24/2018    Provider:   The information in this document, created by the medical scribe for me, accurately reflects the services I personally performed and the decisions made by me. I have reviewed and approved this document for accuracy prior to leaving the patient care area.  Dr. Savage Mejia MD 9/24/2018    Savage Mejia MD.        Again, thank you for allowing me to participate in the care of your patient.        Sincerely,        Savage Mejia MD, MD

## 2018-09-24 NOTE — MR AVS SNAPSHOT
After Visit Summary   9/24/2018    Linden Cruz    MRN: [id number]           Patient Information     Date Of Birth          1970        Visit Information        Provider Department      9/24/2018 4:00 PM NL FLU SHOT ERC [facility]        Today's Diagnoses     Need for prophylactic vaccination and inoculation against influenza    -  1       Follow-ups after your visit        Your next 10 appointments already scheduled     Mar 25, 2019  8:30 AM CDT   Return Visit with Piyush Hernandez   [facility] ([address] [address] [phone]            Mar 25, 2019  9:00 AM CDT   Return Visit with Savage Mejia MD   [facility] ([address] [address] [phone]              Who to contact     If you have questions or need follow up information about today's clinic visit or your schedule please contact [facility] directly at [phone].  Normal or non-critical lab and imaging results will be communicated to you by MyChart, letter or phone within 4 business days after the clinic has received the results. If you do not hear from us within 7 days, please contact the clinic through OpenHomeshart or phone. If you have a critical or abnormal lab result, we will notify you by phone as soon as possible.  Submit refill requests through rumr: turn off the lights or call your pharmacy and they will forward the refill request to us. Please allow 3 business days for your refill to be completed.          Additional Information About Your Visit        OpenHomeshart Information     rumr: turn off the lights gives you secure access to your electronic health record. If you see a primary care provider, you can also send messages to your care team and make appointments. If you have questions, please call your primary care clinic.  If you do not have a primary care provider, please  call [phone] and they will assist you.        Care EveryWhere ID     This is your Care EveryWhere ID. This could be used by other organizations to access your [place] medical records  [id number]         Blood Pressure from Last 3 Encounters:   09/14/18 124/86   06/20/18 132/78   06/07/18 114/80    Weight from Last 3 Encounters:   09/24/18 219 lb (99.3 kg)   09/14/18 219 lb (99.3 kg)   06/20/18 215 lb 12 oz (97.9 kg)              We Performed the Following     FLU VACCINE, SPLIT VIRUS, IM (QUADRIVALENT) [93276]- >3 YRS     Vaccine Administration, Initial [59338]        Primary Care Provider Office Phone # Fax #    Nik Nina PA-C [phone]       [address] [place] [address] [address]  [facility] [address]        Equal Access to Services     [facility] : Hadii aad ku hadasho Soomaali, waaxda luqadaha, qaybta kaalmada adeegyada, darien lawler haypinkyn richard shaw . So [facility] [phone].    ATENCIÓN: Si habla español, tiene a krishnamurthy disposición servicios gratuitos de asistencia lingüística. Opheliaame al [phone].    We comply with applicable federal civil rights laws and [place] laws. We do not discriminate on the basis of race, color, national origin, age, disability, sex, sexual orientation, or gender identity.            Thank you!     Thank you for choosing [facility]  for your care. Our goal is always to provide you with excellent care. Hearing back from our patients is one way we can continue to improve our services. Please take a few minutes to complete the written survey that you may receive in the mail after your visit with us. Thank you!             Your Updated Medication List - Protect others around you: Learn how to safely use, store and throw away your medicines at www.disposemymeds.org.          This list is accurate as of 9/24/18  4:09 PM.  Always use your most recent med list.                   Brand Name Dispense Instructions for use Diagnosis    ADVIL PO            fluticasone 50 MCG/ACT spray    FLONASE    1 Bottle    Spray 1-2 sprays into both nostrils daily    Hypertrophy, nasal, turbinate       gemfibrozil 600 MG tablet    LOPID    180 tablet    TAKE 1 TABLET (600 MG) BY MOUTH 2 TIMES DAILY    Hypertriglyceridemia       levothyroxine 125 MCG tablet    SYNTHROID/LEVOTHROID    90 tablet    Take 1 tablet (125 mcg) by mouth daily    Subclinical hypothyroidism       MULTI VITAMIN MENS PO      Take  by mouth.        omeprazole 40 MG capsule    priLOSEC    90 capsule    TAKE ONE CAPSULE BY MOUTH EVERY DAY    Gastroesophageal reflux disease without esophagitis       SUMAtriptan 100 MG tablet    IMITREX    18 tablet    TAKE 1 TAB BY MOUTH WITH ONSET OF MIGRAINE, MAY REPEAT ONCE AFTER 2 HOURS. MAX 2 TABS/24 HOURS.    Migraine with aura and without status migrainosus, not intractable       tadalafil 20 MG tablet    CIALIS    8 tablet    Take 0.5-1 tablets as needed for erectile dysfunction. Never use with nitroglycerin, terazosin or doxazosin.    Erectile dysfunction, unspecified erectile dysfunction type       topiramate 50 MG tablet    TOPAMAX    60 tablet    TAKE 1 TABLET BY MOUTH TWICE A DAY    Migraine with aura and without status migrainosus, not intractable

## 2018-09-24 NOTE — MR AVS SNAPSHOT
After Visit Summary   9/24/2018    Linden Cruz    MRN: [id number]           Patient Information     Date Of Birth          1970        Visit Information        Provider Department      9/24/2018 10:30 AM Savage Mejia MD [facility]        Today's Diagnoses     S/P myringotomy with insertion of tube    -  1       Follow-ups after your visit        Additional Services     AUDIOLOGY ADULT REFERRAL                 Your next 10 appointments already scheduled     Sep 24, 2018  4:00 PM CDT   Nurse Only with NL FLU SHOT ERC   [facility]    [address]  [facility] [address]   [phone]            Mar 25, 2019  8:30 AM CDT   Return Visit with Piyush Hernandez   [facility] ([facility])    [address] [place] [address] [phone]            Mar 25, 2019  9:00 AM CDT   Return Visit with Savage Mejia MD   [facility] ([address] [address] [phone]              Who to contact     If you have questions or need follow up information about today's clinic visit or your schedule please contact [facility] directly at [phone].  Normal or non-critical lab and imaging results will be communicated to you by MyChart, letter or phone within 4 business days after the clinic has received the results. If you do not hear from us within 7 days, please contact the clinic through MyChart or phone. If you have a critical or abnormal lab result, we will notify you by phone as soon as possible.  Submit refill requests through Fluid Stone or call your pharmacy and they will forward the refill request to us. Please allow 3 business days for your refill to be completed.          Additional Information About Your Visit        [employer] Information     Fluid Stone gives you secure access to your  electronic health record. If you see a primary care provider, you can also send messages to your care team and make appointments. If you have questions, please call your primary care clinic.  If you do not have a primary care provider, please call [phone] and they will assist you.        Care EveryWhere ID     This is your Care EveryWhere ID. This could be used by other organizations to access your [place] medical records  [id number]        Your Vitals Were     Pulse Pulse Oximetry BMI (Body Mass Index)             89 97% 30.98 kg/m2          Blood Pressure from Last 3 Encounters:   09/14/18 124/86   06/20/18 132/78   06/07/18 114/80    Weight from Last 3 Encounters:   09/24/18 99.3 kg (219 lb)   09/14/18 99.3 kg (219 lb)   06/20/18 97.9 kg (215 lb 12 oz)              We Performed the Following     AUDIOLOGY ADULT REFERRAL        Primary Care Provider Office Phone # Fax #    Nik Nina PA-C [phone]       [address] [place] [address]        Equal Access to Services     [facility]: Hadii aad ku hadasho Somarloali, waaxda luqadaha, qaybta kaalmada brown, darien shaw . So [facility] [phone].    ATENCIÓN: Si habla español, tiene a krishnamurthy disposición servicios gratuitos de asistencia lingüística. Amara al [phone].    We comply with applicable federal civil rights laws and [place] laws. We do not discriminate on the basis of race, color, national origin, age, disability, sex, sexual orientation, or gender identity.            Thank you!     Thank you for choosing [facility]  for your care. Our goal is always to provide you with excellent care. Hearing back from our patients is one way we can continue to improve our services. Please take a few minutes to complete the written survey that you may receive in the mail after your visit with us. Thank you!             Your Updated Medication List - Protect others around you:  Learn how to safely use, store and throw away your medicines at www.disposemymeds.org.          This list is accurate as of 9/24/18 11:03 AM.  Always use your most recent med list.                   Brand Name Dispense Instructions for use Diagnosis    ADVIL PO           fluticasone 50 MCG/ACT spray    FLONASE    1 Bottle    Spray 1-2 sprays into both nostrils daily    Hypertrophy, nasal, turbinate       gemfibrozil 600 MG tablet    LOPID    180 tablet    TAKE 1 TABLET (600 MG) BY MOUTH 2 TIMES DAILY    Hypertriglyceridemia       levothyroxine 125 MCG tablet    SYNTHROID/LEVOTHROID    90 tablet    Take 1 tablet (125 mcg) by mouth daily    Subclinical hypothyroidism       MULTI VITAMIN MENS PO      Take  by mouth.        omeprazole 40 MG capsule    priLOSEC    90 capsule    TAKE ONE CAPSULE BY MOUTH EVERY DAY    Gastroesophageal reflux disease without esophagitis       SUMAtriptan 100 MG tablet    IMITREX    18 tablet    TAKE 1 TAB BY MOUTH WITH ONSET OF MIGRAINE, MAY REPEAT ONCE AFTER 2 HOURS. MAX 2 TABS/24 HOURS.    Migraine with aura and without status migrainosus, not intractable       tadalafil 20 MG tablet    CIALIS    8 tablet    Take 0.5-1 tablets as needed for erectile dysfunction. Never use with nitroglycerin, terazosin or doxazosin.    Erectile dysfunction, unspecified erectile dysfunction type       topiramate 50 MG tablet    TOPAMAX    60 tablet    TAKE 1 TABLET BY MOUTH TWICE A DAY    Migraine with aura and without status migrainosus, not intractable

## 2018-09-24 NOTE — MR AVS SNAPSHOT
After Visit Summary   9/24/2018    Linden Cruz    MRN: [id number]           Patient Information     Date Of Birth          1970        Visit Information        Provider Department      9/24/2018 10:00 AM Ai Valle AuD [facility]        Today's Diagnoses     Eustachian tube dysfunction, bilateral    -  1    Mixed conductive and sensorineural hearing loss of left ear with unrestricted hearing of right ear           Follow-ups after your visit        Your next 10 appointments already scheduled     Sep 24, 2018  4:00 PM CDT   Nurse Only with NL FLU SHOT ERC   [facility]    [address]  [facility] [address]   [phone]            Nov 19, 2018  8:00 AM CST   Return Visit with Piyush Hernandez   [facility]    [address] [id number] [phone]              Who to contact     If you have questions or need follow up information about today's clinic visit or your schedule please contact [facility] directly at [phone].  Normal or non-critical lab and imaging results will be communicated to you by SecureNet Payment Systemshart, letter or phone within 4 business days after the clinic has received the results. If you do not hear from us within 7 days, please contact the clinic through Doormen.t or phone. If you have a critical or abnormal lab result, we will notify you by phone as soon as possible.  Submit refill requests through Atlantium or call your pharmacy and they will forward the refill request to us. Please allow 3 business days for your refill to be completed.          Additional Information About Your Visit        MyChart Information     Atlantium gives you secure access to your electronic health record. If you see a primary care provider, you can also send messages to your care team and make appointments. If you have questions,  please call your primary care clinic.  If you do not have a primary care provider, please call [phone] and they will assist you.        Care EveryWhere ID     This is your Care EveryWhere ID. This could be used by other organizations to access your [place] medical records  [id number]         Blood Pressure from Last 3 Encounters:   09/14/18 124/86   06/20/18 132/78   06/07/18 114/80    Weight from Last 3 Encounters:   09/24/18 219 lb (99.3 kg)   09/14/18 219 lb (99.3 kg)   06/20/18 215 lb 12 oz (97.9 kg)              We Performed the Following     AUDIOGRAM/TYMPANOGRAM - INTERFACE     COMPREHENSIVE HEARING TEST     TYMPANOMETRY        Primary Care Provider Office Phone # Fax #    Nik Nina PA-C [phone]       [address]  [facility] [address]        Equal Access to Services     [facility]: Hadii aad ku hadasho Soomaali, waaxda luqadaha, qaybta kaalmada adeegyada, waxay nicolein hayaan richard shaw . So [facility] [phone].    ATENCIÓN: Si habla español, tiene a krishnamurthy disposición servicios gratuitos de asistencia lingüística. Amara al [phone].    We comply with applicable federal civil rights laws and [place] laws. We do not discriminate on the basis of race, color, national origin, age, disability, sex, sexual orientation, or gender identity.            Thank you!     Thank you for choosing [facility]  for your care. Our goal is always to provide you with excellent care. Hearing back from our patients is one way we can continue to improve our services. Please take a few minutes to complete the written survey that you may receive in the mail after your visit with us. Thank you!             Your Updated Medication List - Protect others around you: Learn how to safely use, store and throw away your medicines at www.disposemymeds.org.          This list is accurate as of 9/24/18 10:45 AM.  Always use your most recent med list.                    Brand Name Dispense Instructions for use Diagnosis    ADVIL PO           fluticasone 50 MCG/ACT spray    FLONASE    1 Bottle    Spray 1-2 sprays into both nostrils daily    Hypertrophy, nasal, turbinate       gemfibrozil 600 MG tablet    LOPID    180 tablet    TAKE 1 TABLET (600 MG) BY MOUTH 2 TIMES DAILY    Hypertriglyceridemia       levothyroxine 125 MCG tablet    SYNTHROID/LEVOTHROID    90 tablet    Take 1 tablet (125 mcg) by mouth daily    Subclinical hypothyroidism       MULTI VITAMIN MENS PO      Take  by mouth.        omeprazole 40 MG capsule    priLOSEC    90 capsule    TAKE ONE CAPSULE BY MOUTH EVERY DAY    Gastroesophageal reflux disease without esophagitis       SUMAtriptan 100 MG tablet    IMITREX    18 tablet    TAKE 1 TAB BY MOUTH WITH ONSET OF MIGRAINE, MAY REPEAT ONCE AFTER 2 HOURS. MAX 2 TABS/24 HOURS.    Migraine with aura and without status migrainosus, not intractable       tadalafil 20 MG tablet    CIALIS    8 tablet    Take 0.5-1 tablets as needed for erectile dysfunction. Never use with nitroglycerin, terazosin or doxazosin.    Erectile dysfunction, unspecified erectile dysfunction type       topiramate 50 MG tablet    TOPAMAX    60 tablet    TAKE 1 TABLET BY MOUTH TWICE A DAY    Migraine with aura and without status migrainosus, not intractable

## 2018-09-24 NOTE — PROGRESS NOTES
AUDIOLOGY REPORT     SUMMARY: Audiology visit completed. See audiogram for results.     RECOMMENDATIONS: Follow-up with ENT    Tracy Waldrop Licensed Audiologist #[id number]

## 2018-09-26 ENCOUNTER — TELEPHONE (OUTPATIENT)
Dept: FAMILY MEDICINE | Facility: OTHER | Age: 48
End: 2018-09-26

## 2018-09-26 ENCOUNTER — MYC MEDICAL ADVICE (OUTPATIENT)
Dept: FAMILY MEDICINE | Facility: OTHER | Age: 48
End: 2018-09-26

## 2018-09-26 NOTE — TELEPHONE ENCOUNTER
I called and spoke with patient and he states his pain abdominal and right testicular pain was resolving after a few days on the Levaquin but then over the past week or so, starting last Wednesday, he noticed a slight cloudiness to his urine and then was noticing occasional twinges of back/kidney pain on the right that did improve on its own. He finished the Levaquin this past weekend and started to notice some mild aching of the right testicle again with some pain into the lower abdomen. Today, there has been aching on and off. He denies any fevers or chills.His urine is still slightly cloudy. I advised him to get if another week as the Levaquin should have taken care of any infection present but he may still have some aching and swelling for a few weeks. He will continue with rest, elevation, ice and NSAIDs and if worsening or not improving within the next week, he will contact the clinic and can consider another round of antibiotics such as doxycyline.     Nik Nina PA-C

## 2018-09-26 NOTE — TELEPHONE ENCOUNTER
Responded via The Pocket Agencyhart.  Jerry Saldivar, RN, BSN    Routed to provider of FYI.

## 2018-09-26 NOTE — TELEPHONE ENCOUNTER
Reason for call:  Patient reporting a symptom    Symptom or request: symptoms    Duration (how long have symptoms been present): ?    Have you been treated for this before? Yes    Additional comments: patient called asking to speak to a nurse he states he has some questions about his last visit with Nik Nina, wants to know if he should be seen again. Patient wouldn't give any other information.    Phone Number patient can be reached at:      Telephone Information:   Mobile [phone]   Mobile Not on file.       Best Time:  anytime    Can we leave a detailed message on this number:  YES    Call taken on 9/26/2018 at 12:53 PM by Zakia Barriga

## 2018-09-26 NOTE — TELEPHONE ENCOUNTER
"Will route to  to review/advise.    Per last OV on 09/14: \"Findings and symptoms are consistent with epididymitis.  Will treat with a 10 day course of Levaquin and I recommend a probiotic while on this.  I recommend NSAIDs/Tylenol as needed for pain along with keeping the scrotum elevated with briefs. He can also try cool compresses or ice.  I recommend avoiding intercourse until finished with the antibiotics.  If symptoms are worsening or not improving, he will contact the clinic.     Follow up in November for an annual physical with updated fasting labs.\"  "

## 2018-09-27 ENCOUNTER — OFFICE VISIT (OUTPATIENT)
Dept: URGENT CARE | Facility: RETAIL CLINIC | Age: 48
End: 2018-09-27
Payer: COMMERCIAL

## 2018-09-27 VITALS
HEART RATE: 80 BPM | TEMPERATURE: 97.8 F | SYSTOLIC BLOOD PRESSURE: 126 MMHG | DIASTOLIC BLOOD PRESSURE: 85 MMHG | OXYGEN SATURATION: 98 %

## 2018-09-27 DIAGNOSIS — J02.9 ACUTE PHARYNGITIS, UNSPECIFIED ETIOLOGY: Primary | ICD-10-CM

## 2018-09-27 LAB — S PYO AG THROAT QL IA.RAPID: NORMAL

## 2018-09-27 PROCEDURE — 99213 OFFICE O/P EST LOW 20 MIN: CPT | Performed by: PHYSICIAN ASSISTANT

## 2018-09-27 PROCEDURE — 87880 STREP A ASSAY W/OPTIC: CPT | Mod: QW | Performed by: PHYSICIAN ASSISTANT

## 2018-09-27 PROCEDURE — 87081 CULTURE SCREEN ONLY: CPT | Performed by: PHYSICIAN ASSISTANT

## 2018-09-27 NOTE — PROGRESS NOTES
Chief Complaint   Patient presents with     Pharyngitis     x 2 days     SUBJECTIVE:  Linden Cruz is a 48 year old male presenting with a chief complaint of a sore throat.  Onset of symptoms was 3 days ago.  Course of illness: gradual onset.  Severity: moderate  Current and Associated symptoms: a little nauseous  Treatment measures tried include: ibuprofen a couple hours ago  Predisposing factors include: None.    Past Medical History:   Diagnosis Date     Chest pain      Esophageal reflux      Migraines      Mixed hyperlipidemia      DANIEL (obstructive sleep apnea)      Other specified gastritis without mention of hemorrhage     H. Pylori, diag 12/06     Current Outpatient Prescriptions   Medication Sig Dispense Refill     fluticasone (FLONASE) 50 MCG/ACT spray Spray 1-2 sprays into both nostrils daily 1 Bottle 3     gemfibrozil (LOPID) 600 MG tablet TAKE 1 TABLET (600 MG) BY MOUTH 2 TIMES DAILY 180 tablet 0     Ibuprofen (ADVIL PO)        levothyroxine (SYNTHROID/LEVOTHROID) 125 MCG tablet Take 1 tablet (125 mcg) by mouth daily 90 tablet 0     Multiple Vitamin (MULTI VITAMIN MENS PO) Take  by mouth.       omeprazole (PRILOSEC) 40 MG capsule TAKE ONE CAPSULE BY MOUTH EVERY DAY 90 capsule 2     SUMAtriptan (IMITREX) 100 MG tablet TAKE 1 TAB BY MOUTH WITH ONSET OF MIGRAINE, MAY REPEAT ONCE AFTER 2 HOURS. MAX 2 TABS/24 HOURS. 18 tablet 5     tadalafil (CIALIS) 20 MG tablet Take 0.5-1 tablets as needed for erectile dysfunction. Never use with nitroglycerin, terazosin or doxazosin. 8 tablet 10     topiramate (TOPAMAX) 50 MG tablet TAKE 1 TABLET BY MOUTH TWICE A DAY 60 tablet 4     Social History   Substance Use Topics     Smoking status: Former Smoker     Types: Cigarettes     Quit date: 1/1/2000     Smokeless tobacco: Never Used     Alcohol use 0.0 oz/week     0 Standard drinks or equivalent per week      Comment: 1/month     Allergies   Allergen Reactions     No Known Drug Allergies      REVIEW OF SYSTEMS  General:  "POSITIVE for mild nausea. NEGATIVE for fever.  Skin: Negative for rash.  ENT: POSITIVE for sore throat. NEGATIVE for nasal congestion, ear pain.  Resp: NEGATIVE for cough.    OBJECTIVE:   /85  Pulse 80  Temp 97.8  F (36.6  C) (Temporal)  SpO2 98%  GENERAL APPEARANCE: healthy, alert and in no distress  HEENT: Eyes PEERL, conjunctiva clear. Bilateral ear canals and TMs normal. Nose normal. Pharynx erythematous without tonsillar hypertrophy or exudate noted.  NECK: supple, non-tender to palpation, no adenopathy noted  RESP: lungs clear to auscultation - no rales, rhonchi or wheezes  CV: regular rates and rhythm, normal S1 S2, no murmur noted  SKIN: no suspicious lesions or rashes    Rapid Strep test is negative; await throat culture results.    ASSESSMENT:    ICD-10-CM    1. Acute pharyngitis, unspecified etiology J02.9 RAPID STREP SCREEN     BETA STREP GROUP A R/O CULTURE     PLAN:   Patient Instructions   Rapid strep test today is negative.   Your throat culture is pending. [facility] will call if positive results to start antibiotics at that time; No call if the culture is negative.  Drink plenty of fluids and rest.  May use salt water gargles- about 8 oz warm water with about 1 teaspoon salt  Sucrets and Cepacol spray are over the counter medications that numb the throat.  Over the counter pain relievers such as tylenol or ibuprofen may be used as needed.   Honey lemon tea helps to soothe the throat. \"Throat Coat\" tea is soothing as well.  Please follow up with primary care provider if not improving, worsening or new symptoms.    Follow up with primary care provider with any problems, questions or concerns or if symptoms worsen or fail to improve. Patient agreed to plan and verbalized understanding.    Radha Willis PA-C  [facility] [place]  "

## 2018-09-27 NOTE — MR AVS SNAPSHOT
"              After Visit Summary   9/27/2018    Linden Cruz    MRN: [id number]           Patient Information     Date Of Birth          1970        Visit Information        Provider Department      9/27/2018 10:15 AM Xochitl Willis PA-C [facility] Stephane Ainsworth        Today's Diagnoses     Acute pharyngitis, unspecified etiology    -  1      Care Instructions    Rapid strep test today is negative.   Your throat culture is pending. [facility] will call if positive results to start antibiotics at that time; No call if the culture is negative.  Drink plenty of fluids and rest.  May use salt water gargles- about 8 oz warm water with about 1 teaspoon salt  Sucrets and Cepacol spray are over the counter medications that numb the throat.  Over the counter pain relievers such as tylenol or ibuprofen may be used as needed.   Honey lemon tea helps to soothe the throat. \"Throat Coat\" tea is soothing as well.  Please follow up with primary care provider if not improving, worsening or new symptoms.          Follow-ups after your visit        Your next 10 appointments already scheduled     Mar 25, 2019  8:30 AM CDT   [facility] Visit with Piyush Hernandez   [facility]    [address] [place] [address] [phone]            Mar 25, 2019  9:00 AM CDT   Return Visit with Savage Mejia MD   [facility] ([address] [id number] [phone]              Who to contact     You can reach your care team any time of the day by calling [phone].  Notification of test results:  If you have an abnormal lab result, we will notify you by phone as soon as possible.         Additional Information About Your Visit        MyChart Information     [employer] gives you secure access to your electronic health record. If you see a primary care provider, you can also send messages to your " care team and make appointments. If you have questions, please call your primary care clinic.  If you do not have a primary care provider, please call [phone] and they will assist you.        Care EveryWhere ID     This is your Care EveryWhere ID. This could be used by other organizations to access your [facility] medical records  [id number]        Your Vitals Were     Pulse Temperature Pulse Oximetry             80 97.8  F (36.6  C) (Temporal) 98%          Blood Pressure from Last 3 Encounters:   09/27/18 126/85   09/14/18 124/86   06/20/18 132/78    Weight from Last 3 Encounters:   09/24/18 219 lb (99.3 kg)   09/14/18 219 lb (99.3 kg)   06/20/18 215 lb 12 oz (97.9 kg)              We Performed the Following     RAPID STREP SCREEN        Primary Care Provider Office Phone # Fax #    Nik Nina PA-C [phone]       [address] [facility] [address]  [facility] [address]        Equal Access to Services     [facility] : Hadii aad ku hadasho Soomaali, waaxda luqadaha, qaybta kaalmada adeegyada, waxay idiin haypinkyn richard shaw . So [facility] [phone].    ATENCIÓN: Si habla español, tiene a krishnamurthy disposición servicios gratuitos de asistencia lingüística. [facility] [phone].    We comply with applicable federal civil rights laws and [place] laws. We do not discriminate on the basis of race, color, national origin, age, disability, sex, sexual orientation, or gender identity.            Thank you!     Thank you for choosing [facility]  for your care. Our goal is always to provide you with excellent care. Hearing back from our patients is one way we can continue to improve our services. Please take a few minutes to complete the written survey that you may receive in the mail after your visit with us. Thank you!             Your Updated Medication List - Protect others around you: Learn how to safely use, store and throw away your medicines at www.disposemymeds.org.           This list is accurate as of 9/27/18 10:21 AM.  Always use your most recent med list.                   Brand Name Dispense Instructions for use Diagnosis    ADVIL PO           fluticasone 50 MCG/ACT spray    FLONASE    1 Bottle    Spray 1-2 sprays into both nostrils daily    Hypertrophy, nasal, turbinate       gemfibrozil 600 MG tablet    LOPID    180 tablet    TAKE 1 TABLET (600 MG) BY MOUTH 2 TIMES DAILY    Hypertriglyceridemia       levothyroxine 125 MCG tablet    SYNTHROID/LEVOTHROID    90 tablet    Take 1 tablet (125 mcg) by mouth daily    Subclinical hypothyroidism       MULTI VITAMIN MENS PO      Take  by mouth.        omeprazole 40 MG capsule    priLOSEC    90 capsule    TAKE ONE CAPSULE BY MOUTH EVERY DAY    Gastroesophageal reflux disease without esophagitis       SUMAtriptan 100 MG tablet    IMITREX    18 tablet    TAKE 1 TAB BY MOUTH WITH ONSET OF MIGRAINE, MAY REPEAT ONCE AFTER 2 HOURS. MAX 2 TABS/24 HOURS.    Migraine with aura and without status migrainosus, not intractable       tadalafil 20 MG tablet    CIALIS    8 tablet    Take 0.5-1 tablets as needed for erectile dysfunction. Never use with nitroglycerin, terazosin or doxazosin.    Erectile dysfunction, unspecified erectile dysfunction type       topiramate 50 MG tablet    TOPAMAX    60 tablet    TAKE 1 TABLET BY MOUTH TWICE A DAY    Migraine with aura and without status migrainosus, not intractable

## 2018-09-29 LAB
BACTERIA SPEC CULT: NORMAL
SPECIMEN SOURCE: NORMAL

## 2018-10-03 ENCOUNTER — MYC MEDICAL ADVICE (OUTPATIENT)
Dept: FAMILY MEDICINE | Facility: OTHER | Age: 48
End: 2018-10-03

## 2018-10-03 DIAGNOSIS — N45.1 EPIDIDYMITIS: Primary | ICD-10-CM

## 2018-10-03 RX ORDER — DOXYCYCLINE 100 MG/1
100 CAPSULE ORAL 2 TIMES DAILY
Qty: 28 CAPSULE | Refills: 0 | Status: SHIPPED | OUTPATIENT
Start: 2018-10-03 | End: 2019-01-11

## 2018-10-15 ENCOUNTER — MYC MEDICAL ADVICE (OUTPATIENT)
Dept: FAMILY MEDICINE | Facility: OTHER | Age: 48
End: 2018-10-15

## 2018-10-24 ENCOUNTER — MYC MEDICAL ADVICE (OUTPATIENT)
Dept: FAMILY MEDICINE | Facility: OTHER | Age: 48
End: 2018-10-24

## 2018-10-24 ENCOUNTER — OFFICE VISIT (OUTPATIENT)
Dept: UROLOGY | Facility: OTHER | Age: 48
End: 2018-10-24
Payer: COMMERCIAL

## 2018-10-24 VITALS
DIASTOLIC BLOOD PRESSURE: 88 MMHG | OXYGEN SATURATION: 98 % | WEIGHT: 223 LBS | SYSTOLIC BLOOD PRESSURE: 130 MMHG | HEART RATE: 97 BPM | TEMPERATURE: 97.7 F | BODY MASS INDEX: 31.54 KG/M2

## 2018-10-24 DIAGNOSIS — R10.31 RIGHT INGUINAL PAIN: Primary | ICD-10-CM

## 2018-10-24 DIAGNOSIS — M54.30 SCIATICA, UNSPECIFIED LATERALITY: Primary | ICD-10-CM

## 2018-10-24 LAB
ALBUMIN UR-MCNC: NEGATIVE MG/DL
APPEARANCE UR: CLEAR
BILIRUB UR QL STRIP: NEGATIVE
COLOR UR AUTO: YELLOW
GLUCOSE UR STRIP-MCNC: NEGATIVE MG/DL
HGB UR QL STRIP: NEGATIVE
KETONES UR STRIP-MCNC: NEGATIVE MG/DL
LEUKOCYTE ESTERASE UR QL STRIP: NEGATIVE
NITRATE UR QL: NEGATIVE
PH UR STRIP: 5.5 PH (ref 5–7)
SOURCE: NORMAL
SP GR UR STRIP: <=1.005 (ref 1–1.03)
UROBILINOGEN UR STRIP-ACNC: 0.2 EU/DL (ref 0.2–1)

## 2018-10-24 PROCEDURE — 99203 OFFICE O/P NEW LOW 30 MIN: CPT | Performed by: UROLOGY

## 2018-10-24 PROCEDURE — 81003 URINALYSIS AUTO W/O SCOPE: CPT | Performed by: UROLOGY

## 2018-10-24 ASSESSMENT — PAIN SCALES - GENERAL: PAINLEVEL: MODERATE PAIN (4)

## 2018-10-24 NOTE — PROGRESS NOTES
S: See dictated note   Current Outpatient Prescriptions   Medication Sig Dispense Refill     gemfibrozil (LOPID) 600 MG tablet TAKE 1 TABLET (600 MG) BY MOUTH 2 TIMES DAILY 180 tablet 0     Ibuprofen (ADVIL PO)        levothyroxine (SYNTHROID/LEVOTHROID) 125 MCG tablet Take 1 tablet (125 mcg) by mouth daily 90 tablet 0     Multiple Vitamin (MULTI VITAMIN MENS PO) Take  by mouth.       omeprazole (PRILOSEC) 40 MG capsule TAKE ONE CAPSULE BY MOUTH EVERY DAY 90 capsule 2     SUMAtriptan (IMITREX) 100 MG tablet TAKE 1 TAB BY MOUTH WITH ONSET OF MIGRAINE, MAY REPEAT ONCE AFTER 2 HOURS. MAX 2 TABS/24 HOURS. 18 tablet 5     tadalafil (CIALIS) 20 MG tablet Take 0.5-1 tablets as needed for erectile dysfunction. Never use with nitroglycerin, terazosin or doxazosin. 8 tablet 10     topiramate (TOPAMAX) 50 MG tablet TAKE 1 TABLET BY MOUTH TWICE A DAY 60 tablet 4     doxycycline (VIBRAMYCIN) 100 MG capsule Take 1 capsule (100 mg) by mouth 2 times daily (Patient not taking: Reported on 10/24/2018) 28 capsule 0     fluticasone (FLONASE) 50 MCG/ACT spray Spray 1-2 sprays into both nostrils daily (Patient not taking: Reported on 10/24/2018) 1 Bottle 3     Allergies   Allergen Reactions     No Known Drug Allergies      Past Medical History:   Diagnosis Date     Chest pain      Esophageal reflux      Migraines      Mixed hyperlipidemia      DANIEL (obstructive sleep apnea)      Other specified gastritis without mention of hemorrhage     H. Pylori, diag 12/06     Past Surgical History:   Procedure Laterality Date     CHOLECYSTECTOMY, LAPOROSCOPIC  2004     SURGICAL HISTORY OF -   12/2006    upper GI, Bem[place]gi      Family History   Problem Relation Age of Onset     Diabetes Maternal Grandmother      C.A.D. Maternal Grandfather      Diabetes Maternal Grandfather      Cancer Maternal Grandfather      lung cancer     Cerebrovascular Disease Paternal Grandmother      C.A.D. Paternal Grandfather      Other - See Comments Mother      bilateral  carotid occlusion, SVG bypass to both.     Asthma No family hx of      Hypertension No family hx of      Breast Cancer No family hx of      Cancer - colorectal No family hx of      Prostate Cancer No family hx of      Alcohol/Drug No family hx of      Allergies No family hx of      Alzheimer Disease No family hx of      Anesthesia Reaction No family hx of      Arthritis No family hx of      Blood Disease No family hx of      Cardiovascular No family hx of      Circulatory No family hx of      Congenital Anomalies No family hx of      Connective Tissue Disorder No family hx of      Depression No family hx of      Endocrine Disease No family hx of      Eye Disorder No family hx of      Genetic Disorder No family hx of      GASTROINTESTINAL DISEASE No family hx of      Genitourinary Problems No family hx of      Gynecology No family hx of      HEART DISEASE No family hx of      Lipids No family hx of      Musculoskeletal Disorder No family hx of      Neurologic Disorder No family hx of      Obesity No family hx of      Osteoporosis No family hx of      Psychotic Disorder No family hx of      Respiratory No family hx of      Thyroid Disease No family hx of      Hearing Loss No family hx of      Coronary Artery Disease No family hx of      Mental Illness No family hx of      Depression/Anxiety No family hx of      Known Genetic Syndrome No family hx of      Social History     Social History     Marital status:      Spouse name: N/A     Number of children: 0     Years of education: N/A     Occupational History      [employer]     road construction     Social History Main Topics     Smoking status: Former Smoker     Types: Cigarettes     Quit date: 1/1/2000     Smokeless tobacco: Never Used     Alcohol use 0.0 oz/week     0 Standard drinks or equivalent per week      Comment: 1/month     Drug use: No     Sexual activity: Yes     Partners: Female     Birth control/ protection: Surgical      Comment: vasectomy      Other Topics Concern     Parent/Sibling W/ Cabg, Mi Or Angioplasty Before 65f 55m? No     Caffeine Concern Yes     large intake per day     Special Diet Yes     low cholesterol      Exercise No     Social History Narrative    Dairy/d 2-3 servings/d.     Caffeine 2-3 servings/d    Exercise 1-2 x week    Sunscreen used - NO, uses sunscreen in summer months    Seatbelts used - YES    Working smoke/CO detectors in the home - YES    Guns stored in the home - YES    Self Breast Exams - NA    Self Testicular Exam - YES    Eye Exam up to date - YES    Dental Exam up to date - YES    Pap Smear up to date - NA    Mammogram up to date - NA    PSA up to date - NA    Dexa Scan up to date - NA    Flex Sig / Colonoscopy up to date - NA    Immunizations up to date - YES    Abuse: Current or Past(Physical, Sexual or Emotional)- NO    Do you feel safe in your environment - YES    Claudia Salamanca, [facility]  1/29/2010               REVIEW OF SYSTEMS  =================  C: NEGATIVE for fever, chills, change in weight  I: NEGATIVE for worrisome rashes, moles or lesions  E/M: NEGATIVE for ear, mouth and throat problems  R: NEGATIVE for significant cough or SHORTNESS OF BREATH  CV:  NEGATIVE for chest pain, palpitations or peripheral edema  GI: NEGATIVE for nausea, abdominal pain, heartburn, or change in bowel habits  NEURO: NEGATIVE numbness/weakness  : see HPI  PSYCH: NEGATIVE depression/anxiety  LYmph: no new enlarged lymph nodes  Ortho: no new trauma/movements      Physical Exam:  /88  Pulse 97  Temp 97.7  F (36.5  C) (Oral)  Wt 101.2 kg (223 lb)  SpO2 98%  BMI 31.54 kg/m2   Patient is pleasant, in no acute distress, good general condition.  Heart:  negative, PMI normal  Lung: no evidence of respiratory distress    Abdomen: Soft, nondistended, non tender. No masses. No rebound or guarding.   Exam: penis no discharge. Testis no masses.  No scrotal skin lesion.  Epididymitis normal.    Skin: Warm and dry.  No  redness.  Neuro: grossly normal  Musculaskeletal: moving all extremities  Psych normal mood and affect  Musculoskeletal  moving all extremities  Hematologic/Lymphatic/Immunologic: normal ant/post cervical, axillary, supraclavicular and inguinal nodes    Assessment/Plan:   See dictated note

## 2018-10-24 NOTE — MR AVS SNAPSHOT
After Visit Summary   10/24/2018    Linden Cruz    MRN: [id number]           Patient Information     Date Of Birth          1970        Visit Information        Provider Department      10/24/2018 8:15 AM Anselmo Mtz MD [facility]        Today's Diagnoses     Right inguinal pain    -  1       Follow-ups after your visit        Your next 10 appointments already scheduled     Mar 25, 2019  8:30 AM CDT   Return Visit with Piyush Hernandez   [facility] ([facility])    [address] [place] [address] [phone]            Mar 25, 2019  9:00 AM CDT   Return Visit with Savage Mejia MD   [facility] ([address] [address] [phone]              Who to contact     If you have questions or need follow up information about today's clinic visit or your schedule please contact [facility] directly at [phone].  Normal or non-critical lab and imaging results will be communicated to you by [employer], letter or phone within 4 business days after the clinic has received the results. If you do not hear from us within 7 days, please contact the clinic through [employer] or phone. If you have a critical or abnormal lab result, we will notify you by phone as soon as possible.  Submit refill requests through mSnap or call your pharmacy and they will forward the refill request to us. Please allow 3 business days for your refill to be completed.          Additional Information About Your Visit        Printed [employer] Information     [employer] gives you secure access to your electronic health record. If you see a primary care provider, you can also send messages to your care team and make appointments. If you have questions, please call your primary care clinic.  If you do not have a primary care provider, please call [phone] and they will assist  you.        Care EveryWhere ID     This is your Care EveryWhere ID. This could be used by other organizations to access your [facility] medical records  [id number]        Your Vitals Were     Pulse Temperature Pulse Oximetry BMI (Body Mass Index)          97 97.7  F (36.5  C) (Oral) 98% 31.54 kg/m2         Blood Pressure from Last 3 Encounters:   10/24/18 130/88   09/27/18 126/85   09/14/18 124/86    Weight from Last 3 Encounters:   10/24/18 101.2 kg (223 lb)   09/24/18 99.3 kg (219 lb)   09/14/18 99.3 kg (219 lb)              We Performed the Following     UA reflex to Microscopic and Culture        Primary Care Provider Office Phone # Fax #    Nik Nina PA-C [phone]       [address] [place] [address] [address]  [facility] [address]        Equal Access to Services     [facility] : Hadii bernabe blanko Sodanny, waaxda luqadaha, qaybta kaalmada richardyadebra, darien shaw . So [facility] [phone].    ATENCIÓN: Si habla español, tiene a krishnamurthy disposición servicios gratuitos de asistencia lingüística. Amara al [phone].    We comply with applicable federal civil rights laws and [place] laws. We do not discriminate on the basis of race, color, national origin, age, disability, sex, sexual orientation, or gender identity.            Thank you!     Thank you for choosing [facility]  for your care. Our goal is always to provide you with excellent care. Hearing back from our patients is one way we can continue to improve our services. Please take a few minutes to complete the written survey that you may receive in the mail after your visit with us. Thank you!             Your Updated Medication List - Protect others around you: Learn how to safely use, store and throw away your medicines at www.disposemymeds.org.          This list is accurate as of 10/24/18  8:47 AM.  Always use your most recent med list.                   Brand Name Dispense Instructions for use  Diagnosis    ADVIL PO           doxycycline 100 MG capsule    VIBRAMYCIN    28 capsule    Take 1 capsule (100 mg) by mouth 2 times daily    Epididymitis       fluticasone 50 MCG/ACT spray    FLONASE    1 Bottle    Spray 1-2 sprays into both nostrils daily    Hypertrophy, nasal, turbinate       gemfibrozil 600 MG tablet    LOPID    180 tablet    TAKE 1 TABLET (600 MG) BY MOUTH 2 TIMES DAILY    Hypertriglyceridemia       levothyroxine 125 MCG tablet    SYNTHROID/LEVOTHROID    90 tablet    Take 1 tablet (125 mcg) by mouth daily    Subclinical hypothyroidism       MULTI VITAMIN MENS PO      Take  by mouth.        omeprazole 40 MG capsule    priLOSEC    90 capsule    TAKE ONE CAPSULE BY MOUTH EVERY DAY    Gastroesophageal reflux disease without esophagitis       SUMAtriptan 100 MG tablet    IMITREX    18 tablet    TAKE 1 TAB BY MOUTH WITH ONSET OF MIGRAINE, MAY REPEAT ONCE AFTER 2 HOURS. MAX 2 TABS/24 HOURS.    Migraine with aura and without status migrainosus, not intractable       tadalafil 20 MG tablet    CIALIS    8 tablet    Take 0.5-1 tablets as needed for erectile dysfunction. Never use with nitroglycerin, terazosin or doxazosin.    Erectile dysfunction, unspecified erectile dysfunction type       topiramate 50 MG tablet    TOPAMAX    60 tablet    TAKE 1 TABLET BY MOUTH TWICE A DAY    Migraine with aura and without status migrainosus, not intractable

## 2018-10-24 NOTE — TELEPHONE ENCOUNTER
I got an email from [facility] as he has an appt for tomorrow but needs the referral. I will write the referral but would still like to see patient if he can make it work tomorrow or within the next few weeks.    Nik Nina PA-C

## 2018-10-24 NOTE — TELEPHONE ENCOUNTER
I am unable to write a referral for PT without a visit since I have not seen him for this in the past. You could put him at my 9 or 4 open spots tomorrow.    Nik Nina PA-C

## 2018-10-24 NOTE — PROGRESS NOTES
Visit Date:   10/24/2018      SUBJECTIVE:  The patient is a pleasant 48-year-old  male who was requested to be seen by Nik Nina PA-C, for a consultation with regard to patient's right groin pain.  The patient said that he has had some right groin pain that has been going on for the last several weeks.  He was diagnosed with a possible epididymitis and has been on 2 courses of antibiotics.  The first course may have helped a little bit, the second course has not done anything at all.  He describes the pain as a dull achy pain.  It radiates in the inguinal region, sometimes to his back area.  He is also dealing with some back pain issues.        He has no fever, nausea or vomiting.  He has no urinary complaints except some cloudy urine.  He has no gross hematuria.  He had similar symptoms about 2 years ago and at that time was also treated for epididymitis.  Scrotal ultrasound at that time was completely normal.  He is status post vasectomy 8-9 years ago.      ASSESSMENT:  Anselmo 48-year-old gentleman with right groin pain.  Examination today is normal.  I do not see any evidence of epididymitis or orchitis.  This is most likely pelvic muscle issues that are causing referred pain to the groin area.      RECOMMENDATION:  The patient was reassured.  He should stop taking antibiotics since I do not think he has any infections going on.  This is something that would just take time for it to go away, but eventually it will.         MAYUR NUÑEZ MD             D: 10/24/2018   T: 10/24/2018   MT: QUOC      Name:     EDWIN CHU   MRN:      [id number]        Account:      [id number]   :      1970           Visit Date:   10/24/2018      Document: [id number]       cc: Nik Nina PA-C

## 2018-10-25 ENCOUNTER — THERAPY VISIT (OUTPATIENT)
Dept: PHYSICAL THERAPY | Facility: CLINIC | Age: 48
End: 2018-10-25
Payer: COMMERCIAL

## 2018-10-25 DIAGNOSIS — M54.30 SCIATICA, UNSPECIFIED LATERALITY: ICD-10-CM

## 2018-10-25 DIAGNOSIS — M54.40 CHRONIC RIGHT-SIDED LOW BACK PAIN WITH SCIATICA, SCIATICA LATERALITY UNSPECIFIED: Primary | Chronic | ICD-10-CM

## 2018-10-25 DIAGNOSIS — G89.29 CHRONIC RIGHT-SIDED LOW BACK PAIN WITH SCIATICA, SCIATICA LATERALITY UNSPECIFIED: Primary | Chronic | ICD-10-CM

## 2018-10-25 PROCEDURE — 97161 PT EVAL LOW COMPLEX 20 MIN: CPT | Mod: GP | Performed by: PHYSICAL THERAPIST

## 2018-10-25 PROCEDURE — 97110 THERAPEUTIC EXERCISES: CPT | Mod: GP | Performed by: PHYSICAL THERAPIST

## 2018-10-25 NOTE — MR AVS SNAPSHOT
After Visit Summary   10/25/2018    Linden Cruz    MRN: [id number]           Patient Information     Date Of Birth          1970        Visit Information        Provider Department      10/25/2018 1:10 PM Keenan Bryant PT [facility]        Today's Diagnoses     Chronic right-sided low back pain with sciatica, sciatica laterality unspecified    -  1       Follow-ups after your visit        Your next 10 appointments already scheduled     Mar 25, 2019  8:30 AM CDT   [facility] P[facility]ush Hernandez   [facility]    [address] [place] [id number]   [phone]            Mar 25, 2019  9:00 AM CDT   Return Visit with Savage Mejia MD   [facility] ([address] [address]   [phone]              Future tests that were ordered for you today     Open Future Orders        Priority Expected Expires Ordered    [facility] PT, HAND, AND CHIROPRACTIC REFERRAL Routine  10/24/2019 10/24/2018            Who to contact     If you have questions or need follow up information about today's clinic visit or your schedule please contact [facility] directly at [phone].  Normal or non-critical lab and imaging results will be communicated to you by MyChart, letter or phone within 4 business days after the clinic has received the results. If you do not hear from us within 7 days, please contact the clinic through MyChart or phone. If you have a critical or abnormal lab result, we will notify you by phone as soon as possible.  Submit refill requests through [employer] or call your pharmacy and they will forward the refill request to us. Please allow 3 business days for your refill to be completed.          Additional Information About Your Visit        MyChart Information      [employer] gives you secure access to your electronic health record. If you see a primary care provider, you can also send messages to your care team and make appointments. If you have questions, please call your primary care clinic.  If you do not have a primary care provider, please call [phone] and they will assist you.        Care EveryWhere ID     This is your Care EveryWhere ID. This could be used by other organizations to access your [facility] medical records  [id number]         Blood Pressure from Last 3 Encounters:   10/24/18 130/88   09/27/18 126/85   09/14/18 124/86    Weight from Last 3 Encounters:   10/24/18 101.2 kg (223 lb)   09/24/18 99.3 kg (219 lb)   09/14/18 99.3 kg (219 lb)              We Performed the Following     HC PT EVAL, LOW COMPLEXITY     KARLY INITIAL EVAL REPORT     THERAPEUTIC EXERCISES        Primary Care Provider Office Phone # Fax #    Nik Nina PA-C [phone]       [address] [place] [id number]        Equal Access to Services     [facility]: Hadii aad ku hadasho Soomaali, waaxda luqadaha, qaybta kaalmada adeegyadebra, darien shaw . So [facility] [phone].    ATENCIÓN: Si habla español, tiene a krishnamurthy disposición servicios gratuitos de asistencia lingüística. [facility] [phone].    We comply with applicable federal civil rights laws and [place] laws. We do not discriminate on the basis of race, color, national origin, age, disability, sex, sexual orientation, or gender identity.            Thank you!     Thank you for choosing [facility]ICAL TH[facility]  for your care. Our goal is always to provide you with excellent care. Hearing back from our patients is one way we can continue to improve our services. Please take a few minutes to complete the written survey that you may receive in the mail after your visit with us. Thank you!             Your Updated Medication List - Protect  others around you: Learn how to safely use, store and throw away your medicines at www.disposemymeds.org.          This list is accurate as of 10/25/18  2:13 PM.  Always use your most recent med list.                   Brand Name Dispense Instructions for use Diagnosis    ADVIL PO           doxycycline 100 MG capsule    VIBRAMYCIN    28 capsule    Take 1 capsule (100 mg) by mouth 2 times daily    Epididymitis       fluticasone 50 MCG/ACT spray    FLONASE    1 Bottle    Spray 1-2 sprays into both nostrils daily    Hypertrophy, nasal, turbinate       gemfibrozil 600 MG tablet    LOPID    180 tablet    TAKE 1 TABLET (600 MG) BY MOUTH 2 TIMES DAILY    Hypertriglyceridemia       levothyroxine 125 MCG tablet    SYNTHROID/LEVOTHROID    90 tablet    Take 1 tablet (125 mcg) by mouth daily    Subclinical hypothyroidism       MULTI VITAMIN MENS PO      Take  by mouth.        omeprazole 40 MG capsule    priLOSEC    90 capsule    TAKE ONE CAPSULE BY MOUTH EVERY DAY    Gastroesophageal reflux disease without esophagitis       SUMAtriptan 100 MG tablet    IMITREX    18 tablet    TAKE 1 TAB BY MOUTH WITH ONSET OF MIGRAINE, MAY REPEAT ONCE AFTER 2 HOURS. MAX 2 TABS/24 HOURS.    Migraine with aura and without status migrainosus, not intractable       tadalafil 20 MG tablet    CIALIS    8 tablet    Take 0.5-1 tablets as needed for erectile dysfunction. Never use with nitroglycerin, terazosin or doxazosin.    Erectile dysfunction, unspecified erectile dysfunction type       topiramate 50 MG tablet    TOPAMAX    60 tablet    TAKE 1 TABLET BY MOUTH TWICE A DAY    Migraine with aura and without status migrainosus, not intractable
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
## 2018-11-23 DIAGNOSIS — G43.109 MIGRAINE WITH AURA AND WITHOUT STATUS MIGRAINOSUS, NOT INTRACTABLE: ICD-10-CM

## 2018-11-23 RX ORDER — TOPIRAMATE 50 MG/1
TABLET, FILM COATED ORAL
Qty: 60 TABLET | Refills: 4 | Status: CANCELLED | OUTPATIENT
Start: 2018-11-23

## 2018-11-23 RX ORDER — TOPIRAMATE 50 MG/1
TABLET, FILM COATED ORAL
Qty: 60 TABLET | Refills: 0 | Status: SHIPPED | OUTPATIENT
Start: 2018-11-23 | End: 2018-12-30

## 2018-11-23 NOTE — TELEPHONE ENCOUNTER
[facility] is looking for new Rx on topiramate (TOPAMAX) 50 MG tablet they would like 90 day supply for the patient

## 2018-11-23 NOTE — TELEPHONE ENCOUNTER
"Requested Prescriptions   Pending Prescriptions Disp Refills     topiramate (TOPAMAX) 50 MG tablet 60 tablet 4    Anti-Seizure Meds Protocol  Failed    11/23/2018 12:09 PM       Failed - Review Authorizing provider's last note.     Refer to last progress notes: confirm request is for original authorizing provider (cannot be through other providers).       Failed - Normal CBC on file in past 26 months    Recent Labs   Lab Test  08/24/16   0828   WBC  7.8   RBC  4.61   HGB  14.5   HCT  42.9   PLT  299          Failed - Normal platelet count on file in past 26 months    Recent Labs   Lab Test  08/24/16   0828   PLT  299          Passed - Recent (12 mo) or future (30 days) visit within the authorizing provider's specialty    Patient had office visit in the last 12 months or has a visit in the next 30 days with authorizing provider or within the authorizing provider's specialty.  See \"Patient Info\" tab in inbasket, or \"Choose Columns\" in Meds & Orders section of the refill encounter.         Passed - Normal ALT or AST on file in past 26 months    Recent Labs   Lab Test  11/21/17   0853   ALT  28     Recent Labs   Lab Test  11/21/17   0853   AST  17         topiramate (TOPAMAX) 50 MG tablet  Medication is being filled for 1 time refill only due to:  Patient needs to be seen because due for OV.   Please assist with scheduling.    Chrissy Fountain, RN, BSN             "

## 2018-11-23 NOTE — TELEPHONE ENCOUNTER
Contacted patient notified him and offered to schedule, her stated he would call back to set up appointment next week.

## 2018-12-14 DIAGNOSIS — N52.9 ERECTILE DYSFUNCTION, UNSPECIFIED ERECTILE DYSFUNCTION TYPE: ICD-10-CM

## 2018-12-14 RX ORDER — TADALAFIL 20 MG/1
TABLET ORAL
Qty: 8 TABLET | Refills: 8 | Status: SHIPPED | OUTPATIENT
Start: 2018-12-14 | End: 2019-09-30

## 2018-12-14 NOTE — TELEPHONE ENCOUNTER
Cialis    Prescription approved per [facility] Refill Protocol.    Sherry Newton, RN, BSN

## 2018-12-19 DIAGNOSIS — K21.9 GASTROESOPHAGEAL REFLUX DISEASE WITHOUT ESOPHAGITIS: ICD-10-CM

## 2018-12-20 RX ORDER — OMEPRAZOLE 40 MG/1
CAPSULE, DELAYED RELEASE ORAL
Qty: 30 CAPSULE | Refills: 0 | Status: SHIPPED | OUTPATIENT
Start: 2018-12-20 | End: 2019-02-11

## 2018-12-20 NOTE — TELEPHONE ENCOUNTER
Omeprazole    Medication is being filled for 1 time refill only due to:  Patient needs to be seen because LOV 9/14/2018 states F/U in 2 months.     Please help schedule OV    Sherry Newton, RN, BSN

## 2018-12-30 DIAGNOSIS — G43.109 MIGRAINE WITH AURA AND WITHOUT STATUS MIGRAINOSUS, NOT INTRACTABLE: ICD-10-CM

## 2018-12-31 DIAGNOSIS — E03.8 SUBCLINICAL HYPOTHYROIDISM: ICD-10-CM

## 2019-01-02 RX ORDER — TOPIRAMATE 50 MG/1
TABLET, FILM COATED ORAL
Qty: 60 TABLET | Refills: 0 | Status: SHIPPED | OUTPATIENT
Start: 2019-01-02 | End: 2019-01-11

## 2019-01-02 NOTE — TELEPHONE ENCOUNTER
1 final attila refill sent but due for physical with fasting labs prior to further refills.    Nik Nina PA-C

## 2019-01-02 NOTE — TELEPHONE ENCOUNTER
"Routing refill request to provider for review/approval because:  Awilda given x1 and patient did not follow up, please advise    Desi Solomon, JUAN PABLO    Requested Prescriptions   Pending Prescriptions Disp Refills     topiramate (TOPAMAX) 50 MG tablet [Pharmacy Med Name: TOPIRAMATE 50 MG TABLET] 60 tablet 0     Sig: TAKE 1 TABLET BY MOUTH TWICE A DAY    Anti-Seizure Meds Protocol  Failed - 12/30/2018 12:34 AM       Failed - Review Authorizing provider's last note.     Refer to last progress notes: confirm request is for original authorizing provider (cannot be through other providers).         Failed - Normal CBC on file in past 26 months    Recent Labs   Lab Test 08/24/16  0828   WBC 7.8   RBC 4.61   HGB 14.5   HCT 42.9                   Failed - Normal platelet count on file in past 26 months    Recent Labs   Lab Test 08/24/16  0828                 Passed - Recent (12 mo) or future (30 days) visit within the authorizing provider's specialty    Patient had office visit in the last 12 months or has a visit in the next 30 days with authorizing provider or within the authorizing provider's specialty.  See \"Patient Info\" tab in inbasket, or \"Choose Columns\" in Meds & Orders section of the refill encounter.             Passed - Normal ALT or AST on file in past 26 months    Recent Labs   Lab Test 11/21/17  0853   ALT 28     Recent Labs   Lab Test 11/21/17  0853   AST 17                     "

## 2019-01-03 RX ORDER — LEVOTHYROXINE SODIUM 125 UG/1
125 TABLET ORAL DAILY
Qty: 30 TABLET | Refills: 0 | Status: SHIPPED | OUTPATIENT
Start: 2019-01-03 | End: 2019-01-11

## 2019-01-03 NOTE — TELEPHONE ENCOUNTER
Left message for patient to return call. Please relay JM message and assist in scheduling physical   Emma Santiago CMA

## 2019-01-03 NOTE — TELEPHONE ENCOUNTER
1 month refill sent but due for f/u (annual physical) with labs before further refills.    Nik Nina PA-C

## 2019-01-03 NOTE — TELEPHONE ENCOUNTER
Synthroid  Routing refill request to provider for review/approval because:  Patient needs to be seen because:  Overdue for PE and labs    Next 5 appointments (look out 90 days)    Mar 25, 2019  8:30 AM CDT  Return Visit with Piyush Hernandez  [address] [address]  [phone]   Mar 25, 2019  9:00 AM CDT  Return Visit with Savage Mejia MD  [facility] ([address] [address]  [phone]        Deb Sevilla, RN, BSN

## 2019-01-03 NOTE — TELEPHONE ENCOUNTER
"Per other encounter: \"Synthroid  Routing refill request to provider for review/approval because:  Patient needs to be seen because:  Overdue for PE and labs \"    \"1 month refill sent but due for f/u (annual physical) with labs before further refills.     Nik Nina PA-C\"  "

## 2019-01-07 ENCOUNTER — TELEPHONE (OUTPATIENT)
Dept: FAMILY MEDICINE | Facility: OTHER | Age: 49
End: 2019-01-07

## 2019-01-07 DIAGNOSIS — E03.8 SUBCLINICAL HYPOTHYROIDISM: Primary | ICD-10-CM

## 2019-01-07 DIAGNOSIS — R73.01 IMPAIRED FASTING GLUCOSE: ICD-10-CM

## 2019-01-07 DIAGNOSIS — E78.5 HYPERLIPIDEMIA LDL GOAL <130: ICD-10-CM

## 2019-01-07 DIAGNOSIS — E78.1 HYPERTRIGLYCERIDEMIA: ICD-10-CM

## 2019-01-07 NOTE — TELEPHONE ENCOUNTER
Hello,    This patient is on our lab schedule this Friday.    Please place any orders you would like completed.    Thank you,  Candi MYLES

## 2019-01-07 NOTE — PATIENT INSTRUCTIONS
Preventive Health Recommendations  Male Ages 40 to 49    Yearly exam:             See your health care provider every year in order to  o   Review health changes.   o   Discuss preventive care.    o   Review your medicines if your doctor has prescribed any.    You should be tested each year for STDs (sexually transmitted diseases) if you re at risk.     Have a cholesterol test every 5 years.     Have a colonoscopy (test for colon cancer) if someone in your family has had colon cancer or polyps before age 50.     After age 45, have a diabetes test (fasting glucose). If you are at risk for diabetes, you should have this test every 3 years.      Talk with your health care provider about whether or not a prostate cancer screening test (PSA) is right for you.    Shots: Get a flu shot each year. Get a tetanus shot every 10 years.     Nutrition:    Eat at least 5 servings of fruits and vegetables daily.     Eat whole-grain bread, whole-wheat pasta and brown rice instead of white grains and rice.     Get adequate Calcium and Vitamin D.     Lifestyle    Exercise for at least 150 minutes a week (30 minutes a day, 5 days a week). This will help you control your weight and prevent disease.     Limit alcohol to one drink per day.     No smoking.     Wear sunscreen to prevent skin cancer.     See your dentist every six months for an exam and cleaning.     Will order an MRI of your low back to further evaluate your pain and the tingling in your leg.  Will consider more PT or injections depending on results.  Will start you on daily meloxicam to take daily in the mornings with food. Do not take ibuprofen or Aleve while on this.  Try taking twice daily Zantac instead of omeprazole due to potential long term side effects but if acid reflux worsens, will go back to omeprazole.    Will place you on ear drops to prevent infection of the perforated right ear drum.  Will send you back to Dr. Mejia.  Avoid submerging your head in  water.    Follow up in 3 months.

## 2019-01-07 NOTE — PROGRESS NOTES
SUBJECTIVE:   CC: Linden Cruz is an 48 year old male who presents for preventative health visit.     Physical   Annual:     Getting at least 3 servings of Calcium per day:  Yes    Bi-annual eye exam:  Yes    Dental care twice a year:  Yes    Sleep apnea or symptoms of sleep apnea:  None    Diet:  Regular (no restrictions)    Frequency of exercise:  None    Taking medications regularly:  Yes    Medication side effects:  None    Additional concerns today:  No    PHQ-2 Total Score: 0      He has been experiencing low back pain for the past few years, worse recently with numbness and tingling of the right anterior thigh to the knee along with with bilateral foot numbness and tingling. He has undergone physical therapy recently for his low back without any benefit. He is taking ibuprofen 200 mg, 3 tablets, 3 times daily with some relief. He denies any lower extremity weakness. He continues to have intermittent right sided groin pain as well for the past 4-5 months and was initially diagnosed with epididymitis but symptoms did not improve on antibiotics so he was seen by urology who diagnosed him with a muscular strain. He was told by his physical therapist that the pain may be coming from his low back.      His right ear has been painful for the past few days ever since he flew back from a work trip. No drainage, fevers or chills. He has had multiple ear tubes placed but he states they keep falling out after a few months and he wants T tubes placed so they will last longer.     Today's PHQ-2 Score:   PHQ-2 ( 1999 Pf[employer]) 1/11/2019   Q1: Little interest or pleasure in doing things 0   Q2: Feeling down, depressed or hopeless 0   PHQ-2 Score 0   Q1: Little interest or pleasure in doing things Not at all   Q2: Feeling down, depressed or hopeless Not at all   PHQ-2 Score 0       Abuse: Current or Past(Physical, Sexual or Emotional)- No  Do you feel safe in your environment? Yes    Social History     Tobacco Use      "Smoking status: Former Smoker     Types: Cigarettes     Last attempt to quit: 2000     Years since quittin.0     Smokeless tobacco: Never Used   Substance Use Topics     Alcohol use: Yes     Alcohol/week: 0.0 oz     Comment: 1/month     Alcohol Use 2019   If you drink alcohol do you typically have greater than 3 drinks per day OR greater than 7 drinks per week? No   No flowsheet data found.    Last PSA:   PSA   Date Value Ref Range Status   03/10/2016 0.71 0 - 4 ug/L Final       Reviewed orders with patient. Reviewed health maintenance and updated orders accordingly - Yes    Reviewed and updated as needed this visit by clinical staff  Tobacco  Allergies  Meds  Problems  Med Hx  Surg Hx  Fam Hx  Soc Hx          Reviewed and updated as needed this visit by Provider  Tobacco  Allergies  Meds  Problems  Med Hx  Surg Hx  Fam Hx         Review of Systems  CONSTITUTIONAL: NEGATIVE for fever, chills, change in weight  INTEGUMENTARY/SKIN: NEGATIVE for worrisome rashes, moles or lesions  EYES: NEGATIVE for vision changes or irritation  ENT: +Right ear pain. NEGATIVE for mouth and throat problems  RESP: NEGATIVE for significant cough or SOB  CV: NEGATIVE for chest pain, palpitations or peripheral edema  GI: NEGATIVE for nausea, abdominal pain, heartburn, or change in bowel habits   male: +Intermittent right groin pain. Negative for dysuria, hematuria, decreased urinary stream, erectile dysfunction, urethral discharge  MUSCULOSKELETAL: +Low back pain, worse on the right side.  NEURO: +right leg and bilateral foot numbness/tingling. NEGATIVE for weakness or dizziness  ENDOCRINE: NEGATIVE for temperature intolerance, skin/hair changes  HEME/ALLERGY/IMMUNE: NEGATIVE for bleeding problems  PSYCHIATRIC: NEGATIVE for changes in mood or affect    OBJECTIVE:   /82   Pulse 85   Temp 97  F (36.1  C) (Temporal)   Resp 14   Ht 1.816 m (5' 11.5\")   Wt 100.7 kg (222 lb)   SpO2 98%   BMI 30.53 kg/m  "     Physical Exam  GENERAL: healthy, alert and no distress  EYES: Eyes grossly normal to inspection, PERRL and conjunctivae and sclerae normal  HENT: Left ear canal and TM normal. Right TM with lateral perforation with a small amount of fresh and dried blood present. No TM erythema or purulent discharge. Nose and mouth without ulcers or lesions  NECK: no adenopathy, no asymmetry, masses, or scars and thyroid normal to palpation  RESP: lungs clear to auscultation - no rales, rhonchi or wheezes  CV: regular rate and rhythm, normal S1 S2, no S3 or S4, no murmur, click or rub, no peripheral edema and peripheral pulses strong  ABDOMEN: soft, nontender, no hepatosplenomegaly, no masses and bowel sounds normal   (male): normal male circumcised genitalia without lesions or urethral discharge, no hernia  MS: no gross musculoskeletal defects noted, no edema. FROM to all extremities. Tenderness over the right lower lumbar paraspinal musculature. Negative straight leg raise bilaterally.   SKIN: no suspicious lesions or rashes  NEURO: Normal strength and tone, mentation intact and speech normal. Cranial nerves II-XII are grossly intact. DTRs are 2+/4 throughout and symmetric. Gait is stable.  PSYCH: mentation appears normal, affect normal/bright    ASSESSMENT/PLAN:       ICD-10-CM    1. Encounter for routine adult health examination without abnormal findings Z00.00    2. Subclinical hypothyroidism E03.9    3. Hypertriglyceridemia E78.1 gemfibrozil (LOPID) 600 MG tablet   4. Impaired fasting glucose R73.01    5. Hyperlipidemia LDL goal <130 E78.5    6. Right-sided low back pain with right-sided sciatica, unspecified chronicity M54.41 MR Lumbar Spine w/o Contrast     meloxicam (MOBIC) 15 MG tablet   7. Groin pain, right R10.31 MR Lumbar Spine w/o Contrast   8. Migraine with aura and without status migrainosus, not intractable G43.109 topiramate (TOPAMAX) 50 MG tablet     SUMAtriptan (IMITREX) 100 MG tablet   9. Gastroesophageal  reflux disease without esophagitis K21.9 ranitidine (ZANTAC) 150 MG capsule   10. Nontraumatic perforation of tympanic membrane of right ear H72.91 ofloxacin (FLOXIN) 0.3 % otic solution   11. Need for Tdap vaccination Z23 INJECTION INTRAMUSCULAR OR SUB-Q       2. Will recheck TSH today and refill levothyroxine once results are back.    3, 5. Updated lipid panel ordered. Continue Lopid.     4. Updated CMP ordered.    6-7. Right sided low back pain with right thigh and bilateral foot paraesthesias consistent with sciatica which has not improved with physical therapy so will order a lumbar MRI for further evaluation. A herniated disc could also be contributing to his right groin pain. Depending on MRI results, will try PT in [place] or send him for lumbar ESIs with Dr. Fields. If we decide on this, he is cleared from a medical standpoint based on today's visit to undergo an BALTA so does not need another pre-op if it is within 30 days of today. I recommend he avoid taking so much ibuprofen daily so will instead place him on a daily meloxicam and he will stop the ibuprofen. He was instructed to take this with food.    8. Continue with Topamax for migraine prevention as well as Imitirex for abortive therapy.    9. I discussed the potential long-term side effects of PPIs so recommend he try Zantac twice daily instead. If acid reflux symptoms worsen, he can go back to omeprazole.    10. Right ear perforation, likely from the change in pressure on his recent plane ride. No current signs of infection but will prescribe Floxin drops for infection prevention given his history of recurrent serous otitis media in this ear. I will have him f/u with Dr. Mejia again to see if further tube/T-tube placement is warranted.    11. Vaccine administered by MA.    Follow up in 3 months for a back pain recheck.      COUNSELING:   Reviewed preventive health counseling, as reflected in patient instructions       Regular exercise        "Healthy diet/nutrition    BP Readings from Last 1 Encounters:   01/11/19 128/82     Estimated body mass index is 30.53 kg/m  as calculated from the following:    Height as of this encounter: 1.816 m (5' 11.5\").    Weight as of this encounter: 100.7 kg (222 lb).    BP Screening:   Last 3 BP Readings:    BP Readings from Last 3 Encounters:   01/11/19 128/82   10/24/18 130/88   09/27/18 126/85       Weight management plan: Discussed healthy diet and exercise guidelines     reports that he quit smoking about 19 years ago. His smoking use included cigarettes. he has never used smokeless tobacco.      Counseling Resources:  ATP IV Guidelines  Pooled Cohorts Equation Calculator  FRAX Risk Assessment  ICSI Preventive Guidelines  Dietary Guidelines for Americans, 2010  USDA's MyPlate  ASA Prophylaxis  Lung CA Screening    Nik Nina PA-C  [facility]  "

## 2019-01-08 NOTE — TELEPHONE ENCOUNTER
Pended labs - please review/sign if appropriate.  Precious Cruz CMA    Next 5 appointments (look out 90 days)    Jan 11, 2019  9:00 AM CST  PHYSICAL with Nik Nina PA-C  [facility] [address] [place] [address]  [phone]   Mar 25, 2019  8:30 AM CDT  Return Visit with Piyush Hernandez  [facility] ([facility]) [address] [place] [id number]  [phone]   Mar 25, 2019  9:00 AM CDT  Return Visit with Savage Mejia MD  [facility] ([address] [address]  [phone]

## 2019-01-11 ENCOUNTER — OFFICE VISIT (OUTPATIENT)
Dept: FAMILY MEDICINE | Facility: OTHER | Age: 49
End: 2019-01-11
Payer: COMMERCIAL

## 2019-01-11 VITALS
SYSTOLIC BLOOD PRESSURE: 128 MMHG | RESPIRATION RATE: 14 BRPM | HEIGHT: 72 IN | TEMPERATURE: 97 F | DIASTOLIC BLOOD PRESSURE: 82 MMHG | WEIGHT: 222 LBS | HEART RATE: 85 BPM | OXYGEN SATURATION: 98 % | BODY MASS INDEX: 30.07 KG/M2

## 2019-01-11 DIAGNOSIS — M54.41 RIGHT-SIDED LOW BACK PAIN WITH RIGHT-SIDED SCIATICA, UNSPECIFIED CHRONICITY: ICD-10-CM

## 2019-01-11 DIAGNOSIS — E03.8 SUBCLINICAL HYPOTHYROIDISM: ICD-10-CM

## 2019-01-11 DIAGNOSIS — G43.109 MIGRAINE WITH AURA AND WITHOUT STATUS MIGRAINOSUS, NOT INTRACTABLE: ICD-10-CM

## 2019-01-11 DIAGNOSIS — R10.31 GROIN PAIN, RIGHT: ICD-10-CM

## 2019-01-11 DIAGNOSIS — R73.01 IMPAIRED FASTING GLUCOSE: ICD-10-CM

## 2019-01-11 DIAGNOSIS — E78.1 HYPERTRIGLYCERIDEMIA: ICD-10-CM

## 2019-01-11 DIAGNOSIS — K21.9 GASTROESOPHAGEAL REFLUX DISEASE WITHOUT ESOPHAGITIS: ICD-10-CM

## 2019-01-11 DIAGNOSIS — H72.91: ICD-10-CM

## 2019-01-11 DIAGNOSIS — Z00.00 ENCOUNTER FOR ROUTINE ADULT HEALTH EXAMINATION WITHOUT ABNORMAL FINDINGS: Primary | ICD-10-CM

## 2019-01-11 DIAGNOSIS — E78.5 HYPERLIPIDEMIA LDL GOAL <130: ICD-10-CM

## 2019-01-11 DIAGNOSIS — Z23 NEED FOR TDAP VACCINATION: ICD-10-CM

## 2019-01-11 LAB
ALBUMIN SERPL-MCNC: 4 G/DL (ref 3.4–5)
ALP SERPL-CCNC: 105 U/L (ref 40–150)
ALT SERPL W P-5'-P-CCNC: 31 U/L (ref 0–70)
ANION GAP SERPL CALCULATED.3IONS-SCNC: 5 MMOL/L (ref 3–14)
AST SERPL W P-5'-P-CCNC: 20 U/L (ref 0–45)
BILIRUB SERPL-MCNC: 0.6 MG/DL (ref 0.2–1.3)
BUN SERPL-MCNC: 11 MG/DL (ref 7–30)
CALCIUM SERPL-MCNC: 8.3 MG/DL (ref 8.5–10.1)
CHLORIDE SERPL-SCNC: 111 MMOL/L (ref 94–109)
CHOLEST SERPL-MCNC: 214 MG/DL
CO2 SERPL-SCNC: 23 MMOL/L (ref 20–32)
CREAT SERPL-MCNC: 0.85 MG/DL (ref 0.66–1.25)
GFR SERPL CREATININE-BSD FRML MDRD: >90 ML/MIN/{1.73_M2}
GLUCOSE SERPL-MCNC: 92 MG/DL (ref 70–99)
HDLC SERPL-MCNC: 36 MG/DL
LDLC SERPL CALC-MCNC: 120 MG/DL
NONHDLC SERPL-MCNC: 178 MG/DL
POTASSIUM SERPL-SCNC: 4.1 MMOL/L (ref 3.4–5.3)
PROT SERPL-MCNC: 7.5 G/DL (ref 6.8–8.8)
SODIUM SERPL-SCNC: 139 MMOL/L (ref 133–144)
T4 FREE SERPL-MCNC: 0.86 NG/DL (ref 0.76–1.46)
TRIGL SERPL-MCNC: 292 MG/DL
TSH SERPL DL<=0.005 MIU/L-ACNC: 4.42 MU/L (ref 0.4–4)

## 2019-01-11 PROCEDURE — 99396 PREV VISIT EST AGE 40-64: CPT | Mod: 25 | Performed by: PHYSICIAN ASSISTANT

## 2019-01-11 PROCEDURE — 99214 OFFICE O/P EST MOD 30 MIN: CPT | Mod: 25 | Performed by: PHYSICIAN ASSISTANT

## 2019-01-11 PROCEDURE — 84443 ASSAY THYROID STIM HORMONE: CPT | Performed by: PHYSICIAN ASSISTANT

## 2019-01-11 PROCEDURE — 90471 IMMUNIZATION ADMIN: CPT | Performed by: PHYSICIAN ASSISTANT

## 2019-01-11 PROCEDURE — 80061 LIPID PANEL: CPT | Performed by: PHYSICIAN ASSISTANT

## 2019-01-11 PROCEDURE — 84439 ASSAY OF FREE THYROXINE: CPT | Performed by: PHYSICIAN ASSISTANT

## 2019-01-11 PROCEDURE — 90715 TDAP VACCINE 7 YRS/> IM: CPT | Performed by: PHYSICIAN ASSISTANT

## 2019-01-11 PROCEDURE — 80053 COMPREHEN METABOLIC PANEL: CPT | Performed by: PHYSICIAN ASSISTANT

## 2019-01-11 PROCEDURE — 36415 COLL VENOUS BLD VENIPUNCTURE: CPT | Performed by: PHYSICIAN ASSISTANT

## 2019-01-11 RX ORDER — LEVOTHYROXINE SODIUM 125 UG/1
125 TABLET ORAL DAILY
Qty: 90 TABLET | Refills: 3 | Status: SHIPPED | OUTPATIENT
Start: 2019-01-11 | End: 2020-01-27

## 2019-01-11 RX ORDER — GEMFIBROZIL 600 MG/1
TABLET, FILM COATED ORAL
Qty: 180 TABLET | Refills: 3 | Status: SHIPPED | OUTPATIENT
Start: 2019-01-11 | End: 2020-01-27

## 2019-01-11 RX ORDER — MELOXICAM 15 MG/1
15 TABLET ORAL DAILY
Qty: 30 TABLET | Refills: 5 | Status: SHIPPED | OUTPATIENT
Start: 2019-01-11 | End: 2019-04-15

## 2019-01-11 RX ORDER — SUMATRIPTAN 100 MG/1
TABLET, FILM COATED ORAL
Qty: 18 TABLET | Refills: 5 | Status: SHIPPED | OUTPATIENT
Start: 2019-01-11 | End: 2020-01-17

## 2019-01-11 RX ORDER — TOPIRAMATE 50 MG/1
50 TABLET, FILM COATED ORAL DAILY
Qty: 90 TABLET | Refills: 3 | Status: SHIPPED | OUTPATIENT
Start: 2019-01-11 | End: 2020-01-27

## 2019-01-11 RX ORDER — OFLOXACIN 3 MG/ML
5 SOLUTION AURICULAR (OTIC) DAILY
Qty: 1 BOTTLE | Refills: 0 | Status: SHIPPED | OUTPATIENT
Start: 2019-01-11 | End: 2019-04-01

## 2019-01-11 ASSESSMENT — ANXIETY QUESTIONNAIRES
6. BECOMING EASILY ANNOYED OR IRRITABLE: NOT AT ALL
5. BEING SO RESTLESS THAT IT IS HARD TO SIT STILL: NOT AT ALL
GAD7 TOTAL SCORE: 0
3. WORRYING TOO MUCH ABOUT DIFFERENT THINGS: NOT AT ALL
IF YOU CHECKED OFF ANY PROBLEMS ON THIS QUESTIONNAIRE, HOW DIFFICULT HAVE THESE PROBLEMS MADE IT FOR YOU TO DO YOUR WORK, TAKE CARE OF THINGS AT HOME, OR GET ALONG WITH OTHER PEOPLE: NOT DIFFICULT AT ALL
2. NOT BEING ABLE TO STOP OR CONTROL WORRYING: NOT AT ALL
7. FEELING AFRAID AS IF SOMETHING AWFUL MIGHT HAPPEN: NOT AT ALL
1. FEELING NERVOUS, ANXIOUS, OR ON EDGE: NOT AT ALL

## 2019-01-11 ASSESSMENT — PATIENT HEALTH QUESTIONNAIRE - PHQ9
5. POOR APPETITE OR OVEREATING: NOT AT ALL
SUM OF ALL RESPONSES TO PHQ QUESTIONS 1-9: 0

## 2019-01-11 ASSESSMENT — MIFFLIN-ST. JEOR: SCORE: 1912.05

## 2019-01-11 NOTE — NURSING NOTE
Screening Questionnaire for Adult Immunization    Are you sick today?   No   Do you have allergies to medications, food, a vaccine component or latex?   No   Have you ever had a serious reaction after receiving a vaccination?   No   Do you have a long-term health problem with heart disease, lung disease, asthma, kidney disease, metabolic disease (e.g. diabetes), anemia, or other blood disorder?   No   Do you have cancer, leukemia, HIV/AIDS, or any other immune system problem?   No   In the past 3 months, have you taken medications that affect  your immune system, such as prednisone, other steroids, or anticancer drugs; drugs for the treatment of rheumatoid arthritis, Crohn s disease, or psoriasis; or have you had radiation treatments?   No   Have you had a seizure, or a brain or other nervous system problem?   No   During the past year, have you received a transfusion of blood or blood     products, or been given immune (gamma) globulin or antiviral drug?   No   For women: Are you pregnant or is there a chance you could become        pregnant during the next month?   No   Have you received any vaccinations in the past 4 weeks?   No     Immunization questionnaire answers were all negative.        Per orders of SHANE, injection of Adacel given by Precious Cruz. Patient instructed to remain in clinic for 15 minutes afterwards, and to report any adverse reaction to me immediately.    Prior to injection verified patient identity using patient's name and date of birth. Precious Cruz, CMA     Screening performed by Precious Cruz on 1/11/2019 at 9:06 AM.

## 2019-01-12 ASSESSMENT — ANXIETY QUESTIONNAIRES: GAD7 TOTAL SCORE: 0

## 2019-01-18 ENCOUNTER — HOSPITAL ENCOUNTER (OUTPATIENT)
Dept: MRI IMAGING | Facility: CLINIC | Age: 49
Discharge: HOME | End: 2019-01-18
Admitting: PHYSICIAN ASSISTANT
Payer: COMMERCIAL

## 2019-01-18 DIAGNOSIS — M54.41 RIGHT-SIDED LOW BACK PAIN WITH RIGHT-SIDED SCIATICA, UNSPECIFIED CHRONICITY: ICD-10-CM

## 2019-01-18 DIAGNOSIS — R10.31 GROIN PAIN, RIGHT: ICD-10-CM

## 2019-01-18 PROCEDURE — 72148 MRI LUMBAR SPINE W/O DYE: CPT

## 2019-02-07 DIAGNOSIS — G43.109 MIGRAINE WITH AURA AND WITHOUT STATUS MIGRAINOSUS, NOT INTRACTABLE: ICD-10-CM

## 2019-02-07 RX ORDER — TOPIRAMATE 50 MG/1
TABLET, FILM COATED ORAL
Qty: 60 TABLET | Refills: 0 | OUTPATIENT
Start: 2019-02-07

## 2019-02-11 ENCOUNTER — MYC MEDICAL ADVICE (OUTPATIENT)
Dept: FAMILY MEDICINE | Facility: OTHER | Age: 49
End: 2019-02-11

## 2019-02-11 DIAGNOSIS — G43.109 MIGRAINE WITH AURA AND WITHOUT STATUS MIGRAINOSUS, NOT INTRACTABLE: ICD-10-CM

## 2019-02-11 DIAGNOSIS — K21.9 GASTROESOPHAGEAL REFLUX DISEASE WITHOUT ESOPHAGITIS: ICD-10-CM

## 2019-02-11 DIAGNOSIS — M54.41 RIGHT-SIDED LOW BACK PAIN WITH RIGHT-SIDED SCIATICA, UNSPECIFIED CHRONICITY: Primary | ICD-10-CM

## 2019-02-11 DIAGNOSIS — M51.369 DDD (DEGENERATIVE DISC DISEASE), LUMBAR: ICD-10-CM

## 2019-02-11 RX ORDER — OMEPRAZOLE 40 MG/1
40 CAPSULE, DELAYED RELEASE ORAL DAILY
Qty: 90 CAPSULE | Refills: 3 | Status: SHIPPED | OUTPATIENT
Start: 2019-02-11 | End: 2020-01-27

## 2019-02-12 RX ORDER — TOPIRAMATE 50 MG/1
TABLET, FILM COATED ORAL
Qty: 60 TABLET | Refills: 0 | OUTPATIENT
Start: 2019-02-12

## 2019-02-12 NOTE — TELEPHONE ENCOUNTER
topamax    Sent 1/11/2019 with 1 year supply. Refill not appropriate at this time.     Sherry Newton, RN, BSN

## 2019-02-13 ENCOUNTER — OFFICE VISIT (OUTPATIENT)
Dept: OTOLARYNGOLOGY | Facility: OTHER | Age: 49
End: 2019-02-13
Payer: COMMERCIAL

## 2019-02-13 ENCOUNTER — OFFICE VISIT (OUTPATIENT)
Dept: AUDIOLOGY | Facility: OTHER | Age: 49
End: 2019-02-13
Payer: COMMERCIAL

## 2019-02-13 VITALS
SYSTOLIC BLOOD PRESSURE: 148 MMHG | OXYGEN SATURATION: 98 % | WEIGHT: 222 LBS | DIASTOLIC BLOOD PRESSURE: 80 MMHG | HEART RATE: 83 BPM | BODY MASS INDEX: 30.53 KG/M2

## 2019-02-13 DIAGNOSIS — H69.93 CHRONIC EUSTACHIAN TUBE DYSFUNCTION, BILATERAL: ICD-10-CM

## 2019-02-13 DIAGNOSIS — Z98.890 H/O MYRINGOTOMY: ICD-10-CM

## 2019-02-13 DIAGNOSIS — H90.A11 CONDUCTIVE HEARING LOSS OF RIGHT EAR WITH RESTRICTED HEARING OF LEFT EAR: Primary | ICD-10-CM

## 2019-02-13 DIAGNOSIS — H90.A22 SENSORINEURAL HEARING LOSS (SNHL) OF LEFT EAR WITH RESTRICTED HEARING OF RIGHT EAR: ICD-10-CM

## 2019-02-13 DIAGNOSIS — H90.6 MIXED CONDUCTIVE AND SENSORINEURAL HEARING LOSS OF BOTH EARS: Primary | ICD-10-CM

## 2019-02-13 PROCEDURE — 92557 COMPREHENSIVE HEARING TEST: CPT | Performed by: AUDIOLOGIST

## 2019-02-13 PROCEDURE — 69433 CREATE EARDRUM OPENING: CPT | Mod: 50 | Performed by: OTOLARYNGOLOGY

## 2019-02-13 PROCEDURE — 99213 OFFICE O/P EST LOW 20 MIN: CPT | Mod: 25 | Performed by: OTOLARYNGOLOGY

## 2019-02-13 PROCEDURE — 99207 ZZC NO CHARGE LOS: CPT | Performed by: AUDIOLOGIST

## 2019-02-13 NOTE — PROGRESS NOTES
History of Present Illness - Linden Cruz is a 48 year old male presenting in clinic today for a recheck on Patient presents with:  Surgical Followup: S/P myringotomy with insertion of tube   RECHECK: patient states tube fell out    The patient had multiple sets of tubes in the past.  Last set was triune tubes.  Avulsion of the skin mild.  He is recently had another appears to be infection involving his right ear.  Feels a lot of pressure on the right as well as still pressure in the left.  He had drainage from the right ear after his tube came out the TM apparently closed.  Currently just experienced depression feels it does affect his hearing.    Present Symptoms include: otolgia, hearing loss, tinnitis, otorhea and right ear perforation and they are   getting worse .       Body mass index is 30.53 kg/m .        BP Readings from Last 1 Encounters:   01/11/19 128/82       BP noted to be well controlled today in office.     Linden IS NOT a smoker/uses chewing tobacco.       Past Medical History -   Past Medical History:   Diagnosis Date     Chest pain      Esophageal reflux      Migraines      Mixed hyperlipidemia      DANIEL (obstructive sleep apnea)      Other specified gastritis without mention of hemorrhage     H. Pylori, diag 12/06       Current Medications -   Current Outpatient Medications:      fluticasone (FLONASE) 50 MCG/ACT spray, Spray 1-2 sprays into both nostrils daily (Patient not taking: Reported on 10/24/2018), Disp: 1 Bottle, Rfl: 3     gemfibrozil (LOPID) 600 MG tablet, TAKE 1 TABLET (600 MG) BY MOUTH 2 TIMES DAILY, Disp: 180 tablet, Rfl: 3     Ibuprofen (ADVIL PO), , Disp: , Rfl:      levothyroxine (SYNTHROID/LEVOTHROID) 125 MCG tablet, Take 1 tablet (125 mcg) by mouth daily, Disp: 90 tablet, Rfl: 3     meloxicam (MOBIC) 15 MG tablet, Take 1 tablet (15 mg) by mouth daily, Disp: 30 tablet, Rfl: 5     Multiple Vitamin (MULTI VITAMIN MENS PO), Take  by mouth., Disp: , Rfl:      omeprazole  (PRILOSEC) 40 MG DR capsule, Take 1 capsule (40 mg) by mouth daily, Disp: 90 capsule, Rfl: 3     ranitidine (ZANTAC) 150 MG capsule, Take 1 capsule (150 mg) by mouth 2 times daily, Disp: 60 capsule, Rfl: 5     SUMAtriptan (IMITREX) 100 MG tablet, TAKE 1 TAB BY MOUTH WITH ONSET OF MIGRAINE, MAY REPEAT ONCE AFTER 2 HOURS. MAX 2 TABS/24 HOURS., Disp: 18 tablet, Rfl: 5     tadalafil (CIALIS) 20 MG tablet, TAKE 1/2 TO 1 TABLET BY MOUTH AS NEEDED FOR ED, DO NOT USE WITH NITRO/TERAZOSIN/DOXAZOSIN, Disp: 8 tablet, Rfl: 8     topiramate (TOPAMAX) 50 MG tablet, Take 1 tablet (50 mg) by mouth daily, Disp: 90 tablet, Rfl: 3    Allergies -   Allergies   Allergen Reactions     No Known Drug Allergies        Social History -   Social History     Socioeconomic History     Marital status:      Spouse name: Not on file     Number of children: 0     Years of education: Not on file     Highest education level: Not on file   Social Needs     Financial resource strain: Not on file     Food insecurity - worry: Not on file     Food insecurity - inability: Not on file     Transportation needs - medical: Not on file     Transportation needs - non-medical: Not on file   Occupational History     Employer: [employer]     Comment: road construction   Tobacco Use     Smoking status: Former Smoker     Types: Cigarettes     Last attempt to quit: 2000     Years since quittin.1     Smokeless tobacco: Never Used   Substance and Sexual Activity     Alcohol use: Yes     Alcohol/week: 0.0 oz     Comment: 1/month     Drug use: No     Sexual activity: Yes     Partners: Female     Birth control/protection: Surgical     Comment: vasectomy   Other Topics Concern     Parent/sibling w/ CABG, MI or angioplasty before 65F 55M? No      Service Not Asked     Blood Transfusions Not Asked     Caffeine Concern Yes     Comment: large intake per day     Occupational Exposure Not Asked     Hobby Hazards Not Asked     Sleep Concern Not Asked      Stress Concern Not Asked     Weight Concern Not Asked     Special Diet Yes     Comment: low cholesterol      Back Care Not Asked     Exercise No     Bike Helmet Not Asked     Seat Belt Not Asked     Self-Exams Not Asked   Social History Narrative    Dairy/d 2-3 servings/d.     Caffeine 2-3 servings/d    Exercise 1-2 x week    Sunscreen used - NO, uses sunscreen in summer months    Seatbelts used - YES    Working smoke/CO detectors in the home - YES    Guns stored in the home - YES    Self Breast Exams - NA    Self Testicular Exam - YES    Eye Exam up to date - YES    Dental Exam up to date - YES    Pap Smear up to date - NA    Mammogram up to date - NA    PSA up to date - NA    Dexa Scan up to date - NA    Flex Sig / Colonoscopy up to date - NA    Immunizations up to date - YES    Abuse: Current or Past(Physical, Sexual or Emotional)- NO    Do you feel safe in your environment - YES    Claudiashruthi Buttsal, [facility]  1/29/2010               Family History -   Family History   Problem Relation Age of Onset     Diabetes Maternal Grandmother      C.A.D. Maternal Grandfather      Diabetes Maternal Grandfather      Cancer Maternal Grandfather         lung cancer     Cerebrovascular Disease Paternal Grandmother      C.A.D. Paternal Grandfather      Other - See Comments Mother         bilateral carotid occlusion, SVG bypass to both.     Asthma No family hx of      Hypertension No family hx of      Breast Cancer No family hx of      Cancer - colorectal No family hx of      Prostate Cancer No family hx of      Alcohol/Drug No family hx of      Allergies No family hx of      Alzheimer Disease No family hx of      Anesthesia Reaction No family hx of      Arthritis No family hx of      Blood Disease No family hx of      Cardiovascular No family hx of      Circulatory No family hx of      Congenital Anomalies No family hx of      Connective Tissue Disorder No family hx of      Depression No family hx of      Endocrine Disease No  family hx of      Eye Disorder No family hx of      Genetic Disorder No family hx of      Gastrointestinal Disease No family hx of      Genitourinary Problems No family hx of      Gynecology No family hx of      Heart Disease No family hx of      Lipids No family hx of      Musculoskeletal Disorder No family hx of      Neurologic Disorder No family hx of      Obesity No family hx of      Osteoporosis No family hx of      Psychotic Disorder No family hx of      Respiratory No family hx of      Thyroid Disease No family hx of      Hearing Loss No family hx of      Coronary Artery Disease No family hx of      Mental Illness No family hx of      Depression/Anxiety No family hx of      Known Genetic Syndrome No family hx of        Review of Systems - As per HPI and PMHx, otherwise review of system review of the head and neck negative. Otherwise 10+ review of system is negative    Physical Exam  There were no vitals taken for this visit.  BMI: There is no height or weight on file to calculate BMI.    General - The patient is well nourished and well developed, and appears to have good nutritional status.  Alert and oriented to person and place, answers questions and cooperates with examination appropriately.    SKIN - No suspicious lesions or rashes.  Respiration - No respiratory distress.  Head and Face - Normocephalic and atraumatic, with no gross asymmetry noted of the contour of the facial features.  The facial nerve is intact, with strong symmetric movements.    Voice and Breathing - The patient was breathing comfortably without the use of accessory muscles. The patients voice was clear and strong, and had appropriate pitch and quality.    Ears - Bilateral pinna and EACs with normal appearing overlying skin.  Very atelectatic right tympanic membrane appreciated with what appears to be possible anterior pinhole perforation.  On the right to see posterior pinhole perforation but TM is overall clear..      Audiogram:  Tympanograms were type B with high volume bilaterally.  Pure-tone thresholds on the left side demonstrated mild to moderate high-frequency mixed loss.  On the right side mild mostly conductive loss was appreciated.      Performed in clinic today:  Bilateral myringotomy with Cason T-tube placement:  Surgeon: Jackie  Patient with severe eustachian tube dysfunction bilaterally.  Still has a lot of residual of otitis media on the right with severe atelectasis and a small microperforation on the left posteriorly inferiorly residual of the tube.  Knowing his history of pinhole perforation is closing and fluid accumulating atelectasis that is also contributing to his conductive loss on the right we discussed more long-term solution.  He is interested in placement of T tubes.  Patient is taken to the procedure room appropriate position prepped and draped.  We examined the right ear and the microscope.  Using topical phenol carefully anesthetize inferior inferior anterior quadrant.  Radial myringotomy incision is made.  Scant amount of serous fluid is suctioned followed by placement of T-tube.  On the left side I appreciate a tiny pinhole perforation.  I stay anterior to that to use topical phenol.  Radial myringotomy incision was made.  No fluid is found.  T-tube was placed without difficulty.  Patient tolerated procedure well.    A/P - Linden Cruz is a 48 year old male Patient presents with:  Surgical Followup: S/P myringotomy with insertion of tube   RECHECK: patient states tube fell out    Patient was given appropriate instructions of the placement of his tubes.  He will have these water precautions with T tubes.  He will follow-up with me in about 6 weeks for recheck audio be obtained at this time.      Savage Mejia MD

## 2019-02-13 NOTE — PROGRESS NOTES
AUDIOLOGY REPORT:    Patient was referred from ENT by Dr. Mejia for audiology evaluation. Patient has a history of multiple set of PE tubes. Today he reports that he believes both tubes have extruded, but he was told by another provider that he had a perforation of his right tympanic membrane. Patient states that both ears feel plugged and occasionally hurt. He reports bilateral tinnitus which lateralizes to the left ear.     Testing:    Otoscopy:   Otoscopic exam indicates ears are clear of cerumen bilaterally     Tympanograms:    RIGHT: could not seal     LEFT:  could not seal    Thresholds:   Pure Tone Thresholds assessed using conventional audiometry with good reliability from 250-8000 Hz bilaterally using circumaural headphones     RIGHT:  normal, mild and severe conductive hearing loss    LEFT:    normal and mild-moderate sensorineural hearing loss    Speech Reception Threshold:    RIGHT: 30 dB HL    LEFT:   15 dB HL  Results are in agreement with pure tone average.     Word Recognition Score:     RIGHT: 100% at 70 dB HL using NU-6 recorded word list.    LEFT:   100% at 55 dB HL using NU-6 recorded word list.    Discussed results with the patient. Recommend re-evaluation of hearing post medical management.      Patient was returned to ENT for follow up.     Tracy Gleason, CCC-A  Licensed Audiologist #[id number]  2/13/2019

## 2019-02-13 NOTE — LETTER
2/13/2019         RE: Linden Cruz  [address] [address] [address]        Dear Colleague,    Thank you for referring your patient, Linden Cruz, to the [facility]. Please see a copy of my visit note below.    History of Present Illness - Linden Cruz is a 48 year old male presenting in clinic today for a recheck on Patient presents with:  Surgical Followup: S/P myringotomy with insertion of tube   RECHECK: patient states tube fell out    The patient had multiple sets of tubes in the past.  Last set was triune tubes.  Avulsion of the skin mild.  He is recently had another appears to be infection involving his right ear.  Feels a lot of pressure on the right as well as still pressure in the left.  He had drainage from the right ear after his tube came out the TM apparently closed.  Currently just experienced depression feels it does affect his hearing.    Present Symptoms include: otolgia, hearing loss, tinnitis, otorhea and right ear perforation and they are   getting worse .       Body mass index is 30.53 kg/m .        BP Readings from Last 1 Encounters:   01/11/19 128/82       BP noted to be well controlled today in office.     Linden IS NOT a smoker/uses chewing tobacco.       Past Medical History -   Past Medical History:   Diagnosis Date     Chest pain      Esophageal reflux      Migraines      Mixed hyperlipidemia      DANIEL (obstructive sleep apnea)      Other specified gastritis without mention of hemorrhage     H. Pylori, diag 12/06       Current Medications -   Current Outpatient Medications:      fluticasone (FLONASE) 50 MCG/ACT spray, Spray 1-2 sprays into both nostrils daily (Patient not taking: Reported on 10/24/2018), Disp: 1 Bottle, Rfl: 3     gemfibrozil (LOPID) 600 MG tablet, TAKE 1 TABLET (600 MG) BY MOUTH 2 TIMES DAILY, Disp: 180 tablet, Rfl: 3     Ibuprofen (ADVIL PO), , Disp: , Rfl:      levothyroxine (SYNTHROID/LEVOTHROID) 125 MCG tablet, Take 1 tablet  (125 mcg) by mouth daily, Disp: 90 tablet, Rfl: 3     meloxicam (MOBIC) 15 MG tablet, Take 1 tablet (15 mg) by mouth daily, Disp: 30 tablet, Rfl: 5     Multiple Vitamin (MULTI VITAMIN MENS PO), Take  by mouth., Disp: , Rfl:      omeprazole (PRILOSEC) 40 MG DR capsule, Take 1 capsule (40 mg) by mouth daily, Disp: 90 capsule, Rfl: 3     ranitidine (ZANTAC) 150 MG capsule, Take 1 capsule (150 mg) by mouth 2 times daily, Disp: 60 capsule, Rfl: 5     SUMAtriptan (IMITREX) 100 MG tablet, TAKE 1 TAB BY MOUTH WITH ONSET OF MIGRAINE, MAY REPEAT ONCE AFTER 2 HOURS. MAX 2 TABS/24 HOURS., Disp: 18 tablet, Rfl: 5     tadalafil (CIALIS) 20 MG tablet, TAKE 1/2 TO 1 TABLET BY MOUTH AS NEEDED FOR ED, DO NOT USE WITH NITRO/TERAZOSIN/DOXAZOSIN, Disp: 8 tablet, Rfl: 8     topiramate (TOPAMAX) 50 MG tablet, Take 1 tablet (50 mg) by mouth daily, Disp: 90 tablet, Rfl: 3    Allergies -   Allergies   Allergen Reactions     No Known Drug Allergies        Social History -   Social History     Socioeconomic History     Marital status:      Spouse name: Not on file     Number of children: 0     Years of education: Not on file     Highest education level: Not on file   Social Needs     Financial resource strain: Not on file     Food insecurity - worry: Not on file     Food insecurity - inability: Not on file     Transportation needs - medical: Not on file     Transportation needs - non-medical: Not on file   Occupational History     Employer: [employer]     Comment: road construction   Tobacco Use     Smoking status: Former Smoker     Types: Cigarettes     Last attempt to quit: 2000     Years since quittin.1     Smokeless tobacco: Never Used   Substance and Sexual Activity     Alcohol use: Yes     Alcohol/week: 0.0 oz     Comment: 1/month     Drug use: No     Sexual activity: Yes     Partners: Female     Birth control/protection: Surgical     Comment: vasectomy   Other Topics Concern     Parent/sibling w/ CABG, MI or  angioplasty before 65F 55M? No      Service Not Asked     Blood Transfusions Not Asked     Caffeine Concern Yes     Comment: large intake per day     Occupational Exposure Not Asked     Hobby Hazards Not Asked     Sleep Concern Not Asked     Stress Concern Not Asked     Weight Concern Not Asked     Special Diet Yes     Comment: low cholesterol      Back Care Not Asked     Exercise No     Bike Helmet Not Asked     Seat Belt Not Asked     Self-Exams Not Asked   Social History Narrative    Dairy/d 2-3 servings/d.     Caffeine 2-3 servings/d    Exercise 1-2 x week    Sunscreen used - NO, uses sunscreen in summer months    Seatbelts used - YES    Working smoke/CO detectors in the home - YES    Guns stored in the home - YES    Self Breast Exams - NA    Self Testicular Exam - YES    Eye Exam up to date - YES    Dental Exam up to date - YES    Pap Smear up to date - NA    Mammogram up to date - NA    PSA up to date - NA    Dexa Scan up to date - NA    Flex Sig / Colonoscopy up to date - NA    Immunizations up to date - YES    Abuse: Current or Past(Physical, Sexual or Emotional)- NO    Do you feel safe in your environment - YES    Claudia Salamanca, [facility]  1/29/2010               Family History -   Family History   Problem Relation Age of Onset     Diabetes Maternal Grandmother      C.A.D. Maternal Grandfather      Diabetes Maternal Grandfather      Cancer Maternal Grandfather         lung cancer     Cerebrovascular Disease Paternal Grandmother      C.A.D. Paternal Grandfather      Other - See Comments Mother         bilateral carotid occlusion, SVG bypass to both.     Asthma No family hx of      Hypertension No family hx of      Breast Cancer No family hx of      Cancer - colorectal No family hx of      Prostate Cancer No family hx of      Alcohol/Drug No family hx of      Allergies No family hx of      Alzheimer Disease No family hx of      Anesthesia Reaction No family hx of      Arthritis No family hx of      Blood  Disease No family hx of      Cardiovascular No family hx of      Circulatory No family hx of      Congenital Anomalies No family hx of      Connective Tissue Disorder No family hx of      Depression No family hx of      Endocrine Disease No family hx of      Eye Disorder No family hx of      Genetic Disorder No family hx of      Gastrointestinal Disease No family hx of      Genitourinary Problems No family hx of      Gynecology No family hx of      Heart Disease No family hx of      Lipids No family hx of      Musculoskeletal Disorder No family hx of      Neurologic Disorder No family hx of      Obesity No family hx of      Osteoporosis No family hx of      Psychotic Disorder No family hx of      Respiratory No family hx of      Thyroid Disease No family hx of      Hearing Loss No family hx of      Coronary Artery Disease No family hx of      Mental Illness No family hx of      Depression/Anxiety No family hx of      Known Genetic Syndrome No family hx of        Review of Systems - As per HPI and PMHx, otherwise review of system review of the head and neck negative. Otherwise 10+ review of system is negative    Physical Exam  There were no vitals taken for this visit.  BMI: There is no height or weight on file to calculate BMI.    General - The patient is well nourished and well developed, and appears to have good nutritional status.  Alert and oriented to person and place, answers questions and cooperates with examination appropriately.    SKIN - No suspicious lesions or rashes.  Respiration - No respiratory distress.  Head and Face - Normocephalic and atraumatic, with no gross asymmetry noted of the contour of the facial features.  The facial nerve is intact, with strong symmetric movements.    Voice and Breathing - The patient was breathing comfortably without the use of accessory muscles. The patients voice was clear and strong, and had appropriate pitch and quality.    Ears - Bilateral pinna and EACs with  normal appearing overlying skin.  Very atelectatic right tympanic membrane appreciated with what appears to be possible anterior pinhole perforation.  On the right to see posterior pinhole perforation but TM is overall clear..     Audiogram:  Tympanograms were type B with high volume bilaterally.  Pure-tone thresholds on the left side demonstrated mild to moderate high-frequency mixed loss.  On the right side mild mostly conductive loss was appreciated.      Performed in clinic today:  Bilateral myringotomy with Cason T-tube placement:  Surgeon: Jackie  Patient with severe eustachian tube dysfunction bilaterally.  Still has a lot of residual of otitis media on the right with severe atelectasis and a small microperforation on the left posteriorly inferiorly residual of the tube.  Knowing his history of pinhole perforation is closing and fluid accumulating atelectasis that is also contributing to his conductive loss on the right we discussed more long-term solution.  He is interested in placement of T tubes.  Patient is taken to the procedure room appropriate position prepped and draped.  We examined the right ear and the microscope.  Using topical phenol carefully anesthetize inferior inferior anterior quadrant.  Radial myringotomy incision is made.  Scant amount of serous fluid is suctioned followed by placement of T-tube.  On the left side I appreciate a tiny pinhole perforation.  I stay anterior to that to use topical phenol.  Radial myringotomy incision was made.  No fluid is found.  T-tube was placed without difficulty.  Patient tolerated procedure well.    A/P - Linden Cruz is a 48 year old male Patient presents with:  Surgical Followup: S/P myringotomy with insertion of tube   RECHECK: patient states tube fell out    Patient was given appropriate instructions of the placement of his tubes.  He will have these water precautions with T tubes.  He will follow-up with me in about 6 weeks for recheck  audio be obtained at this time.      Savage Mejia MD        Again, thank you for allowing me to participate in the care of your patient.        Sincerely,        Savage Mejia MD, MD

## 2019-02-14 ENCOUNTER — HOSPITAL ENCOUNTER (OUTPATIENT)
Dept: PHYSICAL THERAPY | Facility: OTHER | Age: 49
Setting detail: THERAPIES SERIES
End: 2019-02-14
Attending: PHYSICIAN ASSISTANT
Payer: COMMERCIAL

## 2019-02-14 DIAGNOSIS — M54.41 RIGHT-SIDED LOW BACK PAIN WITH RIGHT-SIDED SCIATICA, UNSPECIFIED CHRONICITY: ICD-10-CM

## 2019-02-14 DIAGNOSIS — M51.369 DDD (DEGENERATIVE DISC DISEASE), LUMBAR: ICD-10-CM

## 2019-02-14 PROCEDURE — 97110 THERAPEUTIC EXERCISES: CPT | Mod: GP | Performed by: PHYSICAL THERAPIST

## 2019-02-14 PROCEDURE — 97162 PT EVAL MOD COMPLEX 30 MIN: CPT | Mod: GP | Performed by: PHYSICAL THERAPIST

## 2019-02-14 PROCEDURE — 97530 THERAPEUTIC ACTIVITIES: CPT | Mod: GP | Performed by: PHYSICAL THERAPIST
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
## 2019-03-04 ENCOUNTER — HOSPITAL ENCOUNTER (OUTPATIENT)
Dept: PHYSICAL THERAPY | Facility: OTHER | Age: 49
Setting detail: THERAPIES SERIES
End: 2019-03-04
Attending: PHYSICIAN ASSISTANT
Payer: COMMERCIAL

## 2019-03-04 ENCOUNTER — MYC MEDICAL ADVICE (OUTPATIENT)
Dept: OTOLARYNGOLOGY | Facility: CLINIC | Age: 49
End: 2019-03-04

## 2019-03-04 PROCEDURE — 97530 THERAPEUTIC ACTIVITIES: CPT | Mod: GP | Performed by: PHYSICAL THERAPIST

## 2019-03-04 PROCEDURE — 97110 THERAPEUTIC EXERCISES: CPT | Mod: GP | Performed by: PHYSICAL THERAPIST

## 2019-03-06 NOTE — TELEPHONE ENCOUNTER
Patient has reviewed my mychart message to him. Will close encounter.  Rhonda Williamsno RN on 3/6/2019 at 11:17 AM

## 2019-03-11 ENCOUNTER — HOSPITAL ENCOUNTER (OUTPATIENT)
Dept: PHYSICAL THERAPY | Facility: OTHER | Age: 49
Setting detail: THERAPIES SERIES
End: 2019-03-11
Attending: PHYSICIAN ASSISTANT
Payer: COMMERCIAL

## 2019-03-11 PROCEDURE — 97014 ELECTRIC STIMULATION THERAPY: CPT | Mod: GP | Performed by: PHYSICAL THERAPIST

## 2019-03-11 PROCEDURE — 97110 THERAPEUTIC EXERCISES: CPT | Mod: GP | Performed by: PHYSICAL THERAPIST

## 2019-03-25 ENCOUNTER — HOSPITAL ENCOUNTER (OUTPATIENT)
Dept: PHYSICAL THERAPY | Facility: OTHER | Age: 49
Setting detail: THERAPIES SERIES
End: 2019-03-25
Attending: PHYSICIAN ASSISTANT
Payer: COMMERCIAL

## 2019-03-25 PROCEDURE — 97110 THERAPEUTIC EXERCISES: CPT | Mod: GP | Performed by: PHYSICAL THERAPIST

## 2019-04-01 ENCOUNTER — OFFICE VISIT (OUTPATIENT)
Dept: AUDIOLOGY | Facility: CLINIC | Age: 49
End: 2019-04-01
Payer: COMMERCIAL

## 2019-04-01 ENCOUNTER — HOSPITAL ENCOUNTER (OUTPATIENT)
Dept: PHYSICAL THERAPY | Facility: OTHER | Age: 49
Setting detail: THERAPIES SERIES
End: 2019-04-01
Attending: PHYSICIAN ASSISTANT
Payer: COMMERCIAL

## 2019-04-01 ENCOUNTER — OFFICE VISIT (OUTPATIENT)
Dept: OTOLARYNGOLOGY | Facility: CLINIC | Age: 49
End: 2019-04-01
Payer: COMMERCIAL

## 2019-04-01 VITALS — OXYGEN SATURATION: 97 % | SYSTOLIC BLOOD PRESSURE: 123 MMHG | HEART RATE: 93 BPM | DIASTOLIC BLOOD PRESSURE: 79 MMHG

## 2019-04-01 DIAGNOSIS — Z98.890 HX OF MYRINGOTOMY: Primary | ICD-10-CM

## 2019-04-01 DIAGNOSIS — H90.3 SENSORINEURAL HEARING LOSS, BILATERAL: ICD-10-CM

## 2019-04-01 DIAGNOSIS — H69.93 EUSTACHIAN TUBE DYSFUNCTION, BILATERAL: Primary | ICD-10-CM

## 2019-04-01 PROCEDURE — 92557 COMPREHENSIVE HEARING TEST: CPT | Performed by: AUDIOLOGIST

## 2019-04-01 PROCEDURE — 97110 THERAPEUTIC EXERCISES: CPT | Mod: GP | Performed by: PHYSICAL THERAPIST

## 2019-04-01 PROCEDURE — 92567 TYMPANOMETRY: CPT | Performed by: AUDIOLOGIST

## 2019-04-01 PROCEDURE — 99207 ZZC NO CHARGE LOS: CPT | Performed by: AUDIOLOGIST

## 2019-04-01 PROCEDURE — 99213 OFFICE O/P EST LOW 20 MIN: CPT | Performed by: OTOLARYNGOLOGY

## 2019-04-01 PROCEDURE — 97530 THERAPEUTIC ACTIVITIES: CPT | Mod: GP | Performed by: PHYSICAL THERAPIST

## 2019-04-01 NOTE — PROGRESS NOTES
AUDIOLOGY REPORT     SUMMARY: Audiology visit completed. See audiogram for results.     RECOMMENDATIONS: Follow-up with ENT    Tracy Waldrop Licensed Audiologist #[id number]

## 2019-04-01 NOTE — LETTER
4/1/2019         RE: Linden Cruz  [address] [facility] [address]        Dear Colleague,    Thank you for referring your patient, Linden Cruz, to the [facility]. Please see a copy of my visit note below.    History of Present Illness - Linden Cruz is a 48 year old male presenting in clinic today for a recheck on Mixed conductive and sensorineural hearing loss of both ears and s/p T-Tube placement on 02/13/19  Overall patient is feeling much better he feels his hearing is improved initially had some discomfort in his ears clicking discomfort but now that is gone.  Again he feels his hearing is much much improved.    Present Symptoms include: no sx and they are   stable .  Linden denies otolgia, otorhea and facial pain/pressure.      BP Readings from Last 1 Encounters:   02/13/19 148/80       BP noted to be well controlled today in office.     Linden IS NOT a smoker/uses chewing tobacco.     Past Medical History -   Past Medical History:   Diagnosis Date     Chest pain      Esophageal reflux      Migraines      Mixed hyperlipidemia      DANIEL (obstructive sleep apnea)      Other specified gastritis without mention of hemorrhage     H. Pylori, diag 12/06       Current Medications -   Current Outpatient Medications:      fluticasone (FLONASE) 50 MCG/ACT spray, Spray 1-2 sprays into both nostrils daily, Disp: 1 Bottle, Rfl: 3     gemfibrozil (LOPID) 600 MG tablet, TAKE 1 TABLET (600 MG) BY MOUTH 2 TIMES DAILY, Disp: 180 tablet, Rfl: 3     Ibuprofen (ADVIL PO), , Disp: , Rfl:      levothyroxine (SYNTHROID/LEVOTHROID) 125 MCG tablet, Take 1 tablet (125 mcg) by mouth daily, Disp: 90 tablet, Rfl: 3     meloxicam (MOBIC) 15 MG tablet, Take 1 tablet (15 mg) by mouth daily, Disp: 30 tablet, Rfl: 5     Multiple Vitamin (MULTI VITAMIN MENS PO), Take  by mouth., Disp: , Rfl:      omeprazole (PRILOSEC) 40 MG DR capsule, Take 1 capsule (40 mg) by mouth daily, Disp: 90 capsule, Rfl: 3      ranitidine (ZANTAC) 150 MG capsule, Take 1 capsule (150 mg) by mouth 2 times daily, Disp: 60 capsule, Rfl: 5     SUMAtriptan (IMITREX) 100 MG tablet, TAKE 1 TAB BY MOUTH WITH ONSET OF MIGRAINE, MAY REPEAT ONCE AFTER 2 HOURS. MAX 2 TABS/24 HOURS., Disp: 18 tablet, Rfl: 5     tadalafil (CIALIS) 20 MG tablet, TAKE 1/2 TO 1 TABLET BY MOUTH AS NEEDED FOR ED, DO NOT USE WITH NITRO/TERAZOSIN/DOXAZOSIN, Disp: 8 tablet, Rfl: 8     topiramate (TOPAMAX) 50 MG tablet, Take 1 tablet (50 mg) by mouth daily, Disp: 90 tablet, Rfl: 3    Allergies -   Allergies   Allergen Reactions     No Known Drug Allergies        Social History -   Social History     Socioeconomic History     Marital status:      Spouse name: Not on file     Number of children: 0     Years of education: Not on file     Highest education level: Not on file   Occupational History     Employer: [employer]     Comment: road construction   Social Needs     Financial resource strain: Not on file     Food insecurity:     Worry: Not on file     Inability: Not on file     Transportation needs:     Medical: Not on file     Non-medical: Not on file   Tobacco Use     Smoking status: Former Smoker     Types: Cigarettes     Last attempt to quit: 2000     Years since quittin.2     Smokeless tobacco: Never Used   Substance and Sexual Activity     Alcohol use: Yes     Alcohol/week: 0.0 oz     Comment: 1/month     Drug use: No     Sexual activity: Yes     Partners: Female     Birth control/protection: Surgical     Comment: vasectomy   Lifestyle     Physical activity:     Days per week: Not on file     Minutes per session: Not on file     Stress: Not on file   Relationships     Social connections:     Talks on phone: Not on file     Gets together: Not on file     Attends Scientologist service: Not on file     Active member of club or organization: Not on file     Attends meetings of clubs or organizations: Not on file     Relationship status: Not on file      Intimate partner violence:     Fear of current or ex partner: Not on file     Emotionally abused: Not on file     Physically abused: Not on file     Forced sexual activity: Not on file   Other Topics Concern     Parent/sibling w/ CABG, MI or angioplasty before 65F 55M? No      Service Not Asked     Blood Transfusions Not Asked     Caffeine Concern Yes     Comment: large intake per day     Occupational Exposure Not Asked     Hobby Hazards Not Asked     Sleep Concern Not Asked     Stress Concern Not Asked     Weight Concern Not Asked     Special Diet Yes     Comment: low cholesterol      Back Care Not Asked     Exercise No     Bike Helmet Not Asked     Seat Belt Not Asked     Self-Exams Not Asked   Social History Narrative    Dairy/d 2-3 servings/d.     Caffeine 2-3 servings/d    Exercise 1-2 x week    Sunscreen used - NO, uses sunscreen in summer months    Seatbelts used - YES    Working smoke/CO detectors in the home - YES    Guns stored in the home - YES    Self Breast Exams - NA    Self Testicular Exam - YES    Eye Exam up to date - YES    Dental Exam up to date - YES    Pap Smear up to date - NA    Mammogram up to date - NA    PSA up to date - NA    Dexa Scan up to date - NA    Flex Sig / Colonoscopy up to date - NA    Immunizations up to date - YES    Abuse: Current or Past(Physical, Sexual or Emotional)- NO    Do you feel safe in your environment - YES    Claudia Salamanca, [facility]  1/29/2010               Family History -   Family History   Problem Relation Age of Onset     Diabetes Maternal Grandmother      C.A.D. Maternal Grandfather      Diabetes Maternal Grandfather      Cancer Maternal Grandfather         lung cancer     Cerebrovascular Disease Paternal Grandmother      C.A.D. Paternal Grandfather      Other - See Comments Mother         bilateral carotid occlusion, SVG bypass to both.     Asthma No family hx of      Hypertension No family hx of      Breast Cancer No family hx of      Cancer -  colorectal No family hx of      Prostate Cancer No family hx of      Alcohol/Drug No family hx of      Allergies No family hx of      Alzheimer Disease No family hx of      Anesthesia Reaction No family hx of      Arthritis No family hx of      Blood Disease No family hx of      Cardiovascular No family hx of      Circulatory No family hx of      Congenital Anomalies No family hx of      Connective Tissue Disorder No family hx of      Depression No family hx of      Endocrine Disease No family hx of      Eye Disorder No family hx of      Genetic Disorder No family hx of      Gastrointestinal Disease No family hx of      Genitourinary Problems No family hx of      Gynecology No family hx of      Heart Disease No family hx of      Lipids No family hx of      Musculoskeletal Disorder No family hx of      Neurologic Disorder No family hx of      Obesity No family hx of      Osteoporosis No family hx of      Psychotic Disorder No family hx of      Respiratory No family hx of      Thyroid Disease No family hx of      Hearing Loss No family hx of      Coronary Artery Disease No family hx of      Mental Illness No family hx of      Depression/Anxiety No family hx of      Known Genetic Syndrome No family hx of        Review of Systems - As per HPI and PMHx, otherwise review of system review of the head and neck negative. Otherwise 10+ review of system is negative    Physical Exam  There were no vitals taken for this visit.  BMI: There is no height or weight on file to calculate BMI.    General - The patient is well nourished and well developed, and appears to have good nutritional status.  Alert and oriented to person and place, answers questions and cooperates with examination appropriately.    SKIN - No suspicious lesions or rashes.  Respiration - No respiratory distress.  Head and Face - Normocephalic and atraumatic, with no gross asymmetry noted of the contour of the facial features.  The facial nerve is intact, with  strong symmetric movements.    Voice and Breathing - The patient was breathing comfortably without the use of accessory muscles. The patients voice was clear and strong, and had appropriate pitch and quality.    Ears - Bilateral pinna and EACs with normal appearing overlying skin.  T-tube appreciated on the left tympanic membrane is clear.  I can see the middle ear space appears to be clear.  Ear canal clear and dry.  T-tube appreciated on the right side.  TM was again clear and external auditory canals clear.  Eyes - Extraocular movements intact.  Sclera were not icteric or injected, conjunctiva were pink and moist.    Mouth - Examination of the oral cavity showed pink, healthy oral mucosa. No lesions or ulcerations noted.  The tongue was mobile and midline, and the dentition were in good condition.      Throat - The walls of the oropharynx were smooth, pink, moist, symmetric, and had no lesions or ulcerations.  The tonsillar pillars and soft palate were symmetric. Tonsils are symmetric. The uvula was midline on elevation.    Neck - Normal midline excursion of the laryngotracheal complex during swallowing.  Full range of motion on passive movement.  Palpation of the occipital, submental, submandibular, internal jugular chain, and supraclavicular nodes did not demonstrate any abnormal lymph nodes or masses.  The carotid pulse was palpable bilaterally.  Palpation of the thyroid was soft and smooth, with no nodules or goiter appreciated.  The trachea was mobile and midline.    Nose - External contour is symmetric, no gross deflection or scars.  Nasal mucosa is pink and moist with no abnormal mucus.  The septum was midline and non-obstructive, turbinates of normal size and position.  No polyps, masses, or purulence noted on examination.    Neuro - Nonfocal neuro exam is normal, CN 2 through 12 intact, normal gait and muscle tone.      Performed in clinic today:  Audiologic Studies - An audiogram and tympanogram were  performed today as part of the evaluation and personally reviewed. The tympanogram shows Type B curves on the right and Type B curves on the left, with High canal volumes and middle ear pressures.  The audiogram showed Mild to moderate high-frequency loss on the right and Mild to moderate high-frequency loss slightly worse than on the right sideon the left.        A/P - Linden Cruz is a 48 year old male Patient presents with:  RECHECK: Mixed conductive and sensorineural hearing loss of both ears +2 more     Patient with stable sensorineural hearing loss.  Hearing protection again is again encouraged and reinforced.  Tubes appear to be in good position patent.    Linden should follow up in 1 year.      At Linden next appointment they will need a hearing test.      Savage Mejia MD        Again, thank you for allowing me to participate in the care of your patient.        Sincerely,        Savage Mejia MD, MD

## 2019-04-02 NOTE — PROGRESS NOTES
SUBJECTIVE:   Linden Cruz is a 48 year old male who presents to clinic today for the following health issues:      Back Pain Follow Up  Description:   Location of pain: bilateral  Character of pain: dull ache  Pain radiation: radiates into the right buttocks  Since last visit, pain is:  unchanged  New numbness or weakness in legs, not attributed to pain:  no   Intensity: Currently 4/10, moderate  History:   Pain interferes with job: No  Therapies tried without relief: Physical Therapy  Therapies tried with relief: none     Accompanying Signs & Symptoms:  Risk of Fracture:  None  Risk of Cauda Equina:  None  Risk of Infection:  None  Risk of Cancer:  None    He has had intermittent right low back and right leg pain for many years. He initially underwent physical therapy at [facility] last Fall without any benefit as he did not feel he was receiving adequate treatment so he was sent to [facility] and has had some short term relief with E-stim but the last 4 visits, he states all they have had him do is lie on pillows which has not been beneficial. He thinks he should be getting more out of physical therapy including more comprehensive care with core exercises. He has a PT friend who works at [facility] in [place] who he would like to see. He also is open to chiropractic but he does not want to go forward with a steroid injection until he has failed comprehensive PT. He is taking Tylenol, 600 mg 3 times daily with some benefit. The meloxicam was not helpful so he stopped this. He still gets right sided low back pain with intermittent pain, numbness and tingling down the right anterior thigh, stopping at the knee. He also notices occasional bilateral foot tingling along with intermittent pain in the right groin for the past few years. He denies any saddle anaesthesia or loss of control of bowel/bladder.     Problem list and histories reviewed & adjusted, as indicated.  Additional history:  "none    ROS:  GENERAL: Denies fever, fatigue, weakness, weight gain, or weight loss.  CARDIOVASCULAR: Denies chest pain, shortness of breath, irregular heartbeats, palpitations, or edema.  RESPIRATORY: Denies cough, hemoptysis, and shortness of breath.   MUSCULOSKELETAL: +Right low back pain. Denies muscle weakness.  NEUROLOGIC: +Right leg numbness and tingling.  Denies headache, fainting, dizziness, memory loss, numbness, tingling, or seizures.    OBJECTIVE:     /78 (BP Location: Right arm, Patient Position: Chair, Cuff Size: Adult Regular)   Pulse 107   Temp 98  F (36.7  C) (Temporal)   Resp 16   Ht 1.816 m (5' 11.5\")   Wt 102.1 kg (225 lb)   SpO2 98%   BMI 30.94 kg/m    Body mass index is 30.94 kg/m .  GENERAL: healthy, alert and no distress  RESP: lungs clear to auscultation - no rales, rhonchi or wheezes  CV: regular rate and rhythm, normal S1 S2, no S3 or S4, no murmur, click or rub  MS: no gross musculoskeletal defects noted. Mild tenderness over the right low lumbar paraspinal musculature.   NEURO: Normal strength and tone, mentation intact and speech normal. Cranial nerves II-XII are grossly intact. DTRs are 2+/4 throughout and symmetric. Gait is stable.   PSYCH: mentation appears normal, affect normal/bright    MRI Lumbar 1/18/19    IMPRESSION:  1.  Moderate L5-S1 interbody degenerative change with mild-to-moderate  neural foraminal stenoses bilaterally.  2.  Mild left L4-5 neural foraminal stenosis.  3.  No high-grade spinal canal stenosis.     MIRNA REES MD    ASSESSMENT/PLAN:       ICD-10-CM    1. Right-sided low back pain with right-sided sciatica, unspecified chronicity M54.41 PHYSICAL THERAPY REFERRAL   2. DDD (degenerative disc disease), lumbar M51.36 PHYSICAL THERAPY REFERRAL   3. Groin pain, right R10.31 XR Hip Right 2-3 Views       Unfortunately, Zachary does not feel he has received comprehensive physical therapy at either [facility] or [facility]. He would like to see a friend of " his at [facility] so referral has been placed for more comprehensive care and core strengthening. I also recommend chiropractic care so have provide him names of a few local chiropractors including [facility] and [facility]. I recommend he alternate between Tylenol and ibuprofen every few weeks with no more than 3000 mg of Tylenol and 2400 mg of ibuprofen.  If further physical therapy is not beneficial, I recommend a right L5-S1 BALTA given his MRI findings so he will let me know and I can place the referral to Dr. Fields. He was notified that Dr. Fields requires a pre-op. His distribution of radicular pain is not necessarily consistent with L5-S1 but this is where the moderate foraminal narrowing and DDD is located which is likely contributing significantly to his symptoms.    He continues to have intermittent right groin pain as well which is likely stemming from his low back but will order a right hip x-ray for further evaluation to rule out a hip pathology. He had a scrotal ultrasound in 2016 without any worrisome findings.     Follow up in January for an annual physical, sooner if needed.       Nik Nina PA-C  [facility]

## 2019-04-15 ENCOUNTER — TRANSFERRED RECORDS (OUTPATIENT)
Dept: HEALTH INFORMATION MANAGEMENT | Facility: CLINIC | Age: 49
End: 2019-04-15

## 2019-04-15 ENCOUNTER — OFFICE VISIT (OUTPATIENT)
Dept: FAMILY MEDICINE | Facility: OTHER | Age: 49
End: 2019-04-15
Payer: COMMERCIAL

## 2019-04-15 ENCOUNTER — ANCILLARY PROCEDURE (OUTPATIENT)
Dept: GENERAL RADIOLOGY | Facility: OTHER | Age: 49
End: 2019-04-15
Attending: PHYSICIAN ASSISTANT
Payer: COMMERCIAL

## 2019-04-15 VITALS
BODY MASS INDEX: 30.48 KG/M2 | WEIGHT: 225 LBS | SYSTOLIC BLOOD PRESSURE: 126 MMHG | TEMPERATURE: 98 F | OXYGEN SATURATION: 98 % | HEIGHT: 72 IN | RESPIRATION RATE: 16 BRPM | HEART RATE: 107 BPM | DIASTOLIC BLOOD PRESSURE: 78 MMHG

## 2019-04-15 DIAGNOSIS — M51.369 DDD (DEGENERATIVE DISC DISEASE), LUMBAR: ICD-10-CM

## 2019-04-15 DIAGNOSIS — R10.31 GROIN PAIN, RIGHT: ICD-10-CM

## 2019-04-15 DIAGNOSIS — M54.41 RIGHT-SIDED LOW BACK PAIN WITH RIGHT-SIDED SCIATICA, UNSPECIFIED CHRONICITY: Primary | ICD-10-CM

## 2019-04-15 PROCEDURE — 99214 OFFICE O/P EST MOD 30 MIN: CPT | Performed by: PHYSICIAN ASSISTANT

## 2019-04-15 PROCEDURE — 73502 X-RAY EXAM HIP UNI 2-3 VIEWS: CPT

## 2019-04-15 ASSESSMENT — MIFFLIN-ST. JEOR: SCORE: 1925.65

## 2019-04-15 ASSESSMENT — PAIN SCALES - GENERAL: PAINLEVEL: MODERATE PAIN (4)

## 2019-04-15 NOTE — PATIENT INSTRUCTIONS
Will get you set up with [facility] for PT for more comprehensive care of your pain.  I also recommend you consider chiropractic. There are a few good places in [place] including [facility] and [facility].  If not improving, will order an injection.    Try to alternate between Tylenol and ibuprofen every few weeks. No more than 2400 mg of ibuprofen daily and 3000 mg of Tylenol.    Will order an x-ray of your right hip to evaluate your groin pain but this could be stemming from your back.

## 2019-04-26 ENCOUNTER — TELEPHONE (OUTPATIENT)
Dept: FAMILY MEDICINE | Facility: OTHER | Age: 49
End: 2019-04-26

## 2019-04-26 NOTE — TELEPHONE ENCOUNTER
Reason for Call:  Form, our goal is to have forms completed with 72 hours, however, some forms may require a visit or additional information.    Type of letter, form or note:  medical    Who is the form from?: [facility] (if other please explain)    Where did the form come from: form was faxed in    What clinic location was the form placed at?: [facility] - [phone]    Where the form was placed:  Box/Folder    What number is listed as a contact on the form?: [phone]       Additional comments: sign fax back    Call taken on 4/26/2019 at 10:05 AM by Xochitl Cha

## 2019-04-29 PROBLEM — G89.29 CHRONIC RIGHT-SIDED LOW BACK PAIN WITH SCIATICA, SCIATICA LATERALITY UNSPECIFIED: Status: RESOLVED | Noted: 2018-10-25 | Resolved: 2019-04-29

## 2019-04-29 PROBLEM — M54.40 CHRONIC RIGHT-SIDED LOW BACK PAIN WITH SCIATICA, SCIATICA LATERALITY UNSPECIFIED: Status: RESOLVED | Noted: 2018-10-25 | Resolved: 2019-04-29

## 2019-04-29 NOTE — PROGRESS NOTES
Patient seen for one time evaluation and treatment.  Patient did not return for further treatment and current status is unknown.  Please see initial evaluation for further information.    Keenan Bryant,PT, DPT, OCS

## 2019-06-17 ENCOUNTER — TRANSFERRED RECORDS (OUTPATIENT)
Dept: HEALTH INFORMATION MANAGEMENT | Facility: CLINIC | Age: 49
End: 2019-06-17

## 2019-06-19 ENCOUNTER — TELEPHONE (OUTPATIENT)
Dept: FAMILY MEDICINE | Facility: OTHER | Age: 49
End: 2019-06-19

## 2019-06-19 NOTE — TELEPHONE ENCOUNTER
Our goal is to have forms completed with 72 hours, however some forms may require a visit or additional information.    Who is the form from?: [facility] (if other please explain)  Where the form came from: form was faxed in  What clinic location was the form placed at?: [place]  Where the form was placed: forms bin  What number is listed as a contact on the form?: [phone]    Phone call message- patient request for a letter, form or note:    Date needed: as soon as possible  Please fax to [phone]  Has the patient signed a consent form for release of information? NO    Additional comments:     Call taken on 6/19/2019 at 9:09 AM by Kym Becker    Type of letter, form or note: medical

## 2019-07-31 ENCOUNTER — TRANSFERRED RECORDS (OUTPATIENT)
Dept: HEALTH INFORMATION MANAGEMENT | Facility: CLINIC | Age: 49
End: 2019-07-31

## 2019-09-16 ENCOUNTER — TRANSFERRED RECORDS (OUTPATIENT)
Dept: HEALTH INFORMATION MANAGEMENT | Facility: CLINIC | Age: 49
End: 2019-09-16

## 2019-09-16 ENCOUNTER — TELEPHONE (OUTPATIENT)
Dept: FAMILY MEDICINE | Facility: OTHER | Age: 49
End: 2019-09-16

## 2019-09-16 NOTE — TELEPHONE ENCOUNTER
Reason for Call:  Form, our goal is to have forms completed with 72 hours, however, some forms may require a visit or additional information.    Type of letter, form or note:  [facility]    Who [facility] the form from[facility] (if other please explain)    Where did the form come from: form was faxed in    What clinic location was the form placed at?: [facility] - [phone]    Where the form was placed: box Box/Folder    What number is listed as a contact on the form?: [phone]       Additional comments: [phone]    Call taken on 9/16/2019 at 10:49 AM by Sherry Puckett

## 2019-09-17 NOTE — PROGRESS NOTES
Outpatient Physical Therapy Discharge Note     Patient: Linden Crzu  : 1970    Beginning/End Dates of Reporting Period:  19 to 19    Referring Provider: Nik Nina PA-C      Therapy Diagnosis: LBP     Client Self Report: Reports that he is doing his exercises 2-3 x a day, when not working as much it is 5-6 x a day, not sure that it is helping. He reports right hip to knee pain, left posterior thigh and will get numbness into his feet. At this time he reports low back pain 4/10, it is worse at night, morning is pretty bad, gets a little better as he moves around. He also reports right hip pain 4/10, left hip/buttock 2.5/10 and right anterior thigh heavy numbness, no pain. He admits that he has not tried a night roll yet but realizes he should.     Objective Measurements:     Objective Measure: Frequency of PREP  Details: 2-3 x a day, work day, 5-6 x a day when not working. 4 x on Saturday 5 x on .   Objective Measure: Effect of PREP  Details: 2 pillows under the pelvis is best, offers short term relief.      Goals:  Not known if met pt never returned    Plan:  Discharge from therapy.    Discharge:    Reason for Discharge: Patient has failed to schedule further appointments.    Thank you for the referral,            Justyna Napoles PT          Discharge Plan: Patient to continue home program.

## 2019-09-17 NOTE — ADDENDUM NOTE
Encounter addended by: Justyna Napoles, PT on: 9/17/2019 7:55 AM   Actions taken: Sign clinical note, Episode resolved

## 2019-09-28 ENCOUNTER — HEALTH MAINTENANCE LETTER (OUTPATIENT)
Age: 49
End: 2019-09-28

## 2019-09-30 DIAGNOSIS — N52.9 ERECTILE DYSFUNCTION, UNSPECIFIED ERECTILE DYSFUNCTION TYPE: ICD-10-CM

## 2019-10-01 RX ORDER — TADALAFIL 20 MG/1
TABLET ORAL
Qty: 8 TABLET | Refills: 8 | Status: SHIPPED | OUTPATIENT
Start: 2019-10-01 | End: 2020-09-27

## 2019-10-01 NOTE — TELEPHONE ENCOUNTER
Pending Prescriptions:                       Disp   Refills    tadalafil (CIALIS) 20 MG tablet [Pharmacy*8 tabl*8            Sig: TAKE 1/2 TO 1 TABLET BY MOUTH AS NEEDED FOR ED,           DO NOT USE WITH NITRO/TERAZOSIN/DOXAZOSIN    Prescription approved per [facility] Refill Protocol.    Sherry Newton, RN, BSN

## 2019-10-23 NOTE — LETTER
6/20/2018         RE: Linden Cruz  [address] [address] [id number]        Dear Colleague,    Thank you for referring your patient, Linden Cruz, to the [facility]. Please see a copy of my visit note below.    Procedure: Bilateral myringotomy with Tube Placement    Technique- After discussion of the risks and benefits of myringotomy, including the risk of otorrhea and residual persistent perforation, informed consent was signed and placed in the chart.  I began with the left side.  I proceeded to position the patient in a semi-supine position in the examination chair.  Using the binocular surgical microscope, I then proceeded to clean the canal of cerumen and squamous debris.  I was able to see the tympanic membrane and existing posterior middle perforation appears to be 10% of the drum.  It is not epithelialized yet.  Middle ear space is clear.  To give the perforation open and hopefully to achieve epithelialization I placed Triune tube..   On the right side significant atelectasis of the middle ear is appreciated with the drum being like cellophane on the promontory.  Using a small suction tip, I applied a tiny coating of phenol onto the tympanic membrane.  After visualizing a good maria elena, I then proceeded to use a myringotomy knife to make a radially oriented incision in the tympanic membrane.  the middle ear was collapsed completely.  Next, I proceeded to place a 1.  2 7 mm inner diameter Paparella type II tube through the incision.  After confirming good positioning and a clearly visible open tube, the procedure was complete.    A/P - The patient was instructed on water precautions and given a prescription for otic antibiotic drops to use for three days.  I will see them in 2 to 4 weeks for follow up and repeat audiogram.  The patient was instructed to call in or return sooner if there was any drainage from the ear.        Again, thank you for allowing me to participate  Left vm confirming appts for today.     in the care of your patient.        Sincerely,        Savage Mejia MD, MD

## 2019-11-01 ENCOUNTER — IMMUNIZATION (OUTPATIENT)
Dept: FAMILY MEDICINE | Facility: OTHER | Age: 49
End: 2019-11-01
Payer: COMMERCIAL

## 2019-11-01 DIAGNOSIS — Z23 NEED FOR PROPHYLACTIC VACCINATION AND INOCULATION AGAINST INFLUENZA: Primary | ICD-10-CM

## 2019-11-01 PROCEDURE — 90471 IMMUNIZATION ADMIN: CPT

## 2019-11-01 PROCEDURE — 90686 IIV4 VACC NO PRSV 0.5 ML IM: CPT

## 2019-11-01 PROCEDURE — 99207 ZZC NO CHARGE NURSE ONLY: CPT

## 2019-11-18 ENCOUNTER — TELEPHONE (OUTPATIENT)
Dept: FAMILY MEDICINE | Facility: OTHER | Age: 49
End: 2019-11-18

## 2019-11-18 NOTE — TELEPHONE ENCOUNTER
Reason for Call:  Form, our goal is to have forms completed with 72 hours, however, some forms may require a visit or additional information.    Type of letter, form or note:  Plan Of Care    Who is the form from?: Rehab (if other please explain)    Where did the form come from: form was faxed in    What clinic location was the form placed at?: [facility], [place] - [phone]    Where the form was placed: drs Box/Folder    What number is listed as a contact on the form?: [phone]       Additional comments: please sign and return to [phone]    Call taken on 11/18/2019 at 5:23 PM by Tasneem Méndez

## 2019-11-20 ENCOUNTER — TRANSFERRED RECORDS (OUTPATIENT)
Dept: HEALTH INFORMATION MANAGEMENT | Facility: CLINIC | Age: 49
End: 2019-11-20

## 2019-12-13 ENCOUNTER — TRANSFERRED RECORDS (OUTPATIENT)
Dept: HEALTH INFORMATION MANAGEMENT | Facility: CLINIC | Age: 49
End: 2019-12-13

## 2019-12-18 ENCOUNTER — OFFICE VISIT (OUTPATIENT)
Dept: FAMILY MEDICINE | Facility: OTHER | Age: 49
End: 2019-12-18
Payer: COMMERCIAL

## 2019-12-18 VITALS
BODY MASS INDEX: 30.34 KG/M2 | TEMPERATURE: 97.3 F | WEIGHT: 224 LBS | DIASTOLIC BLOOD PRESSURE: 78 MMHG | HEIGHT: 72 IN | OXYGEN SATURATION: 98 % | HEART RATE: 89 BPM | SYSTOLIC BLOOD PRESSURE: 120 MMHG | RESPIRATION RATE: 16 BRPM

## 2019-12-18 DIAGNOSIS — M54.16 LUMBAR RADICULOPATHY: Primary | ICD-10-CM

## 2019-12-18 DIAGNOSIS — M51.369 DDD (DEGENERATIVE DISC DISEASE), LUMBAR: ICD-10-CM

## 2019-12-18 PROCEDURE — 99214 OFFICE O/P EST MOD 30 MIN: CPT | Performed by: PHYSICIAN ASSISTANT

## 2019-12-18 RX ORDER — GABAPENTIN 300 MG/1
300 CAPSULE ORAL DAILY
Qty: 30 CAPSULE | Refills: 1 | Status: SHIPPED | OUTPATIENT
Start: 2019-12-18 | End: 2020-01-27

## 2019-12-18 ASSESSMENT — MIFFLIN-ST. JEOR: SCORE: 1916.12

## 2019-12-18 NOTE — PROGRESS NOTES
Subjective     Linden Cruz is a 49 year old male who presents to clinic today for the following health issues:    History of Present Illness        Back Pain:  He presents for follow up of back pain. Patient's back pain is a chronic problem.  Location of back pain:  Right lower back  Description of back pain: other  Back pain spreads: right thigh and right knee    Since patient first noticed back pain, pain is: gradually worsening  Does back pain interfere with his job:  Yes      He eats 2-3 servings of fruits and vegetables daily.He consumes 2 sweetened beverage(s) daily.He is missing 1 dose(s) of medications per week.  He is not taking prescribed medications regularly due to remembering to take.     He has been undergoing physical therapy at [facility], [place], operated by [facility] in [place] for the past 6+ months for his low back and right leg pain and has had some short term relief but the pain always returns. The pain is across his low back, worse on the right side with radiation down his posterior right thigh to the knee and a persistent numb/tingly feeling in the right anterior thigh. He is taking 800 mg of ibuprofen twice daily with some relief of his pain. He also continues to undergo home exercises. He denies any loss of bowel/bladder control or any leg weakness. He wants to know what next steps he can take for further pain relief. He has never undergone any injections. The pain is making it difficult to sleep.       Reviewed and updated as needed this visit by Provider   Allergies  Meds  Problems  Med Hx     Review of Systems   GENERAL: Denies fever, fatigue, weakness, weight gain, or weight loss.  HEENT: Eyes-Denies pain, redness, loss of vision, double or blurred vision.     Ears/Nose-Denies tinnitus, loss of hearing, epistaxis, decreased sense of smell, nasal congestion. Denies loss of sense of taste, dry mouth, or sore throat.   CARDIOVASCULAR: Denies chest pain, shortness of breath, irregular heartbeats,  palpitations,  "or edema.  RESPIRATORY: Denies cough, hemoptysis, and shortness of breath.  GASTROINTESTINAL: Denies nausea, vomiting, change in appetite, abdominal pain, diarrhea, or constipation.  GENITOURINARY: Denies increased frequency, urgency, dysuria, hematuria, or incontinence.   MUSCULOSKELETAL: +Low back pain, right leg pain. Denies muscle weakness.  NEUROLOGIC: +Right thigh numbness/tingling. Denies headache, fainting, dizziness, memory loss, or seizures.      Objective    /78   Pulse 89   Temp 97.3  F (36.3  C) (Temporal)   Resp 16   Ht 1.816 m (5' 11.5\")   Wt 101.6 kg (224 lb)   SpO2 98%   BMI 30.81 kg/m    Body mass index is 30.81 kg/m .  Physical Exam   GENERAL: healthy, alert and no distress  EYES: Eyes grossly normal to inspection, PERRL and conjunctivae and sclerae normal  HENT: ear canals and TM's normal. Left T-tube is dislodged in ear canal. No T-tube on the right. Nose and mouth without ulcers or lesions  NECK: no adenopathy, no asymmetry, masses, or scars and thyroid normal to palpation  RESP: lungs clear to auscultation - no rales, rhonchi or wheezes  CV: regular rate and rhythm, normal S1 S2, no S3 or S4, no murmur, click or rub, no peripheral edema and peripheral pulses strong  MS: Mild tenderness over the lumbar spinous processes and right lower lumbar paraspinal musculature. Negative straight leg raise bilaterally.   NEURO: Normal strength and tone, mentation intact and speech normal. Cranial nerves II-XII are grossly intact. DTRs are 2+/4 throughout and symmetric. Gait is stable.   PSYCH: mentation appears normal, affect normal/bright      MRI Lumbar Spine 1/18/19    IMPRESSION:  1.  Moderate L5-S1 interbody degenerative change with mild-to-moderate  neural foraminal stenoses bilaterally.  2.  Mild left L4-5 neural foraminal stenosis.  3.  No high-grade spinal canal stenosis.     MIRNA REES MD      Assessment & Plan       ICD-10-CM    1. Lumbar radiculopathy M54.16 gabapentin " (NEURONTIN) 300 MG capsule     XR Lumbar Epidural Injection Incl Imaging   2. DDD (degenerative disc disease), lumbar M51.36 gabapentin (NEURONTIN) 300 MG capsule     XR Lumbar Epidural Injection Incl Imaging        Given his continued pain with findings of significant L5-S1 DDD and moderate bilateral neuroforaminal narrowing, I recommend an L5-S1 BALTA, focusing on the right side, with Dr. Fields. He is medically cleared to undergo this procedure.  Encouraged to continue with heat, stretching and ibuprofen but no more than 2400 of ibuprofen daily.   Will start gabapentin 300 mg nightly to help with the pain, numbness, tingling and sleep.  Common side effects discussed and he was notified that this can take a few weeks to notice a difference.    Both T-tube have become dislodged so I recommend he follow up with Dr. Mejia.    Follow up in 1 month for annual physical.       Nik Nina PA-C  [facility]

## 2019-12-19 ENCOUNTER — TELEPHONE (OUTPATIENT)
Dept: SURGERY | Facility: CLINIC | Age: 49
End: 2019-12-19

## 2019-12-19 NOTE — TELEPHONE ENCOUNTER
Pt scheduled for BALTA  Date: 1/10/20  Time: 1100  Dr. Fields    Instructed pt to have H&P and  for procedure.

## 2020-01-09 ENCOUNTER — ANESTHESIA EVENT (OUTPATIENT)
Dept: SURGERY | Facility: CLINIC | Age: 50
End: 2020-01-09
Payer: COMMERCIAL

## 2020-01-09 ASSESSMENT — LIFESTYLE VARIABLES: TOBACCO_USE: 1

## 2020-01-09 NOTE — ANESTHESIA PREPROCEDURE EVALUATION
Anesthesia Pre-Procedure Evaluation    Patient: Linden Cruz   MRN: [id number] : 1970          Preoperative Diagnosis: DDD (degenerative disc disease), lumbar [M51.36]  Spinal stenosis of lumbar region, unspecified whether neurogenic claudication present [M48.061]  Radicular pain of right lower extremity [M54.10]    Procedure(s):  INJECTION, SPINE, LUMBAR, EPIDURAL    Past Medical History:   Diagnosis Date     Chest pain      Esophageal reflux      Migraines      Mixed hyperlipidemia      DANIEL (obstructive sleep apnea)      Other specified gastritis without mention of hemorrhage     H. Pylori, diag      Past Surgical History:   Procedure Laterality Date     CHOLECYSTECTOMY, LAPOROSCOPIC       SURGICAL HISTORY OF -   2006    upper GI, [place]       Anesthesia Evaluation     . Pt has had prior anesthetic. Type: General    No history of anesthetic complications          ROS/MED HX    ENT/Pulmonary:     (+)sleep apnea, tobacco use, Past use doesn't use CPAP , . .    Neurologic:     (+)migraines,     Cardiovascular:     (+) Dyslipidemia, ----. : . . . :. .       METS/Exercise Tolerance:     Hematologic:  - neg hematologic  ROS       Musculoskeletal:   (+)  other musculoskeletal- CLBP      GI/Hepatic:     (+) GERD Asymptomatic on medication,       Renal/Genitourinary:  - ROS Renal section negative       Endo:     (+) thyroid problem hypothyroidism, Obesity, .      Psychiatric:  - neg psychiatric ROS       Infectious Disease:  - neg infectious disease ROS       Malignancy:      - no malignancy   Other:    (+) No chance of pregnancy C-spine cleared: N/A, H/O Chronic Pain,H/O chronic opiod use , no other significant disability                         Physical Exam  Normal systems: cardiovascular, pulmonary and dental    Airway   Mallampati: II  TM distance: >3 FB  Neck ROM: full    Dental     Cardiovascular   Rhythm and rate: regular and normal      Pulmonary             Lab Results   Component Value  "Date    WBC 7.8 08/24/2016    HGB 14.5 08/24/2016    HCT 42.9 08/24/2016     08/24/2016     01/11/2019    POTASSIUM 4.1 01/11/2019    CHLORIDE 111 (H) 01/11/2019    CO2 23 01/11/2019    BUN 11 01/11/2019    CR 0.85 01/11/2019    GLC 92 01/11/2019    JEFFY 8.3 (L) 01/11/2019    ALBUMIN 4.0 01/11/2019    PROTTOTAL 7.5 01/11/2019    ALT 31 01/11/2019    AST 20 01/11/2019    ALKPHOS 105 01/11/2019    BILITOTAL 0.6 01/11/2019    PTT 33 01/05/2015    INR 0.98 01/05/2015    TSH 4.42 (H) 01/11/2019    T4 0.86 01/11/2019       Preop Vitals  BP Readings from Last 3 Encounters:   12/18/19 120/78   04/15/19 126/78   04/01/19 123/79    Pulse Readings from Last 3 Encounters:   12/18/19 89   04/15/19 107   04/01/19 93      Resp Readings from Last 3 Encounters:   12/18/19 16   04/15/19 16   01/11/19 14    SpO2 Readings from Last 3 Encounters:   12/18/19 98%   04/15/19 98%   04/01/19 97%      Temp Readings from Last 1 Encounters:   12/18/19 97.3  F (36.3  C) (Temporal)    Ht Readings from Last 1 Encounters:   12/18/19 1.816 m (5' 11.5\")      Wt Readings from Last 1 Encounters:   12/18/19 101.6 kg (224 lb)    Estimated body mass index is 30.81 kg/m  as calculated from the following:    Height as of 12/18/19: 1.816 m (5' 11.5\").    Weight as of 12/18/19: 101.6 kg (224 lb).       Anesthesia Plan      History & Physical Review  History and physical reviewed and following examination; no interval change.    ASA Status:  2 .    NPO Status:  > 8 hours    Plan for MAC Maintenance will be TIVA.  Reason for MAC:  Deep or markedly invasive procedure (G8)         Postoperative Care      Consents  Anesthetic plan, risks, benefits and alternatives discussed with:  Patient.  Use of blood products discussed: No .   .                 WOJCIECH Mitchell CRNA  "

## 2020-01-10 ENCOUNTER — HOSPITAL ENCOUNTER (OUTPATIENT)
Facility: CLINIC | Age: 50
Discharge: HOME | End: 2020-01-10
Attending: ANESTHESIOLOGY | Admitting: ANESTHESIOLOGY
Payer: COMMERCIAL

## 2020-01-10 ENCOUNTER — HOSPITAL ENCOUNTER (OUTPATIENT)
Dept: GENERAL RADIOLOGY | Facility: CLINIC | Age: 50
End: 2020-01-10
Attending: ANESTHESIOLOGY | Admitting: ANESTHESIOLOGY
Payer: COMMERCIAL

## 2020-01-10 ENCOUNTER — ANESTHESIA (OUTPATIENT)
Dept: SURGERY | Facility: CLINIC | Age: 50
End: 2020-01-10
Payer: COMMERCIAL

## 2020-01-10 VITALS
TEMPERATURE: 98.9 F | HEART RATE: 75 BPM | RESPIRATION RATE: 18 BRPM | DIASTOLIC BLOOD PRESSURE: 81 MMHG | SYSTOLIC BLOOD PRESSURE: 115 MMHG | OXYGEN SATURATION: 96 %

## 2020-01-10 DIAGNOSIS — M51.369 DEGENERATIVE DISC DISEASE, LUMBAR: ICD-10-CM

## 2020-01-10 PROCEDURE — 25000128 H RX IP 250 OP 636: Performed by: ANESTHESIOLOGY

## 2020-01-10 PROCEDURE — 25000125 ZZHC RX 250: Performed by: NURSE ANESTHETIST, CERTIFIED REGISTERED

## 2020-01-10 PROCEDURE — 40000277 XR SURGERY CARM FLUORO LESS THAN 5 MIN W STILLS: Mod: TC

## 2020-01-10 PROCEDURE — 25000128 H RX IP 250 OP 636: Performed by: NURSE ANESTHETIST, CERTIFIED REGISTERED

## 2020-01-10 PROCEDURE — 64483 NJX AA&/STRD TFRM EPI L/S 1: CPT | Mod: 50 | Performed by: ANESTHESIOLOGY

## 2020-01-10 PROCEDURE — 37000008 ZZH ANESTHESIA TECHNICAL FEE, 1ST 30 MIN: Performed by: ANESTHESIOLOGY

## 2020-01-10 PROCEDURE — 62323 NJX INTERLAMINAR LMBR/SAC: CPT | Performed by: ANESTHESIOLOGY

## 2020-01-10 RX ORDER — LIDOCAINE HYDROCHLORIDE 20 MG/ML
INJECTION, SOLUTION INFILTRATION; PERINEURAL PRN
Status: DISCONTINUED | OUTPATIENT
Start: 2020-01-10 | End: 2020-01-10

## 2020-01-10 RX ORDER — ONDANSETRON 2 MG/ML
4 INJECTION INTRAMUSCULAR; INTRAVENOUS EVERY 30 MIN PRN
Status: CANCELLED | OUTPATIENT
Start: 2020-01-10

## 2020-01-10 RX ORDER — NALOXONE HYDROCHLORIDE 0.4 MG/ML
.1-.4 INJECTION, SOLUTION INTRAMUSCULAR; INTRAVENOUS; SUBCUTANEOUS
Status: CANCELLED | OUTPATIENT
Start: 2020-01-10 | End: 2020-01-11

## 2020-01-10 RX ORDER — TRIAMCINOLONE ACETONIDE 40 MG/ML
INJECTION, SUSPENSION INTRA-ARTICULAR; INTRAMUSCULAR PRN
Status: DISCONTINUED | OUTPATIENT
Start: 2020-01-10 | End: 2020-01-10 | Stop reason: HOSPADM

## 2020-01-10 RX ORDER — ONDANSETRON 4 MG/1
4 TABLET, ORALLY DISINTEGRATING ORAL EVERY 30 MIN PRN
Status: CANCELLED | OUTPATIENT
Start: 2020-01-10

## 2020-01-10 RX ORDER — SODIUM CHLORIDE, SODIUM LACTATE, POTASSIUM CHLORIDE, CALCIUM CHLORIDE 600; 310; 30; 20 MG/100ML; MG/100ML; MG/100ML; MG/100ML
INJECTION, SOLUTION INTRAVENOUS CONTINUOUS
Status: CANCELLED | OUTPATIENT
Start: 2020-01-10

## 2020-01-10 RX ORDER — PROPOFOL 10 MG/ML
INJECTION, EMULSION INTRAVENOUS PRN
Status: DISCONTINUED | OUTPATIENT
Start: 2020-01-10 | End: 2020-01-10

## 2020-01-10 RX ORDER — IOPAMIDOL 612 MG/ML
INJECTION, SOLUTION INTRATHECAL PRN
Status: DISCONTINUED | OUTPATIENT
Start: 2020-01-10 | End: 2020-01-10 | Stop reason: HOSPADM

## 2020-01-10 RX ADMIN — PROPOFOL 50 MG: 10 INJECTION, EMULSION INTRAVENOUS at 10:34

## 2020-01-10 RX ADMIN — PROPOFOL 50 MG: 10 INJECTION, EMULSION INTRAVENOUS at 10:32

## 2020-01-10 RX ADMIN — PROPOFOL 30 MG: 10 INJECTION, EMULSION INTRAVENOUS at 10:36

## 2020-01-10 RX ADMIN — LIDOCAINE HYDROCHLORIDE 40 MG: 20 INJECTION, SOLUTION INFILTRATION; PERINEURAL at 10:30

## 2020-01-10 RX ADMIN — PROPOFOL 50 MG: 10 INJECTION, EMULSION INTRAVENOUS at 10:30

## 2020-01-10 RX ADMIN — PROPOFOL 50 MG: 10 INJECTION, EMULSION INTRAVENOUS at 10:31

## 2020-01-10 NOTE — OP NOTE
PRIMARY PROBLEM: Low back pain and right leg pain    PROCEDURE: Bilateral L5-S1  Transforaminal Epidural Steroid Injections with fluoroscopic guidance and contrast.     PROCEDURE DETAILS: After written informed consent was obtained from the patient, the patient was escorted to the procedure room.  The patient was placed in the prone position.  A  time out  was conducted to verify patient identity, procedure to be performed, side, site, allergies and any special requirements.  The skin over the thoracolumbar region was prepped and draped in normal sterile fashion. Fluoroscopy was used to identify the neural foramen in AP view and the skin was anesthetized with 2 mL of 1% lidocaine with bicarbonate buffer. A 22-gauge Quincke spinal needle was advanced through this location and advanced under fluoroscopic guidance towards the neural foramen.  The target zone was the 6 o clock position of the pedicle.   Prior to entering the foramen, the depth of the needle was gauged with a lateral view on fluoroscopy. While still in a lateral view, the needle was slowly advanced to avoid injury to the spinal nerve.  Then, in the oblique view (approximately 28 degrees), after negative aspiration, 1.5 mL of Omnipaque contrast dye was injected revealing epidural spread without evidence of intravascular or intrathecal spread.  Then a 3cc solution of 20 mg of Triamcinolone in 2.5 mL of  Preservative-Free saline was slowly injected into the epidural space at each segment.  After injection of the medication, as the needle tip was withdrawn, it was flushed with local anesthetic.   The patient was monitored with blood pressure and pulse oximetry machines with the assistance of an RN throughout the procedure.  The patient was alert and responsive to questions throughout the procedure.   The patient tolerated the procedure well and was observed in the post-procedural area.  The patient was dismissed without apparent complications.      DIAGNOSIS:  1.  Low back pain and right leg pain secondary to L5-S1 disc degeneration and foraminal stenosis    PLAN:  1. Performed bilateral L5-S1  transforaminal epidural steroid injections.  2. The patient was instructed to follow-up per Dr. Fields's instructions.  He might want to consider an SI joint injection prior to going back for surgical options if today's injection is not helpful.  His MRI is not overly impressive for this foraminal stenosis that we are treating today.    Jose C Fields MD  Diplomate of the American Board of Anesthesiology, Pain Medicine

## 2020-01-10 NOTE — ANESTHESIA CARE TRANSFER NOTE
Patient: Linden Cruz    Procedure(s):  INJECTION, SPINE, LUMBAR 5 and Sacral 1 Bilateral EPIDURAL    Diagnosis: DDD (degenerative disc disease), lumbar [M51.36]  Spinal stenosis of lumbar region, unspecified whether neurogenic claudication present [M48.061]  Radicular pain of right lower extremity [M54.10]  Diagnosis Additional Information: No value filed.    Anesthesia Type:   MAC     Note:  Airway :Room Air  Patient transferred to:Phase II  Handoff Report: Identifed the Patient, Identified the Reponsible Provider, Reviewed the pertinent medical history, Discussed the surgical course, Reviewed Intra-OP anesthesia mangement and issues during anesthesia, Set expectations for post-procedure period and Allowed opportunity for questions and acknowledgement of understanding      Vitals: (Last set prior to Anesthesia Care Transfer)    CRNA VITALS  1/10/2020 1011 - 1/10/2020 1111      1/10/2020             Resp Rate (observed):  14                Electronically Signed By: WOJCIECH Mitchell CRNA  January 10, 2020  11:11 AM

## 2020-01-10 NOTE — ANESTHESIA POSTPROCEDURE EVALUATION
Patient: Linden Cruz    Procedure(s):  INJECTION, SPINE, LUMBAR 5 and Sacral 1 Bilateral EPIDURAL    Diagnosis:DDD (degenerative disc disease), lumbar [M51.36]  Spinal stenosis of lumbar region, unspecified whether neurogenic claudication present [M48.061]  Radicular pain of right lower extremity [M54.10]  Diagnosis Additional Information: No value filed.    Anesthesia Type:  MAC    Note:  Anesthesia Post Evaluation    Patient location during evaluation: Phase 2 and Bedside  Patient participation: Able to fully participate in evaluation  Level of consciousness: awake  Pain management: adequate  Airway patency: patent  Cardiovascular status: acceptable and hemodynamically stable  Respiratory status: acceptable, room air and nonlabored ventilation  Hydration status: stable  PONV: none     Anesthetic complications: None    Comments: Patient was happy with the anesthesia care received and no anesthesia related complications were noted.  I will follow up with the patient again if it is needed.        Last vitals:  Vitals:    01/10/20 1050 01/10/20 1055 01/10/20 1100   BP: 117/83 110/78 115/81   Pulse: 79 82 75   Resp:      Temp:      SpO2: 94% 94% 96%         Electronically Signed By: WOJCIECH Mitchell CRNA  January 10, 2020  11:13 AM

## 2020-01-10 NOTE — DISCHARGE INSTRUCTIONS
Home Care Instructions                Procedure: Epidural injection or joint injection    Activity:    Rest today    Do not work today    Resume normal activity tomorrow    Pain:    You may experience soreness at the injection site for 1 to 3 days.    You may use an ice pack for 20 minutes every 2 hours for the first 24 hours    You may use a heating pad after the first 24 hours    You may use Tylenol  (acetaminophen) every 4 hours or other pain medicines as directed by your physician    Safety  Sedation medicine, if given may remain active for many hours.    It is important for the next 24 hours that you do not:    Drive a car    Operate machines or power tools    Consume alcohol, including beer    Sign any important papers or legal documents    You may experience numbness radiating into your legs or arms, (depending on the procedure location)  This numbness may last several hours.  Until the numb sensation returns to normal please use caution in walking, climbing stairs, stepping out of your vehicle, etc.    Common side effects of steroids:  Not everyone will experience corticosteroid side effects. If side effects are experienced they will gradually subside in the 7-10 day period following an injection.    Most common side effects include:    Flushed face and/or chest    Feeling of warmth, particularly in face but could be overall feeling of warmth    Increased blood sugar in diabetic patients    Menstrual irregularities may occur.  If taking hormone based birth control an alternate method of birth control is recommended    Sleep disturbances and/or mood swings are possible    Leg cramps    Please contact us if you have:  Severe pain   Fever more than 101.5 degrees Fahrenheit  Signs of infection (redness, swelling or drainage)      If you have questions during normal business hours (8am-5pm Monday-Friday) contact the [facility] at [phone]. If you need help after hours, we recommend that you go to a  hospital emergency room or dial 911.

## 2020-01-13 DIAGNOSIS — M54.16 LUMBAR RADICULOPATHY: ICD-10-CM

## 2020-01-13 DIAGNOSIS — M51.369 DDD (DEGENERATIVE DISC DISEASE), LUMBAR: ICD-10-CM

## 2020-01-14 RX ORDER — GABAPENTIN 300 MG/1
CAPSULE ORAL
Qty: 30 CAPSULE | Refills: 1 | OUTPATIENT
Start: 2020-01-14

## 2020-01-14 NOTE — TELEPHONE ENCOUNTER
Pending Prescriptions:                       Disp   Refills    gabapentin (NEURONTIN) 300 MG capsule [Ph*30 cap*1            Sig: TAKE 1 CAPSULE BY MOUTH EVERY DAY    Sent 12/18/2019 with 2 month supply. Refill not appropriate at this time.     Sherry Newton, RN, BSN

## 2020-01-16 NOTE — PATIENT INSTRUCTIONS
Preventive Health Recommendations  Male Ages 40 to 49    Yearly exam:             See your health care provider every year in order to  o   Review health changes.   o   Discuss preventive care.    o   Review your medicines if your doctor has prescribed any.    You should be tested each year for STDs (sexually transmitted diseases) if you re at risk.     Have a cholesterol test every 5 years.     Have a colonoscopy (test for colon cancer) if someone in your family has had colon cancer or polyps before age 50.     After age 45, have a diabetes test (fasting glucose). If you are at risk for diabetes, you should have this test every 3 years.      Talk with your health care provider about whether or not a prostate cancer screening test (PSA) is right for you.    Shots: Get a flu shot each year. Get a tetanus shot every 10 years.     Nutrition:    Eat at least 5 servings of fruits and vegetables daily.     Eat whole-grain bread, whole-wheat pasta and brown rice instead of white grains and rice.     Get adequate Calcium and Vitamin D.     Lifestyle    Exercise for at least 150 minutes a week (30 minutes a day, 5 days a week). This will help you control your weight and prevent disease.     Limit alcohol to one drink per day.     No smoking.     Wear sunscreen to prevent skin cancer.     See your dentist every six months for an exam and cleaning.      Continue with home PT exercises for your back. If you need a new PT referral, let me know.    I recommend heat over the shoulder along with as needed ibuprofen and Tylenol.    Will send you to another ENT specialist for a second opinion.    Follow up in 3 months.

## 2020-01-16 NOTE — PROGRESS NOTES
"SUBJECTIVE:   CC: Linden Cruz is an 49 year old male who presents for preventative health visit.     Healthy Habits:     Getting at least 3 servings of Calcium per day:  Yes    Bi-annual eye exam:  Yes    Dental care twice a year:  Yes    Sleep apnea or symptoms of sleep apnea:  Excessive snoring    Diet:  Low fat/cholesterol    Frequency of exercise:  None    Taking medications regularly:  Yes    Medication side effects:  None    PHQ-2 Total Score: 0    Additional concerns today:  No    He underwent a lumbar epidural steroid injection earlier this month and has definitely noticed improvement in his low back pain and right lower extremity pain. The pain has been slightly worse the past few days as he has been more physically active but not as bad as it was before the injections. He still experiences constant numbness of the right anterior thigh. He has not been to physical therapy since the injection and did notice some benefit from therapy and has exercises he can perform at home.    He wishes to have a second opinion with another ENT specialist given his history of chronic serous otitis media and bilateral hearing loss because he has had 3 sets of tubes placed over the past few years, most recently T-tubes in January 2019 but they have all come out. Once the \"hole\" in the eardrum heals, he is packing to experiencing pain and pressure in both ears. He denies any pain currently.     He was experiencing moderate pain over the left shoulder and shoulder blade last week and states he had a massage from his wife and she stepped on the shoulder and something \"popped\" which improved his pain. The pain has been improving ever since but is still somewhat sore. He denies any injury to the area.      Today's PHQ-2 Score:   PHQ-2 ( 1999 [employer]) 1/27/2020   Q1: Little interest or pleasure in doing things 0   Q2: Feeling down, depressed or hopeless 0   PHQ-2 Score 0   Q1: Little interest or pleasure in doing things Not at " all   Q2: Feeling down, depressed or hopeless Not at all   PHQ-2 Score 0       Abuse: Current or Past(Physical, Sexual or Emotional)- No  Do you feel safe in your environment? Yes    Social History     Tobacco Use     Smoking status: Former Smoker     Types: Cigarettes     Last attempt to quit: 2000     Years since quittin.0     Smokeless tobacco: Never Used   Substance Use Topics     Alcohol use: Yes     Alcohol/week: 0.0 standard drinks     Comment: 1/month     If you drink alcohol do you typically have >3 drinks per day or >7 drinks per week? No    Alcohol Use 2020   Prescreen: >3 drinks/day or >7 drinks/week? No   Prescreen: >3 drinks/day or >7 drinks/week? -       Last PSA:   PSA   Date Value Ref Range Status   03/10/2016 0.71 0 - 4 ug/L Final       Reviewed orders with patient. Reviewed health maintenance and updated orders accordingly - Yes    Reviewed and updated as needed this visit by clinical staff  Tobacco  Allergies  Meds  Med Hx  Surg Hx  Fam Hx  Soc Hx        Reviewed and updated as needed this visit by Provider  Tobacco  Allergies  Meds  Problems  Med Hx  Surg Hx  Fam Hx      Review of Systems   Constitutional: Negative for chills and fever.   HENT: Negative for congestion, ear pain, hearing loss and sore throat.    Eyes: Negative for pain and visual disturbance.   Respiratory: Negative for cough and shortness of breath.    Cardiovascular: Negative for chest pain, palpitations and peripheral edema.   Gastrointestinal: Negative for abdominal pain, constipation, diarrhea, heartburn, hematochezia and nausea.   Genitourinary: Negative for discharge, dysuria, frequency, genital sores, hematuria, impotence and urgency.   Musculoskeletal: Negative for arthralgias, joint swelling and myalgias.   Skin: Negative for rash.   Neurological: Negative for dizziness, weakness, headaches and paresthesias.   Psychiatric/Behavioral: Negative for mood changes. The patient is not  "nervous/anxious.        OBJECTIVE:   /82   Pulse 77   Temp 97.3  F (36.3  C) (Temporal)   Resp 16   Ht 1.816 m (5' 11.5\")   Wt 99.8 kg (220 lb)   SpO2 97%   BMI 30.26 kg/m      Physical Exam  GENERAL: healthy, alert and no distress  EYES: Eyes grossly normal to inspection, PERRL and conjunctivae and sclerae normal  HENT: ear canals and TM's normal, nose and mouth without ulcers or lesions  NECK: no adenopathy, no asymmetry, masses, or scars and thyroid normal to palpation  RESP: lungs clear to auscultation - no rales, rhonchi or wheezes  CV: regular rate and rhythm, normal S1 S2, no S3 or S4, no murmur, click or rub, no peripheral edema and peripheral pulses strong  ABDOMEN: soft, nontender, no hepatosplenomegaly, no masses and bowel sounds normal   (male): normal male circumcised genitalia without lesions or urethral discharge, no hernia  MS: no gross musculoskeletal defects noted, no edema. FROM to all extremities. No spinal tenderness. Mild tenderness medial to the left scapula. No left anterior shoulder tenderness.   SKIN: no suspicious lesions or rashes  NEURO: Normal strength and tone, mentation intact and speech normal. Cranial nerves II-XII are grossly intact. DTRs are 2+/4 throughout and symmetric. Gait is stable.  PSYCH: mentation appears normal, affect normal/bright    ASSESSMENT/PLAN:       ICD-10-CM    1. Routine general medical examination at a health care facility Z00.00    2. Subclinical hypothyroidism E03.9 TSH with free T4 reflex   3. Hyperlipidemia LDL goal <130 E78.5 Lipid panel reflex to direct LDL Fasting   4. Hypertriglyceridemia E78.1 gemfibrozil (LOPID) 600 MG tablet   5. Acute pain of left shoulder M25.512 OFFICE/OUTPT VISIT,EST,LEVL III   6. Lumbar radiculopathy M54.16 gabapentin (NEURONTIN) 300 MG capsule     OFFICE/OUTPT VISIT,EST,LEVL III   7. DDD (degenerative disc disease), lumbar M51.36 gabapentin (NEURONTIN) 300 MG capsule     OFFICE/OUTPT VISIT,EST,LEVL III   8. " Bilateral chronic serous otitis media H65.23 OTOLARYNGOLOGY REFERRAL     OFFICE/OUTPT VISIT,EST,LEVL III   9. Mixed conductive and sensorineural hearing loss of both ears H90.6 OFFICE/OUTPT VISIT,EST,LEVL III   10. Impaired fasting glucose R73.01 Basic metabolic panel  (Ca, Cl, CO2, Creat, Gluc, K, Na, BUN)   11. Gastroesophageal reflux disease without esophagitis K21.9 omeprazole (PRILOSEC) 40 MG DR capsule     OFFICE/OUTPT VISIT,EST,LEVL III   12. Migraine with aura and without status migrainosus, not intractable G43.109 topiramate (TOPAMAX) 50 MG tablet     OFFICE/OUTPT VISIT,EST,LEVL III       2. Updated TSH ordered. Will refill levothyroxine depending on results.     3-4. Continues current dose of gemfibrozil. Updated fasting lipid panel ordered.    5. Left shoulder/shoulder blade pain since last week consistent with a musculoskeletal strain. I recommend he try heat and home stretching along with as needed ibuprofen or Tylenol.    6-7. His low back and right leg pain pain have improved since his lumbar BALTA a few months ago. He will continue with as needed ibuprofen and Tylenol and will also increase the gabapentin to 300 mg 3 times daily over the next few weeks. I recommend home PT exercises and if he wants to go back to formal PT at [facility], I can place a new order if needed.    8-9. Given his chronic bilateral serous otitis medial and mixed hearing loss with multiple failed tube placements, he would like a second opinion so [facility].     10. Updated fasting BMP ordered.    11. He has tried H2 blockers without benefit so will continue omeprazole.    12. Overall stable on Topamax. A few more headaches the past few weeks so could consider Topamax increase if headache continue to become more frequent. Continue with as needed Imitrex.    Follow up in 3 months for a recheck.      COUNSELING:   Reviewed preventive health counseling, as reflected in patient instructions      "  Regular exercise       Healthy diet/nutrition    Estimated body mass index is 30.26 kg/m  as calculated from the following:    Height as of this encounter: 1.816 m (5' 11.5\").    Weight as of this encounter: 99.8 kg (220 lb).     Weight management plan: Discussed healthy diet and exercise guidelines     reports that he quit smoking about 20 years ago. His smoking use included cigarettes. He has never used smokeless tobacco.      Counseling Resources:  ATP IV Guidelines  Pooled Cohorts Equation Calculator  FRAX Risk Assessment  ICSI Preventive Guidelines  Dietary Guidelines for Americans, 2010  USDA's MyPlate  ASA Prophylaxis  Lung CA Screening    Nik Nina PA-C  [facility]  "

## 2020-01-17 DIAGNOSIS — G43.109 MIGRAINE WITH AURA AND WITHOUT STATUS MIGRAINOSUS, NOT INTRACTABLE: ICD-10-CM

## 2020-01-17 RX ORDER — SUMATRIPTAN 100 MG/1
TABLET, FILM COATED ORAL
Qty: 9 TABLET | Refills: 1 | Status: SHIPPED | OUTPATIENT
Start: 2020-01-17 | End: 2020-05-26

## 2020-01-17 NOTE — TELEPHONE ENCOUNTER
Pending Prescriptions:                       Disp   Refills    SUMAtriptan (IMITREX) 100 MG tablet [Phar*9 tabl*1            Sig: TAKE 1 TAB BY MOUTH WITH ONSET OF MIGRAINE, MAY           REPEAT ONCE AFTER 2 HOURS. MAX 2 TABS/24 HOURS.    Prescription approved per [facility] Refill Protocol.    Sherry Newton, MSN, RN

## 2020-01-21 DIAGNOSIS — M51.369 DDD (DEGENERATIVE DISC DISEASE), LUMBAR: ICD-10-CM

## 2020-01-21 DIAGNOSIS — M54.16 LUMBAR RADICULOPATHY: ICD-10-CM

## 2020-01-22 RX ORDER — GABAPENTIN 300 MG/1
300 CAPSULE ORAL DAILY
Qty: 30 CAPSULE | Refills: 1 | OUTPATIENT
Start: 2020-01-22

## 2020-01-25 DIAGNOSIS — E03.8 SUBCLINICAL HYPOTHYROIDISM: ICD-10-CM

## 2020-01-27 ENCOUNTER — OFFICE VISIT (OUTPATIENT)
Dept: FAMILY MEDICINE | Facility: OTHER | Age: 50
End: 2020-01-27
Payer: COMMERCIAL

## 2020-01-27 VITALS
WEIGHT: 220 LBS | BODY MASS INDEX: 29.8 KG/M2 | HEIGHT: 72 IN | SYSTOLIC BLOOD PRESSURE: 120 MMHG | HEART RATE: 77 BPM | RESPIRATION RATE: 16 BRPM | TEMPERATURE: 97.3 F | DIASTOLIC BLOOD PRESSURE: 82 MMHG | OXYGEN SATURATION: 97 %

## 2020-01-27 DIAGNOSIS — M25.512 ACUTE PAIN OF LEFT SHOULDER: ICD-10-CM

## 2020-01-27 DIAGNOSIS — M51.369 DDD (DEGENERATIVE DISC DISEASE), LUMBAR: ICD-10-CM

## 2020-01-27 DIAGNOSIS — K21.9 GASTROESOPHAGEAL REFLUX DISEASE WITHOUT ESOPHAGITIS: ICD-10-CM

## 2020-01-27 DIAGNOSIS — Z00.00 ROUTINE GENERAL MEDICAL EXAMINATION AT A HEALTH CARE FACILITY: Primary | ICD-10-CM

## 2020-01-27 DIAGNOSIS — H65.23 BILATERAL CHRONIC SEROUS OTITIS MEDIA: ICD-10-CM

## 2020-01-27 DIAGNOSIS — E03.8 SUBCLINICAL HYPOTHYROIDISM: ICD-10-CM

## 2020-01-27 DIAGNOSIS — G43.109 MIGRAINE WITH AURA AND WITHOUT STATUS MIGRAINOSUS, NOT INTRACTABLE: ICD-10-CM

## 2020-01-27 DIAGNOSIS — H90.6 MIXED CONDUCTIVE AND SENSORINEURAL HEARING LOSS OF BOTH EARS: ICD-10-CM

## 2020-01-27 DIAGNOSIS — M54.16 LUMBAR RADICULOPATHY: ICD-10-CM

## 2020-01-27 DIAGNOSIS — R73.01 IMPAIRED FASTING GLUCOSE: ICD-10-CM

## 2020-01-27 DIAGNOSIS — E78.5 HYPERLIPIDEMIA LDL GOAL <130: ICD-10-CM

## 2020-01-27 DIAGNOSIS — E78.1 HYPERTRIGLYCERIDEMIA: ICD-10-CM

## 2020-01-27 LAB
ANION GAP SERPL CALCULATED.3IONS-SCNC: 6 MMOL/L (ref 3–14)
BUN SERPL-MCNC: 16 MG/DL (ref 7–30)
CALCIUM SERPL-MCNC: 8.4 MG/DL (ref 8.5–10.1)
CHLORIDE SERPL-SCNC: 114 MMOL/L (ref 94–109)
CHOLEST SERPL-MCNC: 220 MG/DL
CO2 SERPL-SCNC: 20 MMOL/L (ref 20–32)
CREAT SERPL-MCNC: 0.85 MG/DL (ref 0.66–1.25)
GFR SERPL CREATININE-BSD FRML MDRD: >90 ML/MIN/{1.73_M2}
GLUCOSE SERPL-MCNC: 103 MG/DL (ref 70–99)
HDLC SERPL-MCNC: 49 MG/DL
LDLC SERPL CALC-MCNC: 143 MG/DL
NONHDLC SERPL-MCNC: 171 MG/DL
POTASSIUM SERPL-SCNC: 4.3 MMOL/L (ref 3.4–5.3)
SODIUM SERPL-SCNC: 140 MMOL/L (ref 133–144)
T4 FREE SERPL-MCNC: 0.96 NG/DL (ref 0.76–1.46)
TRIGL SERPL-MCNC: 138 MG/DL
TSH SERPL DL<=0.005 MIU/L-ACNC: 4.3 MU/L (ref 0.4–4)

## 2020-01-27 PROCEDURE — 80048 BASIC METABOLIC PNL TOTAL CA: CPT | Performed by: PHYSICIAN ASSISTANT

## 2020-01-27 PROCEDURE — 80061 LIPID PANEL: CPT | Performed by: PHYSICIAN ASSISTANT

## 2020-01-27 PROCEDURE — 84443 ASSAY THYROID STIM HORMONE: CPT | Performed by: PHYSICIAN ASSISTANT

## 2020-01-27 PROCEDURE — 36415 COLL VENOUS BLD VENIPUNCTURE: CPT | Performed by: PHYSICIAN ASSISTANT

## 2020-01-27 PROCEDURE — 99396 PREV VISIT EST AGE 40-64: CPT | Performed by: PHYSICIAN ASSISTANT

## 2020-01-27 PROCEDURE — 84439 ASSAY OF FREE THYROXINE: CPT | Performed by: PHYSICIAN ASSISTANT

## 2020-01-27 PROCEDURE — 99214 OFFICE O/P EST MOD 30 MIN: CPT | Mod: 25 | Performed by: PHYSICIAN ASSISTANT

## 2020-01-27 RX ORDER — LEVOTHYROXINE SODIUM 125 UG/1
125 TABLET ORAL DAILY
Qty: 90 TABLET | Refills: 3 | OUTPATIENT
Start: 2020-01-27

## 2020-01-27 RX ORDER — LEVOTHYROXINE SODIUM 125 UG/1
125 TABLET ORAL DAILY
Qty: 90 TABLET | Refills: 3 | Status: SHIPPED | OUTPATIENT
Start: 2020-01-27 | End: 2021-02-17

## 2020-01-27 RX ORDER — OMEPRAZOLE 40 MG/1
40 CAPSULE, DELAYED RELEASE ORAL DAILY
Qty: 90 CAPSULE | Refills: 3 | Status: SHIPPED | OUTPATIENT
Start: 2020-01-27 | End: 2020-12-07

## 2020-01-27 RX ORDER — TOPIRAMATE 50 MG/1
50 TABLET, FILM COATED ORAL DAILY
Qty: 90 TABLET | Refills: 3 | Status: SHIPPED | OUTPATIENT
Start: 2020-01-27 | End: 2021-02-17

## 2020-01-27 RX ORDER — GABAPENTIN 300 MG/1
CAPSULE ORAL
Qty: 90 CAPSULE | Refills: 5 | Status: SHIPPED | OUTPATIENT
Start: 2020-01-27 | End: 2020-04-16

## 2020-01-27 RX ORDER — GEMFIBROZIL 600 MG/1
TABLET, FILM COATED ORAL
Qty: 180 TABLET | Refills: 3 | Status: SHIPPED | OUTPATIENT
Start: 2020-01-27 | End: 2021-06-04

## 2020-01-27 ASSESSMENT — ENCOUNTER SYMPTOMS
COUGH: 0
NERVOUS/ANXIOUS: 0
HEMATURIA: 0
HEADACHES: 0
WEAKNESS: 0
DIARRHEA: 0
SHORTNESS OF BREATH: 0
EYE PAIN: 0
FEVER: 0
ARTHRALGIAS: 0
DIZZINESS: 0
CHILLS: 0
PALPITATIONS: 0
HEMATOCHEZIA: 0
ABDOMINAL PAIN: 0
SORE THROAT: 0
MYALGIAS: 0
NAUSEA: 0
JOINT SWELLING: 0
HEARTBURN: 0
DYSURIA: 0
PARESTHESIAS: 0
CONSTIPATION: 0
FREQUENCY: 0

## 2020-01-27 ASSESSMENT — MIFFLIN-ST. JEOR: SCORE: 1897.97

## 2020-01-27 NOTE — TELEPHONE ENCOUNTER
Pending Prescriptions:                       Disp   Refills    levothyroxine (SYNTHROID/LEVOTHROID) 125 M*90 tab*3        Sig: Take 1 tablet (125 mcg) by mouth daily      Routing refill request to provider for review/approval because:  Labs out of range:  TSH    Sherry Newton, MSN, RN

## 2020-01-28 NOTE — TELEPHONE ENCOUNTER
FUTURE VISIT INFORMATION      FUTURE VISIT INFORMATION:    Date: 2/21/2020    Time: 2PM Audio / 3PM ENT     Location: [facility]  REFERRAL INFORMATION:    Referring provider:  Nik Nina PA-C    Referring providers clinic:  [facility]     Reason for visit/diagnosis: Bilateral chronic serous otitis media     RECORDS REQUESTED FROM:       Clinic name Comments Records Status Imaging Status   [facility]  1/27/2020 notes with Nik Nina PA-C EPIC    [facility]  4/1/19 audiogram   4/1/19 notes with Dr Jackie LOUISE

## 2020-02-13 DIAGNOSIS — H91.90 HEARING LOSS, UNSPECIFIED HEARING LOSS TYPE, UNSPECIFIED LATERALITY: Primary | ICD-10-CM

## 2020-02-14 ENCOUNTER — OFFICE VISIT (OUTPATIENT)
Dept: AUDIOLOGY | Facility: CLINIC | Age: 50
End: 2020-02-14
Attending: OTOLARYNGOLOGY
Payer: COMMERCIAL

## 2020-02-14 ENCOUNTER — OFFICE VISIT (OUTPATIENT)
Dept: OTOLARYNGOLOGY | Facility: CLINIC | Age: 50
End: 2020-02-14
Attending: PHYSICIAN ASSISTANT
Payer: COMMERCIAL

## 2020-02-14 DIAGNOSIS — H69.93 DYSFUNCTION OF BOTH EUSTACHIAN TUBES: Primary | ICD-10-CM

## 2020-02-14 DIAGNOSIS — H90.6 MIXED CONDUCTIVE AND SENSORINEURAL HEARING LOSS OF BOTH EARS: ICD-10-CM

## 2020-02-14 DIAGNOSIS — H91.90 HEARING LOSS, UNSPECIFIED HEARING LOSS TYPE, UNSPECIFIED LATERALITY: ICD-10-CM

## 2020-02-14 DIAGNOSIS — H90.3 SENSORY HEARING LOSS, BILATERAL: Primary | ICD-10-CM

## 2020-02-14 DIAGNOSIS — H66.3X3 CHRONIC SUPPURATIVE OTITIS MEDIA OF BOTH EARS, UNSPECIFIED OTITIS MEDIA LOCATION: ICD-10-CM

## 2020-02-14 ASSESSMENT — PAIN SCALES - GENERAL: PAINLEVEL: NO PAIN (0)

## 2020-02-14 NOTE — PATIENT INSTRUCTIONS
1.  You were seen in the ENT Clinic today by Dr. Rae.  If you have any questions or concerns after your appointment, please call [phone]. Press option #1 for scheduling related needs. Press option #3 for Nurse advice.    2.  Please schedule an appointment for the following:   - CT Temporal Bones W/O Contrast    3.  Plan is to return to clinic to see Dr. Sakshi Osuna for further evaluation      Paola Jackson LPN  [facility]  [phone]          The patient presents with a history of chronic eustachian tube dysfunction and otitis media with a mixed hearing loss that is worse in the left ear than in the right ear. Based upon his Audiogram and Tympanogram and his examination, he will be referred for a CT scan of the temporal bones and he will be referred to Dr. Sakshi Osuna after his CT scan is completed.

## 2020-02-14 NOTE — TELEPHONE ENCOUNTER
FUTURE VISIT INFORMATION      FUTURE VISIT INFORMATION:    Date: 3/31/20    Time: 8:40 AM    [place] [facility]  REFERRAL INFORMATION:    Referring provider:  Dr. Rae    Referring providers clinic:  [facility] - ENT    Reason for visit/diagnosis: Serous Otitis Media    RECORDS REQUESTED FROM:       Clinic name Comments Records Status Imaging Status   [facility] 2/14/20 - ENT OV with Dr. Rae [facility] 1/27/20 - PCC OV with MERRITT [facility] 3/30/20 - ENT OV with Dr. Mejia  4/1/19 - ENT OV with Dr. Mejia [facility]    [facility] - Procedures 2/14/20 - Audiogram  4/1/19 - [facility] ([facility]) 2/26/20 - CT Temporal WO [facility] PACs

## 2020-02-14 NOTE — LETTER
2/14/2020       RE: Linden Cruz  [address] [address] [address]     Dear Colleague,    Thank you for referring your patient, Linden Cruz, to the [facility] E[facility] at [facility]. Please see a copy of my visit note below.    The patient presents with a history of hearing loss and chronic otitis media and eustachian tube dysfunction. The patient denies sinusitis, rhinitis, facial pain, nasal obstruction or purulent nasal discharge. The patient denies chronic or recurrent tonsillitis, chronic or recurrent pharyngitis. The patient denies dizziness or tinnitus. His Audiogram and Tympanogram demonstrates bilateral mixed hearing loss that is moderate to severe at the higher frequencies and worse on the left than on the right. His word recognition scores are 100% in the right ear and 96% in the left ear. His tympanograms are type B bilaterally.       This patient is seen in consultation at the request of Dr. Nik Nina.     All other systems were reviewed and they are either negative or they are not directly pertinent to this Otolaryngology examination.      Past Medical History:    Past Medical History:   Diagnosis Date     Chest pain      Esophageal reflux      Migraines      Mixed hyperlipidemia      DANIEL (obstructive sleep apnea)      Other specified gastritis without mention of hemorrhage     H. Pylori, diag 12/06       Past Surgical History:    Past Surgical History:   Procedure Laterality Date     CHOLECYSTECTOMY, LAPOROSCOPIC  2004     INJECT EPIDURAL LUMBAR Right 1/10/2020    Procedure: INJECTION, SPINE, LUMBAR 5 and Sacral 1 Bilateral EPIDURAL;  Surgeon: Jose C Fields MD;  Location: [facility]     SURGICAL HISTORY OF -   12/2006    upper GI, [place]       Medications:      Current Outpatient Medications:      fluticasone (FLONASE) 50 MCG/ACT spray, Spray 1-2 sprays into both nostrils daily, Disp: 1 Bottle, Rfl: 3     gabapentin (NEURONTIN) 300 MG  capsule, 1 cap twice daily for 1 week then 3 times daily thereafeter, Disp: 90 capsule, Rfl: 5     gemfibrozil (LOPID) 600 MG tablet, TAKE 1 TABLET (600 MG) BY MOUTH 2 TIMES DAILY, Disp: 180 tablet, Rfl: 3     Ibuprofen (ADVIL PO), , Disp: , Rfl:      levothyroxine (SYNTHROID/LEVOTHROID) 125 MCG tablet, Take 1 tablet (125 mcg) by mouth daily, Disp: 90 tablet, Rfl: 3     Multiple Vitamin (MULTI VITAMIN MENS PO), Take  by mouth., Disp: , Rfl:      omeprazole (PRILOSEC) 40 MG DR capsule, Take 1 capsule (40 mg) by mouth daily, Disp: 90 capsule, Rfl: 3     SUMAtriptan (IMITREX) 100 MG tablet, TAKE 1 TAB BY MOUTH WITH ONSET OF MIGRAINE, MAY REPEAT ONCE AFTER 2 HOURS. MAX 2 TABS/24 HOURS., Disp: 9 tablet, Rfl: 1     tadalafil (CIALIS) 20 MG tablet, TAKE 1/2 TO 1 TABLET BY MOUTH AS NEEDED FOR ED, DO NOT USE WITH NITRO/TERAZOSIN/DOXAZOSIN, Disp: 8 tablet, Rfl: 8     topiramate (TOPAMAX) 50 MG tablet, Take 1 tablet (50 mg) by mouth daily, Disp: 90 tablet, Rfl: 3    Allergies:    No known drug allergies    Physical Examination:    The patient is a well developed, well nourished male in no apparent distress.  He is normocepahlic, atraumatic with pupils equally round and reactive to light.    Oral Cavity Examination: Normal Mucosa with no masses or lesions  Nasal Examination: Normal Mucosa with no masses or lesions  Ear Examination: Ear canals clear, tympanic membranes and middle ear spaces normal  Neurological Examination: Facial nerve function intact and symmetric  Integumentary Examination: No lesions on the skin of the head or neck  Neck Examination: No masses or lesions, no lymphadenopathy  Endocrine Examination: Normal thyroid examination    Assessment and Plan:    The patient presents with a history of chronic eustachian tube dysfunction and otitis media with a mixed hearing loss that is worse in the left ear than in the right ear. Based upon his Audiogram and Tympanogram and his examination, he will be referred for a CT  scan of the temporal bones and he will be referred to Dr. Sakshi Osuna after his CT scan is completed.     CC: Dr. Nik Nina      Again, thank you for allowing me to participate in the care of your patient.      Sincerely,    Shahbaz Rae MD

## 2020-02-14 NOTE — PROGRESS NOTES
The patient presents with a history of hearing loss and chronic otitis media and eustachian tube dysfunction. The patient denies sinusitis, rhinitis, facial pain, nasal obstruction or purulent nasal discharge. The patient denies chronic or recurrent tonsillitis, chronic or recurrent pharyngitis. The patient denies dizziness or tinnitus. His Audiogram and Tympanogram demonstrates bilateral mixed hearing loss that is moderate to severe at the higher frequencies and worse on the left than on the right. His word recognition scores are 100% in the right ear and 96% in the left ear. His tympanograms are type B bilaterally.       This patient is seen in consultation at the request of Dr. Nik Nina.     All other systems were reviewed and they are either negative or they are not directly pertinent to this Otolaryngology examination.      Past Medical History:    Past Medical History:   Diagnosis Date     Chest pain      Esophageal reflux      Migraines      Mixed hyperlipidemia      DANIEL (obstructive sleep apnea)      Other specified gastritis without mention of hemorrhage     H. Pylori, diag 12/06       Past Surgical History:    Past Surgical History:   Procedure Laterality Date     CHOLECYSTECTOMY, LAPOROSCOPIC  2004     INJECT EPIDURAL LUMBAR Right 1/10/2020    Procedure: INJECTION, SPINE, LUMBAR 5 and Sacral 1 Bilateral EPIDURAL;  Surgeon: Jose C Fields MD;  Location: [facility]     SURGICAL HISTORY OF -   12/2006    upper GI, [place]       Medications:      Current Outpatient Medications:      fluticasone (FLONASE) 50 MCG/ACT spray, Spray 1-2 sprays into both nostrils daily, Disp: 1 Bottle, Rfl: 3     gabapentin (NEURONTIN) 300 MG capsule, 1 cap twice daily for 1 week then 3 times daily thereafeter, Disp: 90 capsule, Rfl: 5     gemfibrozil (LOPID) 600 MG tablet, TAKE 1 TABLET (600 MG) BY MOUTH 2 TIMES DAILY, Disp: 180 tablet, Rfl: 3     Ibuprofen (ADVIL PO), , Disp: , Rfl:      levothyroxine (SYNTHROID/LEVOTHROID) 125  MCG tablet, Take 1 tablet (125 mcg) by mouth daily, Disp: 90 tablet, Rfl: 3     Multiple Vitamin (MULTI VITAMIN MENS PO), Take  by mouth., Disp: , Rfl:      omeprazole (PRILOSEC) 40 MG DR capsule, Take 1 capsule (40 mg) by mouth daily, Disp: 90 capsule, Rfl: 3     SUMAtriptan (IMITREX) 100 MG tablet, TAKE 1 TAB BY MOUTH WITH ONSET OF MIGRAINE, MAY REPEAT ONCE AFTER 2 HOURS. MAX 2 TABS/24 HOURS., Disp: 9 tablet, Rfl: 1     tadalafil (CIALIS) 20 MG tablet, TAKE 1/2 TO 1 TABLET BY MOUTH AS NEEDED FOR ED, DO NOT USE WITH NITRO/TERAZOSIN/DOXAZOSIN, Disp: 8 tablet, Rfl: 8     topiramate (TOPAMAX) 50 MG tablet, Take 1 tablet (50 mg) by mouth daily, Disp: 90 tablet, Rfl: 3    Allergies:    No known drug allergies    Physical Examination:    The patient is a well developed, well nourished male in no apparent distress.  He is normocepahlic, atraumatic with pupils equally round and reactive to light.    Oral Cavity Examination: Normal Mucosa with no masses or lesions  Nasal Examination: Normal Mucosa with no masses or lesions  Ear Examination: Ear canals clear, tympanic membranes and middle ear spaces normal  Neurological Examination: Facial nerve function intact and symmetric  Integumentary Examination: No lesions on the skin of the head or neck  Neck Examination: No masses or lesions, no lymphadenopathy  Endocrine Examination: Normal thyroid examination    Assessment and Plan:    The patient presents with a history of chronic eustachian tube dysfunction and otitis media with a mixed hearing loss that is worse in the left ear than in the right ear. Based upon his Audiogram and Tympanogram and his examination, he will be referred for a CT scan of the temporal bones and he will be referred to Dr. Sakshi Osuna after his CT scan is completed.     CC: Dr. Nik Nina

## 2020-02-14 NOTE — NURSING NOTE
Chief Complaint   Patient presents with     Consult     bilateral chronic serous otitis media        Henry Segal LPN

## 2020-02-14 NOTE — PROGRESS NOTES
AUDIOLOGY REPORT    SUMMARY: Audiology visit completed. See audiogram for results.      RECOMMENDATIONS: Follow-up with ENT.    Angela Lowry.  Licensed Audiologist  [place] # [id number]

## 2020-02-21 ENCOUNTER — PRE VISIT (OUTPATIENT)
Dept: OTOLARYNGOLOGY | Facility: CLINIC | Age: 50
End: 2020-02-21

## 2020-02-23 ENCOUNTER — OFFICE VISIT (OUTPATIENT)
Dept: URGENT CARE | Facility: RETAIL CLINIC | Age: 50
End: 2020-02-23
Payer: COMMERCIAL

## 2020-02-23 VITALS
OXYGEN SATURATION: 97 % | SYSTOLIC BLOOD PRESSURE: 134 MMHG | TEMPERATURE: 98.3 F | DIASTOLIC BLOOD PRESSURE: 90 MMHG | HEART RATE: 109 BPM

## 2020-02-23 DIAGNOSIS — J11.1 INFLUENZA-LIKE ILLNESS: Primary | ICD-10-CM

## 2020-02-23 DIAGNOSIS — R06.2 WHEEZING: ICD-10-CM

## 2020-02-23 DIAGNOSIS — R05.9 COUGH: ICD-10-CM

## 2020-02-23 PROCEDURE — 99213 OFFICE O/P EST LOW 20 MIN: CPT | Performed by: PHYSICIAN ASSISTANT

## 2020-02-23 RX ORDER — ALBUTEROL SULFATE 90 UG/1
1-2 AEROSOL, METERED RESPIRATORY (INHALATION) EVERY 4 HOURS PRN
Qty: 1 INHALER | Refills: 0 | Status: SHIPPED | OUTPATIENT
Start: 2020-02-23 | End: 2021-07-01

## 2020-02-23 NOTE — PATIENT INSTRUCTIONS
Likely influenza  No indication for antibiotics discussed.    Use inhaler as needed  Rest! Your body needs more rest to heal.  Drink plenty of fluids (warm fluids like tea or soup are soothing and reduce cough)  Sit in the bathroom with a hot shower running and breathe in the steam.  Honey may soothe your sore throat and help manage your cough- may take straight or in warm water with lemon juice.  Avoid smoke from cigarettes, fireplace, bonfire etc.  Take Tylenol or an NSAID such as ibuprofen (Advil) or naproxen (Aleve) as needed for pain.  Delsym (dextromethorphan) is a 12 hour over the counter cough medication    OR Mucinex or Robitussin (guiafenesin) thin mucus and may help it to loosen more quickly  Good handwashing is the best way to prevent spread of the common cold.  Present to emergency room if you develop trouble breathing, swallowing or cough-up blood.  Follow up with your primary care provider if symptoms worsen or fail to improve as expected

## 2020-02-23 NOTE — PROGRESS NOTES
Chief Complaint   Patient presents with     Cough     since last tuesday, productive cough sometimes, no fevers, body aches, fatigue, runny nose, congestion     Pharyngitis     since last wed/thurs      SUBJECTIVE:   Linden Cruz is a 49 year old male presenting with a chief complaint of cough, body aches, fatigue, runny nose and sore throat  Onset of symptoms was 5 day(s) ago.  Course of illness is same.    Severity moderate  Current and Associated symptoms: cough, body aches, fatigue, runny nose, sore throat  Treatment measures tried include home remedies  Predisposing factors include airline travel, no chronic health issues  Received his influenza vaccine this season    Past Medical History:   Diagnosis Date     Chest pain      Esophageal reflux      Migraines      Mixed hyperlipidemia      DANIEL (obstructive sleep apnea)      Other specified gastritis without mention of hemorrhage     H. Pylori, diag 12/06     Current Outpatient Medications   Medication Sig Dispense Refill     gemfibrozil (LOPID) 600 MG tablet TAKE 1 TABLET (600 MG) BY MOUTH 2 TIMES DAILY 180 tablet 3     levothyroxine (SYNTHROID/LEVOTHROID) 125 MCG tablet Take 1 tablet (125 mcg) by mouth daily 90 tablet 3     Multiple Vitamin (MULTI VITAMIN MENS PO) Take  by mouth.       omeprazole (PRILOSEC) 40 MG DR capsule Take 1 capsule (40 mg) by mouth daily 90 capsule 3     SUMAtriptan (IMITREX) 100 MG tablet TAKE 1 TAB BY MOUTH WITH ONSET OF MIGRAINE, MAY REPEAT ONCE AFTER 2 HOURS. MAX 2 TABS/24 HOURS. 9 tablet 1     tadalafil (CIALIS) 20 MG tablet TAKE 1/2 TO 1 TABLET BY MOUTH AS NEEDED FOR ED, DO NOT USE WITH NITRO/TERAZOSIN/DOXAZOSIN 8 tablet 8     topiramate (TOPAMAX) 50 MG tablet Take 1 tablet (50 mg) by mouth daily 90 tablet 3     fluticasone (FLONASE) 50 MCG/ACT spray Spray 1-2 sprays into both nostrils daily (Patient not taking: Reported on 2/23/2020) 1 Bottle 3     gabapentin (NEURONTIN) 300 MG capsule 1 cap twice daily for 1 week then 3 times  daily thereafeter (Patient not taking: Reported on 2020) 90 capsule 5     Ibuprofen (ADVIL PO)        Social History     Tobacco Use     Smoking status: Former Smoker     Types: Cigarettes     Last attempt to quit: 2000     Years since quittin.1     Smokeless tobacco: Never Used   Substance Use Topics     Alcohol use: Yes     Alcohol/week: 0.0 standard drinks     Comment: 1/month       ROS:  CONSTITUTIONAL:POSITIVE  for mild headache and body aches   ENT/MOUTH: POSITIVE for mild sore throat and nasal congestion  RESP:POSITIVE for cough-ranges from dry to productive, very intermittent wheeze     OBJECTIVE:  BP (!) 134/90 (BP Location: Left arm)   Pulse 109   Temp 98.3  F (36.8  C) (Oral)   SpO2 97%   GENERAL APPEARANCE: mildly ill appearing  EYES: conjunctiva clear  HENT:.  Nose scant rhinorrhea. mouth without ulcers, erythema or lesions  NECK: supple, nontender, no lymphadenopathy  RESP: lungs clear to auscultation - no rales, rhonchi or wheezes  CV: regular rates and rhythm, normal S1 S2, no murmur noted  SKIN: no suspicious lesions or rashes    ASSESSMENT:  (R69) Influenza-like illness  (primary encounter diagnosis)  (R05) Cough  (R06.2) Wheezing    PLAN:  Due to 6th day of symptoms did not test for influenza.  Plan: albuterol (PROAIR HFA/PROVENTIL HFA/VENTOLIN HFA) 108 (90 Base) MCG/ACT inhaler    Patient Instructions   Likely influenza  No indication for antibiotics discussed.    Use inhaler as needed  Rest! Your body needs more rest to heal.  Drink plenty of fluids (warm fluids like tea or soup are soothing and reduce cough)  Sit in the bathroom with a hot shower running and breathe in the steam.  Honey may soothe your sore throat and help manage your cough- may take straight or in warm water with lemon juice.  Avoid smoke from cigarettes, fireplace, bonfire etc.  Take Tylenol or an NSAID such as ibuprofen (Advil) or naproxen (Aleve) as needed for pain.  Delsym (dextromethorphan) is a 12 hour  over the counter cough medication    OR Mucinex or Robitussin (guiafenesin) thin mucus and may help it to loosen more quickly  Good handwashing is the best way to prevent spread of the common cold.  Present to emergency room if you develop trouble breathing, swallowing or cough-up blood.  Follow up with your primary care provider if symptoms worsen or fail to improve as expected      Mlia Andrade PA-C  [facility]

## 2020-02-26 ENCOUNTER — ANCILLARY PROCEDURE (OUTPATIENT)
Dept: CT IMAGING | Facility: CLINIC | Age: 50
End: 2020-02-26
Attending: OTOLARYNGOLOGY
Payer: COMMERCIAL

## 2020-02-26 DIAGNOSIS — H69.93 DYSFUNCTION OF BOTH EUSTACHIAN TUBES: ICD-10-CM

## 2020-03-10 DIAGNOSIS — J32.9 CHRONIC SINUSITIS, UNSPECIFIED LOCATION: Primary | ICD-10-CM

## 2020-03-31 ENCOUNTER — PRE VISIT (OUTPATIENT)
Dept: OTOLARYNGOLOGY | Facility: CLINIC | Age: 50
End: 2020-03-31

## 2020-04-10 ENCOUNTER — VIRTUAL VISIT (OUTPATIENT)
Dept: OTOLARYNGOLOGY | Facility: CLINIC | Age: 50
End: 2020-04-10
Payer: COMMERCIAL

## 2020-04-10 DIAGNOSIS — H69.93 DYSFUNCTION OF BOTH EUSTACHIAN TUBES: Primary | ICD-10-CM

## 2020-04-10 DIAGNOSIS — H90.6 MIXED CONDUCTIVE AND SENSORINEURAL HEARING LOSS OF BOTH EARS: ICD-10-CM

## 2020-04-10 DIAGNOSIS — J01.01 ACUTE RECURRENT MAXILLARY SINUSITIS: ICD-10-CM

## 2020-04-10 RX ORDER — FLUTICASONE PROPIONATE 50 MCG
2 SPRAY, SUSPENSION (ML) NASAL DAILY
Qty: 16 G | Refills: 1 | Status: SHIPPED | OUTPATIENT
Start: 2020-04-10 | End: 2020-05-04

## 2020-04-10 RX ORDER — LORATADINE 10 MG/1
10 CAPSULE, LIQUID FILLED ORAL DAILY
Qty: 60 CAPSULE | Refills: 3 | Status: SHIPPED | OUTPATIENT
Start: 2020-04-10 | End: 2020-10-14
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
## 2020-04-10 NOTE — PATIENT INSTRUCTIONS
The patient presents with a history of eustachian tube dysfunction, sensorineural hearing loss and sinusitis. Based upon his clinical symptoms and his CT scan findings, he was initiated on Augmentin. He has responded to this therapy. We have discussed the use of Flonase Nasal Spray and Claritin to try to improve his nasal airflow. He will schedule a follow up visit by phone in one month. Eventually, once it is safer to do so, we will repeat the CT scan of the sinuses to verify that his sinusitis has resolved.

## 2020-04-16 ENCOUNTER — VIRTUAL VISIT (OUTPATIENT)
Dept: FAMILY MEDICINE | Facility: OTHER | Age: 50
End: 2020-04-16
Payer: COMMERCIAL

## 2020-04-16 DIAGNOSIS — R07.9 CHEST PAIN, UNSPECIFIED TYPE: Primary | ICD-10-CM

## 2020-04-16 PROCEDURE — 99213 OFFICE O/P EST LOW 20 MIN: CPT | Mod: TEL | Performed by: FAMILY MEDICINE
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
## 2020-04-16 NOTE — PATIENT INSTRUCTIONS
Patient Education     Uncertain Causes of Chest Pain    Chest pain can happen for a number of reasons. Sometimes the cause can't be determined. If your condition does not seem serious, and your pain does not appear to be coming from your heart, your healthcare provider may recommend watching it closely. Sometimes the signs of a serious problem take more time to appear. Many problems not related to your heart can cause chest pain. These include:    Musculoskeletal. Costochondritis is an inflammation of the tissues around the ribs that can occur from trauma or overuse injuries, or a strain of the muscles of the chest wall    Respiratory. Pneumonia, collapsed lung (pneumothorax), or inflammation of the lining of the chest and lungs (pleurisy)    Gastrointestinal. Esophageal reflux, heartburn, ulcers, or gallbladder disease    Anxiety and panic disorders    Nerve compression and inflammation    Rare miscellaneous problems such as aortic aneurysm (a swelling of the large artery coming out of the heart) or pulmonary embolism (a blood clot in the lungs)  Home care  After your visit, follow these recommendations:    Rest today and avoid strenuous activity.    Take any prescribed medicine as directed.    Be aware of any recurrent chest pain and notice any changes  Follow-up care  Follow up with your healthcare provider if you do not start to feel better within 24 hours, or as advised.  Call 911  Call 911 if any of these occur:    A change in the type of pain: if it feels different, becomes more severe, lasts longer, or begins to spread into your shoulder, arm, neck, jaw or back    Shortness of breath or increased pain with breathing    Weakness, dizziness, or fainting    Rapid heart beat    Crushing sensation in your chest  When to seek medical advice  Call your healthcare provider right away if any of the following occur:    Cough with dark colored sputum (phlegm) or blood    Fever of 100.4 F (38 C) or higher, or as  directed by your healthcare provider    Swelling, pain or redness in one leg  Date Last Reviewed: 5/1/2018 2000-2019 [employer]. [address] [place] [address], [place] [address]. All rights reserved. This information is not intended as a substitute for professional medical care. Always follow your healthcare professional's instructions.

## 2020-04-20 ENCOUNTER — ANCILLARY PROCEDURE (OUTPATIENT)
Dept: GENERAL RADIOLOGY | Facility: OTHER | Age: 50
End: 2020-04-20
Attending: FAMILY MEDICINE
Payer: COMMERCIAL

## 2020-04-20 DIAGNOSIS — R07.9 CHEST PAIN, UNSPECIFIED TYPE: ICD-10-CM

## 2020-04-20 LAB
ANION GAP SERPL CALCULATED.3IONS-SCNC: 7 MMOL/L (ref 3–14)
BUN SERPL-MCNC: 17 MG/DL (ref 7–30)
CALCIUM SERPL-MCNC: 8.6 MG/DL (ref 8.5–10.1)
CHLORIDE SERPL-SCNC: 110 MMOL/L (ref 94–109)
CO2 SERPL-SCNC: 23 MMOL/L (ref 20–32)
CREAT SERPL-MCNC: 0.95 MG/DL (ref 0.66–1.25)
ERYTHROCYTE [DISTWIDTH] IN BLOOD BY AUTOMATED COUNT: 12.8 % (ref 10–15)
GFR SERPL CREATININE-BSD FRML MDRD: >90 ML/MIN/{1.73_M2}
GLUCOSE SERPL-MCNC: 104 MG/DL (ref 70–99)
HCT VFR BLD AUTO: 42.7 % (ref 40–53)
HGB BLD-MCNC: 14.6 G/DL (ref 13.3–17.7)
MCH RBC QN AUTO: 31.7 PG (ref 26.5–33)
MCHC RBC AUTO-ENTMCNC: 34.2 G/DL (ref 31.5–36.5)
MCV RBC AUTO: 93 FL (ref 78–100)
PLATELET # BLD AUTO: 319 10E9/L (ref 150–450)
POTASSIUM SERPL-SCNC: 3.6 MMOL/L (ref 3.4–5.3)
RBC # BLD AUTO: 4.61 10E12/L (ref 4.4–5.9)
SODIUM SERPL-SCNC: 140 MMOL/L (ref 133–144)
WBC # BLD AUTO: 6.7 10E9/L (ref 4–11)

## 2020-04-20 PROCEDURE — 71046 X-RAY EXAM CHEST 2 VIEWS: CPT

## 2020-04-20 PROCEDURE — 85027 COMPLETE CBC AUTOMATED: CPT | Performed by: FAMILY MEDICINE

## 2020-04-20 PROCEDURE — 36415 COLL VENOUS BLD VENIPUNCTURE: CPT | Performed by: FAMILY MEDICINE

## 2020-04-20 PROCEDURE — 80048 BASIC METABOLIC PNL TOTAL CA: CPT | Performed by: FAMILY MEDICINE

## 2020-04-24 NOTE — PROGRESS NOTES
"Linden Cruz is a 49 year old male who is being evaluated via a billable telephone visit.      The patient has been notified of following:     \"This telephone visit will be conducted via a call between you and your physician/provider. We have found that certain health care needs can be provided without the need for a physical exam.  This service lets us provide the care you need with a short phone conversation.  If a prescription is necessary we can send it directly to your pharmacy.  If lab work is needed we can place an order for that and you can then stop by our lab to have the test done at a later time.    Telephone visits are billed at different rates depending on your insurance coverage. During this emergency period, for some insurers they may be billed the same as an in-person visit.  Please reach out to your insurance provider with any questions.    If during the course of the call the physician/provider feels a telephone visit is not appropriate, you will not be charged for this service.\"    Patient has given verbal consent for Telephone visit?  Yes    How would you like to obtain your AVS? Claudio Au     Linden Cruz is a 49 year old male who presents to clinic today for the following health issues:    HPI     He was ill at the end of February with fevers, chills, body aches, cough and shortness of breath when he came back from [place]. He was seen in [facility] and was told it was likely influenza but was not tested. He recovered from this after two weeks. However, his wife became ill with similar symptoms approximately 3 weeks ago and they both self quarantined for 14 days. Her symptoms have since resolved but when her symptoms started, he began to notice left upper chest pain radiating into the left shoulder blade. The pain is persistent but has slowly been improving over the past few weeks, although is still noticeable. It does not change with activity and there is no tenderness " "to palpation. He has noticed mild short of breath with exertion, mostly when going up and down stairs but he was working outside cutting wood most of the day yesterday and had no shortness of breath. He denies any calf pain, redness, or leg swelling. He had a virtual visit with Dr. Esteban on 4/16 who did not believe the symptoms were cardiac in origin but ordered a CBC, BMP and CXR all of which were completed and normal. Patient denies other symptoms such as fevers, chills, cough, sore throat, or congestion. His wife bought a home oxygen and heart rate monitor and he states his oxygen has been normal but heart rate has been around 100 lately.     Migraine     Since your last clinic visit, how have your headaches changed?  Worsened    How often are you getting headaches or migraines? \"it vary, 1-2 times a week\"      Are you able to do normal daily activities when you have a migraine? Yes \"it I take Imitrex\"     Are you taking rescue/relief medications? (Select all that apply) sumatriptan (Imitrex)    How helpful is your rescue/relief medication?  I get total relief    Are you taking any medications to prevent migraines? (Select all that apply)  Topomax    In the past 4 weeks, how often have you gone to urgent care or the emergency room because of your headaches?  0     His migraines had worsened the past few weeks but Imitrex has been very helpful and they have decreased in frequency again over the past week.    His back pain still waxed and wanes but is still overall improved since his injection a few months ago. He takes Tylenol and ibuprofen as needed.       Reviewed and updated as needed this visit by Provider  Allergies  Meds  Problems     Review of Systems   GENERAL: Denies fever, fatigue, weakness, weight gain, or weight loss.  HEENT: Eyes-Denies pain, redness, loss of vision, double or blurred vision.     Ears/Nose-Denies tinnitus, loss of hearing, epistaxis, decreased sense of smell, nasal congestion. " Denies loss of sense of taste, dry mouth, or sore throat.   CARDIOVASCULAR: +Improved left sided chest pain. Deniesshortness of breath, irregular heartbeats,  palpitations, or edema.  RESPIRATORY: Denies cough, hemoptysis, and shortness of breath.   MUSCULOSKELETAL: +Improving left chest pain into left scapula.  NEUROLOGIC: Denies headache, fainting, dizziness, memory loss, numbness, tingling, or seizures.  PSYCHIATRIC: Denies depression, anxiety, mood swings, and thoughts of suicide.      Objective   Reported vitals:  There were no vitals taken for this visit.   General: alert and no distress over the phone  PSYCH: Alert and oriented times 3; coherent speech, normal   rate and volume, able to articulate logical thoughts, able   to abstract reason, no tangential thoughts, no hallucinations   or delusions  His affect is normal  RESP: No cough, no audible wheezing, able to talk in full sentences  Remainder of exam unable to be completed due to telephone visits    Diagnostic Test Results:  Lab Results   Component Value Date    WBC 6.7 04/20/2020     Lab Results   Component Value Date    RBC 4.61 04/20/2020     Lab Results   Component Value Date    HGB 14.6 04/20/2020     Lab Results   Component Value Date    HCT 42.7 04/20/2020     No components found for: MCT  Lab Results   Component Value Date    MCV 93 04/20/2020     Lab Results   Component Value Date    MCH 31.7 04/20/2020     Lab Results   Component Value Date    MCHC 34.2 04/20/2020     Lab Results   Component Value Date    RDW 12.8 04/20/2020     Lab Results   Component Value Date     04/20/2020     Last Comprehensive Metabolic Panel:  Sodium   Date Value Ref Range Status   04/20/2020 140 133 - 144 mmol/L Final     Potassium   Date Value Ref Range Status   04/20/2020 3.6 3.4 - 5.3 mmol/L Final     Chloride   Date Value Ref Range Status   04/20/2020 110 (H) 94 - 109 mmol/L Final     Carbon Dioxide   Date Value Ref Range Status   04/20/2020 23 20 - 32  mmol/L Final     Anion Gap   Date Value Ref Range Status   04/20/2020 7 3 - 14 mmol/L Final     Glucose   Date Value Ref Range Status   04/20/2020 104 (H) 70 - 99 mg/dL Final     Urea Nitrogen   Date Value Ref Range Status   04/20/2020 17 7 - 30 mg/dL Final     Creatinine   Date Value Ref Range Status   04/20/2020 0.95 0.66 - 1.25 mg/dL Final     GFR Estimate   Date Value Ref Range Status   04/20/2020 >90 >60 mL/min/[1.73_m2] Final     Comment:     Non  GFR Calc  Starting 12/18/2018, serum creatinine based estimated GFR (eGFR) will be   calculated using the Chronic Kidney Disease Epidemiology Collaboration   (CKD-EPI) equation.       Calcium   Date Value Ref Range Status   04/20/2020 8.6 8.5 - 10.1 mg/dL Final           Assessment/Plan:    ICD-10-CM    1. Chest pain, unspecified type  R07.9 D dimer, quantitative     EKG 12-lead complete w/read - Clinics   2. Dyspnea on exertion  R06.09 D dimer, quantitative     EKG 12-lead complete w/read - Clinics   3. Lumbar radiculopathy  M54.16    4. DDD (degenerative disc disease), lumbar  M51.36    5. Migraine with aura and without status migrainosus, not intractable  G43.109        1-2. Continued left sided chest and shoulder blade pain along with mild dyspnea on exertion for the past month although slightly improved over the past few weeks. He had a recently normal chest x-ray along with normal CBC and BMP. He states his HR monitor at home has been running around 100 so he is slightly tachycardic. Given this and that fact that his symptoms have not completely resolved, I recommend he come into the clinic for an EKG to evaluate his heart along with a D dimer to rule out a pulmonary embolism. I will also have the MA's check his vital signs. I have a low suspicion for cardiac etiology or a PE given his improving symptoms, normal home oxygen and stable symptoms with exertion but these need to be ruled out. I suspect that his symptoms are pleuritic in nature due  to his illness from late February as he had influenza like symptoms but was never tested so may have had COVID-19 and we are finding that COVID-19 patients can have prolonged respiratory symptoms after the virus is gone. I recommend he continue to stay as active as possible and use rescue inhaler as needed. As his symptoms are improving, no further treatment is currently warranted. If symptoms worsen, he will contact the clinic and will need to be evaluated by a provider in the clinic. If all diagnostic testing is normal, will plan on follow up in 6 months or sooner if needed.    3-4. Waxes and wanes but overall stable. Continue with ibuprofen, Tylenol and home stretching as needed.    5. Worse a few weeks ago but since improved. Continue daily Topamax and as needed Imitrex.    Follow up in 6 months for a routine clinic visit.       Phone call duration: 20 minutes    Nik Nina PA-C

## 2020-04-27 ENCOUNTER — ALLIED HEALTH/NURSE VISIT (OUTPATIENT)
Dept: FAMILY MEDICINE | Facility: OTHER | Age: 50
End: 2020-04-27
Payer: COMMERCIAL

## 2020-04-27 ENCOUNTER — VIRTUAL VISIT (OUTPATIENT)
Dept: FAMILY MEDICINE | Facility: OTHER | Age: 50
End: 2020-04-27

## 2020-04-27 VITALS — DIASTOLIC BLOOD PRESSURE: 82 MMHG | RESPIRATION RATE: 16 BRPM | SYSTOLIC BLOOD PRESSURE: 132 MMHG | HEART RATE: 80 BPM

## 2020-04-27 DIAGNOSIS — G43.109 MIGRAINE WITH AURA AND WITHOUT STATUS MIGRAINOSUS, NOT INTRACTABLE: ICD-10-CM

## 2020-04-27 DIAGNOSIS — R06.09 DYSPNEA ON EXERTION: ICD-10-CM

## 2020-04-27 DIAGNOSIS — R07.9 CHEST PAIN, UNSPECIFIED TYPE: Primary | ICD-10-CM

## 2020-04-27 DIAGNOSIS — M54.16 LUMBAR RADICULOPATHY: ICD-10-CM

## 2020-04-27 DIAGNOSIS — R07.9 CHEST PAIN, UNSPECIFIED TYPE: ICD-10-CM

## 2020-04-27 DIAGNOSIS — M51.369 DDD (DEGENERATIVE DISC DISEASE), LUMBAR: ICD-10-CM

## 2020-04-27 LAB — D DIMER PPP FEU-MCNC: 0.3 UG/ML FEU (ref 0–0.5)

## 2020-04-27 PROCEDURE — 99207 ZZC NO CHARGE NURSE ONLY: CPT

## 2020-04-27 PROCEDURE — 99214 OFFICE O/P EST MOD 30 MIN: CPT | Mod: 95 | Performed by: PHYSICIAN ASSISTANT

## 2020-04-27 PROCEDURE — 85379 FIBRIN DEGRADATION QUANT: CPT | Performed by: PHYSICIAN ASSISTANT

## 2020-04-27 PROCEDURE — 36415 COLL VENOUS BLD VENIPUNCTURE: CPT | Performed by: PHYSICIAN ASSISTANT

## 2020-04-27 PROCEDURE — 93000 ELECTROCARDIOGRAM COMPLETE: CPT | Performed by: PHYSICIAN ASSISTANT

## 2020-04-27 ASSESSMENT — PAIN SCALES - GENERAL: PAINLEVEL: MILD PAIN (3)

## 2020-04-27 NOTE — PATIENT INSTRUCTIONS
Will check labs today as well as an EKG.  If normal, no further testing is needed and the chest pain is likely due to pleuritic pain from your previous illness.  Continue to stay active and if not improving, let me know.  Follow up in 6 months for a recheck.

## 2020-04-27 NOTE — Clinical Note
Patient coming in today at 12:30 for labs. Please also perform an EKG and vitals when he comes. Thanks.    Douglas

## 2020-05-02 DIAGNOSIS — H90.6 MIXED CONDUCTIVE AND SENSORINEURAL HEARING LOSS OF BOTH EARS: ICD-10-CM

## 2020-05-02 DIAGNOSIS — J01.01 ACUTE RECURRENT MAXILLARY SINUSITIS: ICD-10-CM

## 2020-05-02 DIAGNOSIS — H69.93 DYSFUNCTION OF BOTH EUSTACHIAN TUBES: ICD-10-CM

## 2020-05-04 RX ORDER — FLUTICASONE PROPIONATE 50 MCG
2 SPRAY, SUSPENSION (ML) NASAL DAILY
Qty: 48 ML | Refills: 1 | Status: SHIPPED | OUTPATIENT
Start: 2020-05-04 | End: 2022-03-07

## 2020-05-07 ENCOUNTER — VIRTUAL VISIT (OUTPATIENT)
Dept: OTOLARYNGOLOGY | Facility: CLINIC | Age: 50
End: 2020-05-07
Payer: COMMERCIAL

## 2020-05-07 ENCOUNTER — TELEPHONE (OUTPATIENT)
Dept: OTOLARYNGOLOGY | Facility: CLINIC | Age: 50
End: 2020-05-07

## 2020-05-07 VITALS — WEIGHT: 210 LBS | HEIGHT: 72 IN | BODY MASS INDEX: 28.44 KG/M2

## 2020-05-07 DIAGNOSIS — J32.0 CHRONIC MAXILLARY SINUSITIS: ICD-10-CM

## 2020-05-07 DIAGNOSIS — H69.93 DYSFUNCTION OF BOTH EUSTACHIAN TUBES: ICD-10-CM

## 2020-05-07 DIAGNOSIS — J30.1 ALLERGIC RHINITIS DUE TO POLLEN, UNSPECIFIED SEASONALITY: Primary | ICD-10-CM

## 2020-05-07 ASSESSMENT — PAIN SCALES - GENERAL: PAINLEVEL: NO PAIN (0)

## 2020-05-07 ASSESSMENT — MIFFLIN-ST. JEOR: SCORE: 1860.55

## 2020-05-07 NOTE — TELEPHONE ENCOUNTER
"Called patient to schedule CT scan (sinus) and follow up with Dr. Rae in July 2020. Left a VM with scheduling instructions and ENT call center number.    Patient is welcome to schedule in any available \"[facility] RETURN\" slot with Dr. Rae in July 2020 onward.  "

## 2020-05-07 NOTE — PROGRESS NOTES
"Linden Cruz is a 49 year old male who is being evaluated via a billable telephone visit.      The patient has been notified of following:     \"This telephone visit will be conducted via a call between you and your physician/provider. We have found that certain health care needs can be provided without the need for a physical exam.  This service lets us provide the care you need with a short phone conversation.  If a prescription is necessary we can send it directly to your pharmacy.  If lab work is needed we can place an order for that and you can then stop by our lab to have the test done at a later time.    Telephone visits are billed at different rates depending on your insurance coverage. During this emergency period, for some insurers they may be billed the same as an in-person visit.  Please reach out to your insurance provider with any questions.    If during the course of the call the physician/provider feels a telephone visit is not appropriate, you will not be charged for this service.\"    Patient has given verbal consent for Telephone visit?  Yes    What phone number would you like to be contacted at? [phone]    How would you like to obtain your AVS? Claudio     The patient presents with a history of eustachian tube dysfunction, sensorineural hearing loss and sinusitis. Based upon his clinical symptoms and his CT scan findings, he was initiated on Augmentin. He has responded to this therapy. He was also initiated on Flonase Nasal Spray and Claritin to try to improve his nasal airflow. He reports that his ears are improved with medical therapy and his hearing is improved. He reports that his sinus are also improved and he is less congested.     All other systems were reviewed and they are either negative or they are not directly pertinent to this Otolaryngology examination.      Past Medical History:    Past Medical History:   Diagnosis Date     Chest pain      Esophageal reflux      Migraines  "     Mixed hyperlipidemia      DANIEL (obstructive sleep apnea)      Other specified gastritis without mention of hemorrhage     H. Pylori, diag 12/06       Past Surgical History:    Past Surgical History:   Procedure Laterality Date     CHOLECYSTECTOMY, LAPOROSCOPIC  2004     INJECT EPIDURAL LUMBAR Right 1/10/2020    Procedure: INJECTION, SPINE, LUMBAR 5 and Sacral 1 Bilateral EPIDURAL;  Surgeon: Jose C Fields MD;  Location:  OR     SURGICAL HISTORY OF -   12/2006    upper GI, [place]       Medications:      Current Outpatient Medications:      albuterol (PROAIR HFA/PROVENTIL HFA/VENTOLIN HFA) 108 (90 Base) MCG/ACT inhaler, Inhale 1-2 puffs into the lungs every 4 hours as needed, Disp: 1 Inhaler, Rfl: 0     fluticasone (FLONASE) 50 MCG/ACT nasal spray, Spray 2 sprays into both nostrils daily, Disp: 48 mL, Rfl: 1     gemfibrozil (LOPID) 600 MG tablet, TAKE 1 TABLET (600 MG) BY MOUTH 2 TIMES DAILY, Disp: 180 tablet, Rfl: 3     Ibuprofen (ADVIL PO), , Disp: , Rfl:      levothyroxine (SYNTHROID/LEVOTHROID) 125 MCG tablet, Take 1 tablet (125 mcg) by mouth daily, Disp: 90 tablet, Rfl: 3     loratadine (CLARITIN) 10 MG capsule, Take 10 mg by mouth daily, Disp: 60 capsule, Rfl: 3     Multiple Vitamin (MULTI VITAMIN MENS PO), Take  by mouth., Disp: , Rfl:      omeprazole (PRILOSEC) 40 MG DR capsule, Take 1 capsule (40 mg) by mouth daily, Disp: 90 capsule, Rfl: 3     SUMAtriptan (IMITREX) 100 MG tablet, TAKE 1 TAB BY MOUTH WITH ONSET OF MIGRAINE, MAY REPEAT ONCE AFTER 2 HOURS. MAX 2 TABS/24 HOURS., Disp: 9 tablet, Rfl: 1     tadalafil (CIALIS) 20 MG tablet, TAKE 1/2 TO 1 TABLET BY MOUTH AS NEEDED FOR ED, DO NOT USE WITH NITRO/TERAZOSIN/DOXAZOSIN, Disp: 8 tablet, Rfl: 8     topiramate (TOPAMAX) 50 MG tablet, Take 1 tablet (50 mg) by mouth daily, Disp: 90 tablet, Rfl: 3    Allergies:    No known drug allergies      Assessment and Plan:    The patient presents with a history of eustachian tube dysfunction, sinusitis and  sensorineural hearing loss. He has been treated with Augmentin, Claritin and Flonase Nasal [place]. He has responded well to medical therapy. He will continue to use Flonase Nasal Spray and Claritin and he will be seen again in clinic when it is safe to do so. A CT scan of the sinuses will be ordered for prior to this visit once it is safe to do so.       Visit Start: 1:40 PM  Visit End: 1:52 PM    Phone call duration: 12 minutes    Shahbaz Rae MD

## 2020-05-23 DIAGNOSIS — G43.109 MIGRAINE WITH AURA AND WITHOUT STATUS MIGRAINOSUS, NOT INTRACTABLE: ICD-10-CM

## 2020-05-26 RX ORDER — SUMATRIPTAN 100 MG/1
TABLET, FILM COATED ORAL
Qty: 9 TABLET | Refills: 2 | Status: SHIPPED | OUTPATIENT
Start: 2020-05-26 | End: 2020-08-27

## 2020-05-26 NOTE — TELEPHONE ENCOUNTER
Prescription approved per [facility] Refill Protocol.    Deb Sevilla, RN, BSN

## 2020-06-01 DIAGNOSIS — H69.93 DYSFUNCTION OF BOTH EUSTACHIAN TUBES: ICD-10-CM

## 2020-06-01 DIAGNOSIS — J01.01 ACUTE RECURRENT MAXILLARY SINUSITIS: ICD-10-CM

## 2020-06-01 DIAGNOSIS — H90.6 MIXED CONDUCTIVE AND SENSORINEURAL HEARING LOSS OF BOTH EARS: ICD-10-CM

## 2020-06-03 RX ORDER — LORATADINE 10 MG/1
TABLET ORAL
Qty: 60 TABLET | Refills: 3 | OUTPATIENT
Start: 2020-06-03

## 2020-06-17 DIAGNOSIS — M54.16 LUMBAR RADICULOPATHY: ICD-10-CM

## 2020-06-17 DIAGNOSIS — M51.369 DDD (DEGENERATIVE DISC DISEASE), LUMBAR: ICD-10-CM

## 2020-06-17 RX ORDER — GABAPENTIN 300 MG/1
CAPSULE ORAL
Qty: 90 CAPSULE | Refills: 5 | Status: SHIPPED | OUTPATIENT
Start: 2020-06-17 | End: 2021-06-04

## 2020-08-24 DIAGNOSIS — G43.109 MIGRAINE WITH AURA AND WITHOUT STATUS MIGRAINOSUS, NOT INTRACTABLE: ICD-10-CM

## 2020-08-27 RX ORDER — SUMATRIPTAN 100 MG/1
TABLET, FILM COATED ORAL
Qty: 9 TABLET | Refills: 2 | Status: SHIPPED | OUTPATIENT
Start: 2020-08-27 | End: 2020-12-07

## 2020-09-02 NOTE — PATIENT INSTRUCTIONS
Will order a lumbar steroid injections to be completed in [place] with Dr. Fields.  Continue with heat, stretching and ibuprofen but no more than 2400 of ibuprofen daily.   Will start gabapentin 300 mg nightly to help with the pain, numbness, tingling and sleep.  This can take a few weeks to notice a difference.  If you have side effects, let me know.    Follow up in 1 month for annual physical.        How Severe Is Your Acne?: moderate Is This A New Presentation, Or A Follow-Up?: Acne

## 2020-09-25 DIAGNOSIS — N52.9 ERECTILE DYSFUNCTION, UNSPECIFIED ERECTILE DYSFUNCTION TYPE: ICD-10-CM

## 2020-09-27 RX ORDER — TADALAFIL 20 MG/1
TABLET ORAL
Qty: 8 TABLET | Refills: 0 | Status: SHIPPED | OUTPATIENT
Start: 2020-09-27 | End: 2020-10-28

## 2020-10-02 ENCOUNTER — IMMUNIZATION (OUTPATIENT)
Dept: FAMILY MEDICINE | Facility: OTHER | Age: 50
End: 2020-10-02
Payer: COMMERCIAL

## 2020-10-02 DIAGNOSIS — Z23 NEED FOR PROPHYLACTIC VACCINATION AND INOCULATION AGAINST INFLUENZA: Primary | ICD-10-CM

## 2020-10-02 PROCEDURE — 99207 PR NO CHARGE NURSE ONLY: CPT

## 2020-10-02 PROCEDURE — 90471 IMMUNIZATION ADMIN: CPT

## 2020-10-02 PROCEDURE — 90686 IIV4 VACC NO PRSV 0.5 ML IM: CPT

## 2020-10-02 NOTE — PROGRESS NOTES
Patient consents to receive outdoor care: Yes    Upon arrival, patient instructed to proceed to designated location, place vehicle in park, turn off, and remove keys     If we are unable to safely and ergonomically able to provide care- is the patient able to safely able to get out of car and transfer to a chair? Yes    Patient would like to receive their AVS none.    Emma Santiago, CMA

## 2020-10-09 ENCOUNTER — MYC MEDICAL ADVICE (OUTPATIENT)
Dept: FAMILY MEDICINE | Facility: OTHER | Age: 50
End: 2020-10-09

## 2020-10-09 DIAGNOSIS — Z86.19 HISTORY OF VIRAL ILLNESS: Primary | ICD-10-CM

## 2020-10-11 DIAGNOSIS — J01.01 ACUTE RECURRENT MAXILLARY SINUSITIS: ICD-10-CM

## 2020-10-11 DIAGNOSIS — H90.6 MIXED CONDUCTIVE AND SENSORINEURAL HEARING LOSS OF BOTH EARS: ICD-10-CM

## 2020-10-11 DIAGNOSIS — H69.93 DYSFUNCTION OF BOTH EUSTACHIAN TUBES: ICD-10-CM

## 2020-10-14 NOTE — TELEPHONE ENCOUNTER
loratadine (CLARITIN) 10 MG tablet      Last Written Prescription Date:  4/10/20  Last Fill Quantity: 60,   # refills: 3    Last Office Visit : 5/7/20-note states..He will continue to use Flonase Nasal Spray and Claritin and he will be seen again in clinic when it is safe to do so.    Future Office visit:  0    Routing refill request to provider for review/approval because:  Drug not active on patient's medication list

## 2020-10-15 RX ORDER — LORATADINE 10 MG/1
1 TABLET ORAL DAILY
Qty: 90 TABLET | Refills: 3 | Status: SHIPPED | OUTPATIENT
Start: 2020-10-15 | End: 2022-03-07

## 2020-10-16 DIAGNOSIS — Z86.19 HISTORY OF VIRAL ILLNESS: ICD-10-CM

## 2020-10-16 PROCEDURE — U0003 INFECTIOUS AGENT DETECTION BY NUCLEIC ACID (DNA OR RNA); SEVERE ACUTE RESPIRATORY SYNDROME CORONAVIRUS 2 (SARS-COV-2) (CORONAVIRUS DISEASE [COVID-19]), AMPLIFIED PROBE TECHNIQUE, MAKING USE OF HIGH THROUGHPUT TECHNOLOGIES AS DESCRIBED BY CMS-2020-01-R: HCPCS | Performed by: PHYSICIAN ASSISTANT

## 2020-10-17 LAB
SARS-COV-2 RNA SPEC QL NAA+PROBE: NOT DETECTED
SPECIMEN SOURCE: NORMAL

## 2020-10-18 ENCOUNTER — NURSE TRIAGE (OUTPATIENT)
Dept: NURSING | Facility: CLINIC | Age: 50
End: 2020-10-18

## 2020-10-18 NOTE — TELEPHONE ENCOUNTER
Zachary was in on Friday for a covid test.  Zachary is going to [facility] on Monday.  Zachary's test was negative and needs the date of the test which is present but needs the date of the final result.   Zachary cannot upload result in my chart.  FNA transferred caller through to Medical Records.       COVID 19 Nurse Triage Plan/Patient Instructions    Please be aware that novel coronavirus (COVID-19) may be circulating in the community. If you develop symptoms such as fever, cough, or SOB or if you have concerns about the presence of another infection including coronavirus (COVID-19), please contact your health care provider or visit [email or web address].     Disposition/Instructions    Home care recommended. Follow home care protocol based instructions.    Thank you for taking steps to prevent the spread of this virus.  o Limit your contact with others.  o Wear a simple mask to cover your cough.  o Wash your hands well and often.    Resources    [facility]: About COVID-19: [email or web address]    CDC: What to Do If You're Sick: www.cdc.gov/coronavirus/2019-ncov/about/steps-when-sick.html    CDC: Ending Home Isolation: www.cdc.gov/coronavirus/2019-ncov/hcp/disposition-in-home-patients.html     CDC: Caring for Someone: www.cdc.gov/coronavirus/2019-ncov/if-you-are-sick/care-for-someone.html     [facility]: Interim Guidance for Hospital Discharge to Home: www.health.[facility] of [facility].mn.us/diseases/coronavirus/hcp/hospdischarge.pdf    [facility] clinical trials (COVID-19 research studies): clinicalaffairs.[facility]-clinical-trials     Below are the COVID-19 hotlines at the [facility] ([facility]). Interpreters are available.   o For health questions: Call 974-377-3871 or 1-576.589.9719 (7 a.m. to 7 p.m.)  o For questions about schools and childcare: Call 296-754-6828 or 1-687.956.4468 (7 a.m. to 7 p.m.)                       Additional Information    Negative: Nursing judgment, per information in Reference     Negative: Nursing judgment or information in reference    Negative: Nursing judgment or information in reference    Negative: Nursing judgment or information in reference    Negative: Nursing judgment or information in reference    Negative: Nursing judgment or information in reference    Negative: Nursing judgment or information in reference    Negative: Nursing judgment or information in reference    Negative: Nursing judgment or information in reference    Negative: Nursing judgment or information in reference    Negative: Nursing judgment or information in reference    Negative: Nursing judgment or information in reference    Negative: Nursing judgment or information in reference    Negative: Nursing judgment or information in reference    Nursing judgment or information in reference    Protocols used: NO GUIDELINE [id number]

## 2020-10-18 NOTE — TELEPHONE ENCOUNTER
Needs a letter to travel.    Information sent to the patient via [employer].    Johana Ruvalcaba RN  [place] Nurse Advisor  2:48 PM  10/18/2020    Reason for Disposition    Caller requesting lab results    Protocols used: PCP CALL - NO TRIAGE-A-

## 2020-10-27 DIAGNOSIS — N52.9 ERECTILE DYSFUNCTION, UNSPECIFIED ERECTILE DYSFUNCTION TYPE: ICD-10-CM

## 2020-10-28 ENCOUNTER — MYC MEDICAL ADVICE (OUTPATIENT)
Dept: FAMILY MEDICINE | Facility: OTHER | Age: 50
End: 2020-10-28

## 2020-10-28 DIAGNOSIS — Z20.822 EXPOSURE TO COVID-19 VIRUS: Primary | ICD-10-CM

## 2020-10-28 RX ORDER — TADALAFIL 20 MG/1
TABLET ORAL
Qty: 8 TABLET | Refills: 3 | Status: SHIPPED | OUTPATIENT
Start: 2020-10-28 | End: 2021-05-27

## 2020-11-06 DIAGNOSIS — Z20.822 EXPOSURE TO COVID-19 VIRUS: ICD-10-CM

## 2020-11-06 PROCEDURE — 36415 COLL VENOUS BLD VENIPUNCTURE: CPT | Performed by: PHYSICIAN ASSISTANT

## 2020-11-06 PROCEDURE — 86769 SARS-COV-2 COVID-19 ANTIBODY: CPT | Performed by: PHYSICIAN ASSISTANT

## 2020-11-07 LAB
COVID-19 SPIKE RBD ABY TITER: NORMAL
COVID-19 SPIKE RBD ABY: NEGATIVE

## 2020-12-04 DIAGNOSIS — G43.109 MIGRAINE WITH AURA AND WITHOUT STATUS MIGRAINOSUS, NOT INTRACTABLE: ICD-10-CM

## 2020-12-04 DIAGNOSIS — K21.9 GASTROESOPHAGEAL REFLUX DISEASE WITHOUT ESOPHAGITIS: ICD-10-CM

## 2020-12-07 RX ORDER — SUMATRIPTAN 100 MG/1
TABLET, FILM COATED ORAL
Qty: 9 TABLET | Refills: 2 | Status: SHIPPED | OUTPATIENT
Start: 2020-12-07 | End: 2021-04-13

## 2020-12-07 RX ORDER — OMEPRAZOLE 40 MG/1
CAPSULE, DELAYED RELEASE ORAL
Qty: 90 CAPSULE | Refills: 1 | Status: SHIPPED | OUTPATIENT
Start: 2020-12-07 | End: 2021-06-04

## 2021-02-17 DIAGNOSIS — E03.8 SUBCLINICAL HYPOTHYROIDISM: ICD-10-CM

## 2021-02-17 DIAGNOSIS — G43.109 MIGRAINE WITH AURA AND WITHOUT STATUS MIGRAINOSUS, NOT INTRACTABLE: ICD-10-CM

## 2021-02-17 RX ORDER — TOPIRAMATE 50 MG/1
TABLET, FILM COATED ORAL
Qty: 90 TABLET | Refills: 0 | Status: SHIPPED | OUTPATIENT
Start: 2021-02-17 | End: 2021-02-19

## 2021-02-17 RX ORDER — LEVOTHYROXINE SODIUM 125 UG/1
TABLET ORAL
Qty: 90 TABLET | Refills: 0 | Status: SHIPPED | OUTPATIENT
Start: 2021-02-17 | End: 2021-02-19

## 2021-02-17 NOTE — TELEPHONE ENCOUNTER
Pending Prescriptions:                       Disp   Refills    levothyroxine (SYNTHROID/LEVOTHROID) 125 M*90 tab*3        Sig: TAKE 1 TABLET BY MOUTH EVERY DAY    topiramate (TOPAMAX) 50 MG tablet [Pharmac*90 tab*3        Sig: TAKE 1 TABLET BY MOUTH EVERY DAY    Routing refill request to provider for review/approval because:  Labs out of range:    TSH   Date Value Ref Range Status   01/27/2020 4.30 (H) 0.40 - 4.00 mU/L Final

## 2021-02-17 NOTE — TELEPHONE ENCOUNTER
Refills sent but due for an annual physical along with updated labs prior to further refills.    Nik Nina PA-C

## 2021-02-19 RX ORDER — TOPIRAMATE 50 MG/1
50 TABLET, FILM COATED ORAL DAILY
Qty: 90 TABLET | Refills: 0 | Status: SHIPPED | OUTPATIENT
Start: 2021-02-19 | End: 2021-06-04

## 2021-02-19 RX ORDER — LEVOTHYROXINE SODIUM 125 UG/1
125 TABLET ORAL DAILY
Qty: 90 TABLET | Refills: 0 | Status: SHIPPED | OUTPATIENT
Start: 2021-02-19 | End: 2021-06-07

## 2021-02-19 NOTE — TELEPHONE ENCOUNTER
E-scribing error.  Med re-sent.    Jerry Saldivar, LUCINAN, RN, PHN  [facility] [place]  February 19, 2021

## 2021-03-13 ENCOUNTER — HEALTH MAINTENANCE LETTER (OUTPATIENT)
Age: 51
End: 2021-03-13

## 2021-04-11 DIAGNOSIS — G43.109 MIGRAINE WITH AURA AND WITHOUT STATUS MIGRAINOSUS, NOT INTRACTABLE: ICD-10-CM

## 2021-04-13 RX ORDER — SUMATRIPTAN 100 MG/1
TABLET, FILM COATED ORAL
Qty: 9 TABLET | Refills: 0 | Status: SHIPPED | OUTPATIENT
Start: 2021-04-13 | End: 2021-05-27

## 2021-04-13 NOTE — TELEPHONE ENCOUNTER
Pending Prescriptions:                       Disp   Refills    SUMAtriptan (IMITREX) 100 MG tablet [Phar*9 tabl*0            Sig: TAKE 1 TABLET BY MOUTH WITH ONSET OF MIGRAINE.           MAY REPEAT ONCE AFTER 2 HOURS. MAX 2 TABS/24           HOURS    Medication is being filled for 1 time attila refill only due to:  Patient is due for medication follow up    Please call and help schedule.  Thank you!    Andreina Lee RN on 4/13/2021 at 8:41 AM

## 2021-05-25 DIAGNOSIS — G43.109 MIGRAINE WITH AURA AND WITHOUT STATUS MIGRAINOSUS, NOT INTRACTABLE: ICD-10-CM

## 2021-05-25 DIAGNOSIS — N52.9 ERECTILE DYSFUNCTION, UNSPECIFIED ERECTILE DYSFUNCTION TYPE: ICD-10-CM

## 2021-05-27 RX ORDER — SUMATRIPTAN 100 MG/1
TABLET, FILM COATED ORAL
Qty: 9 TABLET | Refills: 0 | Status: SHIPPED | OUTPATIENT
Start: 2021-05-27 | End: 2021-06-04

## 2021-05-27 RX ORDER — TADALAFIL 20 MG/1
TABLET ORAL
Qty: 8 TABLET | Refills: 3 | Status: SHIPPED | OUTPATIENT
Start: 2021-05-27 | End: 2021-11-19

## 2021-05-27 NOTE — TELEPHONE ENCOUNTER
Pending Prescriptions:                       Disp   Refills    SUMAtriptan (IMITREX) 100 MG tablet [Pharm*9 tabl*0        Sig: TAKE 1 TABLET BY MOUTH WITH ONSET OF MIGRAINE. MAY           REPEAT ONCE AFTER 2 HOURS. MAX 2 TABS/24 HOURS    tadalafil (CIALIS) 20 MG tablet [Pharmacy *8 tabl*3        Sig: TAKE 1/2 TO 1 TABLET BY MOUTH AS NEEDED FOR ED, DO           NOT USE WITH NITRO/TERAZOSIN/DOXAZOSIN    Routing refill request to provider for review/approval because:  Labs out of range:    BP Readings from Last 3 Encounters:   04/27/20 132/82   02/23/20 (!) 134/90   01/27/20 120/82      over 13 months, RN unable to provide a attila refill.

## 2021-05-30 NOTE — PROGRESS NOTES
Assessment & Plan       ICD-10-CM    1. Lumbar radiculopathy  M54.16 XR Lumbar Epidural Injection Incl Imaging   2. DDD (degenerative disc disease), lumbar  M51.36 XR Lumbar Epidural Injection Incl Imaging   3. Multiple joint pain  M25.50 Basic metabolic panel  (Ca, Cl, CO2, Creat, Gluc, K, Na, BUN)     ESR: Erythrocyte sedimentation rate     CRP, inflammation     Rheumatoid factor     Anti Nuclear Pallavi IgG by IFA with Reflex   4. Subclinical hypothyroidism  E03.9 TSH with free T4 reflex   5. Migraine with aura and without status migrainosus, not intractable  G43.109 topiramate (TOPAMAX) 50 MG tablet     SUMAtriptan (IMITREX) 100 MG tablet   6. Hyperlipidemia LDL goal <130  E78.5    7. Hypertriglyceridemia  E78.1 gemfibrozil (LOPID) 600 MG tablet   8. Gastroesophageal reflux disease without esophagitis  K21.9 omeprazole (PRILOSEC) 40 MG DR capsule       1-2. Will order a new lumbar epidural steroid injection with Dr. Fields. He is medically cleared for this procedure. Addendum: Cleared for procedure on 7/8/21. Encouraged to avoid taking more than 2400 mg of ibuprofen per day and should try to rotate with Tylenol.    3. Multiple joint pain since his second COVID vaccine but unsure if this is related to the vaccine. I recommend lab work up including ESR, CRP, RF and JAN for further evaluation. I recommend he stay well hydrated and consider glucosamine daily. This very well could just be osteoarthritis as well.     4. Updated TSH ordered. Continue levothyroxine.    5. Continue with daily Topamax and as needed Imitrex and ibuprofen.    6-7. He is not fasting today and has been out of his gemfibrozil so will refill and check fasting lipid panel another day.     8. Continue omeprazole.    Follow up annually, sooner if needed.     Nik Nina PA-C  [facility] Sharma is a 50 year old who presents for the following health issues:    History of Present Illness       Back Pain:  He  "presents for follow up of back pain. Patient's back pain is a recurring problem.  Location of back pain:  Right lower back, right buttock, right hip and right side of waist  Description of back pain: burning and other  Back pain spreads: right buttocks, right thigh and left thigh    Since patient first noticed back pain, pain is: always present, but gets better and worse  Does back pain interfere with his job:  No      Migraines:   Since the patient's last clinic visit, headaches are: no change  The patient is getting headaches:  2-3 week  He is able to do normal daily activities when he has a migraine.  The patient is taking the following rescue/relief medications:  Ibuprofen (Advil, Motrin) and sumatriptan (Imitrex)   Patient states \"I get some relief\" from the rescue/relief medications.   The patient is taking the following medications to prevent migraines:  Topomax  In the past 4 weeks, the patient has gone to an Urgent Care or Emergency Room 0 times times due to headaches.    He eats 2-3 servings of fruits and vegetables daily.He consumes 2 sweetened beverage(s) daily.He exercises with enough effort to increase his heart rate 9 or less minutes per day.  He exercises with enough effort to increase his heart rate 3 or less days per week.   He is taking medications regularly.     His low back pain has been worse again the past few months. He has right sided low back pain along with pain into the right buttock down the right leg to the knee right right leg numbness as well. He denies any weakness or loss of bowel/bladder control. He has a lumbar BALTA last year which definitely helped with the pain so is hoping he can repeat this.     His headaches/migraines have been better controlled lately with ibuprofen. He has been taking up to 12 ibuprofen per day due to his increased back pain. He still takes Imitrex as needed.    Even since his second Moderna vaccine in early April, he has had joint pain in his distal finger " knuckles bilaterally and both knees. Pain and stiffness is worse in the mornings. He denies any swelling. No personal or family history of rheumatologic disease.     Review of Systems    Constitutional, cardiovascular, pulmonary, musculoskeletal, neuro, gi and gu systems are negative, except as otherwise noted.      Objective    /80   Temp 97.7  F (36.5  C) (Temporal)   Resp 14   Wt 108 kg (238 lb)   SpO2 97%   BMI 32.28 kg/m    Body mass index is 32.28 kg/m .  Physical Exam   GENERAL: healthy, alert and no distress  EYES: Eyes grossly normal to inspection, PERRL and conjunctivae and sclerae normal  RESP: lungs clear to auscultation - no rales, rhonchi or wheezes  CV: regular rate and rhythm, normal S1 S2, no S3 or S4, no murmur, click or rub, no peripheral edema an peripheral pulses strongd  MS: no gross musculoskeletal defects noted, no edema. Mild right lumbar paraspinal tenderness. No hand/finger tenderness or swelling. No knee tenderness.   NEURO: Normal strength and tone, mentation intact and speech normal. Cranial nerves II-XII are grossly intact. DTRs are 2+/4 throughout and symmetric. Gait is stable.  PSYCH: mentation appears normal, affect normal/bright      Results for orders placed or performed in visit on 06/04/21   ESR: Erythrocyte sedimentation rate     Status: None   Result Value Ref Range    Sed Rate 5 0 - 15 mm/h         Answers for HPI/ROS submitted by the patient on 6/4/2021   Chronic problems general questions HPI Form  PHQ9 TOTAL SCORE: 0  BHARTI 7 TOTAL SCORE: 0

## 2021-06-04 ENCOUNTER — TELEPHONE (OUTPATIENT)
Dept: FAMILY MEDICINE | Facility: OTHER | Age: 51
End: 2021-06-04

## 2021-06-04 ENCOUNTER — OFFICE VISIT (OUTPATIENT)
Dept: FAMILY MEDICINE | Facility: OTHER | Age: 51
End: 2021-06-04
Payer: COMMERCIAL

## 2021-06-04 VITALS
WEIGHT: 238 LBS | RESPIRATION RATE: 14 BRPM | SYSTOLIC BLOOD PRESSURE: 120 MMHG | BODY MASS INDEX: 32.28 KG/M2 | OXYGEN SATURATION: 97 % | DIASTOLIC BLOOD PRESSURE: 80 MMHG | TEMPERATURE: 97.7 F

## 2021-06-04 DIAGNOSIS — G43.109 MIGRAINE WITH AURA AND WITHOUT STATUS MIGRAINOSUS, NOT INTRACTABLE: ICD-10-CM

## 2021-06-04 DIAGNOSIS — E03.8 SUBCLINICAL HYPOTHYROIDISM: ICD-10-CM

## 2021-06-04 DIAGNOSIS — M25.50 MULTIPLE JOINT PAIN: ICD-10-CM

## 2021-06-04 DIAGNOSIS — E78.1 HYPERTRIGLYCERIDEMIA: ICD-10-CM

## 2021-06-04 DIAGNOSIS — E78.5 HYPERLIPIDEMIA LDL GOAL <130: ICD-10-CM

## 2021-06-04 DIAGNOSIS — M54.16 LUMBAR RADICULOPATHY: Primary | ICD-10-CM

## 2021-06-04 DIAGNOSIS — K21.9 GASTROESOPHAGEAL REFLUX DISEASE WITHOUT ESOPHAGITIS: ICD-10-CM

## 2021-06-04 DIAGNOSIS — M51.369 DDD (DEGENERATIVE DISC DISEASE), LUMBAR: ICD-10-CM

## 2021-06-04 LAB
ANION GAP SERPL CALCULATED.3IONS-SCNC: 6 MMOL/L (ref 3–14)
BUN SERPL-MCNC: 12 MG/DL (ref 7–30)
CALCIUM SERPL-MCNC: 8.2 MG/DL (ref 8.5–10.1)
CHLORIDE SERPL-SCNC: 110 MMOL/L (ref 94–109)
CO2 SERPL-SCNC: 24 MMOL/L (ref 20–32)
CREAT SERPL-MCNC: 1.03 MG/DL (ref 0.66–1.25)
CRP SERPL-MCNC: <2.9 MG/L (ref 0–8)
ERYTHROCYTE [SEDIMENTATION RATE] IN BLOOD BY WESTERGREN METHOD: 5 MM/H (ref 0–15)
GFR SERPL CREATININE-BSD FRML MDRD: 84 ML/MIN/{1.73_M2}
GLUCOSE SERPL-MCNC: 89 MG/DL (ref 70–99)
POTASSIUM SERPL-SCNC: 3.6 MMOL/L (ref 3.4–5.3)
SODIUM SERPL-SCNC: 140 MMOL/L (ref 133–144)
T4 FREE SERPL-MCNC: 0.79 NG/DL (ref 0.76–1.46)
TSH SERPL DL<=0.005 MIU/L-ACNC: 11.03 MU/L (ref 0.4–4)

## 2021-06-04 PROCEDURE — 99214 OFFICE O/P EST MOD 30 MIN: CPT | Performed by: PHYSICIAN ASSISTANT

## 2021-06-04 PROCEDURE — 86038 ANTINUCLEAR ANTIBODIES: CPT | Performed by: PHYSICIAN ASSISTANT

## 2021-06-04 PROCEDURE — 86039 ANTINUCLEAR ANTIBODIES (ANA): CPT | Performed by: PHYSICIAN ASSISTANT

## 2021-06-04 PROCEDURE — 85652 RBC SED RATE AUTOMATED: CPT | Performed by: PHYSICIAN ASSISTANT

## 2021-06-04 PROCEDURE — 86140 C-REACTIVE PROTEIN: CPT | Performed by: PHYSICIAN ASSISTANT

## 2021-06-04 PROCEDURE — 84439 ASSAY OF FREE THYROXINE: CPT | Performed by: PHYSICIAN ASSISTANT

## 2021-06-04 PROCEDURE — 86431 RHEUMATOID FACTOR QUANT: CPT | Performed by: PHYSICIAN ASSISTANT

## 2021-06-04 PROCEDURE — 80048 BASIC METABOLIC PNL TOTAL CA: CPT | Performed by: PHYSICIAN ASSISTANT

## 2021-06-04 PROCEDURE — 84443 ASSAY THYROID STIM HORMONE: CPT | Performed by: PHYSICIAN ASSISTANT

## 2021-06-04 PROCEDURE — 36415 COLL VENOUS BLD VENIPUNCTURE: CPT | Performed by: PHYSICIAN ASSISTANT

## 2021-06-04 RX ORDER — GEMFIBROZIL 600 MG/1
TABLET, FILM COATED ORAL
Qty: 180 TABLET | Refills: 3 | Status: SHIPPED | OUTPATIENT
Start: 2021-06-04 | End: 2022-03-07

## 2021-06-04 RX ORDER — OMEPRAZOLE 40 MG/1
CAPSULE, DELAYED RELEASE ORAL
Qty: 90 CAPSULE | Refills: 3 | Status: SHIPPED | OUTPATIENT
Start: 2021-06-04 | End: 2022-03-07

## 2021-06-04 RX ORDER — TOPIRAMATE 50 MG/1
50 TABLET, FILM COATED ORAL DAILY
Qty: 90 TABLET | Refills: 3 | Status: SHIPPED | OUTPATIENT
Start: 2021-06-04 | End: 2022-03-07

## 2021-06-04 RX ORDER — SUMATRIPTAN 100 MG/1
100 TABLET, FILM COATED ORAL
Qty: 9 TABLET | Refills: 2 | Status: SHIPPED | OUTPATIENT
Start: 2021-06-04 | End: 2021-12-21

## 2021-06-04 ASSESSMENT — ANXIETY QUESTIONNAIRES
3. WORRYING TOO MUCH ABOUT DIFFERENT THINGS: NOT AT ALL
GAD7 TOTAL SCORE: 0
7. FEELING AFRAID AS IF SOMETHING AWFUL MIGHT HAPPEN: NOT AT ALL
4. TROUBLE RELAXING: NOT AT ALL
GAD7 TOTAL SCORE: 0
7. FEELING AFRAID AS IF SOMETHING AWFUL MIGHT HAPPEN: NOT AT ALL
2. NOT BEING ABLE TO STOP OR CONTROL WORRYING: NOT AT ALL
5. BEING SO RESTLESS THAT IT IS HARD TO SIT STILL: NOT AT ALL
6. BECOMING EASILY ANNOYED OR IRRITABLE: NOT AT ALL
GAD7 TOTAL SCORE: 0
1. FEELING NERVOUS, ANXIOUS, OR ON EDGE: NOT AT ALL

## 2021-06-04 ASSESSMENT — PATIENT HEALTH QUESTIONNAIRE - PHQ9
SUM OF ALL RESPONSES TO PHQ QUESTIONS 1-9: 0
SUM OF ALL RESPONSES TO PHQ QUESTIONS 1-9: 0

## 2021-06-04 NOTE — PATIENT INSTRUCTIONS
Will order another injection for your low back.    Avoid taking more than 2400 mg of ibuprofen daily and try to rotate with Tylenol.    Try glucosamine for the joint pain and will check some labs.    Will check cholesterol at a different time.

## 2021-06-04 NOTE — TELEPHONE ENCOUNTER
Please call [facility] to schedule following and call pt with day and  Time      Schedule lumbar injection with Dr. Fields in [place]        Samia Simpson CMA

## 2021-06-04 NOTE — Clinical Note
Zachary Woodall is cleared for his lumbar BALTA even though last visit will be just over 30 days from the procedure. Thanks.     Nik Nina PA-C

## 2021-06-05 ASSESSMENT — ANXIETY QUESTIONNAIRES: GAD7 TOTAL SCORE: 0

## 2021-06-05 ASSESSMENT — PATIENT HEALTH QUESTIONNAIRE - PHQ9: SUM OF ALL RESPONSES TO PHQ QUESTIONS 1-9: 0

## 2021-06-07 ENCOUNTER — TELEPHONE (OUTPATIENT)
Dept: PALLIATIVE MEDICINE | Facility: CLINIC | Age: 51
End: 2021-06-07

## 2021-06-07 ENCOUNTER — TELEPHONE (OUTPATIENT)
Dept: FAMILY MEDICINE | Facility: OTHER | Age: 51
End: 2021-06-07

## 2021-06-07 DIAGNOSIS — Z11.59 ENCOUNTER FOR SCREENING FOR OTHER VIRAL DISEASES: ICD-10-CM

## 2021-06-07 DIAGNOSIS — E03.8 SUBCLINICAL HYPOTHYROIDISM: Primary | ICD-10-CM

## 2021-06-07 LAB
ANA PAT SER IF-IMP: ABNORMAL
ANA SER QL IF: ABNORMAL
ANA TITR SER IF: ABNORMAL {TITER}
RHEUMATOID FACT SER NEPH-ACNC: 9 IU/ML (ref 0–20)

## 2021-06-07 RX ORDER — LEVOTHYROXINE SODIUM 137 UG/1
137 TABLET ORAL DAILY
Qty: 90 TABLET | Refills: 1 | Status: SHIPPED | OUTPATIENT
Start: 2021-06-07 | End: 2021-11-30

## 2021-06-07 NOTE — TELEPHONE ENCOUNTER
Pt scheduled for BALTA   Date: 7/8/21  Time: 300p  Dr. Fields    Instructed pt to have H&P and  for procedure.  Patient informed of COVID testing process.

## 2021-06-07 NOTE — TELEPHONE ENCOUNTER
Please call and notify patient that his thyroid is under replaced so will increase his levothyroxine to 137 mcg and will recheck in 2 months. His other labs are normal thus far, including inflammatory markers. Still waiting on a few rheumatologic labs.    Nik Nina PA-C

## 2021-06-08 DIAGNOSIS — R76.8 POSITIVE ANA (ANTINUCLEAR ANTIBODY): ICD-10-CM

## 2021-06-08 DIAGNOSIS — M25.50 MULTIPLE JOINT PAIN: Primary | ICD-10-CM

## 2021-06-20 ENCOUNTER — MYC MEDICAL ADVICE (OUTPATIENT)
Dept: FAMILY MEDICINE | Facility: OTHER | Age: 51
End: 2021-06-20

## 2021-07-05 ENCOUNTER — MYC MEDICAL ADVICE (OUTPATIENT)
Dept: FAMILY MEDICINE | Facility: OTHER | Age: 51
End: 2021-07-05

## 2021-07-05 DIAGNOSIS — Z11.59 ENCOUNTER FOR SCREENING FOR OTHER VIRAL DISEASES: ICD-10-CM

## 2021-07-05 LAB
LABORATORY COMMENT REPORT: NORMAL
SARS-COV-2 RNA RESP QL NAA+PROBE: NEGATIVE
SARS-COV-2 RNA RESP QL NAA+PROBE: NORMAL
SPECIMEN SOURCE: NORMAL
SPECIMEN SOURCE: NORMAL

## 2021-07-05 PROCEDURE — U0003 INFECTIOUS AGENT DETECTION BY NUCLEIC ACID (DNA OR RNA); SEVERE ACUTE RESPIRATORY SYNDROME CORONAVIRUS 2 (SARS-COV-2) (CORONAVIRUS DISEASE [COVID-19]), AMPLIFIED PROBE TECHNIQUE, MAKING USE OF HIGH THROUGHPUT TECHNOLOGIES AS DESCRIBED BY CMS-2020-01-R: HCPCS | Performed by: ANESTHESIOLOGY

## 2021-07-05 PROCEDURE — U0005 INFEC AGEN DETEC AMPLI PROBE: HCPCS | Performed by: ANESTHESIOLOGY

## 2021-07-06 ENCOUNTER — MYC MEDICAL ADVICE (OUTPATIENT)
Dept: FAMILY MEDICINE | Facility: OTHER | Age: 51
End: 2021-07-06

## 2021-07-06 NOTE — TELEPHONE ENCOUNTER
[phone]  Case #[id number]    Peer to peer scheduled for tomorrow at 8:45 am.    Nik Nina PA-C

## 2021-07-07 NOTE — TELEPHONE ENCOUNTER
I completed the peer to peer and the procedure has been approved. Approval information will be sent within the next few days. Will notify patient.    Nik Nina PA-C

## 2021-07-08 ENCOUNTER — ANESTHESIA (OUTPATIENT)
Dept: SURGERY | Facility: CLINIC | Age: 51
End: 2021-07-08
Payer: COMMERCIAL

## 2021-07-08 ENCOUNTER — ANESTHESIA EVENT (OUTPATIENT)
Dept: SURGERY | Facility: CLINIC | Age: 51
End: 2021-07-08
Payer: COMMERCIAL

## 2021-07-08 ENCOUNTER — HOSPITAL ENCOUNTER (OUTPATIENT)
Dept: GENERAL RADIOLOGY | Facility: CLINIC | Age: 51
End: 2021-07-08
Attending: ANESTHESIOLOGY | Admitting: ANESTHESIOLOGY
Payer: COMMERCIAL

## 2021-07-08 ENCOUNTER — HOSPITAL ENCOUNTER (OUTPATIENT)
Facility: CLINIC | Age: 51
Discharge: HOME | End: 2021-07-08
Attending: ANESTHESIOLOGY | Admitting: ANESTHESIOLOGY
Payer: COMMERCIAL

## 2021-07-08 VITALS
BODY MASS INDEX: 32.23 KG/M2 | OXYGEN SATURATION: 100 % | WEIGHT: 238 LBS | RESPIRATION RATE: 16 BRPM | DIASTOLIC BLOOD PRESSURE: 83 MMHG | HEART RATE: 80 BPM | SYSTOLIC BLOOD PRESSURE: 116 MMHG | TEMPERATURE: 98.5 F | HEIGHT: 72 IN

## 2021-07-08 DIAGNOSIS — M54.16 LUMBAR RADICULOPATHY: ICD-10-CM

## 2021-07-08 DIAGNOSIS — M51.369 DDD (DEGENERATIVE DISC DISEASE), LUMBAR: ICD-10-CM

## 2021-07-08 PROCEDURE — 250N000009 HC RX 250: Performed by: NURSE ANESTHETIST, CERTIFIED REGISTERED

## 2021-07-08 PROCEDURE — 250N000011 HC RX IP 250 OP 636: Performed by: ANESTHESIOLOGY

## 2021-07-08 PROCEDURE — 999N000179 XR SURGERY CARM FLUORO LESS THAN 5 MIN W STILLS: Mod: TC

## 2021-07-08 PROCEDURE — 370N000017 HC ANESTHESIA TECHNICAL FEE, PER MIN: Performed by: ANESTHESIOLOGY

## 2021-07-08 PROCEDURE — 250N000011 HC RX IP 250 OP 636: Performed by: NURSE ANESTHETIST, CERTIFIED REGISTERED

## 2021-07-08 PROCEDURE — 64483 NJX AA&/STRD TFRM EPI L/S 1: CPT | Mod: 50 | Performed by: ANESTHESIOLOGY

## 2021-07-08 RX ORDER — PROPOFOL 10 MG/ML
INJECTION, EMULSION INTRAVENOUS PRN
Status: DISCONTINUED | OUTPATIENT
Start: 2021-07-08 | End: 2021-07-08

## 2021-07-08 RX ORDER — LIDOCAINE HYDROCHLORIDE 20 MG/ML
INJECTION, SOLUTION INFILTRATION; PERINEURAL PRN
Status: DISCONTINUED | OUTPATIENT
Start: 2021-07-08 | End: 2021-07-08

## 2021-07-08 RX ORDER — IOPAMIDOL 612 MG/ML
INJECTION, SOLUTION INTRATHECAL PRN
Status: DISCONTINUED | OUTPATIENT
Start: 2021-07-08 | End: 2021-07-08 | Stop reason: HOSPADM

## 2021-07-08 RX ORDER — KETOROLAC TROMETHAMINE 30 MG/ML
15 INJECTION, SOLUTION INTRAMUSCULAR; INTRAVENOUS ONCE
Status: COMPLETED | OUTPATIENT
Start: 2021-07-08 | End: 2021-07-08

## 2021-07-08 RX ORDER — METHYLPREDNISOLONE ACETATE 40 MG/ML
INJECTION, SUSPENSION INTRA-ARTICULAR; INTRALESIONAL; INTRAMUSCULAR; SOFT TISSUE PRN
Status: DISCONTINUED | OUTPATIENT
Start: 2021-07-08 | End: 2021-07-08 | Stop reason: HOSPADM

## 2021-07-08 RX ADMIN — PROPOFOL 50 MG: 10 INJECTION, EMULSION INTRAVENOUS at 15:17

## 2021-07-08 RX ADMIN — PROPOFOL 50 MG: 10 INJECTION, EMULSION INTRAVENOUS at 15:22

## 2021-07-08 RX ADMIN — PROPOFOL 50 MG: 10 INJECTION, EMULSION INTRAVENOUS at 15:18

## 2021-07-08 RX ADMIN — KETOROLAC TROMETHAMINE 15 MG: 30 INJECTION, SOLUTION INTRAMUSCULAR; INTRAVENOUS at 14:32

## 2021-07-08 RX ADMIN — PROPOFOL 20 MG: 10 INJECTION, EMULSION INTRAVENOUS at 15:21

## 2021-07-08 RX ADMIN — LIDOCAINE HYDROCHLORIDE 1 ML: 10 INJECTION, SOLUTION EPIDURAL; INFILTRATION; INTRACAUDAL; PERINEURAL at 14:25

## 2021-07-08 RX ADMIN — PROPOFOL 30 MG: 10 INJECTION, EMULSION INTRAVENOUS at 15:19

## 2021-07-08 RX ADMIN — LIDOCAINE HYDROCHLORIDE 40 MG: 20 INJECTION, SOLUTION INFILTRATION; PERINEURAL at 15:17

## 2021-07-08 ASSESSMENT — MIFFLIN-ST. JEOR: SCORE: 1977.56

## 2021-07-08 ASSESSMENT — LIFESTYLE VARIABLES: TOBACCO_USE: 1

## 2021-07-08 NOTE — ANESTHESIA POSTPROCEDURE EVALUATION
Patient: Linden Cruz    Procedure(s):  , LUMBAR,5 sacral 1  EPIDURAL injection bilateral    Diagnosis:Lumbar radiculopathy [M54.16]  DDD (degenerative disc disease), lumbar [M51.36]  Diagnosis Additional Information: No value filed.    Anesthesia Type:  MAC    Note:  Disposition: Outpatient   Postop Pain Control: Uneventful            Sign Out: Well controlled pain   PONV: No   Neuro/Psych: Uneventful            Sign Out: Acceptable/Baseline neuro status   Airway/Respiratory: Uneventful            Sign Out: Acceptable/Baseline resp. status   CV/Hemodynamics: Uneventful            Sign Out: Acceptable CV status   Other NRE: NONE   DID A NON-ROUTINE EVENT OCCUR? No    Event details/Postop Comments:  Pt was happy with anesthesia care.  No complications.  I will follow up with the pt if needed.           Last vitals:  Vitals:    07/08/21 1416 07/08/21 1530 07/08/21 1532   BP: (!) 135/94 129/89 (!) 123/96   Pulse:  90 88   Resp: 16 12    Temp: 98.5  F (36.9  C)     SpO2:  98%        Last vitals prior to Anesthesia Care Transfer:  CRNA VITALS  7/8/2021 1458 - 7/8/2021 1538      7/8/2021             SpO2:  98 %    Resp Rate (observed):  (!) 4          Electronically Signed By: WOJCIECH Craig CRNA  July 8, 2021  3:38 PM

## 2021-07-08 NOTE — DISCHARGE INSTRUCTIONS
Home Care Instructions                Procedure: Epidural injection or joint injection    Activity:    Rest today    Do not work today    Resume normal activity tomorrow    Pain:    You may experience soreness at the injection site for 1 to 3 days.    You may use an ice pack for 20 minutes every 2 hours for the first 24 hours    You may use a heating pad after the first 24 hours    You may use Tylenol  (acetaminophen) every 4 hours or other pain medicines as directed by your physician    Safety  Sedation medicine, if given may remain active for many hours.    It is important for the next 24 hours that you do not:    Drive a car    Operate machines or power tools    Consume alcohol, including beer    Sign any important papers or legal documents    You may experience numbness radiating into your legs or arms, (depending on the procedure location)  This numbness may last several hours.  Until the numb sensation returns to normal please use caution in walking, climbing stairs, stepping out of your vehicle, etc.    Common side effects of steroids:  Not everyone will experience corticosteroid side effects. If side effects are experienced they will gradually subside in the 7-10 day period following an injection.    Most common side effects include:    Flushed face and/or chest    Feeling of warmth, particularly in face but could be overall feeling of warmth    Increased blood sugar in diabetic patients    Menstrual irregularities may occur.  If taking hormone based birth control an alternate method of birth control is recommended    Sleep disturbances and/or mood swings are possible    Leg cramps    Please contact us if you have:  Severe pain   Fever more than 101.5 degrees Fahrenheit  Signs of infection (redness, swelling or drainage)      If you have questions during normal business hours (8am-5pm Monday-Friday) contact the [facility] at [phone]. If you need help after hours, we recommend that you go to a  hospital emergency room or dial 911.

## 2021-07-08 NOTE — ANESTHESIA CARE TRANSFER NOTE
Patient: Linden Cruz    Procedure(s):  , LUMBAR,5 sacral 1  EPIDURAL injection bilateral    Diagnosis: Lumbar radiculopathy [M54.16]  DDD (degenerative disc disease), lumbar [M51.36]  Diagnosis Additional Information: No value filed.    Anesthesia Type:   MAC     Note:    Oropharynx: oropharynx clear of all foreign objects and spontaneously breathing  Level of Consciousness: drowsy  Oxygen Supplementation: face mask    Independent Airway: airway patency satisfactory and stable  Dentition: dentition unchanged  Vital Signs Stable: post-procedure vital signs reviewed and stable  Report to RN Given: handoff report given  Patient transferred to: Phase II    Handoff Report: Identifed the Patient, Identified the Reponsible Provider, Reviewed the pertinent medical history, Discussed the surgical course, Reviewed Intra-OP anesthesia mangement and issues during anesthesia, Set expectations for post-procedure period and Allowed opportunity for questions and acknowledgement of understanding      Vitals: (Last set prior to Anesthesia Care Transfer)  CRNA VITALS  7/8/2021 1458 - 7/8/2021 1531      7/8/2021             SpO2:  98 %    Resp Rate (observed):  (!) 4        Electronically Signed By: WOJCIECH Craig CRNA  July 8, 2021  3:31 PM

## 2021-07-08 NOTE — OP NOTE
PRIMARY PROBLEM: Low back pain    PROCEDURE: Bilateral L5-S1  Transforaminal Epidural Steroid Injection with fluoroscopic guidance and contrast.     PROCEDURE DETAILS: After written informed consent was obtained from the patient, the patient was escorted to the procedure room.  The patient was placed in the prone position.  A  time out  was conducted to verify patient identity, procedure to be performed, side, site, allergies and any special requirements.  The skin over the thoracolumbar region was prepped and draped in normal sterile fashion. Fluoroscopy was used to identify the neural foramen in AP view and the skin was anesthetized with 2 mL of 1% lidocaine with bicarbonate buffer. A 22-gauge Quincke spinal needle was advanced through this location and advanced under fluoroscopic guidance towards the neural foramen.  The target zone was the 6 o clock position of the pedicle.   Prior to entering the foramen, the depth of the needle was gauged with a lateral view on fluoroscopy. While still in a lateral view, the needle was slowly advanced to avoid injury to the spinal nerve.  Then, in the oblique view (approximately 28 degrees), after negative aspiration, 1.5 mL of Omnipaque contrast dye was injected revealing epidural spread without evidence of intravascular or intrathecal spread.  Then a 2.5cc solution of 40 mg of Depo-Medrol in 2 mL of  Preservative-Free saline was slowly injected into the epidural space at each segment.  There is very good spread of medication centrally at the L5-S1 level as well as over both L5 nerve roots.  After injection of the medication, as the needle tip was withdrawn, it was flushed with local anesthetic.   The patient was monitored with blood pressure and pulse oximetry machines with the assistance of an RN throughout the procedure.  The patient was alert and responsive to questions throughout the procedure.   The patient tolerated the procedure well and was observed in the  post-procedural area.  The patient was dismissed without apparent complications.     BLOOD LOSS: < 5 cc    DIAGNOSIS:  1.  L5-S1 disc herniation causing back pain and leg pains    PLAN:  1. Performed bilateral L5-S1  transforaminal epidural steroid injections.  2. The patient was instructed to follow-up per Dr. Fields's instructions.  I told Zachary and his wife that if the injection today is not helpful then updated imaging with a new MRI is indicated given that he seems to be experiencing advancing neuro deficits with increasing numbness and weakness in both legs.    Jose C Fields MD  Diplomate of the American Board of Anesthesiology, Pain Medicine

## 2021-07-08 NOTE — ANESTHESIA PREPROCEDURE EVALUATION
Anesthesia Pre-Procedure Evaluation    Patient: Linden Cruz   MRN: [id number] : 1970        Preoperative Diagnosis: Lumbar radiculopathy [M54.16]  DDD (degenerative disc disease), lumbar [M51.36]   Procedure : Procedure(s):  INJECTION, SPINE, LUMBAR, EPIDURAL     Past Medical History:   Diagnosis Date     Chest pain      Esophageal reflux      Migraines      Mixed hyperlipidemia      DANIEL (obstructive sleep apnea)      Other specified gastritis without mention of hemorrhage     H. Pylori, diag       Past Surgical History:   Procedure Laterality Date     CHOLECYSTECTOMY, LAPOROSCOPIC       INJECT EPIDURAL LUMBAR Right 1/10/2020    Procedure: INJECTION, SPINE, LUMBAR 5 and Sacral 1 Bilateral EPIDURAL;  Surgeon: Jose C Fields MD;  Location: [facility]     SURGICAL HISTORY OF -   2006    upper GI, [facility]      Allergies   Allergen Reactions     No Known Drug Allergies       Social History     Tobacco Use     Smoking status: Former Smoker     Types: Cigarettes     Quit date: 2000     Years since quittin.5     Smokeless tobacco: Never Used   Substance Use Topics     Alcohol use: Yes     Alcohol/week: 0.0 standard drinks     Comment: 1/month      Wt Readings from Last 1 Encounters:   21 108 kg (238 lb)        Anesthesia Evaluation   Pt has had prior anesthetic.         ROS/MED HX  ENT/Pulmonary:     (+) tobacco use, Past use,     Neurologic:  - neg neurologic ROS     Cardiovascular:       METS/Exercise Tolerance:     Hematologic:  - neg hematologic  ROS     Musculoskeletal:  - neg musculoskeletal ROS     GI/Hepatic:     (+) GERD, Asymptomatic on medication,     Renal/Genitourinary:  - neg Renal ROS     Endo:     (+) thyroid problem, hypothyroidism,     Psychiatric/Substance Use:  - neg psychiatric ROS     Infectious Disease:  - neg infectious disease ROS     Malignancy:  - neg malignancy ROS     Other:  - neg other ROS          Physical Exam    Airway        Mallampati: II   TM distance:  > 3 FB   Neck ROM: full   Mouth opening: > 3 cm    Respiratory Devices and Support         Dental  no notable dental history         Cardiovascular   cardiovascular exam normal          Pulmonary   pulmonary exam normal                OUTSIDE LABS:  CBC:   Lab Results   Component Value Date    WBC 6.7 04/20/2020    WBC 7.8 08/24/2016    HGB 14.6 04/20/2020    HGB 14.5 08/24/2016    HCT 42.7 04/20/2020    HCT 42.9 08/24/2016     04/20/2020     08/24/2016     BMP:   Lab Results   Component Value Date     06/04/2021     04/20/2020    POTASSIUM 3.6 06/04/2021    POTASSIUM 3.6 04/20/2020    CHLORIDE 110 (H) 06/04/2021    CHLORIDE 110 (H) 04/20/2020    CO2 24 06/04/2021    CO2 23 04/20/2020    BUN 12 06/04/2021    BUN 17 04/20/2020    CR 1.03 06/04/2021    CR 0.95 04/20/2020    GLC 89 06/04/2021     (H) 04/20/2020     COAGS:   Lab Results   Component Value Date    PTT 33 01/05/2015    INR 0.98 01/05/2015     POC: No results found for: BGM, HCG, HCGS  HEPATIC:   Lab Results   Component Value Date    ALBUMIN 4.0 01/11/2019    PROTTOTAL 7.5 01/11/2019    ALT 31 01/11/2019    AST 20 01/11/2019    ALKPHOS 105 01/11/2019    BILITOTAL 0.6 01/11/2019     OTHER:   Lab Results   Component Value Date    A1C 5.7 03/10/2016    JEFFY 8.2 (L) 06/04/2021    TSH 11.03 (H) 06/04/2021    T4 0.79 06/04/2021    CRP <2.9 06/04/2021    SED 5 06/04/2021       Anesthesia Plan    ASA Status:  2   NPO Status:  NPO Appropriate    Anesthesia Type: MAC.     - Reason for MAC: straight local not clinically adequate   Induction: Intravenous, Propofol.   Maintenance: TIVA.        Consents    Anesthesia Plan(s) and associated risks, benefits, and realistic alternatives discussed. Questions answered and patient/representative(s) expressed understanding.     - Discussed with:  Patient      - Extended Intubation/Ventilatory Support Discussed: No.      - Patient is DNR/DNI Status: No    Use of blood products discussed: No .      Postoperative Care            Comments:    The risks and benefits of anesthesia, and the alternatives where applicable, have been discussed with the patient, and they wish to proceed.     H&P reviewed: Unable to attach H&P to encounter due to EHR limitations. H&P Update: appropriate H&P reviewed, patient examined. No interval changes since H&P (within 30 days).         WOJCIECH Craig CRNA

## 2021-08-25 NOTE — PROGRESS NOTES
RHEUMATOLOGY INITIAL CONSULT NOTE    Chief Complaint:    Chief Complaint   Patient presents with     Consult     joint pain, elevated JAN-lab 6/4/2021     Joint Pain       Reason for consult: Nik Nina from  has requested consultation for this patient for polyarthralgia     History of Present Illness:    Linden Cruz is a 51 year old male with pmhx DDD, lumbar radiculopathy, GERD, DANIEL, migraines, allergic rhinitis, sensorineural hearing loss, sinusitis, hypothyroidism, is referred to rheumatology clinic for polyarthralgia.     He reports pain over b/l hands that started in April after his second dose of COVID-19 vaccine. He reports noticing difficulty making a fist and stiffness. He reports symptoms mostly over his PIP joints. Reports having stiffness in AM, unable to say how long stiffness lasted. He denies having any joint swelling. R hand symptoms were worse than L. Over the last month, he has noticed improvement in his symptoms. He states his symptoms are mostly resolved now. He denies any other joint pain, maybe mild over the ankles but nothing like the symptoms he had over his hands. Currently does not have any AM stiffness. He was taking PRN ibuprofen, initially almost daily for his back pain as well as hand pain. He currently takes PRN ibuprofen (2-3 times a week) or his back pain or migraines.     Denies fatigue, fevers, chills, weight loss, night sweats, vision changes, eye pain/redness, dry eyes, dry mouth, oral/nasal ulcers, hair loss/thinning, +chronic sinusitis, +hx of eustachian tubes as a child, denies asthma, denies epistaxis, rash, photosensitivity, raynaud's, cough, SOB, pleurisy, chest pain, +heartburn -omeprazole, denies difficulty swallowing, abdominal pain, diarrhea, hematochezia, melena, dysuria, +chronic back pain due to DDD, denies enthesitis, dactylitis, numbness, weakness, tingling. No hx of IBD. No hx of blood clots.    Pertinent labs and imaging: (per chart review in epic and  care everywhere)    Labs:  6/4/21: +JAN 1:40 speckled, negative RF, ESR, CRP, +TSH (11) with normal free T4      Past Medical History:    Past Medical History:   Diagnosis Date     Chest pain      Esophageal reflux      Migraines      Mixed hyperlipidemia      DANIEL (obstructive sleep apnea)      Other specified gastritis without mention of hemorrhage     H. Pylori, diag 12/06     Past Surgical History:   Past Surgical History:   Procedure Laterality Date     CHOLECYSTECTOMY, LAPOROSCOPIC  2004     INJECT EPIDURAL LUMBAR Right 1/10/2020    Procedure: INJECTION, SPINE, LUMBAR 5 and Sacral 1 Bilateral EPIDURAL;  Surgeon: Jose C Fields MD;  Location: [facility]     INJECT EPIDURAL TRANSFORAMINAL Bilateral 7/8/2021    Procedure: Bilateral L5-S1 transforaminal Epidural Steroid Injection with fluoroscopic guidance and contrast.;  Surgeon: Jose C Fields MD;  Location: [facility] OR     SURGICAL HISTORY OF -   12/2006    upper GI, [place]     Family History:   Denies family hx of RA, SLE, Sjogren's syndrome, sarcoidosis, scleroderma, PsA, ankylosing spondylitis, psoriasis, vasculitis, gout, pseudogout    Grandmother: unknown type of arthritis     Social History:     Alcohol use: on average, 1 glass of wine/week  Tobacco use: former smoker, quit 10 years ago  Occupational hx: works in sales for construction     Allergies   Allergen Reactions     No Known Drug Allergies       Immunization History   Administered Date(s) Administered     COVID-19,PF,Moderna 03/07/2021, 04/05/2021     Influenza (H1N1) 12/18/2009     Influenza (IIV3) PF 12/02/2008, 12/18/2009, 09/24/2010, 10/12/2011, 12/31/2012     Influenza Vaccine IM > 6 months Valent IIV4 10/04/2013, 10/24/2014, 10/15/2015, 11/03/2016, 10/06/2017, 09/24/2018, 11/01/2019, 10/02/2020     TDAP Vaccine (Adacel) 09/15/2008, 01/11/2019       Medications:  Current Outpatient Medications   Medication Sig Dispense Refill     fluticasone (FLONASE) 50 MCG/ACT nasal spray Spray 2 sprays into both  nostrils daily 48 mL 1     gemfibrozil (LOPID) 600 MG tablet TAKE 1 TABLET (600 MG) BY MOUTH 2 TIMES DAILY 180 tablet 3     Ibuprofen (ADVIL PO)        levothyroxine (SYNTHROID/LEVOTHROID) 137 MCG tablet Take 1 tablet (137 mcg) by mouth daily 90 tablet 1     loratadine (CLARITIN) 10 MG tablet Take 1 tablet (10 mg) by mouth daily 90 tablet 3     Multiple Vitamin (MULTI VITAMIN MENS PO) Take  by mouth.       omeprazole (PRILOSEC) 40 MG DR capsule TAKE 1 CAPSULE BY MOUTH EVERY DAY 90 capsule 3     SUMAtriptan (IMITREX) 100 MG tablet Take 1 tablet (100 mg) by mouth at onset of headache for migraine May repeat once in 2 hours 9 tablet 2     tadalafil (CIALIS) 20 MG tablet TAKE 1/2 TO 1 TABLET BY MOUTH AS NEEDED FOR ED, DO NOT USE WITH NITRO/TERAZOSIN/DOXAZOSIN 8 tablet 3     topiramate (TOPAMAX) 50 MG tablet Take 1 tablet (50 mg) by mouth daily 90 tablet 3         PHYSICAL EXAMINATION:   Vital signs:  /86 (BP Location: Left arm, Patient Position: Sitting)   Pulse 91   Temp 97.7  F (36.5  C) (Oral)   Wt 109.7 kg (241 lb 14.4 oz)   SpO2 100%   BMI 32.81 kg/m      MSK: no synovitis of joints, no joint effusions/warmth/erythema, no joint tenderness, no dactylitis, no enthesitis, able to make a fist b/l, no nail pitting  Skin: no rashes  HEENT: MMM, no oral ulcers/lesions, no alopecia  CV: RRR  Pulm: CTAB, non-labored respirations, no c/r/w  GI: +BS, soft, no TTP  Neuro: A&O x3, no focal deficits    Labs:      I have reviewed all pertinent investigations including labs, including outside records if relevant    RF/CCP  Recent Labs   Lab Test 06/04/21  1441   RHF 9     JAN  Recent Labs   Lab Test 06/04/21  1441   MENA Borderline Positive*   ANAP1 SPECKLED   ANAT1 1:40     CBC  Recent Labs   Lab Test 04/20/20  0918 08/24/16  0828 03/10/16  0907 12/26/14  1050   WBC 6.7 7.8 7.1 7.0   RBC 4.61 4.61 4.79 4.59   HGB 14.6 14.5 14.9 14.7   HCT 42.7 42.9 44.7 42.6   MCV 93 93 93 93   RDW 12.8 13.4 13.5 12.5    299 336  287   MCH 31.7 31.5 31.1 32.0   MCHC 34.2 33.8 33.3 34.5   NEUTROPHIL  --  48.5 54.4 49.7   LYMPH  --  27.7 30.2 34.0   MONOCYTE  --  9.4 8.8 7.7   EOSINOPHIL  --  13.1 5.8 6.2   BASOPHIL  --  1.3 0.8 2.3   ANEU  --  3.8 3.9 3.5   ALYM  --  2.2 2.2 2.4   LACEY  --  0.7 0.6 0.5   AEOS  --  1.0* 0.4 0.4   ABAS  --  0.1 0.1 0.2     CMP  Recent Labs   Lab Test 06/04/21  1441 04/20/20  0918 01/27/20  0825 01/11/19  0818 11/21/17  0853 03/10/16  0907    140 140 139 140 141   POTASSIUM 3.6 3.6 4.3 4.1 4.1 4.4   CHLORIDE 110* 110* 114* 111* 110* 110*   CO2 24 23 20 23 22 22   ANIONGAP 6 7 6 5 8 9   GLC 89 104* 103* 92 105* 91   BUN 12 17 16 11 16 15   CR 1.03 0.95 0.85 0.85 0.87 0.97   GFRESTIMATED 84 >90 >90 >90 >90 84   GFRESTBLACK >90 >90 >90 >90 >90 >90  African American GFR Calc     JEFFY 8.2* 8.6 8.4* 8.3* 8.6 8.2*   BILITOTAL  --   --   --  0.6 0.4 0.5   ALBUMIN  --   --   --  4.0 4.3 4.2   PROTTOTAL  --   --   --  7.5 7.9 7.7   ALKPHOS  --   --   --  105 134 115   AST  --   --   --  20 17 18   ALT  --   --   --  31 28 30     HgA1c  Recent Labs   Lab Test 03/10/16  0907 10/22/15  0956   A1C 5.7 5.7     Calcium/VitaminD  Recent Labs   Lab Test 06/04/21  1441 04/20/20  0918 01/27/20  0825   JEFFY 8.2* 8.6 8.4*     ESR/CRP  Recent Labs   Lab Test 06/04/21  1441   SED 5   CRP <2.9     TSH/T4  Recent Labs   Lab Test 06/04/21  1441 01/27/20  0825 01/11/19  0818   TSH 11.03* 4.30* 4.42*   T4 0.79 0.96 0.86     Lipid Panel  Recent Labs   Lab Test 01/27/20  0825 01/11/19  0818 11/21/17  0853 03/10/16  0907 10/15/15  0833 08/22/14  0753 08/22/14  0753 11/29/13  0934   CHOL 220* 214* 189  --  297*  --  152 160   TRIG 138 292* 248*  --  1,076*  --  276* 256*   HDL 49 36* 42  --  28*  --  31* 30*   * 120* 97   < > Cannot estimate LDL when triglyceride exceeds 400 mg/dL  93  --  66 79   VLDL  --   --   --   --  Cannot estimate VLDL when triglyceride exceeds 400 mg/dL.  --  55* 51*   CHOLHDLRATIO  --   --   --   --  10.6*   --  4.9 5.0   NHDL 171* 178* 147*  --   --    < >  --   --     < > = values in this interval not displayed.     UA  Recent Labs   Lab Test 10/24/18  0840 08/24/16  0829 03/09/16  1700 01/08/16  1649   COLOR Yellow Yellow Yellow Yellow   APPEARANCE Clear Clear Clear Clear   URINEGLC Negative Negative Negative Negative   URINEBILI Negative Negative Negative Negative   SG <=1.005 1.015 1.020 1.015   URINEPH 5.5 7.0 7.0 7.0   PROTEIN Negative Negative Negative Trace*   UROBILINOGEN 0.2 0.2 0.2 2.0*   NITRITE Negative Negative Negative Negative   UBLD Negative Negative Negative Negative   LEUKEST Negative Negative Negative Negative   WBCU  --   --   --  O - 2   RBCU  --   --   --  O - 2   SQUAMOUSEPI  --   --   --  Few     Urine Microscopic  Recent Labs   Lab Test 01/08/16  1649   WBCU O - 2   RBCU O - 2   SQUAMOUSEPI Few       Imaging:    I have reviewed all pertinent investigations including imaging, including outside records if relevant    CXR (4/20/2020)  IMPRESSION: Negative chest    Assessment / Plan:    Linden Cruz is a 51 year old male with pmhx DDD, lumbar radiculopathy, GERD, DANIEL, migraines, allergic rhinitis, sensorineural hearing loss, sinusitis, is referred to rheumatology clinic for polyarthralgia involving b/l hands that started around April soon after his 2nd COVID-19 vaccine. Any prior notes, outside records, laboratory results, and imaging studies were reviewed if relevant. Pertinent work-up thus far includes  +JAN 1:40 speckled, negative RF, ESR, CRP, +TSH (11) with normal free T4. His symptoms involved PIP joint stiffness and pain which lasted for a few months. Since over 1 month, his symptoms have been improving and have essentially resolved now. He is asymptomatic today. He denies any joint swelling. There is no synovitis, joint deformities or joint tenderness on exam today. His exam today is not consistent with inflammatory arthritis. Possibly some form or reaction to the vaccine, but  difficult to say.     We discussed that a positive JAN can be seen in a variety of different diseases and syndrome.  The anti-nuclear antibody is a screening test for auto-immune disease.  Depending on the level or titer, it may be seen in autoimmune diseases such as SLE, sjogren's, scleroderma but could also be seen in auto-immune hepatitis, hypothyroidism and various other disease.  A positive JAN does not give one a conclusive diagnosis of SLE as it may be a false positive and may not be significant as well.  Further w/u for auto-immune disease can be considered based on clinical symptoms and other laboratory findings.  In the absence of rashes, photosensitivity, pleurisy, mucocutaneous lesions, alopecia, inflammatory joint pain, sicca symptoms, raynaud's, my suspicion for an underlying autoimmune CTD is low and we will defer further testing at this time unless new symptoms arise. We discussed that +JAN 1:40 can be seen in up to 30% of healthy population and in his case, could also be 2/2 underlying thyroid disease.     1) B/l hand pain, self-limiting, currently asymptomatic  -check CCP for completion of work-up with next lab draw  -offered obtaining plain films of b/l hands, pt defers for now  -he will inform me if recurrence of symptoms or persistent joint swelling          Mr. Cruz verbalized agreement with and understanding of the rationale for the diagnosis and treatment plan.  All questions were answered to best of my ability and the patient's satisfaction. Mr. Cruz was advised to contact the clinic with any questions that may arise after the clinic visit.        Chart documentation done in part with Dragon Voice recognition Software. Although reviewed after completion, some word and grammatical error may remain.      RTC pending test results    María Elena Delacruz MD

## 2021-08-25 NOTE — NURSING NOTE
Linden Cruz's goals for this visit include: joint pain, elevated JAN-lab 6/4/2021  He requests these members of his care team be copied on today's visit information:     PCP: Nik Nina    Referring Provider:  Nik Nina PA-C  [address] [place] [address] [address]  [place] [address]    /86 (BP Location: Left arm, Patient Position: Sitting)   Pulse 91   Temp 97.7  F (36.5  C) (Oral)   Wt 109.7 kg (241 lb 14.4 oz)   SpO2 100%   BMI 32.81 kg/m      Do you need any medication refills at today's visit? None      SUNITHA Dobbs  [facility]

## 2021-08-27 ENCOUNTER — OFFICE VISIT (OUTPATIENT)
Dept: RHEUMATOLOGY | Facility: CLINIC | Age: 51
End: 2021-08-27
Attending: PHYSICIAN ASSISTANT
Payer: COMMERCIAL

## 2021-08-27 VITALS
SYSTOLIC BLOOD PRESSURE: 126 MMHG | OXYGEN SATURATION: 100 % | BODY MASS INDEX: 32.81 KG/M2 | TEMPERATURE: 97.7 F | HEART RATE: 91 BPM | WEIGHT: 241.9 LBS | DIASTOLIC BLOOD PRESSURE: 86 MMHG

## 2021-08-27 DIAGNOSIS — M25.50 MULTIPLE JOINT PAIN: ICD-10-CM

## 2021-08-27 DIAGNOSIS — R76.8 POSITIVE ANA (ANTINUCLEAR ANTIBODY): ICD-10-CM

## 2021-08-27 PROCEDURE — 99203 OFFICE O/P NEW LOW 30 MIN: CPT | Performed by: STUDENT IN AN ORGANIZED HEALTH CARE EDUCATION/TRAINING PROGRAM

## 2021-08-27 NOTE — PATIENT INSTRUCTIONS
1. Please make an appointment for blood test in the next 2 weeks  2. Let me know if any persistent joint swelling   3. Follow-up pending test results

## 2021-10-23 ENCOUNTER — HEALTH MAINTENANCE LETTER (OUTPATIENT)
Age: 51
End: 2021-10-23

## 2021-11-19 DIAGNOSIS — N52.9 ERECTILE DYSFUNCTION, UNSPECIFIED ERECTILE DYSFUNCTION TYPE: ICD-10-CM

## 2021-11-19 RX ORDER — TADALAFIL 20 MG/1
TABLET ORAL
Qty: 8 TABLET | Refills: 3 | Status: SHIPPED | OUTPATIENT
Start: 2021-11-19 | End: 2022-03-07

## 2021-11-29 DIAGNOSIS — E03.8 SUBCLINICAL HYPOTHYROIDISM: ICD-10-CM

## 2021-11-30 RX ORDER — LEVOTHYROXINE SODIUM 137 UG/1
TABLET ORAL
Qty: 90 TABLET | Refills: 1 | Status: SHIPPED | OUTPATIENT
Start: 2021-11-30 | End: 2022-03-10

## 2021-11-30 NOTE — TELEPHONE ENCOUNTER
Pending Prescriptions:                       Disp   Refills    levothyroxine (SYNTHROID/LEVOTHROID) 137 M*90 tab*1        Sig: TAKE 1 TABLET BY MOUTH EVERY DAY    Routing refill request to provider for review/approval because:  Labs out of range:    TSH   Date Value Ref Range Status   06/04/2021 11.03 (H) 0.40 - 4.00 mU/L Final

## 2021-12-16 DIAGNOSIS — H90.6 MIXED CONDUCTIVE AND SENSORINEURAL HEARING LOSS OF BOTH EARS: ICD-10-CM

## 2021-12-16 DIAGNOSIS — J01.01 ACUTE RECURRENT MAXILLARY SINUSITIS: ICD-10-CM

## 2021-12-16 DIAGNOSIS — H69.93 DYSFUNCTION OF BOTH EUSTACHIAN TUBES: ICD-10-CM

## 2021-12-20 RX ORDER — LORATADINE 10 MG/1
1 TABLET ORAL DAILY
Qty: 90 TABLET | OUTPATIENT
Start: 2021-12-20

## 2021-12-20 NOTE — TELEPHONE ENCOUNTER
LORATADINE 10 MG TABLET  Last Written Prescription Date:  10/15/2020  Last Fill Quantity: 90,   # refills: 3  Last Office Visit : 5/7/2020  Future Office visit:  None    Routing refill request to provider for review/approval because:  Over due office visit  Refer to clinic for review       Tram Durant RN  Central Triage Red Flags/Med Refills

## 2022-01-19 DIAGNOSIS — J01.01 ACUTE RECURRENT MAXILLARY SINUSITIS: ICD-10-CM

## 2022-01-19 DIAGNOSIS — H69.93 DYSFUNCTION OF BOTH EUSTACHIAN TUBES: ICD-10-CM

## 2022-01-19 DIAGNOSIS — H90.6 MIXED CONDUCTIVE AND SENSORINEURAL HEARING LOSS OF BOTH EARS: ICD-10-CM

## 2022-01-21 ENCOUNTER — ALLIED HEALTH/NURSE VISIT (OUTPATIENT)
Dept: FAMILY MEDICINE | Facility: OTHER | Age: 52
End: 2022-01-21
Payer: COMMERCIAL

## 2022-01-21 DIAGNOSIS — Z23 ENCOUNTER FOR IMMUNIZATION: Primary | ICD-10-CM

## 2022-01-21 PROCEDURE — 99207 PR NO CHARGE NURSE ONLY: CPT

## 2022-01-21 PROCEDURE — 90471 IMMUNIZATION ADMIN: CPT

## 2022-01-21 PROCEDURE — 90682 RIV4 VACC RECOMBINANT DNA IM: CPT

## 2022-01-24 RX ORDER — LORATADINE 10 MG/1
1 TABLET ORAL DAILY
Qty: 30 TABLET | Refills: 0 | OUTPATIENT
Start: 2022-01-24

## 2022-01-24 NOTE — TELEPHONE ENCOUNTER
LORATADINE 10 MG TABLET      Last Written Prescription Date:  10/15/20  Last Fill Quantity: 90,   # refills: 3  Last Office Visit : 5/7/20  Future Office visit:  None scheduled    Routing refill request to provider for review/approval because:  >18 months since last visit.  30 day refill pended per protocol, routed to ENT

## 2022-02-12 DIAGNOSIS — G43.109 MIGRAINE WITH AURA AND WITHOUT STATUS MIGRAINOSUS, NOT INTRACTABLE: ICD-10-CM

## 2022-02-14 RX ORDER — SUMATRIPTAN 100 MG/1
TABLET, FILM COATED ORAL
Qty: 9 TABLET | Refills: 0 | Status: SHIPPED | OUTPATIENT
Start: 2022-02-14 | End: 2022-03-07

## 2022-03-04 DIAGNOSIS — H90.6 MIXED CONDUCTIVE AND SENSORINEURAL HEARING LOSS OF BOTH EARS: ICD-10-CM

## 2022-03-04 DIAGNOSIS — J01.01 ACUTE RECURRENT MAXILLARY SINUSITIS: ICD-10-CM

## 2022-03-04 DIAGNOSIS — H69.93 DYSFUNCTION OF BOTH EUSTACHIAN TUBES: ICD-10-CM

## 2022-03-07 ENCOUNTER — TELEPHONE (OUTPATIENT)
Dept: GASTROENTEROLOGY | Facility: CLINIC | Age: 52
End: 2022-03-07

## 2022-03-07 ENCOUNTER — OFFICE VISIT (OUTPATIENT)
Dept: FAMILY MEDICINE | Facility: OTHER | Age: 52
End: 2022-03-07
Payer: COMMERCIAL

## 2022-03-07 VITALS
BODY MASS INDEX: 33.25 KG/M2 | TEMPERATURE: 97 F | SYSTOLIC BLOOD PRESSURE: 100 MMHG | HEIGHT: 71 IN | DIASTOLIC BLOOD PRESSURE: 70 MMHG | HEART RATE: 77 BPM | WEIGHT: 237.5 LBS | OXYGEN SATURATION: 98 % | RESPIRATION RATE: 16 BRPM

## 2022-03-07 DIAGNOSIS — Z13.1 SCREENING FOR DIABETES MELLITUS: ICD-10-CM

## 2022-03-07 DIAGNOSIS — K21.9 GASTROESOPHAGEAL REFLUX DISEASE WITHOUT ESOPHAGITIS: ICD-10-CM

## 2022-03-07 DIAGNOSIS — Z12.5 SCREENING FOR PROSTATE CANCER: ICD-10-CM

## 2022-03-07 DIAGNOSIS — Z12.11 SCREEN FOR COLON CANCER: ICD-10-CM

## 2022-03-07 DIAGNOSIS — Z00.00 ENCOUNTER FOR ROUTINE ADULT HEALTH EXAMINATION WITHOUT ABNORMAL FINDINGS: Primary | ICD-10-CM

## 2022-03-07 DIAGNOSIS — Z11.59 ENCOUNTER FOR SCREENING FOR OTHER VIRAL DISEASES: Primary | ICD-10-CM

## 2022-03-07 DIAGNOSIS — E03.8 SUBCLINICAL HYPOTHYROIDISM: ICD-10-CM

## 2022-03-07 DIAGNOSIS — H90.6 MIXED CONDUCTIVE AND SENSORINEURAL HEARING LOSS OF BOTH EARS: ICD-10-CM

## 2022-03-07 DIAGNOSIS — G43.109 MIGRAINE WITH AURA AND WITHOUT STATUS MIGRAINOSUS, NOT INTRACTABLE: ICD-10-CM

## 2022-03-07 DIAGNOSIS — M54.16 LUMBAR RADICULOPATHY: ICD-10-CM

## 2022-03-07 DIAGNOSIS — N52.9 ERECTILE DYSFUNCTION, UNSPECIFIED ERECTILE DYSFUNCTION TYPE: ICD-10-CM

## 2022-03-07 DIAGNOSIS — H66.005 RECURRENT ACUTE SUPPURATIVE OTITIS MEDIA WITHOUT SPONTANEOUS RUPTURE OF LEFT TYMPANIC MEMBRANE: ICD-10-CM

## 2022-03-07 DIAGNOSIS — S46.001A INJURY OF RIGHT ROTATOR CUFF, INITIAL ENCOUNTER: ICD-10-CM

## 2022-03-07 DIAGNOSIS — H69.93 DYSFUNCTION OF BOTH EUSTACHIAN TUBES: ICD-10-CM

## 2022-03-07 DIAGNOSIS — E78.1 HYPERTRIGLYCERIDEMIA: ICD-10-CM

## 2022-03-07 DIAGNOSIS — M51.369 DDD (DEGENERATIVE DISC DISEASE), LUMBAR: ICD-10-CM

## 2022-03-07 PROCEDURE — 90750 HZV VACC RECOMBINANT IM: CPT | Performed by: PHYSICIAN ASSISTANT

## 2022-03-07 PROCEDURE — 99214 OFFICE O/P EST MOD 30 MIN: CPT | Mod: 25 | Performed by: PHYSICIAN ASSISTANT

## 2022-03-07 PROCEDURE — 90471 IMMUNIZATION ADMIN: CPT | Performed by: PHYSICIAN ASSISTANT

## 2022-03-07 PROCEDURE — 99396 PREV VISIT EST AGE 40-64: CPT | Mod: 25 | Performed by: PHYSICIAN ASSISTANT

## 2022-03-07 RX ORDER — GEMFIBROZIL 600 MG/1
TABLET, FILM COATED ORAL
Qty: 180 TABLET | Refills: 3 | Status: SHIPPED | OUTPATIENT
Start: 2022-03-07 | End: 2023-05-31

## 2022-03-07 RX ORDER — FLUTICASONE PROPIONATE 50 MCG
2 SPRAY, SUSPENSION (ML) NASAL DAILY
Qty: 16 ML | Refills: 1 | Status: SHIPPED | OUTPATIENT
Start: 2022-03-07 | End: 2022-05-03

## 2022-03-07 RX ORDER — LORATADINE 10 MG/1
1 TABLET ORAL DAILY
Qty: 90 TABLET | Refills: 3 | Status: SHIPPED | OUTPATIENT
Start: 2022-03-07 | End: 2023-04-26

## 2022-03-07 RX ORDER — SUMATRIPTAN 100 MG/1
TABLET, FILM COATED ORAL
Qty: 9 TABLET | Refills: 2 | Status: SHIPPED | OUTPATIENT
Start: 2022-03-07 | End: 2022-06-30

## 2022-03-07 RX ORDER — TADALAFIL 20 MG/1
TABLET ORAL
Qty: 8 TABLET | Refills: 5 | Status: SHIPPED | OUTPATIENT
Start: 2022-03-07 | End: 2023-06-26

## 2022-03-07 RX ORDER — TOPIRAMATE 50 MG/1
50 TABLET, FILM COATED ORAL DAILY
Qty: 90 TABLET | Refills: 3 | Status: SHIPPED | OUTPATIENT
Start: 2022-03-07 | End: 2023-06-16

## 2022-03-07 RX ORDER — LEVOTHYROXINE SODIUM 137 UG/1
137 TABLET ORAL DAILY
Qty: 90 TABLET | Refills: 1 | Status: CANCELLED | OUTPATIENT
Start: 2022-03-07

## 2022-03-07 RX ORDER — OMEPRAZOLE 40 MG/1
CAPSULE, DELAYED RELEASE ORAL
Qty: 90 CAPSULE | Refills: 3 | Status: SHIPPED | OUTPATIENT
Start: 2022-03-07 | End: 2023-05-31

## 2022-03-07 RX ORDER — AMOXICILLIN 875 MG
875 TABLET ORAL 2 TIMES DAILY
Qty: 20 TABLET | Refills: 0 | Status: SHIPPED | OUTPATIENT
Start: 2022-03-07 | End: 2023-06-16

## 2022-03-07 ASSESSMENT — ENCOUNTER SYMPTOMS
FEVER: 0
FREQUENCY: 0
CONSTIPATION: 0
NERVOUS/ANXIOUS: 0
HEADACHES: 1
HEMATURIA: 0
PARESTHESIAS: 0
WEAKNESS: 0
CHILLS: 0
MYALGIAS: 0
ARTHRALGIAS: 1
ABDOMINAL PAIN: 0
DIARRHEA: 0
DIZZINESS: 0
DYSURIA: 0
SHORTNESS OF BREATH: 0
NAUSEA: 0
PALPITATIONS: 0
HEARTBURN: 1
JOINT SWELLING: 0
COUGH: 0
HEMATOCHEZIA: 0
EYE PAIN: 0
SORE THROAT: 0

## 2022-03-07 NOTE — PROGRESS NOTES
SUBJECTIVE:   CC: Linden Cruz is an 51 year old male who presents for preventative health visit.       Patient has been advised of split billing requirements and indicates understanding: Yes  Healthy Habits:     Getting at least 3 servings of Calcium per day:  Yes    Bi-annual eye exam:  Yes    Dental care twice a year:  Yes    Sleep apnea or symptoms of sleep apnea:  None    Diet:  Regular (no restrictions) and Low fat/cholesterol    Frequency of exercise:  None    Taking medications regularly:  Yes    Medication side effects:  None    PHQ-2 Total Score: 0    Additional concerns today:  No      He has slipped on ice a few months ago and reached to grab something with his right arm and felt a pull in his right shoulder. He has had right shoulder pain ever since and it is not improving. Pain is worse when lifting and reaching his arm above his head. He is concerned he maybe tore something in his shoulder.     He continues to experience low back pain with intermittent shooting pain down the back of his right leg to the knee along with persistent numbness in the right anterior thigh, now the left thigh as well. He denies any loss of bowel/bladder control. He has undergone 2 rounds of epidural steroid injections and the most recent injection last summer was not helpful at all. He has also completed three different rounds of physical therapy treatments, also without any sustained relief.     He had COVID-19 just over a month ago and migraines have been worse since then. He has noticed migraines twice weekly.    Today's PHQ-2 Score:   PHQ-2 ( 1999 Pfizer) 3/7/2022   Q1: Little interest or pleasure in doing things 0   Q2: Feeling down, depressed or hopeless 0   PHQ-2 Score 0   PHQ-2 Total Score (12-17 Years)- Positive if 3 or more points; Administer PHQ-A if positive -   Q1: Little interest or pleasure in doing things Not at all   Q2: Feeling down, depressed or hopeless Not at all   PHQ-2 Score 0       Abuse: Current  or Past(Physical, Sexual or Emotional)- No  Do you feel safe in your environment? Yes    Have you ever done Advance Care Planning? (For example, a Health Directive, POLST, or a discussion with a medical provider or your loved ones about your wishes): No, advance care planning information given to patient to review.  Patient declined advance care planning discussion at this time.    Social History     Tobacco Use     Smoking status: Former Smoker     Types: Cigarettes     Quit date: 2000     Years since quittin.1     Smokeless tobacco: Never Used   Substance Use Topics     Alcohol use: Yes     Alcohol/week: 0.0 standard drinks     Comment: 1/month       Alcohol Use 3/7/2022   Prescreen: >3 drinks/day or >7 drinks/week? No   Prescreen: >3 drinks/day or >7 drinks/week? -       Last PSA:   PSA   Date Value Ref Range Status   03/10/2016 0.71 0 - 4 ug/L Final       Reviewed orders with patient. Reviewed health maintenance and updated orders accordingly - Yes      Reviewed and updated as needed this visit by clinical staff   Tobacco  Allergies  Meds  Problems  Med Hx  Surg Hx  Fam Hx  Soc   Hx        Reviewed and updated as needed this visit by Provider   Tobacco  Allergies  Meds  Problems  Med Hx  Surg Hx  Fam Hx           Review of Systems   Constitutional: Negative for chills and fever.   HENT: Positive for ear pain and hearing loss. Negative for congestion and sore throat.    Eyes: Negative for pain and visual disturbance.   Respiratory: Negative for cough and shortness of breath.    Cardiovascular: Negative for chest pain, palpitations and peripheral edema.   Gastrointestinal: Positive for heartburn. Negative for abdominal pain, constipation, diarrhea, hematochezia and nausea.   Genitourinary: Negative for dysuria, frequency, genital sores, hematuria and urgency.   Musculoskeletal: Positive for arthralgias. Negative for joint swelling and myalgias.   Skin: Negative for rash.   Neurological:  "Positive for headaches. Negative for dizziness, weakness and paresthesias.   Psychiatric/Behavioral: Negative for mood changes. The patient is not nervous/anxious.        OBJECTIVE:   /70   Pulse 77   Temp 97  F (36.1  C) (Temporal)   Resp 16   Ht 1.81 m (5' 11.26\")   Wt 107.7 kg (237 lb 8 oz)   SpO2 98%   BMI 32.88 kg/m      Physical Exam  GENERAL: healthy, alert and no distress  EYES: Eyes grossly normal to inspection, PERRL and conjunctivae and sclerae normal  HENT: ear canals normal. Right TM with mild serous effusion. Left TM with mucoid effusion and small, healing perforation/retraction pocket over the right lateral TM. No erythema or bulging. Nasal mucosa is erythematous and edematous. Pharynx is clear.   NECK: no adenopathy, no asymmetry, masses, or scars and thyroid normal to palpation  RESP: lungs clear to auscultation - no rales, rhonchi or wheezes  CV: regular rate and rhythm, normal S1 S2, no S3 or S4, no murmur, click or rub, no peripheral edema and peripheral pulses strong  ABDOMEN: soft, nontender, no hepatosplenomegaly, no masses and bowel sounds normal   (male): normal male circumcised genitalia without lesions or urethral discharge, no hernia  RECTAL: normal sphincter tone, no rectal masses, prostate normal size, smooth, nontender without nodules or masses  MS: no gross musculoskeletal defects noted, no edema. FROM to all extremities although slightly decreased right shoulder abduction above the head. Tenderness inferior to the right acromion. Positive Neer, Travis, lift off and Speed's testing on the right. Negative empty can and lift off testing.  No spinal tenderness.   SKIN: no suspicious lesions or rashes  NEURO: Normal strength and tone, mentation intact and speech normal. Cranial nerves II-XII are grossly intact. Negative straight leg raise bilaterally. DTRs are 2+/4 throughout and symmetric. Gait is stable.  PSYCH: mentation appears normal, affect " normal/bright      ASSESSMENT/PLAN:       ICD-10-CM    1. Encounter for routine adult health examination without abnormal findings  Z00.00    2. Lumbar radiculopathy  M54.16 MR Lumbar Spine w/o Contrast   3. DDD (degenerative disc disease), lumbar  M51.36 MR Lumbar Spine w/o Contrast   4. Injury of right rotator cuff, initial encounter  S46.001A MR Shoulder Right w/o Contrast   5. Migraine with aura and without status migrainosus, not intractable  G43.109 topiramate (TOPAMAX) 50 MG tablet     SUMAtriptan (IMITREX) 100 MG tablet   6. Erectile dysfunction, unspecified erectile dysfunction type  N52.9 tadalafil (CIALIS) 20 MG tablet   7. Gastroesophageal reflux disease without esophagitis  K21.9 omeprazole (PRILOSEC) 40 MG DR capsule   8. Subclinical hypothyroidism  E03.8 TSH with free T4 reflex   9. Hypertriglyceridemia  E78.1 Lipid panel reflex to direct LDL Fasting     gemfibrozil (LOPID) 600 MG tablet   10. Dysfunction of both eustachian tubes  H69.83 fluticasone (FLONASE) 50 MCG/ACT nasal spray     loratadine (CLARITIN) 10 MG tablet   11. Recurrent acute suppurative otitis media without spontaneous rupture of left tympanic membrane  H66.005 amoxicillin (AMOXIL) 875 MG tablet   12. Mixed conductive and sensorineural hearing loss of both ears  H90.6 fluticasone (FLONASE) 50 MCG/ACT nasal spray     loratadine (CLARITIN) 10 MG tablet   13. Screen for colon cancer  Z12.11 Adult Gastro Ref - Procedure Only   14. Screening for prostate cancer  Z12.5 PSA, screen   15. Screening for diabetes mellitus  Z13.1 Basic metabolic panel  (Ca, Cl, CO2, Creat, Gluc, K, Na, BUN)       2-3. His back pain continues to be poorly controlled, now with more radicular symptoms into both legs. I recommend an updated lumbar MRI as it has been 3 years since his last MRI. Continue ibuprofen, ice, heat and home stretching and depending on results, will likely refer to spine surgery/neurosurgery for further evaluation and/or consider another  "epidural steroid injection.    4. Right shoulder injury 2 months ago most consistent with a rotator cuff tear and possible tear of the proximal biceps tendon. No evidence for bony injury so I feel an x-ray would be of very low yield so will order an MRI for further evaluation. Continue with Tylenol and depending on results, will refer to orthopedics.    5. Migraines worse since COVID-19. I would anticipate that migraines would improve over the next few months but if not, will consider increasing Topamax. Continue Imitrex as needed.    6. Continue Cialis as needed.    7. Continue omeprazole. He understands the potential long-term side effects and he has failed H2 blockers.    8. Updated TSH to be completed later this week. Will hold off on refilling levothyroxine until labs are back.    9. Updated fasting lipid panel to be completed later this week. Continue gemfibrozil.    10-12. He will restart Claritin and Flonase and will schedule a follow-up with ENT. Will prescribe amoxicillin to take if ear pressure is not improving over the next week.     13. Screening colonoscopy ordered.    14. PSA screen ordered.    15. Updated BMP to be completed later this week.    Follow up annually.         Patient has been advised of split billing requirements and indicates understanding: Yes    COUNSELING:   Reviewed preventive health counseling, as reflected in patient instructions       Regular exercise       Healthy diet/nutrition    Estimated body mass index is 32.88 kg/m  as calculated from the following:    Height as of this encounter: 1.81 m (5' 11.26\").    Weight as of this encounter: 107.7 kg (237 lb 8 oz).     Weight management plan: Discussed healthy diet and exercise guidelines    He reports that he quit smoking about 22 years ago. His smoking use included cigarettes. He has never used smokeless tobacco.      Counseling Resources:  ATP IV Guidelines  Pooled Cohorts Equation Calculator  FRAX Risk Assessment  ICSI " Preventive Guidelines  Dietary Guidelines for Americans, 2010  USDA's MyPlate  ASA Prophylaxis  Lung CA Screening    Nik Nina PA-C  [facility]

## 2022-03-07 NOTE — TELEPHONE ENCOUNTER
Screening Questions  BlueKIND OF PREP RedLOCATION [review exclusion criteria] GreenSEDATION TYPE    1. Have you had a positive covid test in the last 90 days? N     2. Are you active on mychart? Y    3. What insurance is in the chart? [employer]     3.  Ordering/Referring Provider: Nik Nina PA-C    4. BMI 29.8 [BMI OVER 40-EXTENDED PREP]  If greater than 40 review exclusion criteria [PAC APPT IF @ [facility]]    5.  Respiratory Screening :  [If yes to any of the following HOSPITAL setting only]     Do you use daily home oxygen? N    Do you have mod to severe Obstructive Sleep Apnea? N  MILD [OKAY @ [facility]]   Do you have Pulmonary Hypertension? N     Do you have UNCONTROLLED asthma? N      6. Have you had a heart or lung transplant? N      7. Are you currently on dialysis? N [ If yes, G-PREP & HOSPITAL setting only]     8. Do you have chronic kidney disease? N [ If yes, G-PREP ]    9. Have you had a stroke or Transient ischemic attack (TIA) within 6 months? N (If yes, do not schedule at [facility])    10. In the past 6 months, have you had any heart related issues including cardiomyopathy or heart attack? N        If yes, did it require cardiac stenting or other implantable device?       11. Do you have any implantable devices in your body (pacemaker, defib, LVAD)? N (If yes, schedule at [facility])    12. Do you take nitroglycerin? N   If yes, how often?   (if yes, HOSPITAL setting ONLY)    13. Are you currently taking any blood thinners? N   [IF YES, INFORM PATIENT TO FOLLOW UP W/ ORDERING PROVIDER FOR BRIDGING INSTRUCTIONS]     14. Are you a diabetic? N   [ If yes, G-PREP ]    15. [FEMALES] Are you currently pregnant?     If yes, how many weeks?     16. Are you taking any prescription pain medications on a routine schedule?  N  [ If yes, EXTENDED PREP.] [If yes, MAC]    17. Do you have any chemical dependencies such as alcohol, street drugs, or methadone?  N [If yes, MAC]    18. Do you have any history of  post-traumatic stress syndrome, severe anxiety or history of psychosis?  N  [If yes, MAC]    19. Do you transfer independently?  Y    20.  Do you have any issues with constipation?  N  [ If yes, EXTENDED PREP.]    21. Preferred LOCAL Pharmacy for Pre Prescription       [id number] [place] [facility] [address]    Scheduling Details      Caller :   (Please ask for phone number if not scheduled by patient)    Type of Procedure Scheduled: COLON   Which Colonoscopy Prep was Sent?: MPREP  JOHNIE CF PATIENTS & GROEN'S PATIENTS NEEDS EXTENDED PREP  Surgeon: HARDEN  Date of Procedure: 4/15  Location:       Sedation Type: CS  Conscious Sedation- Needs  for 6 hours after the procedure  MAC/General-Needs  for 24 hours after procedure    Pre-op Required at [facility] [place], [place] and OR for MAC sedation: N  (advise patient they will need a pre-op prior to procedure -)      Informed patient they will need an adult  Y  Cannot take any type of public or medical transportation alone    [facility]Pro[facility] or Externally: 4/11    Confirmed Nurse will call to complete assessment Y    Additional comments:

## 2022-03-07 NOTE — PATIENT INSTRUCTIONS
Preventive Health Recommendations  Male Ages 50 - 64    Yearly exam:             See your health care provider every year in order to  o   Review health changes.   o   Discuss preventive care.    o   Review your medicines if your doctor has prescribed any.     Have a cholesterol test every 5 years, or more frequently if you are at risk for high cholesterol/heart disease.     Have a diabetes test (fasting glucose) every three years. If you are at risk for diabetes, you should have this test more often.     Have a colonoscopy at age 50, or have a yearly FIT test (stool test). These exams will check for colon cancer.      Talk with your health care provider about whether or not a prostate cancer screening test (PSA) is right for you.    You should be tested each year for STDs (sexually transmitted diseases), if you re at risk.     Shots: Get a flu shot each year. Get a tetanus shot every 10 years.     Nutrition:    Eat at least 5 servings of fruits and vegetables daily.     Eat whole-grain bread, whole-wheat pasta and brown rice instead of white grains and rice.     Get adequate Calcium and Vitamin D.     Lifestyle    Exercise for at least 150 minutes a week (30 minutes a day, 5 days a week). This will help you control your weight and prevent disease.     Limit alcohol to one drink per day.     No smoking.     Wear sunscreen to prevent skin cancer.     See your dentist every six months for an exam and cleaning.     See your eye doctor every 1 to 2 years.    Will order updated MRIs of your lumbar spine and of your right shoulder.  Continue ibuprofen, no more than 2400 mg per day.    Will order updated labs to be completed later this week.    Will restart Flonase and Claritin. Will prescribe amoxicillin to take if not improving over the next week.    Will order a colonoscopy.    Follow up annually.

## 2022-03-07 NOTE — PROGRESS NOTES
Prior to immunization administration, verified patients identity using patient s name and date of birth. Please see Immunization Activity for additional information.     Screening Questionnaire for Adult Immunization    Are you sick today?   No   Do you have allergies to medications, food, a vaccine component or latex?   No   Have you ever had a serious reaction after receiving a vaccination?   No   Do you have a long-term health problem with heart, lung, kidney, or metabolic disease (e.g., diabetes), asthma, a blood disorder, no spleen, complement component deficiency, a cochlear implant, or a spinal fluid leak?  Are you on long-term aspirin therapy?   No   Do you have cancer, leukemia, HIV/AIDS, or any other immune system problem?   No   Do you have a parent, brother, or sister with an immune system problem?   No   In the past 3 months, have you taken medications that affect  your immune system, such as prednisone, other steroids, or anticancer drugs; drugs for the treatment of rheumatoid arthritis, Crohn s disease, or psoriasis; or have you had radiation treatments?   No   Have you had a seizure, or a brain or other nervous system problem?   No   During the past year, have you received a transfusion of blood or blood    products, or been given immune (gamma) globulin or antiviral drug?   No   For women: Are you pregnant or is there a chance you could become       pregnant during the next month?   No   Have you received any vaccinations in the past 4 weeks?   No     Immunization questionnaire answers were all negative.        Per orders of MERRITT Nina, injection of Shingles given by Sherry Fountain CMA. Patient instructed to remain in clinic for 15 minutes afterwards, and to report any adverse reaction to me immediately.       Screening performed by Sherry Fountain CMA on 3/7/2022 at 8:13 AM.

## 2022-03-08 RX ORDER — LORATADINE 10 MG/1
TABLET ORAL
Qty: 90 TABLET | Refills: 3 | OUTPATIENT
Start: 2022-03-08

## 2022-03-10 ENCOUNTER — LAB (OUTPATIENT)
Dept: LAB | Facility: OTHER | Age: 52
End: 2022-03-10
Payer: COMMERCIAL

## 2022-03-10 DIAGNOSIS — E03.8 SUBCLINICAL HYPOTHYROIDISM: ICD-10-CM

## 2022-03-10 DIAGNOSIS — E78.1 HYPERTRIGLYCERIDEMIA: ICD-10-CM

## 2022-03-10 DIAGNOSIS — Z13.1 SCREENING FOR DIABETES MELLITUS: ICD-10-CM

## 2022-03-10 DIAGNOSIS — Z12.5 SCREENING FOR PROSTATE CANCER: ICD-10-CM

## 2022-03-10 LAB
ANION GAP SERPL CALCULATED.3IONS-SCNC: 6 MMOL/L (ref 3–14)
BUN SERPL-MCNC: 14 MG/DL (ref 7–30)
CALCIUM SERPL-MCNC: 8.7 MG/DL (ref 8.5–10.1)
CHLORIDE BLD-SCNC: 112 MMOL/L (ref 94–109)
CHOLEST SERPL-MCNC: 230 MG/DL
CO2 SERPL-SCNC: 23 MMOL/L (ref 20–32)
CREAT SERPL-MCNC: 0.92 MG/DL (ref 0.66–1.25)
FASTING STATUS PATIENT QL REPORTED: YES
GFR SERPL CREATININE-BSD FRML MDRD: >90 ML/MIN/1.73M2
GLUCOSE BLD-MCNC: 112 MG/DL (ref 70–99)
HDLC SERPL-MCNC: 38 MG/DL
LDLC SERPL CALC-MCNC: 139 MG/DL
NONHDLC SERPL-MCNC: 192 MG/DL
POTASSIUM BLD-SCNC: 4.1 MMOL/L (ref 3.4–5.3)
PSA SERPL-MCNC: 0.71 UG/L (ref 0–4)
SODIUM SERPL-SCNC: 141 MMOL/L (ref 133–144)
T4 FREE SERPL-MCNC: 0.93 NG/DL (ref 0.76–1.46)
TRIGL SERPL-MCNC: 264 MG/DL
TSH SERPL DL<=0.005 MIU/L-ACNC: 11.34 MU/L (ref 0.4–4)

## 2022-03-10 PROCEDURE — 80048 BASIC METABOLIC PNL TOTAL CA: CPT

## 2022-03-10 PROCEDURE — 36415 COLL VENOUS BLD VENIPUNCTURE: CPT

## 2022-03-10 PROCEDURE — 80061 LIPID PANEL: CPT

## 2022-03-10 PROCEDURE — G0103 PSA SCREENING: HCPCS

## 2022-03-10 PROCEDURE — 84443 ASSAY THYROID STIM HORMONE: CPT

## 2022-03-10 PROCEDURE — 84439 ASSAY OF FREE THYROXINE: CPT

## 2022-03-10 RX ORDER — LEVOTHYROXINE SODIUM 150 UG/1
137 TABLET ORAL DAILY
Qty: 90 TABLET | Refills: 0 | Status: SHIPPED | OUTPATIENT
Start: 2022-03-10 | End: 2022-06-20

## 2022-03-14 ENCOUNTER — HOSPITAL ENCOUNTER (OUTPATIENT)
Dept: MRI IMAGING | Facility: CLINIC | Age: 52
Discharge: HOME | End: 2022-03-14
Attending: PHYSICIAN ASSISTANT
Payer: COMMERCIAL

## 2022-03-14 ENCOUNTER — MYC MEDICAL ADVICE (OUTPATIENT)
Dept: FAMILY MEDICINE | Facility: OTHER | Age: 52
End: 2022-03-14

## 2022-03-14 DIAGNOSIS — M51.369 DDD (DEGENERATIVE DISC DISEASE), LUMBAR: ICD-10-CM

## 2022-03-14 DIAGNOSIS — M54.16 LUMBAR RADICULOPATHY: ICD-10-CM

## 2022-03-14 DIAGNOSIS — S46.001A INJURY OF RIGHT ROTATOR CUFF, INITIAL ENCOUNTER: ICD-10-CM

## 2022-03-14 DIAGNOSIS — E03.8 SUBCLINICAL HYPOTHYROIDISM: ICD-10-CM

## 2022-03-14 PROCEDURE — 72148 MRI LUMBAR SPINE W/O DYE: CPT

## 2022-03-14 PROCEDURE — 73221 MRI JOINT UPR EXTREM W/O DYE: CPT | Mod: 26 | Performed by: RADIOLOGY

## 2022-03-14 PROCEDURE — 73221 MRI JOINT UPR EXTREM W/O DYE: CPT | Mod: RT

## 2022-03-14 NOTE — RESULT ENCOUNTER NOTE
Mr. Cruz    Your recent test results are attached.  I am resulting your imaging as your provider is out of office.  MRI of the right shoulder showed signs of frozen shoulder which is usually treated with physical therapy.  I would recommend physical therapy for this.  I can place orders for these or I could have you talk to your PCP on his return to discuss further evaluation and treatment.  Please send me a message back if you would like to go ahead with therapy so I can place those orders.    If you have any questions or concerns please contact me via My Chart or call the clinic at [phone]     Thank You  Betzaida Cat MD.

## 2022-03-14 NOTE — TELEPHONE ENCOUNTER
----- Message from Inocente Khan MD sent at 3/14/2022 12:33 PM CDT -----  Patient should continue taking his Synthroid at 150 mcg daily 30 minutes before any food or other medicines.  We will recheck the TSH in 6 to 8 weeks.  We may adjust again then depending on the results.      Thank you,    Inocente Khan MD      ----- Message -----  From: Geno Alcocer  Sent: 3/14/2022  10:28 AM CDT  To: Inocente Khan MD    Notified patient of result message.  He stated he has not been taking his medication on an empty stomach and was taking with other medications.  Please advise.

## 2022-03-14 NOTE — RESULT ENCOUNTER NOTE
Mr. Anthony    Your recent test results are attached.  I am resulting your imaging as your provider is out of office.  MRI of the lumbar spine showed mild to moderate degenerative changes most prominent changes along the L5-S1 disc causing mild to moderate neural foraminal narrowing. These changes seem to be stable compared to your last MRI done in 2019. No significant changes noted.  I would recommend a visit with your PCP to discuss further evaluation and treatment    If you have any questions or concerns please contact me via My Chart or call the clinic at [phone]     Thank You  Betzaida Cat MD.

## 2022-03-14 NOTE — TELEPHONE ENCOUNTER
Patient informed.  No further questions.  Jerry Saldivar, LUCINAN, RN, PHN  [facility] ~ Registered Nurse  Clinic Triage ~ [place] [facility] [place]  March 14, 2022

## 2022-03-31 DIAGNOSIS — H90.6 MIXED CONDUCTIVE AND SENSORINEURAL HEARING LOSS OF BOTH EARS: ICD-10-CM

## 2022-03-31 DIAGNOSIS — H69.93 DYSFUNCTION OF BOTH EUSTACHIAN TUBES: ICD-10-CM

## 2022-03-31 RX ORDER — FLUTICASONE PROPIONATE 50 MCG
SPRAY, SUSPENSION (ML) NASAL
Qty: 16 ML | Refills: 1 | OUTPATIENT
Start: 2022-03-31

## 2022-03-31 NOTE — TELEPHONE ENCOUNTER
Should have refill on file.  fluticasone (FLONASE) 50 MCG/ACT nasal spray 16 mL 1 3/7/2022  No   Sig - Route: Spray 2 sprays into both nostrils daily - Both Nostrils   Sent to pharmacy as: Fluticasone Propionate 50 MCG/ACT Nasal Suspension (FLONASE)   Class: E-Prescribe   Order: [id number]   E-Prescribing Status: Receipt confirmed by pharmacy (3/7/2022  8:03 AM CST)

## 2022-04-13 ENCOUNTER — LAB (OUTPATIENT)
Dept: LAB | Facility: CLINIC | Age: 52
End: 2022-04-13
Payer: COMMERCIAL

## 2022-04-13 DIAGNOSIS — Z11.59 ENCOUNTER FOR SCREENING FOR OTHER VIRAL DISEASES: ICD-10-CM

## 2022-04-13 PROCEDURE — U0003 INFECTIOUS AGENT DETECTION BY NUCLEIC ACID (DNA OR RNA); SEVERE ACUTE RESPIRATORY SYNDROME CORONAVIRUS 2 (SARS-COV-2) (CORONAVIRUS DISEASE [COVID-19]), AMPLIFIED PROBE TECHNIQUE, MAKING USE OF HIGH THROUGHPUT TECHNOLOGIES AS DESCRIBED BY CMS-2020-01-R: HCPCS

## 2022-04-13 PROCEDURE — U0005 INFEC AGEN DETEC AMPLI PROBE: HCPCS

## 2022-04-14 LAB — SARS-COV-2 RNA RESP QL NAA+PROBE: NEGATIVE

## 2022-04-15 ENCOUNTER — HOSPITAL ENCOUNTER (OUTPATIENT)
Facility: CLINIC | Age: 52
Discharge: HOME | End: 2022-04-15
Attending: SURGERY | Admitting: SURGERY
Payer: COMMERCIAL

## 2022-04-15 ENCOUNTER — ANESTHESIA EVENT (OUTPATIENT)
Dept: GASTROENTEROLOGY | Facility: CLINIC | Age: 52
End: 2022-04-15
Payer: COMMERCIAL

## 2022-04-15 ENCOUNTER — ANESTHESIA (OUTPATIENT)
Dept: GASTROENTEROLOGY | Facility: CLINIC | Age: 52
End: 2022-04-15
Payer: COMMERCIAL

## 2022-04-15 VITALS
DIASTOLIC BLOOD PRESSURE: 85 MMHG | OXYGEN SATURATION: 99 % | HEART RATE: 79 BPM | SYSTOLIC BLOOD PRESSURE: 114 MMHG | TEMPERATURE: 98.8 F | RESPIRATION RATE: 16 BRPM

## 2022-04-15 LAB — COLONOSCOPY: NORMAL

## 2022-04-15 PROCEDURE — 258N000003 HC RX IP 258 OP 636: Performed by: NURSE ANESTHETIST, CERTIFIED REGISTERED

## 2022-04-15 PROCEDURE — 250N000009 HC RX 250: Performed by: NURSE ANESTHETIST, CERTIFIED REGISTERED

## 2022-04-15 PROCEDURE — 88305 TISSUE EXAM BY PATHOLOGIST: CPT | Mod: TC | Performed by: SURGERY

## 2022-04-15 PROCEDURE — 45380 COLONOSCOPY AND BIOPSY: CPT | Performed by: SURGERY

## 2022-04-15 PROCEDURE — 370N000017 HC ANESTHESIA TECHNICAL FEE, PER MIN: Performed by: SURGERY

## 2022-04-15 PROCEDURE — 45385 COLONOSCOPY W/LESION REMOVAL: CPT | Mod: PT | Performed by: SURGERY

## 2022-04-15 PROCEDURE — 250N000013 HC RX MED GY IP 250 OP 250 PS 637: Performed by: NURSE ANESTHETIST, CERTIFIED REGISTERED

## 2022-04-15 PROCEDURE — 250N000011 HC RX IP 250 OP 636: Performed by: NURSE ANESTHETIST, CERTIFIED REGISTERED

## 2022-04-15 RX ORDER — NALOXONE HYDROCHLORIDE 0.4 MG/ML
0.4 INJECTION, SOLUTION INTRAMUSCULAR; INTRAVENOUS; SUBCUTANEOUS
Status: DISCONTINUED | OUTPATIENT
Start: 2022-04-15 | End: 2022-04-15 | Stop reason: HOSPADM

## 2022-04-15 RX ORDER — ONDANSETRON 2 MG/ML
4 INJECTION INTRAMUSCULAR; INTRAVENOUS
Status: DISCONTINUED | OUTPATIENT
Start: 2022-04-15 | End: 2022-04-15 | Stop reason: HOSPADM

## 2022-04-15 RX ORDER — NALOXONE HYDROCHLORIDE 0.4 MG/ML
0.2 INJECTION, SOLUTION INTRAMUSCULAR; INTRAVENOUS; SUBCUTANEOUS
Status: DISCONTINUED | OUTPATIENT
Start: 2022-04-15 | End: 2022-04-15 | Stop reason: HOSPADM

## 2022-04-15 RX ORDER — LIDOCAINE 40 MG/G
CREAM TOPICAL
Status: DISCONTINUED | OUTPATIENT
Start: 2022-04-15 | End: 2022-04-15 | Stop reason: HOSPADM

## 2022-04-15 RX ORDER — ONDANSETRON 2 MG/ML
4 INJECTION INTRAMUSCULAR; INTRAVENOUS EVERY 6 HOURS PRN
Status: DISCONTINUED | OUTPATIENT
Start: 2022-04-15 | End: 2022-04-15 | Stop reason: HOSPADM

## 2022-04-15 RX ORDER — PROPOFOL 10 MG/ML
INJECTION, EMULSION INTRAVENOUS CONTINUOUS PRN
Status: DISCONTINUED | OUTPATIENT
Start: 2022-04-15 | End: 2022-04-15

## 2022-04-15 RX ORDER — ONDANSETRON 4 MG/1
4 TABLET, ORALLY DISINTEGRATING ORAL EVERY 6 HOURS PRN
Status: DISCONTINUED | OUTPATIENT
Start: 2022-04-15 | End: 2022-04-15 | Stop reason: HOSPADM

## 2022-04-15 RX ORDER — LIDOCAINE HYDROCHLORIDE 20 MG/ML
INJECTION, SOLUTION INFILTRATION; PERINEURAL PRN
Status: DISCONTINUED | OUTPATIENT
Start: 2022-04-15 | End: 2022-04-15

## 2022-04-15 RX ORDER — PROCHLORPERAZINE MALEATE 5 MG
10 TABLET ORAL EVERY 6 HOURS PRN
Status: DISCONTINUED | OUTPATIENT
Start: 2022-04-15 | End: 2022-04-15 | Stop reason: HOSPADM

## 2022-04-15 RX ORDER — SUMATRIPTAN 100 MG/1
100 TABLET, FILM COATED ORAL ONCE
Status: COMPLETED | OUTPATIENT
Start: 2022-04-15 | End: 2022-04-15

## 2022-04-15 RX ORDER — PROPOFOL 10 MG/ML
INJECTION, EMULSION INTRAVENOUS PRN
Status: DISCONTINUED | OUTPATIENT
Start: 2022-04-15 | End: 2022-04-15

## 2022-04-15 RX ORDER — FLUMAZENIL 0.1 MG/ML
0.2 INJECTION, SOLUTION INTRAVENOUS
Status: DISCONTINUED | OUTPATIENT
Start: 2022-04-15 | End: 2022-04-15 | Stop reason: HOSPADM

## 2022-04-15 RX ORDER — SODIUM CHLORIDE, SODIUM LACTATE, POTASSIUM CHLORIDE, CALCIUM CHLORIDE 600; 310; 30; 20 MG/100ML; MG/100ML; MG/100ML; MG/100ML
INJECTION, SOLUTION INTRAVENOUS CONTINUOUS
Status: DISCONTINUED | OUTPATIENT
Start: 2022-04-15 | End: 2022-04-15 | Stop reason: HOSPADM

## 2022-04-15 RX ADMIN — SUMATRIPTAN SUCCINATE 100 MG: 100 TABLET ORAL at 09:54

## 2022-04-15 RX ADMIN — PROPOFOL 100 MG: 10 INJECTION, EMULSION INTRAVENOUS at 09:00

## 2022-04-15 RX ADMIN — PROPOFOL 20 MG: 10 INJECTION, EMULSION INTRAVENOUS at 09:01

## 2022-04-15 RX ADMIN — PROPOFOL 150 MCG/KG/MIN: 10 INJECTION, EMULSION INTRAVENOUS at 09:00

## 2022-04-15 RX ADMIN — SODIUM CHLORIDE, POTASSIUM CHLORIDE, SODIUM LACTATE AND CALCIUM CHLORIDE: 600; 310; 30; 20 INJECTION, SOLUTION INTRAVENOUS at 08:35

## 2022-04-15 RX ADMIN — LIDOCAINE HYDROCHLORIDE 50 MG: 20 INJECTION, SOLUTION INFILTRATION; PERINEURAL at 09:00

## 2022-04-15 ASSESSMENT — LIFESTYLE VARIABLES: TOBACCO_USE: 1

## 2022-04-15 NOTE — DISCHARGE INSTRUCTIONS
[facility]    Home Care Following Endoscopy          Activity:  You have just undergone an endoscopic procedure usually performed with conscious sedation.  Do not work or operate machinery (including a car) for at least 12 hours.    I encourage you to walk and attempt to pass this air as soon as possible.    Diet:  Return to the diet you were on before your procedure but eat lightly for the first 12-24 hours.  Drink plenty of water.  Resume any regular medications unless otherwise advised by your physician.  Please begin any new medication prescribed as a result of your procedure as directed by your physician.   If you had any biopsy or polyp removed please refrain from aspirin or aspirin products for 2 days.  If on Coumadin please restart as instructed by your physician.   Pain:  You may take Tylenol as needed for pain.  Expected Recovery:  You can expect some mild abdominal fullness and/or discomfort due to the air used to inflate your intestinal tract. It is also normal to have a mild sore throat after upper endoscopy.    Call Your Physician if You Have:    After Colonoscopy:  Worsening persisting abdominal pain which is worse with activity.  Fevers (>101 degrees F), chills or shakes.  Passage of continued blood with bowel movements.   Any questions or concerns about your recovery, please call [phone] or after hours [phone] Nurse Advice Line.    Follow-up Care:  IF You did have polyps/biopsy tissue sample(s) removed.  The polyps/biopsy tissue sample(s) will be sent to pathology.  You should receive letter in your My Chart from with your results within 1-2 weeks. If you do not participate in My Chart a physical letter will come in the mail in 2-3 weeks.  Please call if you have not received a notification of your results.  If asked to return to clinic please make an appointment 1 week after your procedure.  Call [phone].

## 2022-04-15 NOTE — ANESTHESIA PREPROCEDURE EVALUATION
Anesthesia Pre-Procedure Evaluation    Patient: Linden Cruz   MRN: [id number] : 1970        Procedure : Procedure(s):  COLONOSCOPY          Past Medical History:   Diagnosis Date     Chest pain      Esophageal reflux      Migraines      Mixed hyperlipidemia      DANIEL (obstructive sleep apnea)      Other specified gastritis without mention of hemorrhage     H. Pylori, diag       Past Surgical History:   Procedure Laterality Date     CHOLECYSTECTOMY, LAPOROSCOPIC       INJECT EPIDURAL LUMBAR Right 1/10/2020    Procedure: INJECTION, SPINE, LUMBAR 5 and Sacral 1 Bilateral EPIDURAL;  Surgeon: Jose C Fields MD;  Location: [facility] OR     INJECT EPIDURAL TRANSFORAMINAL Bilateral 2021    Procedure: Bilateral L5-S1 transforaminal Epidural Steroid Injection with fluoroscopic guidance and contrast.;  Surgeon: Jose C Fields MD;  Location: [facility] OR     SURGICAL HISTORY OF -   2006    upper GI, [place]      Allergies   Allergen Reactions     No Known Drug Allergies       Social History     Tobacco Use     Smoking status: Former Smoker     Types: Cigarettes     Quit date: 2000     Years since quittin.3     Smokeless tobacco: Never Used   Substance Use Topics     Alcohol use: Yes     Alcohol/week: 0.0 standard drinks     Comment: 1/month      Wt Readings from Last 1 Encounters:   22 107.7 kg (237 lb 8 oz)        Anesthesia Evaluation            ROS/MED HX  ENT/Pulmonary:     (+) sleep apnea, doesn't use CPAP, tobacco use, Past use,     Neurologic:     (+) migraines,     Cardiovascular:  - neg cardiovascular ROS     METS/Exercise Tolerance:     Hematologic:       Musculoskeletal: Comment: Back pain      GI/Hepatic:     (+) GERD, bowel prep,     Renal/Genitourinary:  - neg Renal ROS     Endo:     (+) Obesity,     Psychiatric/Substance Use:  - neg psychiatric ROS     Infectious Disease:  - neg infectious disease ROS     Malignancy:  - neg malignancy ROS     Other:            Physical Exam    Airway         Mallampati: II   TM distance: > 3 FB   Neck ROM: full   Mouth opening: > 3 cm    Respiratory Devices and Support         Dental           Cardiovascular             Pulmonary   pulmonary exam normal                OUTSIDE LABS:  CBC:   Lab Results   Component Value Date    WBC 6.7 04/20/2020    WBC 7.8 08/24/2016    HGB 14.6 04/20/2020    HGB 14.5 08/24/2016    HCT 42.7 04/20/2020    HCT 42.9 08/24/2016     04/20/2020     08/24/2016     BMP:   Lab Results   Component Value Date     03/10/2022     06/04/2021    POTASSIUM 4.1 03/10/2022    POTASSIUM 3.6 06/04/2021    CHLORIDE 112 (H) 03/10/2022    CHLORIDE 110 (H) 06/04/2021    CO2 23 03/10/2022    CO2 24 06/04/2021    BUN 14 03/10/2022    BUN 12 06/04/2021    CR 0.92 03/10/2022    CR 1.03 06/04/2021     (H) 03/10/2022    GLC 89 06/04/2021     COAGS:   Lab Results   Component Value Date    PTT 33 01/05/2015    INR 0.98 01/05/2015     POC: No results found for: BGM, HCG, HCGS  HEPATIC:   Lab Results   Component Value Date    ALBUMIN 4.0 01/11/2019    PROTTOTAL 7.5 01/11/2019    ALT 31 01/11/2019    AST 20 01/11/2019    ALKPHOS 105 01/11/2019    BILITOTAL 0.6 01/11/2019     OTHER:   Lab Results   Component Value Date    A1C 5.7 03/10/2016    JEFFY 8.7 03/10/2022    TSH 11.34 (H) 03/10/2022    T4 0.93 03/10/2022    CRP <2.9 06/04/2021    SED 5 06/04/2021       Anesthesia Plan    ASA Status:  2   NPO Status:  NPO Appropriate    Anesthesia Type: MAC.     - Reason for MAC: straight local not clinically adequate   Induction: Intravenous, Propofol.   Maintenance: TIVA.        Consents    Anesthesia Plan(s) and associated risks, benefits, and realistic alternatives discussed. Questions answered and patient/representative(s) expressed understanding.     - Discussed: Risks, Benefits and Alternatives for BOTH SEDATION and the PROCEDURE were discussed     - Discussed with:  Patient    Use of blood products discussed: No .     Postoperative  Care            Comments:    Other Comments: The risks and benefits of anesthesia, and the alternatives where applicable, have been discussed with the patient, and they wish to proceed.            WOJCIECH Craig CRNA

## 2022-04-15 NOTE — ANESTHESIA CARE TRANSFER NOTE
Patient: Linden Cruz    Procedure: Procedure(s):  COLONOSCOPY, WITH POLYPECTOMY       Diagnosis: Screen for colon cancer [Z12.11]  Diagnosis Additional Information: No value filed.    Anesthesia Type:   MAC     Note:      Level of Consciousness: drowsy  Oxygen Supplementation: face mask    Independent Airway: airway patency satisfactory and stable  Dentition: dentition unchanged  Vital Signs Stable: post-procedure vital signs reviewed and stable    Patient transferred to: Phase II    Handoff Report: Identifed the Patient, Identified the Reponsible Provider, Reviewed the pertinent medical history, Discussed the surgical course, Reviewed Intra-OP anesthesia mangement and issues during anesthesia, Set expectations for post-procedure period and Allowed opportunity for questions and acknowledgement of understanding      Vitals:  Vitals Value Taken Time   BP     Temp     Pulse     Resp     SpO2         Electronically Signed By: WOJCIECH Echavarria CRNA  April 15, 2022  9:23 AM

## 2022-04-15 NOTE — ANESTHESIA POSTPROCEDURE EVALUATION
Patient: Linden Cruz    Procedure: Procedure(s):  COLONOSCOPY, WITH POLYPECTOMY       Anesthesia Type:  MAC    Note:  Disposition: Outpatient   Postop Pain Control: Uneventful            Sign Out: Well controlled pain   PONV: No   Neuro/Psych: Uneventful            Sign Out: Acceptable/Baseline neuro status   Airway/Respiratory: Uneventful            Sign Out: Acceptable/Baseline resp. status   CV/Hemodynamics: Uneventful            Sign Out: Acceptable CV status   Other NRE: NONE   DID A NON-ROUTINE EVENT OCCUR? No    Event details/Postop Comments:  Pt was happy with anesthesia care.  No complications.  I will follow up with the pt if needed.           Last vitals:  Vitals Value Taken Time   /66 04/15/22 0925   Temp     Pulse 87 04/15/22 0925   Resp     SpO2 98 % 04/15/22 0927   Vitals shown include unvalidated device data.    Electronically Signed By: WOJCIECH Craig CRNA  April 15, 2022  9:28 AM

## 2022-04-15 NOTE — H&P
Patient seen for Endoscopy    HPI:  Patient is a 51 year old male here for endoscopy. Not taking blood thinning medications. No MI or CVA history. No issues with previous sedation. No recent acute illness.    Review Of Systems    Skin: negative  Ears/Nose/Throat: negative  Respiratory: No shortness of breath, dyspnea on exertion, cough, or hemoptysis  Cardiovascular: negative  Gastrointestinal: negative  Genitourinary: negative  Musculoskeletal: negative  Neurologic: negative  Hematologic/Lymphatic/Immunologic: negative  Endocrine: negative      Past Medical History:   Diagnosis Date     Chest pain      Esophageal reflux      Migraines      Mixed hyperlipidemia      DANIEL (obstructive sleep apnea)      Other specified gastritis without mention of hemorrhage     H. Pylori, diag 12/06       Past Surgical History:   Procedure Laterality Date     CHOLECYSTECTOMY, LAPOROSCOPIC  2004     INJECT EPIDURAL LUMBAR Right 1/10/2020    Procedure: INJECTION, SPINE, LUMBAR 5 and Sacral 1 Bilateral EPIDURAL;  Surgeon: Jose C Fields MD;  Location: [facility]     INJECT EPIDURAL TRANSFORAMINAL Bilateral 7/8/2021    Procedure: Bilateral L5-S1 transforaminal Epidural Steroid Injection with fluoroscopic guidance and contrast.;  Surgeon: Jose C Fields MD;  Location: [facility] OR     SURGICAL HISTORY OF -   12/2006    upper GI, [place]       Family History   Problem Relation Age of Onset     Diabetes Maternal Grandmother      C.A.D. Maternal Grandfather      Diabetes Maternal Grandfather      Cancer Maternal Grandfather         lung cancer     Cerebrovascular Disease Paternal Grandmother      C.A.D. Paternal Grandfather      Other - See Comments Mother         bilateral carotid occlusion, SVG bypass to both.     Asthma No family hx of      Hypertension No family hx of      Breast Cancer No family hx of      Cancer - colorectal No family hx of      Prostate Cancer No family hx of      Alcohol/Drug No family hx of      Allergies No family hx of       Alzheimer Disease No family hx of      Anesthesia Reaction No family hx of      Arthritis No family hx of      Blood Disease No family hx of      Cardiovascular No family hx of      Circulatory No family hx of      Congenital Anomalies No family hx of      Connective Tissue Disorder No family hx of      Depression No family hx of      Endocrine Disease No family hx of      Eye Disorder No family hx of      Genetic Disorder No family hx of      Gastrointestinal Disease No family hx of      Genitourinary Problems No family hx of      Gynecology No family hx of      Heart Disease No family hx of      Lipids No family hx of      Musculoskeletal Disorder No family hx of      Neurologic Disorder No family hx of      Obesity No family hx of      Osteoporosis No family hx of      Psychotic Disorder No family hx of      Respiratory No family hx of      Thyroid Disease No family hx of      Hearing Loss No family hx of      Coronary Artery Disease No family hx of      Mental Illness No family hx of      Depression/Anxiety No family hx of      Known Genetic Syndrome No family hx of        Social History     Socioeconomic History     Marital status:      Spouse name: Not on file     Number of children: 0     Years of education: Not on file     Highest education level: Not on file   Occupational History     Employer: [employer]     Comment: road construction   Tobacco Use     Smoking status: Former Smoker     Types: Cigarettes     Quit date: 2000     Years since quittin.3     Smokeless tobacco: Never Used   Vaping Use     Vaping Use: Never used   Substance and Sexual Activity     Alcohol use: Yes     Alcohol/week: 0.0 standard drinks     Comment: 1/month     Drug use: No     Sexual activity: Not Currently     Partners: Female     Birth control/protection: Surgical     Comment: vasectomy   Other Topics Concern     Parent/sibling w/ CABG, MI or angioplasty before 65F 55M? No      Service Not Asked      Blood Transfusions Not Asked     Caffeine Concern Yes     Comment: large intake per day     Occupational Exposure Not Asked     Hobby Hazards Not Asked     Sleep Concern Not Asked     Stress Concern Not Asked     Weight Concern Not Asked     Special Diet Yes     Comment: low cholesterol      Back Care Not Asked     Exercise No     Bike Helmet Not Asked     Seat Belt Not Asked     Self-Exams Not Asked   Social History Narrative    Dairy/d 2-3 servings/d.     Caffeine 2-3 servings/d    Exercise 1-2 x week    Sunscreen used - NO, uses sunscreen in summer months    Seatbelts used - YES    Working smoke/CO detectors in the home - YES    Guns stored in the home - YES    Self Breast Exams - NA    Self Testicular Exam - YES    Eye Exam up to date - YES    Dental Exam up to date - YES    Pap Smear up to date - NA    Mammogram up to date - NA    PSA up to date - NA    Dexa Scan up to date - NA    Flex Sig / Colonoscopy up to date - NA    Immunizations up to date - YES    Abuse: Current or Past(Physical, Sexual or Emotional)- NO    Do you feel safe in your environment - YES    Claudia Salamanca, [facility]  1/29/2010             Social Determinants of Health     Financial Resource Strain: Not on file   Food Insecurity: Not on file   Transportation Needs: Not on file   Physical Activity: Not on file   Stress: Not on file   Social Connections: Not on file   Intimate Partner Violence: Not on file   Housing Stability: Not on file       No current outpatient medications on file.       Medications and history reviewed    Physical exam:  Vitals: BP (!) 141/108   Temp 98.8  F (37.1  C) (Oral)   Resp 20   SpO2 97%   BMI= There is no height or weight on file to calculate BMI.    Constitutional: Healthy, alert, non-distressed   Head: Normo-cephalic, atraumatic, no lesions, masses or tenderness   Cardiovascular: RRR, no new murmurs, +S1, +S2 heart sounds, no clicks, rubs or gallops   Respiratory: CTAB, no rales, rhonchi or wheezing, equal chest  rise, good respiratory effort   Gastrointestinal: Soft, non-tender, non distended, no rebound rigidity or guarding, no masses or hernias palpated   : Deferred  Musculoskeletal: Moves all extremities, normal  strength, no deformities noted   Skin: No suspicious lesions or rashes   Psychiatric: Mentation appears normal, affect appropriate   Hematologic/Lymphatic/Immunologic: Normal cervical and supraclavicular lymph nodes   Patient able to get up on table without difficulty.    Labs show:  No results found for this or any previous visit (from the past 24 hour(s)).    Assessment: Endoscopy  Plan: Pt cleared for anesthesia for proposed procedure.    Kenney Lee DO

## 2022-04-15 NOTE — LETTER
Linden Cruz  [address] [facility] [id number]    April 19, 2022      Dear Linden,  This letter is to inform you of the results of your pathology report from your colonoscopy.  Your pathology report was:  Final Diagnosis   A: Large intestine, descending, polyp, biopsy/polypectomy:  - Tubular adenoma negative for high-grade dysplasia.  B: Large intestine, sigmoid, polyp, biopsy/polypectomy:  - Benign mucosal lymphoid aggregate and features of hyperplastic polyp.   These are benign polyps. These types of polyps do carry a small risk of developing into a cancer over time if not removed. Yours were completely removed at the time of your colonoscopy. You should have another surveillance colonoscopy in 5 years.   If you have further questions please don t hesitate to call our clinic at [phone].   Sincerely,     Kenney Lee, DO

## 2022-04-15 NOTE — TELEPHONE ENCOUNTER
Pending Prescriptions:                       Disp   Refills    gabapentin (NEURONTIN) 300 MG capsule     30 cap*1            Sig: Take 1 capsule (300 mg) by mouth daily    Sent 12/18/2019 with 2 month supply. Refill not appropriate at this time.     Sherry Newton, MSN, RN     0

## 2022-04-18 LAB
PATH REPORT.COMMENTS IMP SPEC: NORMAL
PATH REPORT.COMMENTS IMP SPEC: NORMAL
PATH REPORT.FINAL DX SPEC: NORMAL
PATH REPORT.GROSS SPEC: NORMAL
PATH REPORT.MICROSCOPIC SPEC OTHER STN: NORMAL
PATH REPORT.RELEVANT HX SPEC: NORMAL
PHOTO IMAGE: NORMAL

## 2022-04-18 PROCEDURE — 88305 TISSUE EXAM BY PATHOLOGIST: CPT | Mod: 26 | Performed by: PATHOLOGY

## 2022-04-30 DIAGNOSIS — H90.6 MIXED CONDUCTIVE AND SENSORINEURAL HEARING LOSS OF BOTH EARS: ICD-10-CM

## 2022-04-30 DIAGNOSIS — H69.93 DYSFUNCTION OF BOTH EUSTACHIAN TUBES: ICD-10-CM

## 2022-05-03 RX ORDER — FLUTICASONE PROPIONATE 50 MCG
SPRAY, SUSPENSION (ML) NASAL
Qty: 16 ML | Refills: 3 | Status: SHIPPED | OUTPATIENT
Start: 2022-05-03 | End: 2024-09-23

## 2022-06-18 DIAGNOSIS — E03.8 SUBCLINICAL HYPOTHYROIDISM: ICD-10-CM

## 2022-06-20 DIAGNOSIS — E03.8 SUBCLINICAL HYPOTHYROIDISM: Primary | ICD-10-CM

## 2022-06-20 RX ORDER — LEVOTHYROXINE SODIUM 150 UG/1
TABLET ORAL
Qty: 30 TABLET | Refills: 0 | Status: SHIPPED | OUTPATIENT
Start: 2022-06-20 | End: 2022-07-18

## 2022-06-20 NOTE — TELEPHONE ENCOUNTER
Please schedule him for a TSH recheck before filling long-term.  Short-term refill until then.      Thank you,    Inocente Khan MD

## 2022-06-20 NOTE — TELEPHONE ENCOUNTER
"Pending Prescriptions:                       Disp   Refills    levothyroxine (SYNTHROID/LEVOTHROID) 150 M*90 tab*0        Sig: TAKE 1 TABLET BY MOUTH EVERY DAY    Routing refill request to provider for review/approval because:  Labs out of range:      Requested Prescriptions   Pending Prescriptions Disp Refills    levothyroxine (SYNTHROID/LEVOTHROID) 150 MCG tablet [Pharmacy Med Name: LEVOTHYROXINE 150 MCG TABLET] 90 tablet 0     Sig: TAKE 1 TABLET BY MOUTH EVERY DAY        Thyroid Protocol Failed - 6/18/2022  1:08 AM        Failed - Normal TSH on file in past 12 months     Recent Labs   Lab Test 03/10/22  0747   TSH 11.34*                Passed - Patient is 12 years or older        Passed - Recent (12 mo) or future (30 days) visit within the authorizing provider's specialty     Patient has had an office visit with the authorizing provider or a provider within the authorizing providers department within the previous 12 mos or has a future within next 30 days. See \"Patient Info\" tab in inbasket, or \"Choose Columns\" in Meds & Orders section of the refill encounter.              Passed - Medication is active on med list              "

## 2022-06-30 DIAGNOSIS — G43.109 MIGRAINE WITH AURA AND WITHOUT STATUS MIGRAINOSUS, NOT INTRACTABLE: ICD-10-CM

## 2022-06-30 RX ORDER — SUMATRIPTAN 100 MG/1
TABLET, FILM COATED ORAL
Qty: 9 TABLET | Refills: 2 | Status: SHIPPED | OUTPATIENT
Start: 2022-06-30 | End: 2023-03-30

## 2022-08-19 DIAGNOSIS — E03.8 SUBCLINICAL HYPOTHYROIDISM: ICD-10-CM

## 2022-08-19 RX ORDER — LEVOTHYROXINE SODIUM 150 UG/1
TABLET ORAL
Qty: 30 TABLET | Refills: 0 | Status: SHIPPED | OUTPATIENT
Start: 2022-08-19 | End: 2023-06-16

## 2022-08-19 NOTE — TELEPHONE ENCOUNTER
Routing refill request to provider for review/approval because:  Awilda given x1 and patient did not follow up, please advise  Labs out of range:    TSH   Date Value Ref Range Status   03/10/2022 11.34 (H) 0.40 - 4.00 mU/L Final   06/04/2021 11.03 (H) 0.40 - 4.00 mU/L Final

## 2022-08-21 DIAGNOSIS — E03.8 SUBCLINICAL HYPOTHYROIDISM: ICD-10-CM

## 2022-08-25 RX ORDER — LEVOTHYROXINE SODIUM 137 UG/1
TABLET ORAL
Qty: 90 TABLET | Refills: 1 | OUTPATIENT
Start: 2022-08-25

## 2022-10-20 ENCOUNTER — IMMUNIZATION (OUTPATIENT)
Dept: FAMILY MEDICINE | Facility: OTHER | Age: 52
End: 2022-10-20
Payer: COMMERCIAL

## 2022-10-20 DIAGNOSIS — Z23 ENCOUNTER FOR IMMUNIZATION: Primary | ICD-10-CM

## 2022-10-20 PROCEDURE — 90471 IMMUNIZATION ADMIN: CPT

## 2022-10-20 PROCEDURE — 91313 COVID-19,PF,MODERNA BIVALENT: CPT

## 2022-10-20 PROCEDURE — 99207 PR NO CHARGE NURSE ONLY: CPT

## 2022-10-20 PROCEDURE — 90682 RIV4 VACC RECOMBINANT DNA IM: CPT

## 2022-10-20 PROCEDURE — 0134A COVID-19,PF,MODERNA BIVALENT: CPT

## 2022-11-17 NOTE — PATIENT INSTRUCTIONS
The patient presents with a history of eustachian tube dysfunction, sinusitis and sensorineural hearing loss. He has been treated with Augmentin, Claritin and Flonase Nasal [place]. He has responded well to medical therapy. He will continue to use Flonase Nasal Spray and Claritin and he will be seen again in clinic when it is safe to do so. A CT scan of the sinuses will be ordered for prior to this visit once it is safe to do so.    0

## 2023-04-22 DIAGNOSIS — H90.6 MIXED CONDUCTIVE AND SENSORINEURAL HEARING LOSS OF BOTH EARS: ICD-10-CM

## 2023-04-22 DIAGNOSIS — H69.93 DYSFUNCTION OF BOTH EUSTACHIAN TUBES: ICD-10-CM

## 2023-04-25 RX ORDER — LORATADINE 10 MG/1
1 TABLET ORAL DAILY
Qty: 90 TABLET | Refills: 3 | OUTPATIENT
Start: 2023-04-25

## 2023-04-25 NOTE — TELEPHONE ENCOUNTER
"Pending Prescriptions:                       Disp   Refills    loratadine (CLARITIN) 10 MG tablet [Pharma*90 tab*3        Sig: Take 1 tablet (10 mg) by mouth daily    Routing refill request to provider for review/approval because:  Patient needs to be seen because it has been more than 1 year since last office visit.  Requested Prescriptions   Pending Prescriptions Disp Refills    loratadine (CLARITIN) 10 MG tablet [Pharmacy Med Name: LORATADINE 10 MG TABLET] 90 tablet 3     Sig: Take 1 tablet (10 mg) by mouth daily       Antihistamines Protocol Failed - 4/22/2023  1:21 AM        Failed - Recent (12 mo) or future (30 days) visit within the authorizing provider's specialty     Patient has had an office visit with the authorizing provider or a provider within the authorizing providers department within the previous 12 mos or has a future within next 30 days. See \"Patient Info\" tab in inbasket, or \"Choose Columns\" in Meds & Orders section of the refill encounter.              Passed - Patient is 3-64 years of age     Apply weight-based dosing for peds patients age 3 - 12 years of age.    Forward request to provider for patients under the age of 3 or over the age of 64.            Passed - Medication is active on med list                   "

## 2023-04-25 NOTE — TELEPHONE ENCOUNTER
Called patient and scheduled on 6/16.  Can provider fill prescription to appointment date please.

## 2023-04-26 RX ORDER — LORATADINE 10 MG/1
1 TABLET ORAL DAILY
Qty: 90 TABLET | Refills: 0 | Status: SHIPPED | OUTPATIENT
Start: 2023-04-26 | End: 2023-06-16

## 2023-05-30 DIAGNOSIS — K21.9 GASTROESOPHAGEAL REFLUX DISEASE WITHOUT ESOPHAGITIS: ICD-10-CM

## 2023-05-31 RX ORDER — OMEPRAZOLE 40 MG/1
CAPSULE, DELAYED RELEASE ORAL
Qty: 90 CAPSULE | Refills: 0 | Status: SHIPPED | OUTPATIENT
Start: 2023-05-31 | End: 2023-11-06

## 2023-05-31 NOTE — TELEPHONE ENCOUNTER
Pending Prescriptions:                       Disp   Refills    omeprazole (PRILOSEC) 40 MG DR capsule [Ph*90 cap*3        Sig: TAKE 1 CAPSULE BY MOUTH EVERY DAY    Routing refill request to provider for review/approval because:  Prescription is over 13 months old.     omeprazole (PRILOSEC) 40 MG DR capsule 90 capsule 3 3/7/2022  No   Sig: TAKE 1 CAPSULE BY MOUTH EVERY DAY   Sent to pharmacy as: Omeprazole 40 MG Oral Capsule Delayed Release (priLOSEC)   Class: E-Prescribe   Order: [id number]   E-Prescribing Status: Receipt confirmed by pharmacy (3/7/2022  8:03 AM CST)     Anne Tineo RN on 5/31/2023 at 1:44 PM

## 2023-06-01 NOTE — MR AVS SNAPSHOT
After Visit Summary   6/7/2018    Linden Cruz    MRN: [id number]           Patient Information     Date Of Birth          1970        Visit Information        Provider Department      6/7/2018 8:00 AM Ai Valle AuD [facility]        Today's Diagnoses     Conductive hearing loss of right ear with restricted hearing of left ear    -  1    Mixed conductive and sensorineural hearing loss of left ear with restricted hearing of right ear           Follow-ups after your visit        Your next 10 appointments already scheduled     Nov 19, 2018  8:00 AM CST   [facility] Visit with Piyush Hernandez   [facility]    [address] [place] [address] [id number] [phone]            Nov 19, 2018  8:30 AM CST   Return Visit with Savage Mejia MD   [facility] ([address] [phone]              Who to contact     If you have questions or need follow up information about today's clinic visit or your schedule please contact [facility] directly at [phone].  Normal or non-critical lab and imaging results will be communicated to you by [employer], letter or phone within 4 business days after the clinic has received the results. If you do not hear from us within 7 days, please contact the clinic through [employer] or phone. If you have a critical or abnormal lab result, we will notify you by phone as soon as possible.  Submit refill requests through [employer] or call your pharmacy and they will forward the refill request to us. Please allow 3 business days for your refill to be completed.          Additional Information About Your Visit        MyChart Information     [employer] gives you secure access to your electronic health record. If you see a primary care provider, you can also send messages to your care team and make  appointments. If you have questions, please call your primary care clinic.  If you do not have a primary care provider, please call [phone] and they will assist you.        Care EveryWhere ID     This is your Care EveryWhere ID. This could be used by other organizations to access your [facility] medical records  [id number]         Blood Pressure from Last 3 Encounters:   10/20/17 (!) 134/92   10/13/17 136/80   01/04/17 128/82    Weight from Last 3 Encounters:   02/26/18 207 lb (93.9 kg)   02/19/18 206 lb (93.4 kg)   01/08/18 209 lb (94.8 kg)              We Performed the Following     AUDIOGRAM/TYMPANOGRAM - INTERFACE     COMPREHENSIVE HEARING TEST     TYMPANOMETRY        Primary Care Provider Office Phone # Fax #    Nik Nina PA-C [phone]       [address]  [facility] [address]        Equal Access to Services     AFSANEH CRUZ : Hadii aad ku hadasho Somarloali, waaxda luqadaha, qaybta kaalmada adeegyada, waxay nicolein haypinkyn richard shaw . So [facility] [phone].    ATENCIÓN: Si habla español, tiene a krishnamurthy disposición servicios gratuitos de asistencia lingüística. Llame al [phone].    We comply with applicable federal civil rights laws and [place] laws. We do not discriminate on the basis of race, color, national origin, age, disability, sex, sexual orientation, or gender identity.            Thank you!     Thank you for choosing [facility]  for your care. Our goal is always to provide you with excellent care. Hearing back from our patients is one way we can continue to improve our services. Please take a few minutes to complete the written survey that you may receive in the mail after your visit with us. Thank you!             Your Updated Medication List - Protect others around you: Learn how to safely use, store and throw away your medicines at www.disposemymeds.org.          This list is accurate as of 6/7/18  8:38 AM.  Always use your most  recent med list.                   Brand Name Dispense Instructions for use Diagnosis    ADVIL PO           fluticasone 50 MCG/ACT spray    FLONASE    16 g    Spray 1-2 sprays into both nostrils daily    Left otitis media with spontaneous rupture of eardrum, Chronic rhinitis       gemfibrozil 600 MG tablet    LOPID    180 tablet    TAKE 1 TABLET (600 MG) BY MOUTH 2 TIMES DAILY    Hypertriglyceridemia       MULTI VITAMIN MENS PO      Take  by mouth.        omeprazole 40 MG capsule    priLOSEC    90 capsule    TAKE ONE CAPSULE BY MOUTH EVERY DAY    Gastroesophageal reflux disease without esophagitis       * oxyCODONE-acetaminophen 5-325 MG per tablet    PERCOCET    20 tablet    Take 1-2 tablets by mouth every 6 hours as needed for moderate to severe pain    Closed fracture of left scapula, unspecified part of scapula, initial encounter       * oxyCODONE-acetaminophen 5-325 MG per tablet    PERCOCET    50 tablet    Take 1-2 tablets by mouth nightly as needed for moderate to severe pain    Closed fracture of left scapula, unspecified part of scapula, initial encounter       SUMAtriptan 100 MG tablet    IMITREX    18 tablet    TAKE 1 TAB BY MOUTH WITH ONSET OF MIGRAINE, MAY REPEAT ONCE AFTER 2 HOURS. MAX 2 TABS/24 HOURS.    Migraine with aura and without status migrainosus, not intractable       SYNTHROID 125 MCG tablet   Generic drug:  levothyroxine     90 tablet    TAKE 1 TABLET BY MOUTH ONCE DAILY    Subclinical hypothyroidism       tadalafil 20 MG tablet    CIALIS    8 tablet    Take 0.5-1 tablets as needed for erectile dysfunction. Never use with nitroglycerin, terazosin or doxazosin.    Erectile dysfunction, unspecified erectile dysfunction type       topiramate 50 MG tablet    TOPAMAX    60 tablet    TAKE 1 TABLET BY MOUTH TWICE A DAY    Migraine with aura and without status migrainosus, not intractable       * Notice:  This list has 2 medication(s) that are the same as other medications prescribed for you. Read  the directions carefully, and ask your doctor or other care provider to review them with you.       1 = Total assistance

## 2023-06-14 DIAGNOSIS — E78.1 HYPERTRIGLYCERIDEMIA: ICD-10-CM

## 2023-06-14 RX ORDER — GEMFIBROZIL 600 MG/1
TABLET, FILM COATED ORAL
Qty: 180 TABLET | Refills: 1 | OUTPATIENT
Start: 2023-06-14

## 2023-06-14 NOTE — TELEPHONE ENCOUNTER
"Pending Prescriptions:                       Disp   Refills    gemfibrozil (LOPID) 600 MG tablet [Pharmac*180 ta*1        Sig: TAKE 1 TABLET BY MOUTH TWICE A DAY    Routing refill request to provider for review/approval because:  Labs not current:  lipids    Requested Prescriptions   Pending Prescriptions Disp Refills    gemfibrozil (LOPID) 600 MG tablet [Pharmacy Med Name: GEMFIBROZIL 600 MG TABLET] 180 tablet 1     Sig: TAKE 1 TABLET BY MOUTH TWICE A DAY       Fibrates Failed - 6/14/2023 10:15 AM        Failed - Lipid panel on file in past 12 months     Recent Labs   Lab Test 03/10/22  0747 03/10/16  0907 10/15/15  0833   CHOL 230*   < > 297*   TRIG 264*   < > 1,076*   HDL 38*   < > 28*   *   < > Cannot estimate LDL when triglyceride exceeds 400 mg/dL  93   NHDL 192*   < >  --    VLDL  --   --  Cannot estimate VLDL when triglyceride exceeds 400 mg/dL.   CHOLHDLRATIO  --   --  10.6*    < > = values in this interval not displayed.               Passed - No abnormal creatine kinase in past 12 months     No lab results found.             Passed - Recent (12 mo) or future (30 days) visit within the authorizing provider's specialty     Patient has had an office visit with the authorizing provider or a provider within the authorizing providers department within the previous 12 mos or has a future within next 30 days. See \"Patient Info\" tab in inbasket, or \"Choose Columns\" in Meds & Orders section of the refill encounter.              Passed - Medication is active on med list        Passed - Patient is age 18 or older             "

## 2023-06-16 ENCOUNTER — OFFICE VISIT (OUTPATIENT)
Dept: FAMILY MEDICINE | Facility: OTHER | Age: 53
End: 2023-06-16
Payer: COMMERCIAL

## 2023-06-16 VITALS
HEIGHT: 71 IN | RESPIRATION RATE: 20 BRPM | SYSTOLIC BLOOD PRESSURE: 114 MMHG | HEART RATE: 82 BPM | DIASTOLIC BLOOD PRESSURE: 76 MMHG | OXYGEN SATURATION: 96 % | TEMPERATURE: 97.7 F | BODY MASS INDEX: 32.76 KG/M2 | WEIGHT: 234 LBS

## 2023-06-16 DIAGNOSIS — H90.6 MIXED CONDUCTIVE AND SENSORINEURAL HEARING LOSS OF BOTH EARS: ICD-10-CM

## 2023-06-16 DIAGNOSIS — R07.89 CHEST PRESSURE: ICD-10-CM

## 2023-06-16 DIAGNOSIS — G43.109 MIGRAINE WITH AURA AND WITHOUT STATUS MIGRAINOSUS, NOT INTRACTABLE: ICD-10-CM

## 2023-06-16 DIAGNOSIS — M51.369 DDD (DEGENERATIVE DISC DISEASE), LUMBAR: ICD-10-CM

## 2023-06-16 DIAGNOSIS — Z00.00 ENCOUNTER FOR ROUTINE ADULT HEALTH EXAMINATION WITHOUT ABNORMAL FINDINGS: Primary | ICD-10-CM

## 2023-06-16 DIAGNOSIS — E78.1 HYPERTRIGLYCERIDEMIA: ICD-10-CM

## 2023-06-16 DIAGNOSIS — H69.93 DYSFUNCTION OF BOTH EUSTACHIAN TUBES: ICD-10-CM

## 2023-06-16 DIAGNOSIS — E03.8 SUBCLINICAL HYPOTHYROIDISM: ICD-10-CM

## 2023-06-16 DIAGNOSIS — M54.16 LUMBAR RADICULOPATHY: ICD-10-CM

## 2023-06-16 DIAGNOSIS — Z13.1 SCREENING FOR DIABETES MELLITUS: ICD-10-CM

## 2023-06-16 DIAGNOSIS — E78.5 HYPERLIPIDEMIA LDL GOAL <130: ICD-10-CM

## 2023-06-16 PROCEDURE — 99214 OFFICE O/P EST MOD 30 MIN: CPT | Mod: 25 | Performed by: PHYSICIAN ASSISTANT

## 2023-06-16 PROCEDURE — 93000 ELECTROCARDIOGRAM COMPLETE: CPT | Performed by: PHYSICIAN ASSISTANT

## 2023-06-16 PROCEDURE — 99396 PREV VISIT EST AGE 40-64: CPT | Performed by: PHYSICIAN ASSISTANT

## 2023-06-16 RX ORDER — GEMFIBROZIL 600 MG/1
TABLET, FILM COATED ORAL
Qty: 180 TABLET | Refills: 3 | Status: SHIPPED | OUTPATIENT
Start: 2023-06-16 | End: 2024-06-18

## 2023-06-16 RX ORDER — LEVOTHYROXINE SODIUM 150 UG/1
150 TABLET ORAL DAILY
Qty: 90 TABLET | Refills: 3 | Status: SHIPPED | OUTPATIENT
Start: 2023-06-16 | End: 2024-06-18

## 2023-06-16 RX ORDER — LORATADINE 10 MG/1
1 TABLET ORAL DAILY
Qty: 90 TABLET | Refills: 3 | Status: SHIPPED | OUTPATIENT
Start: 2023-06-16 | End: 2024-08-14

## 2023-06-16 RX ORDER — TOPIRAMATE 50 MG/1
50 TABLET, FILM COATED ORAL DAILY
Qty: 90 TABLET | Refills: 3 | Status: SHIPPED | OUTPATIENT
Start: 2023-06-16 | End: 2024-06-18

## 2023-06-16 RX ORDER — SUMATRIPTAN 100 MG/1
100 TABLET, FILM COATED ORAL
Qty: 9 TABLET | Refills: 1 | Status: SHIPPED | OUTPATIENT
Start: 2023-06-16 | End: 2023-07-28

## 2023-06-16 ASSESSMENT — ENCOUNTER SYMPTOMS
COUGH: 0
DIZZINESS: 0
PARESTHESIAS: 0
DYSURIA: 0
MYALGIAS: 0
DIARRHEA: 0
HEMATOCHEZIA: 0
HEARTBURN: 0
SHORTNESS OF BREATH: 0
NAUSEA: 0
CHILLS: 0
HEMATURIA: 0
PALPITATIONS: 0
FEVER: 0
CONSTIPATION: 0
WEAKNESS: 0
SORE THROAT: 0
HEADACHES: 1
NERVOUS/ANXIOUS: 0
JOINT SWELLING: 0
ABDOMINAL PAIN: 0
EYE PAIN: 0
ARTHRALGIAS: 0
FREQUENCY: 0

## 2023-06-16 ASSESSMENT — PAIN SCALES - GENERAL: PAINLEVEL: MILD PAIN (2)

## 2023-06-16 NOTE — PATIENT INSTRUCTIONS
Preventive Health Recommendations  Male Ages 50 - 64    Yearly exam:             See your health care provider every year in order to  o   Review health changes.   o   Discuss preventive care.    o   Review your medicines if your doctor has prescribed any.   Have a cholesterol test every 5 years, or more frequently if you are at risk for high cholesterol/heart disease.   Have a diabetes test (fasting glucose) every three years. If you are at risk for diabetes, you should have this test more often.   Have a colonoscopy at age 50, or have a yearly FIT test (stool test). These exams will check for colon cancer.    Talk with your health care provider about whether or not a prostate cancer screening test (PSA) is right for you.  You should be tested each year for STDs (sexually transmitted diseases), if you re at risk.     Shots: Get a flu shot each year. Get a tetanus shot every 10 years.     Nutrition:  Eat at least 5 servings of fruits and vegetables daily.   Eat whole-grain bread, whole-wheat pasta and brown rice instead of white grains and rice.   Get adequate Calcium and Vitamin D.     Lifestyle  Exercise for at least 150 minutes a week (30 minutes a day, 5 days a week). This will help you control your weight and prevent disease.   Limit alcohol to one drink per day.   No smoking.   Wear sunscreen to prevent skin cancer.   See your dentist every six months for an exam and cleaning.   See your eye doctor every 1 to 2 years.    Consider Voltaren gel for the pain.    Will have you see a spine specialist.    If the chest tightness worsens, let me know.    Come in for labs next month.

## 2023-06-16 NOTE — PROGRESS NOTES
SUBJECTIVE:   CC: Zachary is an 52 year old who presents for preventative health visit.       6/16/2023     4:02 PM   Additional Questions   Roomed by Mariam VILLAR   Accompanied by self         6/16/2023     4:02 PM   Patient Reported Additional Medications   Patient reports taking the following new medications NA     Healthy Habits:     Getting at least 3 servings of Calcium per day:  Yes    Bi-annual eye exam:  Yes    Dental care twice a year:  Yes    Sleep apnea or symptoms of sleep apnea:  Excessive snoring    Diet:  Low fat/cholesterol    Frequency of exercise:  None    Duration of exercise:  N/A    Taking medications regularly:  Yes    Barriers to taking medications:  None    Medication side effects:  None    PHQ-2 Total Score: 0    Additional concerns today:  No    He has a history of intermittent chest pain/tightness that was evaluated in 2015 with a stress test and coronary angiogram without any overly concerning findings. Over the past 6 months, he has had similar symptoms with left sided chest tightness and occasional pain in the left shoulder with intermittent tingling down his left arm to the hand. The arm pain and tingling is not always associated with the chest tightness but it can be. The chest tightness can sometimes last a few hours and is very rarely associated with activity, usually at rest. He denies any associated shortness of breath, pain into his neck nausea, or sweating. It has not worsened or changed. He denies any neck pain.    He continues to deal with chronic low back pain with pain into both arms and persistent numbness in his anterior thighs bilaterally. His most recent lumbar injection last year was not very helpful. He is not really interested in surgery but would like to speak with a specialist about other treatment options. He has tried physical therapy without much benefit. He has also tried gabapentin without relief.     Hyperlipidemia Follow-Up      Are you regularly taking any  medication or supplement to lower your cholesterol?   Yes- Lopid    Are you having muscle aches or other side effects that you think could be caused by your cholesterol lowering medication?  No    Hypothyroidism Follow-up      Since last visit, patient describes the following symptoms: Weight stable, no hair loss, no skin changes, no constipation, no loose stools      Today's PHQ-2 Score:       2023     4:01 PM   PHQ-2 (  [employer])   Q1: Little interest or pleasure in doing things 0   Q2: Feeling down, depressed or hopeless 0   PHQ-2 Score 0   Q1: Little interest or pleasure in doing things Not at all   Q2: Feeling down, depressed or hopeless Not at all   PHQ-2 Score 0           Social History     Tobacco Use     Smoking status: Former     Types: Cigarettes     Quit date: 2000     Years since quittin.4     Smokeless tobacco: Never   Vaping Use     Vaping status: Never Used   Substance Use Topics     Alcohol use: Yes     Alcohol/week: 0.0 standard drinks of alcohol     Comment: 1/month           2023     4:01 PM   Alcohol Use   Prescreen: >3 drinks/day or >7 drinks/week? No       Last PSA:   PSA   Date Value Ref Range Status   03/10/2016 0.71 0 - 4 ug/L Final     Prostate Specific Antigen Screen   Date Value Ref Range Status   03/10/2022 0.71 0.00 - 4.00 ug/L Final       Reviewed orders with patient. Reviewed health maintenance and updated orders accordingly - Yes    Reviewed and updated as needed this visit by clinical staff   Tobacco  Allergies  Meds  Problems  Med Hx  Surg Hx  Fam Hx          Reviewed and updated as needed this visit by Provider   Tobacco  Allergies  Meds  Problems  Med Hx  Surg Hx  Fam Hx           Review of Systems   Constitutional: Negative for chills and fever.   HENT: Negative for congestion, ear pain, hearing loss and sore throat.    Eyes: Negative for pain and visual disturbance.   Respiratory: Negative for cough and shortness of breath.    Cardiovascular:  "Negative for chest pain, palpitations and peripheral edema.   Gastrointestinal: Negative for abdominal pain, constipation, diarrhea, heartburn, hematochezia and nausea.   Genitourinary: Negative for dysuria, frequency, genital sores, hematuria and urgency.   Musculoskeletal: Negative for arthralgias, joint swelling and myalgias.   Skin: Negative for rash.   Neurological: Positive for headaches. Negative for dizziness, weakness and paresthesias.   Psychiatric/Behavioral: Negative for mood changes. The patient is not nervous/anxious.        OBJECTIVE:   /76   Pulse 82   Temp 97.7  F (36.5  C) (Temporal)   Resp 20   Ht 1.805 m (5' 11.06\")   Wt 106.1 kg (234 lb)   SpO2 96%   BMI 32.58 kg/m      Physical Exam  GENERAL: healthy, alert and no distress  EYES: Eyes grossly normal to inspection, PERRL and conjunctivae and sclerae normal  HENT: ear canals clear and TMs with some tympanosclerosis, nose and mouth without ulcers or lesions  NECK: no adenopathy, no asymmetry, masses, or scars and thyroid normal to palpation  RESP: lungs clear to auscultation - no rales, rhonchi or wheezes  CV: regular rate and rhythm, normal S1 S2, no S3 or S4, no murmur, click or rub, no peripheral edema and peripheral pulses strong  ABDOMEN: soft, nontender, no hepatosplenomegaly, no masses and bowel sounds normal   (male): normal male circumcised genitalia without lesions or urethral discharge, no hernia  MS: no gross musculoskeletal defects noted, no edema. FROM to all extremities. No spinal tenderness.   SKIN: no suspicious lesions or rashes  NEURO: Normal strength and tone, mentation intact and speech normal. Cranial nerves II-XII are grossly intact. DTRs are 2+/4 throughout and symmetric. Gait is stable.  PSYCH: mentation appears normal, affect normal/bright    EKG: Normal sinus rhythm with rate variation, rate 74, left axis. No ST/T wave changes concerning for ischemia. Unchanged from previous.    ASSESSMENT/PLAN:       " ICD-10-CM    1. Encounter for routine adult health examination without abnormal findings  Z00.00       2. Migraine with aura and without status migrainosus, not intractable  G43.109 topiramate (TOPAMAX) 50 MG tablet     SUMAtriptan (IMITREX) 100 MG tablet      3. Subclinical hypothyroidism  E03.8 levothyroxine (SYNTHROID/LEVOTHROID) 150 MCG tablet     TSH with free T4 reflex      4. Hypertriglyceridemia  E78.1 Lipid panel reflex to direct LDL Non-fasting     gemfibrozil (LOPID) 600 MG tablet      5. Chest pressure  R07.89 EKG 12-lead complete w/read - Clinics      6. DDD (degenerative disc disease), lumbar  M51.36 Spine  Referral      7. Lumbar radiculopathy  M54.16 Spine  Referral      8. Hyperlipidemia LDL goal <130  E78.5       9. Dysfunction of both eustachian tubes  H69.83 loratadine (CLARITIN) 10 MG tablet      10. Mixed conductive and sensorineural hearing loss of both ears  H90.6 loratadine (CLARITIN) 10 MG tablet      11. Screening for diabetes mellitus  Z13.1 Basic metabolic panel  (Ca, Cl, CO2, Creat, Gluc, K, Na, BUN)          2. He ran out of Topamax recently so headaches have been worse. Will refill this along with Imitrex to take as directed.    3. Will refill levothyroxine as he has also been out of this for 2 weeks. Will recheck labs in 1 month.    4, 8. Continue gemfibrozil. He will come in for fasting labs in 1 month.    5. Intermittent chest pressure more often over the past 6 months, similar to his symptoms in 2015. He had a normal work up at that time without any significant coronary artery disease. His EKG today is stable without any new or concerning findings. We discussed further testing with a stress echo but he prefers continued monitoring which I feel is a reasonable option. If symptoms increase in intensity or frequently, he will contact the clinic and will order a stress echo.    6-7. Continued low back pain with bilateral radicular symptoms. He has some degenerative  "changes throughout the lumbar spine along with some neuroforaminal narrowing noted on MRI last year and has failed conservative treatments so will refer to spine specialty for further evaluation.     9-10. Continue Claritin.    11. Updated fasting BMP to be completed next month.    Follow-up annually, sooner if needed.      Patient has been advised of split billing requirements and indicates understanding: Yes      COUNSELING:   Reviewed preventive health counseling, as reflected in patient instructions       Regular exercise       Healthy diet/nutrition      BMI:   Estimated body mass index is 32.58 kg/m  as calculated from the following:    Height as of this encounter: 1.805 m (5' 11.06\").    Weight as of this encounter: 106.1 kg (234 lb).   Weight management plan: Discussed healthy diet and exercise guidelines      He reports that he quit smoking about 23 years ago. His smoking use included cigarettes. He has never used smokeless tobacco.    Nik Nina PA-C  [facility]  "

## 2023-06-23 ENCOUNTER — MYC MEDICAL ADVICE (OUTPATIENT)
Dept: FAMILY MEDICINE | Facility: OTHER | Age: 53
End: 2023-06-23
Payer: COMMERCIAL

## 2023-06-23 DIAGNOSIS — N52.9 ERECTILE DYSFUNCTION, UNSPECIFIED ERECTILE DYSFUNCTION TYPE: Primary | ICD-10-CM

## 2023-06-26 RX ORDER — SILDENAFIL 50 MG/1
TABLET, FILM COATED ORAL
Qty: 12 TABLET | Refills: 5 | Status: SHIPPED | OUTPATIENT
Start: 2023-06-26 | End: 2024-08-14

## 2023-07-24 ENCOUNTER — LAB (OUTPATIENT)
Dept: LAB | Facility: OTHER | Age: 53
End: 2023-07-24
Payer: COMMERCIAL

## 2023-07-24 DIAGNOSIS — Z13.1 SCREENING FOR DIABETES MELLITUS: ICD-10-CM

## 2023-07-24 DIAGNOSIS — E78.1 HYPERTRIGLYCERIDEMIA: ICD-10-CM

## 2023-07-24 DIAGNOSIS — E03.8 SUBCLINICAL HYPOTHYROIDISM: ICD-10-CM

## 2023-07-24 LAB
ANION GAP SERPL CALCULATED.3IONS-SCNC: 10 MMOL/L (ref 7–15)
BUN SERPL-MCNC: 13.9 MG/DL (ref 6–20)
CALCIUM SERPL-MCNC: 8.9 MG/DL (ref 8.6–10)
CHLORIDE SERPL-SCNC: 108 MMOL/L (ref 98–107)
CHOLEST SERPL-MCNC: 185 MG/DL
CREAT SERPL-MCNC: 1.09 MG/DL (ref 0.67–1.17)
DEPRECATED HCO3 PLAS-SCNC: 23 MMOL/L (ref 22–29)
GFR SERPL CREATININE-BSD FRML MDRD: 82 ML/MIN/1.73M2
GLUCOSE SERPL-MCNC: 97 MG/DL (ref 70–99)
HDLC SERPL-MCNC: 33 MG/DL
LDLC SERPL CALC-MCNC: 121 MG/DL
NONHDLC SERPL-MCNC: 152 MG/DL
POTASSIUM SERPL-SCNC: 3.9 MMOL/L (ref 3.4–5.3)
SODIUM SERPL-SCNC: 141 MMOL/L (ref 136–145)
TRIGL SERPL-MCNC: 155 MG/DL
TSH SERPL DL<=0.005 MIU/L-ACNC: 0.52 UIU/ML (ref 0.3–4.2)

## 2023-07-24 PROCEDURE — 80048 BASIC METABOLIC PNL TOTAL CA: CPT

## 2023-07-24 PROCEDURE — 36415 COLL VENOUS BLD VENIPUNCTURE: CPT

## 2023-07-24 PROCEDURE — 84443 ASSAY THYROID STIM HORMONE: CPT

## 2023-07-24 PROCEDURE — 80061 LIPID PANEL: CPT

## 2023-07-28 DIAGNOSIS — G43.109 MIGRAINE WITH AURA AND WITHOUT STATUS MIGRAINOSUS, NOT INTRACTABLE: ICD-10-CM

## 2023-07-28 RX ORDER — SUMATRIPTAN 100 MG/1
100 TABLET, FILM COATED ORAL
Qty: 9 TABLET | Refills: 1 | Status: SHIPPED | OUTPATIENT
Start: 2023-07-28 | End: 2024-02-01

## 2023-10-09 ENCOUNTER — TELEPHONE (OUTPATIENT)
Dept: FAMILY MEDICINE | Facility: OTHER | Age: 53
End: 2023-10-09

## 2023-10-09 ENCOUNTER — OFFICE VISIT (OUTPATIENT)
Dept: FAMILY MEDICINE | Facility: CLINIC | Age: 53
End: 2023-10-09
Payer: COMMERCIAL

## 2023-10-09 VITALS
WEIGHT: 235.6 LBS | SYSTOLIC BLOOD PRESSURE: 126 MMHG | HEIGHT: 71 IN | RESPIRATION RATE: 15 BRPM | HEART RATE: 98 BPM | BODY MASS INDEX: 32.98 KG/M2 | TEMPERATURE: 98.9 F | OXYGEN SATURATION: 97 % | DIASTOLIC BLOOD PRESSURE: 80 MMHG

## 2023-10-09 DIAGNOSIS — R07.89 ATYPICAL CHEST PAIN: Primary | ICD-10-CM

## 2023-10-09 DIAGNOSIS — E66.811 CLASS 1 OBESITY DUE TO EXCESS CALORIES WITH SERIOUS COMORBIDITY IN ADULT, UNSPECIFIED BMI: ICD-10-CM

## 2023-10-09 DIAGNOSIS — E66.09 CLASS 1 OBESITY DUE TO EXCESS CALORIES WITH SERIOUS COMORBIDITY IN ADULT, UNSPECIFIED BMI: ICD-10-CM

## 2023-10-09 DIAGNOSIS — E78.5 HYPERLIPIDEMIA LDL GOAL <130: ICD-10-CM

## 2023-10-09 DIAGNOSIS — R06.83 SNORING: ICD-10-CM

## 2023-10-09 PROCEDURE — 99214 OFFICE O/P EST MOD 30 MIN: CPT | Mod: 25 | Performed by: NURSE PRACTITIONER

## 2023-10-09 PROCEDURE — 36415 COLL VENOUS BLD VENIPUNCTURE: CPT | Performed by: NURSE PRACTITIONER

## 2023-10-09 PROCEDURE — 80048 BASIC METABOLIC PNL TOTAL CA: CPT | Performed by: NURSE PRACTITIONER

## 2023-10-09 PROCEDURE — 93000 ELECTROCARDIOGRAM COMPLETE: CPT | Performed by: NURSE PRACTITIONER

## 2023-10-09 PROCEDURE — 85379 FIBRIN DEGRADATION QUANT: CPT | Performed by: NURSE PRACTITIONER

## 2023-10-09 ASSESSMENT — PAIN SCALES - GENERAL: PAINLEVEL: NO PAIN (0)

## 2023-10-09 NOTE — PROGRESS NOTES
"      Subjective   Zachary is a 53 year old, presenting for the following health issues:  Hypertension      HPI:   Zachary is a 53 year old man with a history significant for hyperlipidemia, DANIEL, reflux, and migraines with aura. He is being seen in clinic today following an episode of chest pressure and hypertension on 3 days ago.  He reports that mid morning he began having left sided chest press that was worse with deep breathing. He reports that this pressure did not radiate, and that he was not having palpitations, lightheadedness, dizziness, he was not diaphoretic, or experience nausea symptoms.     Symptoms did not worsen with activity, but he did report associated head pressure when he would bend over. This head pressure resolved upon uprighting. This prompted him to check his blood pressure which was noted to be 150/100 at that time. He reports that symptoms did not progress, we're not exertional, and he continued to check his blood pressure intermittently over the next couple of hours, and it eventually returned to baseline. The entire episode lasted about 2 hours. He hasn't had any further episodes since that time.       He is currently on gemfibrozil for his elevated triglycerides. He is not on any antihypertensives, and has never been diagnosed with hypertension. Blood pressure this visit is normal at 126/80. No chest pain or pressure during this visit.       10/9/2023     4:01 PM   Additional Questions   Roomed by Telma PATEL   Accompanied by None         10/9/2023     4:01 PM   Patient Reported Additional Medications   Patient reports taking the following new medications NA        Pt reports bending over while doing yard work and experienced pressure in head. When pt stood, reports dizziness. Pt reports chest pain with deep breaths as if \"something was out of place\". Pt reports BP of 150/100, took BP 1 hour later 130/97. Reports average BP after event of 115-130/80-90. Reports continued pressure in head and " "feeling as if his head is \"going to pop\"     History of Present Illness       Reason for visit:  Chest pain and high blood pressure  Symptom onset:  1-3 days ago  Symptoms include:  High blood pressure, head pressure, chest pain  Symptom intensity:  Moderate  Symptom progression:  Improving  Had these symptoms before:  No  What makes it worse:  Exertion  What makes it better:  Relaxing    He eats 2-3 servings of fruits and vegetables daily.He consumes 2 sweetened beverage(s) daily.He exercises with enough effort to increase his heart rate 10 to 19 minutes per day.  He exercises with enough effort to increase his heart rate 3 or less days per week.   He is taking medications regularly.       FAMILY HISTORY:  Mom is alive. Has history significant for hyperlipidemia and CVD.  Dad is alive and well.      SOCIAL:  Zachary lives at home with his wife and two children ages 11 and 13. 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 He is a former smoker, and does not drink alcohol. He does not have a regular exercise routine, but states he walks 25-30 minutes twice a week. He eats out at sandwich shops especially when traveling for work. He says that his stress level is manageable, but that his sleep is affected by his snoring.     MEDICATIONS:   Sumatriptan 100mg  at onset of migraine  Gemfibrozil 600mg twice daily  Levothyroxine 150mcg daily  Multivitamin 1 tab daily  Omeprazole 40mg daily  Topiramate 50mg daily  Flonase 50mcg  Sidenafil 50mg       ALLERGIES:   NKDA      Review of Systems   CONSTITUTIONAL:NEGATIVE for fever, or chills  EYES: NEGATIVE for vision changes. POSITIVE for intermittent redness.  ENT/MOUTH: POSITIVE for fluid in the ears (chronic). NEGATIVE for pain, pressure, or drainage from ears, or painful/difficult swallowing.  RESP:NEGATIVE for significant cough or SOB  CV: NEGATIVE for chest pain, palpitations or peripheral edema  GI: NEGATIVE for nausea, abdominal pain, heartburn, or " "change in bowel habits  NEURO: NEGATIVE for weakness, dizziness or paresthesias      Objective    /80 (BP Location: Left arm, Patient Position: Chair, Cuff Size: Adult Regular)   Pulse 98   Temp 98.9  F (37.2  C) (Temporal)   Resp 15   Ht 1.795 m (5' 10.67\")   Wt 106.9 kg (235 lb 9.6 oz)   SpO2 97%   BMI 33.17 kg/m    Body mass index is 33.17 kg/m .    Physical Exam   GENERAL: Well developed middle aged male, alert and in no acute distress  EYES: Eyes grossly normal to inspection, PERRL and conjunctivae and sclerae normal  HENT: Ear canals clear and TM's normal, some fluid noted behind TM's. Nose and mouth without ulcers or lesions  RESP: Lungs clear to auscultation bilaterally - no rales, rhonchi or wheezes  CV: Regular rate and rhythm, normal S1 S2, no S3 or S4, no murmur, click or rub, no peripheral edema and peripheral pulses strong. No carotid bruits auscultated.  NEURO: Normal strength and tone, mentation intact and speech normal.      (R07.89) Atypical chest pain  (primary encounter diagnosis)  Plan: EKG 12-lead complete w/read - Clinics, Basic         metabolic panel, D dimer, quantitative, Adult         Cardiology Eval  Referral        Discussed concerning signs and symptoms to monitor for such as further chest pain/pressure, pain that radiates into arms or up into the neck, palpitations, shortness of breath, lightheadedness or dizziness, diaphoresis, or nausea. If symptoms persist greater than 5 minutes patient instructed to be evaluated in the ED. EKG unchanged from June.       (E78.5) Hyperlipidemia LDL goal <130  Plan: Adult Cardiology Eval  Referral        Continue gemfibrozil. Consider addition of a statin to medication regimen. Advise [facility] for looking at familial lipids, and future implications for Zachary, discussed primary prevention. Pt. Advised for heart CT scan- patient to schedule for cost.     (E66.09) Class 1 obesity due to excess calories with serious " comorbidity in adult, unspecified BMI  Plan: Adult Cardiology Eval  Referral, Adult        Sleep Eval & Management  Referral         Discussed the importance of nutrition and exercise on reducing weight and BMI, and subsequently lowering his risk of cardiac events.          Encouraged moderate activity such as walking after meals several times a week, even when traveling for work.         Encouraged better eating habits, incorporating more nutritious foods into his diet, and cutting back on eating out.          Offered referral to a dietician. Patient had declined that at this time.     (R06.83) Snoring  Plan: Adult Sleep Eval & Management          Referral        Discussed with the patient that weight loss may assist with alleviating his snoring while sleeping. Discussed same points as above regarding obesity.        Patient was seen, examined and note reviewed by Katherin Pan DNP.   Emerson Nina RN, DNP student  [employer]

## 2023-10-09 NOTE — TELEPHONE ENCOUNTER
Reason for Call:  Appointment Request    Patient requesting this type of appt: Chronic Diease Management/Medication/Follow-Up    Requested provider: Nik Nina    Reason patient unable to be scheduled: Not within requested timeframe    When does patient want to be seen/preferred time: Same day    Comments: patient requesting appt for today if possible but over the weekend he said he had high blood pressure and chest pain. When he breathed heavy his left chest would hurt. Since then it has gotten better, Bp went down and chest pain is virtually gone. He said he had headaches and hurt when he bent down/got up too fast. He said he feels much better now but would like to be evaluated to make sure.     Could we send this information to you in [employer] or would you prefer to receive a phone call?:   Patient would prefer a phone call   Okay to leave a detailed message?: Yes at Cell number on file:    Telephone Information:   Mobile [phone]   Mobile Not on file.       Call taken on 10/9/2023 at 8:21 AM by Kat Lee

## 2023-10-09 NOTE — TELEPHONE ENCOUNTER
"Called and spoke with patient.     He states on Saturday his blood pressure was 150/100. During this time he experienced chest heaviness. Heaviness/chest pain worsened when taking a deep breath.   Reports pain did not radiate to his arm or into his neck.   Denies any dizziness, diaphoresis, vision changes, palpitations during this time.   Patient sat and rested for about 10 minutes. Rechecked BP and was 130/95. Symptoms were lessening at this time.   2 hours later his blood pressure was back to \"normal\". According to patient his \"normal\" is 130-130/.    Yesterday his BP was \"normal\" for him. He reports very intermittent slight chest heaviness, but only sustained for a few seconds.     Patient denies all symptoms today. He has not checked his BP yet today.     He also reports a sudden pressure in his head when bending down or standing up quickly. This goes away within a few seconds after position change.    Please recommend appointment time for patient since symptoms are currently not present. He would like to be seen today if at all possible. He is flying out tomorrow morning for work, will be back Friday morning. Does provider recommend flying?    Emma VERDUGON, RN  [facility]    "

## 2023-10-09 NOTE — TELEPHONE ENCOUNTER
Patient would like to be seen before flying tomorrow.   No appointments available at [facility] today. Scheduled to see provider in [place].     Appointments in Next Year      Oct 09, 2023  4:30 PM  (Arrive by 4:10 PM)  Provider Visit with WOJCIECH Avalos CNP  [facility] ([facility] [place] ) [phone]          Emma JUAREZ, RN  [facility]

## 2023-10-09 NOTE — TELEPHONE ENCOUNTER
I am unable to see him today as I am virtual the rest of the day now. Anyone else have any openings or work in spots? Otherwise, can use my next same day. Okay to fly if symptoms are resolved but should be seen emergently if symptoms recur.    Nik Nina PA-C

## 2023-10-10 LAB
ANION GAP SERPL CALCULATED.3IONS-SCNC: 12 MMOL/L (ref 7–15)
BUN SERPL-MCNC: 14.9 MG/DL (ref 6–20)
CALCIUM SERPL-MCNC: 9.2 MG/DL (ref 8.6–10)
CHLORIDE SERPL-SCNC: 108 MMOL/L (ref 98–107)
CREAT SERPL-MCNC: 0.89 MG/DL (ref 0.67–1.17)
D DIMER PPP FEU-MCNC: 0.29 UG/ML FEU (ref 0–0.5)
DEPRECATED HCO3 PLAS-SCNC: 20 MMOL/L (ref 22–29)
EGFRCR SERPLBLD CKD-EPI 2021: >90 ML/MIN/1.73M2
GLUCOSE SERPL-MCNC: 118 MG/DL (ref 70–99)
POTASSIUM SERPL-SCNC: 3.8 MMOL/L (ref 3.4–5.3)
SODIUM SERPL-SCNC: 140 MMOL/L (ref 135–145)

## 2023-10-10 NOTE — RESULT ENCOUNTER NOTE
Zachary,  I have reviewed your labs, and they are all normal. If you have questions, please notify me through MyChart or a telephone call.   Katherin Pan, DNP

## 2023-10-13 ENCOUNTER — PATIENT OUTREACH (OUTPATIENT)
Dept: GASTROENTEROLOGY | Facility: CLINIC | Age: 53
End: 2023-10-13
Payer: COMMERCIAL

## 2023-11-06 ENCOUNTER — IMMUNIZATION (OUTPATIENT)
Dept: FAMILY MEDICINE | Facility: OTHER | Age: 53
End: 2023-11-06
Payer: COMMERCIAL

## 2023-11-06 DIAGNOSIS — K21.9 GASTROESOPHAGEAL REFLUX DISEASE WITHOUT ESOPHAGITIS: ICD-10-CM

## 2023-11-06 DIAGNOSIS — Z23 NEED FOR PROPHYLACTIC VACCINATION AND INOCULATION AGAINST INFLUENZA: Primary | ICD-10-CM

## 2023-11-06 PROCEDURE — 90682 RIV4 VACC RECOMBINANT DNA IM: CPT

## 2023-11-06 PROCEDURE — 99207 PR NO CHARGE NURSE ONLY: CPT

## 2023-11-06 PROCEDURE — 90471 IMMUNIZATION ADMIN: CPT

## 2023-11-06 PROCEDURE — 91320 SARSCV2 VAC 30MCG TRS-SUC IM: CPT

## 2023-11-06 PROCEDURE — 90480 ADMN SARSCOV2 VAC 1/ONLY CMP: CPT

## 2023-11-06 RX ORDER — OMEPRAZOLE 40 MG/1
CAPSULE, DELAYED RELEASE ORAL
Qty: 90 CAPSULE | Refills: 1 | Status: SHIPPED | OUTPATIENT
Start: 2023-11-06 | End: 2024-04-29

## 2023-11-09 ENCOUNTER — MYC MEDICAL ADVICE (OUTPATIENT)
Dept: FAMILY MEDICINE | Facility: OTHER | Age: 53
End: 2023-11-09

## 2023-11-09 ENCOUNTER — VIRTUAL VISIT (OUTPATIENT)
Dept: FAMILY MEDICINE | Facility: OTHER | Age: 53
End: 2023-11-09
Payer: COMMERCIAL

## 2023-11-09 DIAGNOSIS — H10.33 ACUTE BACTERIAL CONJUNCTIVITIS OF BOTH EYES: Primary | ICD-10-CM

## 2023-11-09 PROCEDURE — 99213 OFFICE O/P EST LOW 20 MIN: CPT | Mod: VID | Performed by: PHYSICIAN ASSISTANT

## 2023-11-09 RX ORDER — POLYMYXIN B SULFATE AND TRIMETHOPRIM 1; 10000 MG/ML; [USP'U]/ML
1-2 SOLUTION OPHTHALMIC EVERY 4 HOURS
Qty: 10 ML | Refills: 0 | Status: SHIPPED | OUTPATIENT
Start: 2023-11-09

## 2023-11-09 NOTE — TELEPHONE ENCOUNTER
Spoke with patient on the phone.  Discussed mood of transferring pink eye.    No further questions or concerns at this time.      Jerry Saldivar, MSN, APRN, FNP-C  [facility] ~ Registered Nurse  Clinic Triage ~ [facility]  November 9, 2023

## 2023-11-09 NOTE — PROGRESS NOTES
Instructions Relayed to Patient by Virtual Roomer:     Reminded patient to ensure they were logged on to virtual visit by arrival time listed. Documented in appointment notes if patient had flexibility to initiate visit sooner than arrival time. If pediatric virtual visit, ensured pediatric patient along with parent/guardian will be present for video visit.     Patient offered the website [email or web address] and/or phone number to [employer] Help line: [phone]      Zachary is a 53 year old who is being evaluated via a billable video visit.      How would you like to obtain your AVS? AgentPiggy[employer]  If the video visit is dropped, the invitation should be resent by: Text to cell phone: [phone]  Will anyone else be joining your video visit? No          Assessment & Plan     Acute bacterial conjunctivitis of both eyes  Daughter started on therapy for bacterial conjunctivitis yesterday through local urgent care. Patient woke with matted/stuck eyes this morning. He did say, however, that his eyes were itching. This may be allergic conjunctivitis. I did decide to treat with drops given the daughter has bacterial. I reviewed the ease which conjunctivitis can be spread and emphasized the importance of wiping down surfaces, not using same cloth to clean eyes and avoiding of itching or touching eyes and then touching common household objects.   - trimethoprim-polymyxin b (POLYTRIM) 61265-2.1 UNIT/ML-% ophthalmic solution; Place 1-2 drops into both eyes every 4 hours    [facility]   Zachary is a 53 year old, presenting for the following health issues:  Conjunctivitis    Conjunctivitis    History of Present Illness       Reason for visit:  Pink eye    He eats 0-1 servings of fruits and vegetables daily.He consumes 0 sweetened beverage(s) daily.He exercises with enough effort to increase his heart rate 20 to 29 minutes per day.  He exercises with enough  effort to increase his heart rate 3 or less days per week.   He is taking medications regularly.       Concern - Pink eye  Onset: Pt just got symptoms as of today  Description: Matted eyes with right eye worse with itchiness.  Intensity: moderate  Progression of Symptoms:  worsening  Accompanying Signs & Symptoms: None  Previous history of similar problem: None  Precipitating factors:        Worsened by: None  Alleviating factors:        Improved by: None  Therapies tried and outcome: None    Brought daughter in urgent care       Review of Systems   Constitutional, HEENT, cardiovascular, pulmonary, gi and gu systems are negative, except as otherwise noted.      Objective           Vitals:  No vitals were obtained today due to virtual visit.    Physical Exam   GENERAL: Healthy, alert and no distress  EYES: Eyes grossly normal to inspection.  No discharge or erythema, or obvious scleral/conjunctival abnormalities.  RESP: No audible wheeze, cough, or visible cyanosis.  No visible retractions or increased work of breathing.    SKIN: Visible skin clear. No significant rash, abnormal pigmentation or lesions.  NEURO: Cranial nerves grossly intact.  Mentation and speech appropriate for age.  PSYCH: Mentation appears normal, affect normal/bright, judgement and insight intact, normal speech and appearance well-groomed.            Video-Visit Details    Type of service:  Video Visit     Joined the call at 11/9/2023, 12:34:30 pm.  Left the call at 11/9/2023, 12:40:31 pm.  You were on the call for 6 minutes 1 second .    Originating Location (pt. Location): Home    Distant Location (provider location):  On-site  Platform used for Video Visit: [employer]

## 2024-02-01 DIAGNOSIS — G43.109 MIGRAINE WITH AURA AND WITHOUT STATUS MIGRAINOSUS, NOT INTRACTABLE: ICD-10-CM

## 2024-02-01 RX ORDER — SUMATRIPTAN 100 MG/1
100 TABLET, FILM COATED ORAL
Qty: 9 TABLET | Refills: 1 | Status: SHIPPED | OUTPATIENT
Start: 2024-02-01 | End: 2024-08-14

## 2024-03-15 ENCOUNTER — OFFICE VISIT (OUTPATIENT)
Dept: FAMILY MEDICINE | Facility: CLINIC | Age: 54
End: 2024-03-15
Payer: COMMERCIAL

## 2024-03-15 ENCOUNTER — ANCILLARY PROCEDURE (OUTPATIENT)
Dept: GENERAL RADIOLOGY | Facility: CLINIC | Age: 54
End: 2024-03-15
Attending: FAMILY MEDICINE
Payer: COMMERCIAL

## 2024-03-15 VITALS
DIASTOLIC BLOOD PRESSURE: 66 MMHG | HEART RATE: 86 BPM | BODY MASS INDEX: 32.2 KG/M2 | SYSTOLIC BLOOD PRESSURE: 124 MMHG | OXYGEN SATURATION: 97 % | RESPIRATION RATE: 18 BRPM | WEIGHT: 230 LBS | HEIGHT: 71 IN | TEMPERATURE: 97.7 F

## 2024-03-15 DIAGNOSIS — G89.29 CHRONIC LEFT SHOULDER PAIN: Primary | ICD-10-CM

## 2024-03-15 DIAGNOSIS — M25.512 CHRONIC LEFT SHOULDER PAIN: ICD-10-CM

## 2024-03-15 DIAGNOSIS — G89.29 CHRONIC LEFT SHOULDER PAIN: ICD-10-CM

## 2024-03-15 DIAGNOSIS — M25.512 CHRONIC LEFT SHOULDER PAIN: Primary | ICD-10-CM

## 2024-03-15 PROCEDURE — 73030 X-RAY EXAM OF SHOULDER: CPT | Mod: TC | Performed by: RADIOLOGY

## 2024-03-15 PROCEDURE — 99213 OFFICE O/P EST LOW 20 MIN: CPT | Performed by: FAMILY MEDICINE

## 2024-03-15 RX ORDER — CYCLOBENZAPRINE HCL 10 MG
5-10 TABLET ORAL 3 TIMES DAILY PRN
Qty: 45 TABLET | Refills: 0 | Status: SHIPPED | OUTPATIENT
Start: 2024-03-15 | End: 2024-05-07

## 2024-03-15 ASSESSMENT — PAIN SCALES - GENERAL: PAINLEVEL: SEVERE PAIN (6)

## 2024-03-15 NOTE — PATIENT INSTRUCTIONS
Important Takeaway Points From This Visit:     Call your insurance about where to get the shingles vaccine if interested.     Regarding your Pain:  You can take 1000 mg of tylenol up to three times daily, as long as another provider has not restricted you from taking this type of medication.  You can take 400-600 mg of Ibuprofen up to three times daily with food, as long as another provider has restricted you from taking this type of medication.  You can also try over the counter Aspercreme (lidocaine), Biofreeze (menthol), or Voltaren (diclofenac) for pain.  I have given you a prescription of a muscle relaxer you can take up to 3 times daily. It can make you drowsy so do not drive or operate machinery until you know how it affects you.
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
## 2024-03-15 NOTE — RESULT ENCOUNTER NOTE
Momo Sharma,    If you have not viewed these results on [employer] within 3 days, we will use an alternative method to contact you. We will contact you via the following protocol:    - Via letter if your results are normal.  - Via phone ([phone]) if your results are abnormal.     Here are my comments about your recent results:    Your imaging showed changes related to your old fracture, but nothing new.     Continue with the plan of care we discussed.      Please call the clinic ([phone]), or message us on [employer] with any questions you may have.     Have a great day,    Dr. Esteban

## 2024-04-29 DIAGNOSIS — K21.9 GASTROESOPHAGEAL REFLUX DISEASE WITHOUT ESOPHAGITIS: ICD-10-CM

## 2024-04-29 RX ORDER — OMEPRAZOLE 40 MG/1
CAPSULE, DELAYED RELEASE ORAL
Qty: 90 CAPSULE | Refills: 0 | Status: SHIPPED | OUTPATIENT
Start: 2024-04-29 | End: 2024-08-05

## 2024-06-18 DIAGNOSIS — G43.109 MIGRAINE WITH AURA AND WITHOUT STATUS MIGRAINOSUS, NOT INTRACTABLE: ICD-10-CM

## 2024-06-18 DIAGNOSIS — E03.8 SUBCLINICAL HYPOTHYROIDISM: ICD-10-CM

## 2024-06-18 DIAGNOSIS — E78.1 HYPERTRIGLYCERIDEMIA: ICD-10-CM

## 2024-06-18 RX ORDER — LEVOTHYROXINE SODIUM 150 UG/1
150 TABLET ORAL DAILY
Qty: 90 TABLET | Refills: 0 | Status: SHIPPED | OUTPATIENT
Start: 2024-06-18 | End: 2024-09-23

## 2024-06-18 RX ORDER — GEMFIBROZIL 600 MG/1
TABLET, FILM COATED ORAL
Qty: 180 TABLET | Refills: 0 | Status: SHIPPED | OUTPATIENT
Start: 2024-06-18 | End: 2024-08-14

## 2024-06-18 RX ORDER — TOPIRAMATE 50 MG/1
50 TABLET, FILM COATED ORAL DAILY
Qty: 90 TABLET | Refills: 0 | Status: SHIPPED | OUTPATIENT
Start: 2024-06-18 | End: 2024-08-14

## 2024-07-28 NOTE — PATIENT INSTRUCTIONS
Will prescribe a 10 day course of antibiotics to take as directed. Take a probiotic or Activia yogurt daily while on this medication.  Will also prescribe a steroid to help with the sinus swelling and pressure.  Continue with the Flonase nasal spray. You can also try Nettipot or Sinu Rinse to help clear out your sinuses.  Drink plenty of fluids.  Follow up if symptoms are not improving.     
36.6

## 2024-08-03 ENCOUNTER — HEALTH MAINTENANCE LETTER (OUTPATIENT)
Age: 54
End: 2024-08-03

## 2024-08-05 DIAGNOSIS — K21.9 GASTROESOPHAGEAL REFLUX DISEASE WITHOUT ESOPHAGITIS: ICD-10-CM

## 2024-08-05 RX ORDER — OMEPRAZOLE 40 MG/1
CAPSULE, DELAYED RELEASE ORAL
Qty: 90 CAPSULE | Refills: 1 | Status: SHIPPED | OUTPATIENT
Start: 2024-08-05

## 2024-08-14 DIAGNOSIS — N52.9 ERECTILE DYSFUNCTION, UNSPECIFIED ERECTILE DYSFUNCTION TYPE: ICD-10-CM

## 2024-08-14 DIAGNOSIS — H69.93 DYSFUNCTION OF BOTH EUSTACHIAN TUBES: ICD-10-CM

## 2024-08-14 DIAGNOSIS — E03.8 SUBCLINICAL HYPOTHYROIDISM: ICD-10-CM

## 2024-08-14 DIAGNOSIS — H90.6 MIXED CONDUCTIVE AND SENSORINEURAL HEARING LOSS OF BOTH EARS: ICD-10-CM

## 2024-08-14 DIAGNOSIS — E78.1 HYPERTRIGLYCERIDEMIA: ICD-10-CM

## 2024-08-14 DIAGNOSIS — G43.109 MIGRAINE WITH AURA AND WITHOUT STATUS MIGRAINOSUS, NOT INTRACTABLE: ICD-10-CM

## 2024-08-14 RX ORDER — LEVOTHYROXINE SODIUM 150 UG/1
150 TABLET ORAL DAILY
Qty: 90 TABLET | Refills: 0 | OUTPATIENT
Start: 2024-08-14

## 2024-08-14 RX ORDER — LORATADINE 10 MG/1
1 TABLET ORAL DAILY
Qty: 90 TABLET | Refills: 0 | Status: SHIPPED | OUTPATIENT
Start: 2024-08-14

## 2024-08-14 RX ORDER — GEMFIBROZIL 600 MG/1
TABLET, FILM COATED ORAL
Qty: 180 TABLET | Refills: 0 | Status: SHIPPED | OUTPATIENT
Start: 2024-08-14 | End: 2024-09-23

## 2024-08-14 RX ORDER — TOPIRAMATE 50 MG/1
50 TABLET, FILM COATED ORAL DAILY
Qty: 90 TABLET | Refills: 0 | Status: SHIPPED | OUTPATIENT
Start: 2024-08-14 | End: 2024-09-23

## 2024-08-14 RX ORDER — SILDENAFIL 50 MG/1
TABLET, FILM COATED ORAL
Qty: 8 TABLET | Refills: 0 | Status: SHIPPED | OUTPATIENT
Start: 2024-08-14

## 2024-08-14 RX ORDER — SUMATRIPTAN 100 MG/1
100 TABLET, FILM COATED ORAL
Qty: 9 TABLET | Refills: 0 | Status: SHIPPED | OUTPATIENT
Start: 2024-08-14 | End: 2024-09-23

## 2024-09-23 ENCOUNTER — OFFICE VISIT (OUTPATIENT)
Dept: FAMILY MEDICINE | Facility: OTHER | Age: 54
End: 2024-09-23
Payer: COMMERCIAL

## 2024-09-23 VITALS
WEIGHT: 231.5 LBS | SYSTOLIC BLOOD PRESSURE: 118 MMHG | RESPIRATION RATE: 18 BRPM | OXYGEN SATURATION: 97 % | BODY MASS INDEX: 31.36 KG/M2 | HEART RATE: 83 BPM | TEMPERATURE: 97.2 F | DIASTOLIC BLOOD PRESSURE: 78 MMHG | HEIGHT: 72 IN

## 2024-09-23 DIAGNOSIS — G43.109 MIGRAINE WITH AURA AND WITHOUT STATUS MIGRAINOSUS, NOT INTRACTABLE: ICD-10-CM

## 2024-09-23 DIAGNOSIS — N52.9 ERECTILE DYSFUNCTION, UNSPECIFIED ERECTILE DYSFUNCTION TYPE: ICD-10-CM

## 2024-09-23 DIAGNOSIS — Z12.5 SCREENING FOR PROSTATE CANCER: ICD-10-CM

## 2024-09-23 DIAGNOSIS — E03.8 SUBCLINICAL HYPOTHYROIDISM: ICD-10-CM

## 2024-09-23 DIAGNOSIS — H69.93 DYSFUNCTION OF BOTH EUSTACHIAN TUBES: ICD-10-CM

## 2024-09-23 DIAGNOSIS — H90.6 MIXED CONDUCTIVE AND SENSORINEURAL HEARING LOSS OF BOTH EARS: ICD-10-CM

## 2024-09-23 DIAGNOSIS — M54.16 LUMBAR RADICULOPATHY: ICD-10-CM

## 2024-09-23 DIAGNOSIS — R73.03 PREDIABETES: ICD-10-CM

## 2024-09-23 DIAGNOSIS — Z00.00 ENCOUNTER FOR ROUTINE ADULT HEALTH EXAMINATION WITHOUT ABNORMAL FINDINGS: Primary | ICD-10-CM

## 2024-09-23 DIAGNOSIS — E78.1 HYPERTRIGLYCERIDEMIA: ICD-10-CM

## 2024-09-23 DIAGNOSIS — M51.369 DDD (DEGENERATIVE DISC DISEASE), LUMBAR: ICD-10-CM

## 2024-09-23 LAB
CHOLEST SERPL-MCNC: 228 MG/DL
EST. AVERAGE GLUCOSE BLD GHB EST-MCNC: 131 MG/DL
FASTING STATUS PATIENT QL REPORTED: YES
HBA1C MFR BLD: 6.2 % (ref 0–5.6)
HDLC SERPL-MCNC: 34 MG/DL
LDLC SERPL CALC-MCNC: 145 MG/DL
NONHDLC SERPL-MCNC: 194 MG/DL
PSA SERPL DL<=0.01 NG/ML-MCNC: 0.68 NG/ML (ref 0–3.5)
TRIGL SERPL-MCNC: 245 MG/DL
TSH SERPL DL<=0.005 MIU/L-ACNC: 0.34 UIU/ML (ref 0.3–4.2)

## 2024-09-23 PROCEDURE — 36415 COLL VENOUS BLD VENIPUNCTURE: CPT | Performed by: PHYSICIAN ASSISTANT

## 2024-09-23 PROCEDURE — 91320 SARSCV2 VAC 30MCG TRS-SUC IM: CPT | Performed by: PHYSICIAN ASSISTANT

## 2024-09-23 PROCEDURE — 90750 HZV VACC RECOMBINANT IM: CPT | Performed by: PHYSICIAN ASSISTANT

## 2024-09-23 PROCEDURE — 90673 RIV3 VACCINE NO PRESERV IM: CPT | Performed by: PHYSICIAN ASSISTANT

## 2024-09-23 PROCEDURE — 80061 LIPID PANEL: CPT | Performed by: PHYSICIAN ASSISTANT

## 2024-09-23 PROCEDURE — 99214 OFFICE O/P EST MOD 30 MIN: CPT | Mod: 25 | Performed by: PHYSICIAN ASSISTANT

## 2024-09-23 PROCEDURE — 99396 PREV VISIT EST AGE 40-64: CPT | Mod: 25 | Performed by: PHYSICIAN ASSISTANT

## 2024-09-23 PROCEDURE — G0103 PSA SCREENING: HCPCS | Performed by: PHYSICIAN ASSISTANT

## 2024-09-23 PROCEDURE — 90471 IMMUNIZATION ADMIN: CPT | Performed by: PHYSICIAN ASSISTANT

## 2024-09-23 PROCEDURE — 90472 IMMUNIZATION ADMIN EACH ADD: CPT | Performed by: PHYSICIAN ASSISTANT

## 2024-09-23 PROCEDURE — 84443 ASSAY THYROID STIM HORMONE: CPT | Performed by: PHYSICIAN ASSISTANT

## 2024-09-23 PROCEDURE — 83036 HEMOGLOBIN GLYCOSYLATED A1C: CPT | Performed by: PHYSICIAN ASSISTANT

## 2024-09-23 PROCEDURE — 90480 ADMN SARSCOV2 VAC 1/ONLY CMP: CPT | Performed by: PHYSICIAN ASSISTANT

## 2024-09-23 RX ORDER — GEMFIBROZIL 600 MG/1
TABLET, FILM COATED ORAL
Qty: 180 TABLET | Refills: 3 | Status: SHIPPED | OUTPATIENT
Start: 2024-09-23

## 2024-09-23 RX ORDER — FLUTICASONE PROPIONATE 50 MCG
2 SPRAY, SUSPENSION (ML) NASAL DAILY
Qty: 16 ML | Refills: 3 | Status: SHIPPED | OUTPATIENT
Start: 2024-09-23

## 2024-09-23 RX ORDER — TOPIRAMATE 50 MG/1
75 TABLET, FILM COATED ORAL DAILY
Qty: 135 TABLET | Refills: 3 | Status: SHIPPED | OUTPATIENT
Start: 2024-09-23

## 2024-09-23 RX ORDER — SUMATRIPTAN 100 MG/1
100 TABLET, FILM COATED ORAL
Qty: 12 TABLET | Refills: 3 | Status: SHIPPED | OUTPATIENT
Start: 2024-09-23

## 2024-09-23 RX ORDER — SILDENAFIL 100 MG/1
TABLET, FILM COATED ORAL
Qty: 12 TABLET | Refills: 5 | Status: SHIPPED | OUTPATIENT
Start: 2024-09-23

## 2024-09-23 RX ORDER — LEVOTHYROXINE SODIUM 150 UG/1
150 TABLET ORAL DAILY
Qty: 90 TABLET | Refills: 3 | Status: SHIPPED | OUTPATIENT
Start: 2024-09-23

## 2024-09-23 ASSESSMENT — PAIN SCALES - GENERAL: PAINLEVEL: NO PAIN (0)

## 2024-09-23 NOTE — PATIENT INSTRUCTIONS
Patient Education     Will have you see a spine specialist.    Continue with your other medications.    Follow-up annually.    Preventive Care Advice   This is general advice given by our system to help you stay healthy. However, your care team may have specific advice just for you. Please talk to your care team about your preventive care needs.  Nutrition  Eat 5 or more servings of fruits and vegetables each day.  Try wheat bread, brown rice and whole grain pasta (instead of white bread, rice, and pasta).  Get enough calcium and vitamin D. Check the label on foods and aim for 100% of the RDA (recommended daily allowance).  Lifestyle  Exercise at least 150 minutes each week  (30 minutes a day, 5 days a week).  Do muscle strengthening activities 2 days a week. These help control your weight and prevent disease.  No smoking.  Wear sunscreen to prevent skin cancer.  Have a dental exam and cleaning every 6 months.  Yearly exams  See your health care team every year to talk about:  Any changes in your health.  Any medicines your care team has prescribed.  Preventive care, family planning, and ways to prevent chronic diseases.  Shots (vaccines)   HPV shots (up to age 26), if you've never had them before.  Hepatitis B shots (up to age 59), if you've never had them before.  COVID-19 shot: Get this shot when it's due.  Flu shot: Get a flu shot every year.  Tetanus shot: Get a tetanus shot every 10 years.  Pneumococcal, hepatitis A, and RSV shots: Ask your care team if you need these based on your risk.  Shingles shot (for age 50 and up)  General health tests  Diabetes screening:  Starting at age 35, Get screened for diabetes at least every 3 years.  If you are younger than age 35, ask your care team if you should be screened for diabetes.  Cholesterol test: At age 39, start having a cholesterol test every 5 years, or more often if advised.  Bone density scan (DEXA): At age 50, ask your care team if you should have this scan  for osteoporosis (brittle bones).  Hepatitis C: Get tested at least once in your life.  STIs (sexually transmitted infections)  Before age 24: Ask your care team if you should be screened for STIs.  After age 24: Get screened for STIs if you're at risk. You are at risk for STIs (including HIV) if:  You are sexually active with more than one person.  You don't use condoms every time.  You or a partner was diagnosed with a sexually transmitted infection.  If you are at risk for HIV, ask about PrEP medicine to prevent HIV.  Get tested for HIV at least once in your life, whether you are at risk for HIV or not.  Cancer screening tests  Cervical cancer screening: If you have a cervix, begin getting regular cervical cancer screening tests starting at age 21.  Breast cancer scan (mammogram): If you've ever had breasts, begin having regular mammograms starting at age 40. This is a scan to check for breast cancer.  Colon cancer screening: It is important to start screening for colon cancer at age 45.  Have a colonoscopy test every 10 years (or more often if you're at risk) Or, ask your provider about stool tests like a FIT test every year or Cologuard test every 3 years.  To learn more about your testing options, visit:   .  For help making a decision, visit:   https://bit.ly/nu34298.  Prostate cancer screening test: If you have a prostate, ask your care team if a prostate cancer screening test (PSA) at age 55 is right for you.  Lung cancer screening: If you are a current or former smoker ages 50 to 80, ask your care team if ongoing lung cancer screenings are right for you.  For informational purposes only. Not to replace the advice of your health care provider. Copyright   2023 [employer]. All rights reserved. Clinically reviewed by the [facility] Transitions Program. [employer] [id number] - REV 01/24.

## 2024-09-23 NOTE — PROGRESS NOTES
Preventive Care Visit  [facility]  Nik Nina PA-C, Family Medicine  Sep 23, 2024    Assessment & Plan       ICD-10-CM    1. Encounter for routine adult health examination without abnormal findings  Z00.00       2. Hypertriglyceridemia  E78.1 Lipid panel reflex to direct LDL Fasting     gemfibrozil (LOPID) 600 MG tablet     Lipid panel reflex to direct LDL Fasting      3. Subclinical hypothyroidism  E03.8 TSH WITH FREE T4 REFLEX     levothyroxine (SYNTHROID/LEVOTHROID) 150 MCG tablet     TSH WITH FREE T4 REFLEX      4. Migraine with aura and without status migrainosus, not intractable  G43.109 SUMAtriptan (IMITREX) 100 MG tablet     topiramate (TOPAMAX) 50 MG tablet      5. Dysfunction of both eustachian tubes  H69.93 fluticasone (FLONASE) 50 MCG/ACT nasal spray      6. DDD (degenerative disc disease), lumbar  M51.36 Spine  Referral      7. Lumbar radiculopathy  M54.16 Spine  Referral      8. Erectile dysfunction, unspecified erectile dysfunction type  N52.9 sildenafil (VIAGRA) 100 MG tablet      9. Elevated glucose  R73.09 Hemoglobin A1c     Hemoglobin A1c      10. Screening for prostate cancer  Z12.5 PSA, screen     PSA, screen      11. Mixed conductive and sensorineural hearing loss of both ears  H90.6 fluticasone (FLONASE) 50 MCG/ACT nasal spray          Influenza, COVID, and Shingles vaccine administered by MA today.     2. Lipid panel ordered today. Continue Lopid.     3. Continue levothyroxine 150 mcg daily. TSH ordered today. Continue current plan.     4. Due to increasing frequency of headaches recently, will increase Topamax to 75mg daily. Continue Sumatriptan as needed for abortive treatment. Return precautions discussed.     5, 11. Feeling improved on Flonase and Claritin daily. Continue current plan.     6,7. Long-standing, has not been worsening, but continues to be bothersome. Pain mostly on the right side, but is having intermittent bilateral proximal  "thigh numbness. Flexeril not helpful. Has not yet followed with spine specialist yet. Another referral sent to spine specialist today for further evaluation as he has tried and failed PT multiple times along with muscle relaxers and OTC medications.     8. Well controlled on Viagra. Refills sent.     9. A1C ordered today did result in the pre-diabetic level at 6.2. Patient was informed about healthy lifestyle choices including daily exercise and a healthy diet.     10. PSA ordered today.     Follow-up annually.    Patient has been advised of split billing requirements and indicates understanding: Yes    BMI  Estimated body mass index is 31.7 kg/m  as calculated from the following:    Height as of this encounter: 1.82 m (5' 11.65\").    Weight as of this encounter: 105 kg (231 lb 8 oz).   Weight management plan: Discussed healthy diet and exercise guidelines    Counseling  Appropriate preventive services were addressed with this patient via screening, questionnaire, or discussion as appropriate for fall prevention, nutrition, physical activity, Tobacco-use cessation, social engagement, weight loss and cognition.  Checklist reviewing preventive services available has been given to the patient.  Reviewed patient's diet, addressing concerns and/or questions.   He is at risk for lack of exercise and has been provided with information to increase physical activity for the benefit of his well-being.     Angely   Zachary is a 54 year old, presenting for the following:  Physical        9/23/2024     7:15 AM   Additional Questions   Roomed by Danica COOK   Accompanied by Self        Health Care Directive  Patient does not have a Health Care Directive or Living Will: Discussed advance care planning with patient; information given to patient to review.    54 M with history of migraine, hypothyroidism, HLD, and chronic lower back pain presents today for annual wellness check. He has no new concerns today.           9/23/2024 "   General Health   How would you rate your overall physical health? Good   Feel stress (tense, anxious, or unable to sleep) To some extent      (!) STRESS CONCERN      2024   Nutrition   Three or more servings of calcium each day? Yes   Diet: Low fat/cholesterol   How many servings of fruit and vegetables per day? (!) 0-1   How many sweetened beverages each day? 0-1            2024   Exercise   Days per week of moderate/strenous exercise 1 day      (!) EXERCISE CONCERN      2024   Social Factors   Frequency of gathering with friends or relatives Once a week   Worry food won't last until get money to buy more No   Food not last or not have enough money for food? No   Do you have housing? (Housing is defined as stable permanent housing and does not include staying ouside in a car, in a tent, in an abandoned building, in an overnight shelter, or couch-surfing.) Yes   Are you worried about losing your housing? No   Lack of transportation? No   Unable to get utilities (heat,electricity)? No            2024   Fall Risk   Fallen 2 or more times in the past year? No   Trouble with walking or balance? No             2024   Dental   Dentist two times every year? Yes            2024   TB Screening   Were you born outside of the [place]? No        Today's PHQ-2 Score:       3/15/2024     6:50 AM   PHQ-2 (  [employer])   Q1: Little interest or pleasure in doing things 0   Q2: Feeling down, depressed or hopeless 0   PHQ-2 Score 0   Q1: Little interest or pleasure in doing things Not at all   Q2: Feeling down, depressed or hopeless Not at all   PHQ-2 Score 0         2024   Substance Use   Alcohol more than 3/day or more than 7/wk No   Do you use any other substances recreationally? No        Social History     Tobacco Use    Smoking status: Former     Current packs/day: 0.00     Types: Cigarettes     Quit date: 2000     Years since quittin.7     Passive exposure: Past    Smokeless tobacco:  "Never   Vaping Use    Vaping status: Never Used   Substance Use Topics    Alcohol use: Yes     Alcohol/week: 0.0 standard drinks of alcohol     Comment: 1/month    Drug use: No           9/23/2024   STI Screening   New sexual partner(s) since last STI/HIV test? No      ASCVD Risk   The 10-year ASCVD risk score (Latrell PAYAN, et al., 2019) is: 5.4%    Values used to calculate the score:      Age: 54 years      Sex: Male      Is Non- : No      Diabetic: No      Tobacco smoker: No      Systolic Blood Pressure: 118 mmHg      Is BP treated: No      HDL Cholesterol: 33 mg/dL      Total Cholesterol: 185 mg/dL      Reviewed and updated as needed this visit by Provider   Tobacco  Allergies  Meds  Problems  Med Hx  Surg Hx  Fam Hx            Review of Systems  Constitutional, HEENT, cardiovascular, pulmonary, gi and gu systems are negative, except as otherwise noted.     Objective    Exam  /78   Pulse 83   Temp 97.2  F (36.2  C) (Temporal)   Resp 18   Ht 1.82 m (5' 11.65\")   Wt 105 kg (231 lb 8 oz)   SpO2 97%   BMI 31.70 kg/m     Estimated body mass index is 31.7 kg/m  as calculated from the following:    Height as of this encounter: 1.82 m (5' 11.65\").    Weight as of this encounter: 105 kg (231 lb 8 oz).    Physical Exam  Constitutional:       General: He is not in acute distress.     Appearance: Normal appearance. He is normal weight.   HENT:      Head: Normocephalic and atraumatic.      Right Ear: Ear canal and external ear normal.      Left Ear: Ear canal and external ear normal.      Ears:      Comments: Serous effusions of bilateral TM's     Nose: Nose normal.      Mouth/Throat:      Mouth: Mucous membranes are moist.   Eyes:      Pupils: Pupils are equal, round, and reactive to light.   Cardiovascular:      Rate and Rhythm: Normal rate and regular rhythm.      Pulses: Normal pulses.      Heart sounds: Normal heart sounds.   Pulmonary:      Effort: Pulmonary effort is " normal.      Breath sounds: Normal breath sounds. No wheezing or rales.   Abdominal:      General: Abdomen is flat. There is no distension.      Palpations: Abdomen is soft.   Musculoskeletal:         General: No tenderness. Normal range of motion.      Cervical back: Normal range of motion and neck supple. No rigidity.   Skin:     General: Skin is warm and dry.   Neurological:      General: No focal deficit present.      Mental Status: He is alert and oriented to person, place, and time. Mental status is at baseline.   Psychiatric:         Mood and Affect: Mood normal.         Behavior: Behavior normal.         Thought Content: Thought content normal.       Patient seen in conjunction with Cyrus RAYO  Signed Electronically by: Nik Nina PA-C

## 2024-09-24 ENCOUNTER — PATIENT OUTREACH (OUTPATIENT)
Dept: CARE COORDINATION | Facility: CLINIC | Age: 54
End: 2024-09-24
Payer: COMMERCIAL

## 2024-09-26 ENCOUNTER — PATIENT OUTREACH (OUTPATIENT)
Dept: CARE COORDINATION | Facility: CLINIC | Age: 54
End: 2024-09-26
Payer: COMMERCIAL

## 2024-09-30 NOTE — TELEPHONE ENCOUNTER
REASON FOR VISIT: DDD (degenerative disc disease), lumbar [M51.36]  Lumbar radiculopathy    DATE OF APPT: 10/23/2024   NOTES (FOR ALL VISITS) STATUS DETAILS   OFFICE NOTE from referring provider [facility]  Nik Nina PA-C 9/23/2024   MEDICATION LIST N/A    IMAGING  (FOR ALL VISITS)     XR N/A    MRI (HEAD, NECK, SPINE) N/A    CT (HEAD, NECK, SPINE) N/A

## 2024-10-16 NOTE — PROGRESS NOTES
"    SUBJECTIVE:  HPI:  Linden Cruz  Is a 54 year old male who presents for new patient evaluation of low back pain with reported lumbar DDD on referral from MERRITT Nina who is 9/23/2024 preventive health exam documents:  \" Long-standing, has not been worsening, but continues to be bothersome. Pain mostly on the right side, but is having intermittent bilateral proximal thigh numbness. Flexeril not helpful. Has not yet followed with spine specialist yet. Another referral sent to spine specialist today for further evaluation as he has tried and failed PT multiple times along with muscle relaxers and OTC medications.\"  Record documents right L5-S1 \"bilateral\" BALTA 1/10/2020 Dr. Fields    History 10/23/2024:    For about 8 years without an inciting event then has had pain in the midline of his low back but more so in the right posterior pelvic area and occasional radiating symptoms down the left leg more Costley down the right leg with a patch of numbness in the anterolateral right thigh but today not in the left thigh.  There is no specific leg weakness, saddle anesthesia, bowel, bladder, or sexual dysfunction.  1 epidural helped some and the second epidural did not.  He has gone through 3 different sessions of physical therapy, the first not helpful, the second taught him an HEP which she does do, and the third was much more helpful.  He has not had any chiropractic recently.  That was prior to his current onset of symptoms.      SYMPTOMS WORSENED WITH long periods of inactivity    SYMPTOMS IMPROVED WITH stretching and repositioning    Pain score and diagram reviewed.  See questionnaire.      ROS: .  Otherwise negative for bowel/bladder dysfunction, balance changes, headache, leg pain/numbness/weakness, fevers, chills, night sweats, unexplained weight loss;  otherwise unremarkable.   See the patient's intake questionnaire from today for details.    MEDICATIONS:  Reviewed.    ALLERGIES:  Reviewed.     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  He and his spouse have 2 children.  Sports hobbies and activities: HEP, coaching basketball, hunting, fishing, reading      OBJECTIVE:    IMAGING: Images and reports reviewed    MRI LUMBAR SPINE WITHOUT CONTRAST   3/14/2022      L2-L3: No loss of disc height. Mild left foraminal disc bulge with  left uncinate spurring. Normal facets. No spinal canal narrowing. No  significant neural foraminal narrowing.     L4-L5: No loss of disc height. Posterior disc bulge with mild uncinate  spurring. Normal facets. No spinal canal narrowing. Mild bilateral  neural foraminal narrowing.      L5-S1: Moderate loss of disc height and signal. Posterior disc bulge  with endplate osteophytic spurring. Mild left greater than right facet  hypertrophy. No spinal canal narrowing. Mild right neural foraminal  narrowing. Moderate left neural foraminal narrowing.                                                                  IMPRESSION:    1. Degenerative changes in the lower lumbar spine as detailed above.  2. At L5-S1, there is mild right and moderate left neural foraminal  narrowing.  3. At L4-L5, there is mild bilateral neural foraminal narrowing.   4. Overall, no significant change since 1/18/2019.       EXAMINATION:    --CONSTITUTIONAL:   No acute distress.  The patient is well nourished and well groomed.  Muscular frame.  Elevated BMI.  Transitions and moves fluidly.--SKIN:  Skin over the face, bilateral lower extremities, and posterior torso is clean, dry, intact without rashes.    --GAIT:  is non-antalgic. Flat foot, heel and toe walking:  normal   .  Squat and rise   normal    .  --STANDING EXAMINATION:    Symmetry of spine/pelvis      right hemipelvis slightly elevated.      Range of motion full with minor  discomfort noted only with right side bend.  Negative Kemps test   Standing flexion    positive right  Shanika's sign   negative    .     Stork test   negative    .   --NEUROLOGICAL:    SENSATION to light touch is diminished in the distribution of the right LFC otherwise intact in bilateral thighs, lower legs and feet.   REFLEXES:  patellar 1+, and achilles 1+.  Babinski is negative. No clonus.  MANUAL MOTOR TESTING:  L1- S1 Myotomes, Femoral, Obturator, Peroneal and Tibial nerves 5/5   DURAL STRETCH TESTS:  SLR negative.  Femoral Stretch Test negative.   --PELVIC/HIP JOINTS:                Long Sitting   negative   but right 1/2 cm long.    Hip scour   negative   .    Hip Impingement   negative   .   ZAID   negative    .   Thigh thrust, pelvic distraction, pelvic compression, all negative  Piriformis   negative   .   Spring testing slightly positive on the right.  Decreased compliance on the left.      PELVIC ALIGNMENT right inferior innominate shear.   --LUMBAR/GLUTEAL MUSCLES: Negative.    Procedure note-OMT:  Manual medicine restore normal pelvic alignment.  He did not really notice much change in symptoms with sidebending to the right which was his only minor painful range.  He did have better compliance in the left SI joint and no pain with spring testing of the right SI joint afterwards.  He does recall one of his therapist doing treatment similar to this but not quite as aggressive.        ASSESSMENT: Linden Cruz is a 54 year old male who presents  today for new patient evaluation of:    Chronic low back pain  Pelvic Joint Dysfunction manifesting as a right posterior innominate rotation-indeterminate response to OMT  Right meralgia paresthetica  Otherwise neurologically intact with negative femoral stretch and SLR and negative Kemps test  Status post BALTA x 2: Minimal therapeutic response      PLAN:  Pelvic stabilization PT and self-correction each morning.  I do not think he will need an SI belt given  history to build.  We did talk about the likelihood that his right thigh numbness will change but he might want to switch from wearing belts to suspenders to avoid compressing the LFC.  Follow-up in 3 months.  Consider lumbar MedX.    Advised patient to call or return early if symptoms worsen, or having problems controlling bladder and bowel function or worsening leg weakness.     Please note: Voice recognition software was used in this dictation.  It may therefore contain typographical errors.    Ras Viera MD

## 2024-10-17 DIAGNOSIS — H90.6 MIXED CONDUCTIVE AND SENSORINEURAL HEARING LOSS OF BOTH EARS: ICD-10-CM

## 2024-10-17 DIAGNOSIS — H69.93 DYSFUNCTION OF BOTH EUSTACHIAN TUBES: ICD-10-CM

## 2024-10-18 RX ORDER — FLUTICASONE PROPIONATE 50 MCG
2 SPRAY, SUSPENSION (ML) NASAL DAILY
Qty: 16 ML | Refills: 3 | OUTPATIENT
Start: 2024-10-18

## 2024-10-23 ENCOUNTER — PRE VISIT (OUTPATIENT)
Dept: NEUROSURGERY | Facility: CLINIC | Age: 54
End: 2024-10-23

## 2024-10-23 ENCOUNTER — OFFICE VISIT (OUTPATIENT)
Dept: NEUROSURGERY | Facility: CLINIC | Age: 54
End: 2024-10-23
Attending: PHYSICIAN ASSISTANT
Payer: COMMERCIAL

## 2024-10-23 VITALS
HEIGHT: 72 IN | HEART RATE: 99 BPM | SYSTOLIC BLOOD PRESSURE: 147 MMHG | BODY MASS INDEX: 30.79 KG/M2 | WEIGHT: 227.3 LBS | DIASTOLIC BLOOD PRESSURE: 85 MMHG

## 2024-10-23 DIAGNOSIS — M99.05 SOMATIC DYSFUNCTION OF PELVIS REGION: ICD-10-CM

## 2024-10-23 DIAGNOSIS — G57.11 MERALGIA PARESTHETICA OF RIGHT SIDE: Primary | ICD-10-CM

## 2024-10-23 DIAGNOSIS — M79.18 MYOFASCIAL PAIN: ICD-10-CM

## 2024-10-23 PROCEDURE — 99203 OFFICE O/P NEW LOW 30 MIN: CPT | Mod: 25 | Performed by: PREVENTIVE MEDICINE

## 2024-10-23 PROCEDURE — 98925 OSTEOPATH MANJ 1-2 REGIONS: CPT | Performed by: PREVENTIVE MEDICINE

## 2024-10-23 ASSESSMENT — PAIN SCALES - GENERAL: PAINLEVEL_OUTOF10: MODERATE PAIN (4)

## 2024-10-23 NOTE — LETTER
"10/23/2024      Linden Cruz  [id number] [address] [id number]      Dear Colleague,    Thank you for referring your patient, Linden Cruz, to the [facility] [place]. Please see a copy of my visit note below.        SUBJECTIVE:  HPI:  Linden Cruz  Is a 54 year old male who presents for new patient evaluation of low back pain with reported lumbar DDD on referral from MERRITT Nina who is 9/23/2024 preventive health exam documents:  \" Long-standing, has not been worsening, but continues to be bothersome. Pain mostly on the right side, but is having intermittent bilateral proximal thigh numbness. Flexeril not helpful. Has not yet followed with spine specialist yet. Another referral sent to spine specialist today for further evaluation as he has tried and failed PT multiple times along with muscle relaxers and OTC medications.\"  Record documents right L5-S1 \"bilateral\" BALTA 1/10/2020 Dr. Fields    History 10/23/2024:    For about 8 years without an inciting event then has had pain in the midline of his low back but more so in the right posterior pelvic area and occasional radiating symptoms down the left leg more Costley down the right leg with a patch of numbness in the anterolateral right thigh but today not in the left thigh.  There is no specific leg weakness, saddle anesthesia, bowel, bladder, or sexual dysfunction.  1 epidural helped some and the second epidural did not.  He has gone through 3 different sessions of physical therapy, the first not helpful, the second taught him an HEP which she does do, and the third was much more helpful.  He has not had any chiropractic recently.  That was prior to his current onset of symptoms.      SYMPTOMS WORSENED WITH long periods of inactivity    SYMPTOMS IMPROVED WITH stretching and repositioning    Pain score and diagram reviewed.  See questionnaire.      ROS: .  Otherwise negative for bowel/bladder dysfunction, " balance changes, headache, leg pain/numbness/weakness, fevers, chills, night sweats, unexplained weight loss;  otherwise unremarkable.   See the patient's intake questionnaire from today for details.    MEDICATIONS:  Reviewed.    ALLERGIES:  Reviewed.     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  He and his spouse have 2 children.  Sports hobbies and activities: HEP, coaching basketball, hunting, fishing, reading      OBJECTIVE:    IMAGING: Images and reports reviewed    MRI LUMBAR SPINE WITHOUT CONTRAST   3/14/2022      L2-L3: No loss of disc height. Mild left foraminal disc bulge with  left uncinate spurring. Normal facets. No spinal canal narrowing. No  significant neural foraminal narrowing.     L4-L5: No loss of disc height. Posterior disc bulge with mild uncinate  spurring. Normal facets. No spinal canal narrowing. Mild bilateral  neural foraminal narrowing.      L5-S1: Moderate loss of disc height and signal. Posterior disc bulge  with endplate osteophytic spurring. Mild left greater than right facet  hypertrophy. No spinal canal narrowing. Mild right neural foraminal  narrowing. Moderate left neural foraminal narrowing.                                                                  IMPRESSION:    1. Degenerative changes in the lower lumbar spine as detailed above.  2. At L5-S1, there is mild right and moderate left neural foraminal  narrowing.  3. At L4-L5, there is mild bilateral neural foraminal narrowing.   4. Overall, no significant change since 1/18/2019.       EXAMINATION:    --CONSTITUTIONAL:   No acute distress.  The patient is well nourished and well groomed.  Muscular frame.  Elevated BMI.  Transitions and moves fluidly.--SKIN:  Skin over the face, bilateral lower extremities, and posterior torso is clean, dry,  intact without rashes.    --GAIT:  is non-antalgic. Flat foot, heel and toe walking:  normal   .  Squat and rise   normal    .  --STANDING EXAMINATION:    Symmetry of spine/pelvis      right hemipelvis slightly elevated.      Range of motion full with minor discomfort noted only with right side bend.  Negative Kemps test   Standing flexion    positive right  Shanika's sign   negative    .     Stork test   negative    .   --NEUROLOGICAL:    SENSATION to light touch is diminished in the distribution of the right LFC otherwise intact in bilateral thighs, lower legs and feet.   REFLEXES:  patellar 1+, and achilles 1+.  Babinski is negative. No clonus.  MANUAL MOTOR TESTING:  L1- S1 Myotomes, Femoral, Obturator, Peroneal and Tibial nerves 5/5   DURAL STRETCH TESTS:  SLR negative.  Femoral Stretch Test negative.   --PELVIC/HIP JOINTS:                Long Sitting   negative   but right 1/2 cm long.    Hip scour   negative   .    Hip Impingement   negative   .   ZAID   negative    .   Thigh thrust, pelvic distraction, pelvic compression, all negative  Piriformis   negative   .   Spring testing slightly positive on the right.  Decreased compliance on the left.      PELVIC ALIGNMENT right inferior innominate shear.   --LUMBAR/GLUTEAL MUSCLES: Negative.    Procedure note-OMT:  Manual medicine restore normal pelvic alignment.  He did not really notice much change in symptoms with sidebending to the right which was his only minor painful range.  He did have better compliance in the left SI joint and no pain with spring testing of the right SI joint afterwards.  He does recall one of his therapist doing treatment similar to this but not quite as aggressive.        ASSESSMENT: Linden Cruz is a 54 year old male who presents  today for new patient evaluation of:    Chronic low back pain  Pelvic Joint Dysfunction manifesting as a right posterior innominate rotation-indeterminate response to OMT  Right meralgia  paresthetica  Otherwise neurologically intact with negative femoral stretch and SLR and negative Kemps test  Status post BALTA x 2: Minimal therapeutic response      PLAN:  Pelvic stabilization PT and self-correction each morning.  I do not think he will need an SI belt given history to build.  We did talk about the likelihood that his right thigh numbness will change but he might want to switch from wearing belts to suspenders to avoid compressing the LFC.  Follow-up in 3 months.  Consider lumbar MedX.    Advised patient to call or return early if symptoms worsen, or having problems controlling bladder and bowel function or worsening leg weakness.     Please note: Voice recognition software was used in this dictation.  It may therefore contain typographical errors.    Ras Viera MD             Again, thank you for allowing me to participate in the care of your patient.        Sincerely,        Ras Viera MD

## 2024-10-23 NOTE — PATIENT INSTRUCTIONS
"Zachary, it was very nice to meet you and I am glad you came in to see me so we could identify and begin treatment for your Pelvic Joint Dysfunction.  It is a little unclear whether this is causing your pain but there is little downside to trying this therapy.  I am looking forward to seeing you back here in 3 months hopefully feeling better and doing more.  Do your self-correction each morning that I taught you as described below.  Consider switching from belts to suspenders to avoid putting pressure on your lateral femoral cutaneous nerve which is causing your right thigh to go numb.    ASSESSMENT: Linden Cruz is a 54 year old male who presents  today for new patient evaluation of:    Chronic low back pain  Pelvic Joint Dysfunction manifesting as a right posterior innominate rotation-indeterminate response to OMT  Right meralgia paresthetica  Otherwise neurologically intact with negative femoral stretch and SLR and negative Kemps test  Status post BALTA x 2: Minimal therapeutic response      PLAN:  Pelvic stabilization PT and self-correction each morning.  I do not think he will need an SI belt given history to build.  We did talk about the likelihood that his right thigh numbness will change but he might want to switch from wearing belts to suspenders to avoid compressing the LFC.  Follow-up in 3 months.  Consider lumbar MedX.        PELVIC JOINT SELF CORRECTION EXERCISES      It is best to do this first thing in the morning, and can be repeated once or twice during the day.    \"SHOTGUN\" TECHNIQUE:  This loosens up the front and back of the pelvis.  Do this before the Broomstick exercise.  Use on object such as a rectangular laundry basket.  Lie on your back with your knees bent, feet together and flat on the floor.    Spread your knees approximately 12-24 inches around the outside of an upright laundry basket.  Squeeze your knees together, breathing as you do this.    Concentrate on keeping your buttocks relaxed " and on the ground.    A brief discomfort in the front of the pelvis and even a popping sound is normal.  Hold the squeeze for 3-5 seconds.    Relax for 3-5 seconds.    Repeat 2 more times.    Now reverse your knee position to the inside of the upside down laundry basket while still lying with your knees bent up, feet on the ground.  Pull your knees apart, breathing easy as you do this.  Hold the squeeze for 3-5 seconds.    Relax for 3-5 seconds.    Repeat 2 more times.    BROOMSTICK EXERCISE:  Lie on your back with your knees bent.  Slide a broomstick or similar object above one knee, and below the opposite knee.  Firmly hold the stick with your hands will bringing your knees closed to your belly, preferably high enough that your back can rest flat on the ground.  Still holding the stick, scissors your legs against the stick, breathing as you do this.    (Push down to your foot with leg on top of the broomstick, and lift up to your chin with the leg below the broomstick).  Hold the squeeze for 3-5 seconds.    Relax for 3-5 seconds.    Repeat 2 more times.  Switch the position of the broomstick above the other knee, and below the first knee.  Repeat the exercise as above in this new position.

## 2024-10-23 NOTE — NURSING NOTE
Reason For Visit:   Chief Complaint   Patient presents with    Consult     Low back pain         Occupation: sales  Currently working? Yes.  Work status?  Full time.    Sports: n  Activities: walking             BP (!) 147/85   Pulse 99   Ht 1.829 m (6')   Wt 103.1 kg (227 lb 4.8 oz)   BMI 30.83 kg/m        Allergies   Allergen Reactions    No Known Drug Allergy        Current Outpatient Medications   Medication Sig Dispense Refill    fluticasone (FLONASE) 50 MCG/ACT nasal spray Spray 2 sprays into both nostrils daily. 16 mL 3    gemfibrozil (LOPID) 600 MG tablet TAKE 1 TABLET BY MOUTH TWICE A  tablet 3    Ibuprofen (ADVIL PO)       levothyroxine (SYNTHROID/LEVOTHROID) 150 MCG tablet Take 1 tablet (150 mcg) by mouth daily. 90 tablet 3    loratadine (CLARITIN) 10 MG tablet TAKE 1 TABLET (10 MG) BY MOUTH DAILY. 90 tablet 0    Multiple Vitamin (MULTI VITAMIN MENS PO) Take  by mouth.      omeprazole (PRILOSEC) 40 MG DR capsule TAKE 1 CAPSULE BY MOUTH EVERY DAY 90 capsule 1    sildenafil (VIAGRA) 100 MG tablet Take 0.5-1 tablet 1 hour prior to intercourse as needed 12 tablet 5    sildenafil (VIAGRA) 50 MG tablet TAKE 0.5-2 TABLETS 1 HOUR PRIOR TO INTERCOURSE AS NEEDED. 8 tablet 0    SUMAtriptan (IMITREX) 100 MG tablet Take 1 tablet (100 mg) by mouth at onset of headache for migraine. Max 2 tablets in 24 hours 12 tablet 3    topiramate (TOPAMAX) 50 MG tablet Take 1.5 tablets (75 mg) by mouth daily. 135 tablet 3    trimethoprim-polymyxin b (POLYTRIM) 77697-0.1 UNIT/ML-% ophthalmic solution Place 1-2 drops into both eyes every 4 hours 10 mL 0     No current facility-administered medications for this visit.         Darla Severin-Brown, LPN

## 2024-11-13 DIAGNOSIS — H69.93 DYSFUNCTION OF BOTH EUSTACHIAN TUBES: ICD-10-CM

## 2024-11-13 DIAGNOSIS — H90.6 MIXED CONDUCTIVE AND SENSORINEURAL HEARING LOSS OF BOTH EARS: ICD-10-CM

## 2024-11-13 RX ORDER — LORATADINE 10 MG/1
1 TABLET ORAL DAILY
Qty: 90 TABLET | Refills: 1 | Status: SHIPPED | OUTPATIENT
Start: 2024-11-13

## 2024-11-18 DIAGNOSIS — K21.9 GASTROESOPHAGEAL REFLUX DISEASE WITHOUT ESOPHAGITIS: ICD-10-CM

## 2024-11-18 DIAGNOSIS — H90.6 MIXED CONDUCTIVE AND SENSORINEURAL HEARING LOSS OF BOTH EARS: ICD-10-CM

## 2024-11-18 DIAGNOSIS — H69.93 DYSFUNCTION OF BOTH EUSTACHIAN TUBES: ICD-10-CM

## 2024-11-18 RX ORDER — OMEPRAZOLE 40 MG/1
CAPSULE, DELAYED RELEASE ORAL
Qty: 90 CAPSULE | Refills: 1 | OUTPATIENT
Start: 2024-11-18

## 2024-11-18 RX ORDER — FLUTICASONE PROPIONATE 50 MCG
2 SPRAY, SUSPENSION (ML) NASAL DAILY
Qty: 48 ML | Refills: 1 | OUTPATIENT
Start: 2024-11-18

## 2024-11-22 PROBLEM — M79.18 MYOFASCIAL PAIN: Status: ACTIVE | Noted: 2024-11-22

## 2024-11-22 PROBLEM — M99.05 SOMATIC DYSFUNCTION OF PELVIS REGION: Status: ACTIVE | Noted: 2024-11-22

## 2024-11-22 PROBLEM — G57.11 MERALGIA PARESTHETICA OF RIGHT SIDE: Status: ACTIVE | Noted: 2024-11-22

## 2024-12-26 ENCOUNTER — THERAPY VISIT (OUTPATIENT)
Dept: PHYSICAL THERAPY | Facility: CLINIC | Age: 54
End: 2024-12-26
Payer: COMMERCIAL

## 2024-12-26 DIAGNOSIS — G57.11 MERALGIA PARESTHETICA OF RIGHT SIDE: Primary | ICD-10-CM

## 2024-12-26 DIAGNOSIS — M99.05 SOMATIC DYSFUNCTION OF PELVIS REGION: ICD-10-CM

## 2024-12-26 DIAGNOSIS — M79.18 MYOFASCIAL PAIN: ICD-10-CM

## 2025-01-17 ENCOUNTER — THERAPY VISIT (OUTPATIENT)
Dept: PHYSICAL THERAPY | Facility: CLINIC | Age: 55
End: 2025-01-17
Payer: COMMERCIAL

## 2025-01-17 DIAGNOSIS — M99.05 SOMATIC DYSFUNCTION OF PELVIS REGION: ICD-10-CM

## 2025-01-17 DIAGNOSIS — G57.11 MERALGIA PARESTHETICA OF RIGHT SIDE: Primary | ICD-10-CM

## 2025-01-17 DIAGNOSIS — M79.18 MYOFASCIAL PAIN: ICD-10-CM

## 2025-01-17 PROCEDURE — 97140 MANUAL THERAPY 1/> REGIONS: CPT | Mod: GP | Performed by: PHYSICAL THERAPIST

## 2025-01-17 PROCEDURE — 97139 UNLISTED THERAPEUTIC PX: CPT | Mod: GP | Performed by: PHYSICAL THERAPIST

## 2025-01-22 NOTE — PROGRESS NOTES
PLAN  Continue therapy per current plan of care.    Beginning/End Dates of Progress Note Reporting Period:  11/22/24 to 01/17/2025    Referring Provider:  Ras Viera       01/17/25 0500   Appointment Info   Signing clinician's name / credentials Amanda Hilligoss, DPKALI, PRPC   Total/Authorized Visits 12 (per MD referral)   Visits Used 7   Medical Diagnosis Meralgia paresthetica of right side  Somatic dysfunction of pelvis region  Myofascial pain   PT Tx Diagnosis Meralgia paresthetica of right side Somatic dysfunction of pelvis region Myofascial pain   Progress Note/Certification   Onset of illness/injury or Date of Surgery 10/23/24  (date of referral)   Therapy Frequency 1x/week   Predicted Duration x12 weeks   Progress Note Due Date 01/20/25   Progress Note Completed Date 11/22/24   Supervision   PT Assistant Visit Number 1   PT Goal 1   Goal Identifier Daily Tasks   Goal Description Patient will be able to complete all home and work tasks w/ pain in low back/glute no worse than 2/10, and no numbness in anterior thigh   Rationale to maximize safety and independence with performance of ADLs and functional tasks;to maximize safety and independence within the home;to maximize safety and independence with self cares   Goal Progress Feels some numbness present all the time, but about 50% of previous area.   Target Date 02/14/25   Subjective Report   Subjective Report Pt notes he had exacerbation of sx after putting Leslie decorations away/ climbing ladder.  Feels more pain in R glute after. Notes he still has numbness in lower thigh (less intense than initially, but worse than last visit). Feels that overall numbness covers a smaller area in his thigh than previously. Does still notice tension/josh in low back first thing in AM.   Objective Measures   Objective Measures Objective Measure 1;Objective Measure 2   Objective Measure 1   Objective Measure SI tests   Details (-) Active SLR, (-) ZAID R, ASIS level,  Medial malleoli level   Objective Measure 2   Objective Measure Palpation   Details continues to have hypomobile fascial R lateral/distal thigh (worst over distal ITB today); hypomobile lumbar fascia   PT Modalities   PT Modalities Cupping   Cupping   Cupping Minutes ([id number]) 16   Skilled Intervention adjustment of technique, intensity, and location based on patient tolerance and tissue response   Treatment Detail dynamic cupping w/ small cup over R anterior and lateral thigh x 8 min (intermittent use of x-small cup and medium cup) + set up; dynamic w/ 2 medium cups over lumbar paraspinals including errector spinae opening and crossing the spine x 6 min total   Patient Response/Progress feels numbness decreased some after cupping   Treatment Interventions (PT)   Interventions Therapeutic Procedure/Exercise;Manual Therapy;Neuromuscular Re-education   Therapeutic Procedure/Exercise   Therapeutic Procedures Ther Proc 2;Ther Proc 3;Ther Proc 4;Ther Proc 5;Ther Proc 6   Ther Proc 1 Gluteal Myofascial Arc Series   Ther Proc 1 - Details HEP   Ther Proc 2 [place] stretch   Ther Proc 2 - Details HEP   Ther Proc 3 ITB Stretch   Ther Proc 3 - Details HEP   Ther Proc 4 Halk kneeling Hip flexor stretch/ repeated hip ext w/ focus on forward/lateral motion   Ther Proc 4 - Details HEP   Ther Proc 6 Standing hip flexor/quad stretch   Ther Proc 6 - Details HEP   Skilled Intervention selection of appropriate exercises based on objective findings and patient response   Patient Response/Progress good stretch felt in lateral hip w/ standing pigeon and half kneeling hip exte   Neuromuscular Re-education   Neuro Re-ed 1 Nerve flossing   Neuro Re-ed 1 - Details reviewed for HEP   Manual Therapy   Manual Therapy: Mobilization, MFR, MLD, friction massage minutes ([id number]) 24   Manual Therapy Manual Therapy 2   Manual Therapy 2 MFR   Manual Therapy 2 - Details supine: fascial rolling R lateral and anterior thigh, before and after cupping x 14 min  total; prone lumbar fascial glides/fascial rolling x 10 min total   Skilled Intervention adjustment of technique, intensity, and location based on patient tolerance and tissue response   Patient Response/Progress fascial mobility continuing to improve   Education   Learner/Method Patient;Listening;Reading;Demonstration;Pictures/Video;No Barriers to Learning   Plan   Home program PTRx initiated   Plan for next session continue nerve flossing, MT, cupping as indicated   Total Session Time   Timed Code Treatment Minutes 40   Total Treatment Time (sum of timed and untimed services) 40

## 2025-02-09 ENCOUNTER — APPOINTMENT (OUTPATIENT)
Dept: CT IMAGING | Facility: CLINIC | Age: 55
End: 2025-02-09
Attending: NURSE PRACTITIONER
Payer: COMMERCIAL

## 2025-02-09 ENCOUNTER — HOSPITAL ENCOUNTER (EMERGENCY)
Facility: CLINIC | Age: 55
Discharge: HOME | End: 2025-02-09
Attending: NURSE PRACTITIONER | Admitting: NURSE PRACTITIONER
Payer: COMMERCIAL

## 2025-02-09 VITALS
DIASTOLIC BLOOD PRESSURE: 71 MMHG | HEART RATE: 67 BPM | RESPIRATION RATE: 18 BRPM | WEIGHT: 217 LBS | BODY MASS INDEX: 29.39 KG/M2 | SYSTOLIC BLOOD PRESSURE: 129 MMHG | HEIGHT: 72 IN | TEMPERATURE: 98.3 F | OXYGEN SATURATION: 93 %

## 2025-02-09 DIAGNOSIS — N20.1 RIGHT URETERAL STONE: ICD-10-CM

## 2025-02-09 LAB
ALBUMIN SERPL BCG-MCNC: 4.9 G/DL (ref 3.5–5.2)
ALBUMIN UR-MCNC: 10 MG/DL
ALP SERPL-CCNC: 155 U/L (ref 40–150)
ALT SERPL W P-5'-P-CCNC: 22 U/L (ref 0–70)
AMORPH CRY #/AREA URNS HPF: ABNORMAL /HPF
ANION GAP SERPL CALCULATED.3IONS-SCNC: 16 MMOL/L (ref 7–15)
APPEARANCE UR: ABNORMAL
AST SERPL W P-5'-P-CCNC: 25 U/L (ref 0–45)
BASOPHILS # BLD AUTO: 0.1 10E3/UL (ref 0–0.2)
BASOPHILS NFR BLD AUTO: 0 %
BILIRUB SERPL-MCNC: 0.6 MG/DL
BILIRUB UR QL STRIP: NEGATIVE
BUN SERPL-MCNC: 16.9 MG/DL (ref 6–20)
CALCIUM SERPL-MCNC: 9.2 MG/DL (ref 8.8–10.4)
CHLORIDE SERPL-SCNC: 104 MMOL/L (ref 98–107)
COLOR UR AUTO: YELLOW
CREAT SERPL-MCNC: 0.91 MG/DL (ref 0.67–1.17)
EGFRCR SERPLBLD CKD-EPI 2021: >90 ML/MIN/1.73M2
EOSINOPHIL # BLD AUTO: 0 10E3/UL (ref 0–0.7)
EOSINOPHIL NFR BLD AUTO: 0 %
ERYTHROCYTE [DISTWIDTH] IN BLOOD BY AUTOMATED COUNT: 12.4 % (ref 10–15)
GLUCOSE SERPL-MCNC: 138 MG/DL (ref 70–99)
GLUCOSE UR STRIP-MCNC: NEGATIVE MG/DL
HCO3 SERPL-SCNC: 20 MMOL/L (ref 22–29)
HCT VFR BLD AUTO: 41.4 % (ref 40–53)
HGB BLD-MCNC: 14.5 G/DL (ref 13.3–17.7)
HGB UR QL STRIP: ABNORMAL
IMM GRANULOCYTES # BLD: 0 10E3/UL
IMM GRANULOCYTES NFR BLD: 0 %
KETONES UR STRIP-MCNC: 60 MG/DL
LEUKOCYTE ESTERASE UR QL STRIP: NEGATIVE
LYMPHOCYTES # BLD AUTO: 1.2 10E3/UL (ref 0.8–5.3)
LYMPHOCYTES NFR BLD AUTO: 9 %
MCH RBC QN AUTO: 31.9 PG (ref 26.5–33)
MCHC RBC AUTO-ENTMCNC: 35 G/DL (ref 31.5–36.5)
MCV RBC AUTO: 91 FL (ref 78–100)
MONOCYTES # BLD AUTO: 0.3 10E3/UL (ref 0–1.3)
MONOCYTES NFR BLD AUTO: 2 %
MUCOUS THREADS #/AREA URNS LPF: PRESENT /LPF
NEUTROPHILS # BLD AUTO: 10.8 10E3/UL (ref 1.6–8.3)
NEUTROPHILS NFR BLD AUTO: 88 %
NITRATE UR QL: NEGATIVE
NRBC # BLD AUTO: 0 10E3/UL
NRBC BLD AUTO-RTO: 0 /100
PH UR STRIP: 7.5 [PH] (ref 5–7)
PLATELET # BLD AUTO: 326 10E3/UL (ref 150–450)
POTASSIUM SERPL-SCNC: 3.5 MMOL/L (ref 3.4–5.3)
PROT SERPL-MCNC: 7.7 G/DL (ref 6.4–8.3)
RBC # BLD AUTO: 4.54 10E6/UL (ref 4.4–5.9)
RBC URINE: >182 /HPF
SODIUM SERPL-SCNC: 140 MMOL/L (ref 135–145)
SP GR UR STRIP: 1.02 (ref 1–1.03)
UROBILINOGEN UR STRIP-MCNC: NORMAL MG/DL
WBC # BLD AUTO: 12.4 10E3/UL (ref 4–11)
WBC URINE: 0 /HPF
YEAST #/AREA URNS HPF: ABNORMAL /HPF

## 2025-02-09 PROCEDURE — 96375 TX/PRO/DX INJ NEW DRUG ADDON: CPT | Performed by: NURSE PRACTITIONER

## 2025-02-09 PROCEDURE — 74176 CT ABD & PELVIS W/O CONTRAST: CPT

## 2025-02-09 PROCEDURE — 96361 HYDRATE IV INFUSION ADD-ON: CPT | Performed by: NURSE PRACTITIONER

## 2025-02-09 PROCEDURE — 80053 COMPREHEN METABOLIC PANEL: CPT | Performed by: NURSE PRACTITIONER

## 2025-02-09 PROCEDURE — 250N000011 HC RX IP 250 OP 636: Mod: JZ | Performed by: NURSE PRACTITIONER

## 2025-02-09 PROCEDURE — 85025 COMPLETE CBC W/AUTO DIFF WBC: CPT | Performed by: NURSE PRACTITIONER

## 2025-02-09 PROCEDURE — 99284 EMERGENCY DEPT VISIT MOD MDM: CPT | Performed by: NURSE PRACTITIONER

## 2025-02-09 PROCEDURE — 96374 THER/PROPH/DIAG INJ IV PUSH: CPT | Performed by: NURSE PRACTITIONER

## 2025-02-09 PROCEDURE — 99285 EMERGENCY DEPT VISIT HI MDM: CPT | Mod: 25 | Performed by: NURSE PRACTITIONER

## 2025-02-09 PROCEDURE — 81001 URINALYSIS AUTO W/SCOPE: CPT | Performed by: NURSE PRACTITIONER

## 2025-02-09 PROCEDURE — 258N000003 HC RX IP 258 OP 636: Performed by: NURSE PRACTITIONER

## 2025-02-09 PROCEDURE — 96376 TX/PRO/DX INJ SAME DRUG ADON: CPT | Performed by: NURSE PRACTITIONER

## 2025-02-09 PROCEDURE — 36415 COLL VENOUS BLD VENIPUNCTURE: CPT | Performed by: NURSE PRACTITIONER

## 2025-02-09 RX ORDER — OXYCODONE HYDROCHLORIDE 5 MG/1
5 TABLET ORAL EVERY 6 HOURS PRN
Qty: 12 TABLET | Refills: 0 | Status: SHIPPED | OUTPATIENT
Start: 2025-02-09

## 2025-02-09 RX ORDER — HYDROMORPHONE HYDROCHLORIDE 1 MG/ML
0.5 INJECTION, SOLUTION INTRAMUSCULAR; INTRAVENOUS; SUBCUTANEOUS EVERY 30 MIN PRN
Status: DISCONTINUED | OUTPATIENT
Start: 2025-02-09 | End: 2025-02-10 | Stop reason: HOSPADM

## 2025-02-09 RX ORDER — ONDANSETRON 4 MG/1
4 TABLET, ORALLY DISINTEGRATING ORAL EVERY 8 HOURS PRN
Qty: 10 TABLET | Refills: 0 | Status: SHIPPED | OUTPATIENT
Start: 2025-02-09

## 2025-02-09 RX ORDER — KETOROLAC TROMETHAMINE 15 MG/ML
15 INJECTION, SOLUTION INTRAMUSCULAR; INTRAVENOUS ONCE
Status: COMPLETED | OUTPATIENT
Start: 2025-02-09 | End: 2025-02-09

## 2025-02-09 RX ORDER — ONDANSETRON 2 MG/ML
4 INJECTION INTRAMUSCULAR; INTRAVENOUS EVERY 30 MIN PRN
Status: DISCONTINUED | OUTPATIENT
Start: 2025-02-09 | End: 2025-02-10 | Stop reason: HOSPADM

## 2025-02-09 RX ADMIN — ONDANSETRON 4 MG: 2 INJECTION, SOLUTION INTRAMUSCULAR; INTRAVENOUS at 20:44

## 2025-02-09 RX ADMIN — ONDANSETRON 4 MG: 2 INJECTION, SOLUTION INTRAMUSCULAR; INTRAVENOUS at 21:15

## 2025-02-09 RX ADMIN — HYDROMORPHONE HYDROCHLORIDE 0.5 MG: 1 INJECTION, SOLUTION INTRAMUSCULAR; INTRAVENOUS; SUBCUTANEOUS at 20:46

## 2025-02-09 RX ADMIN — HYDROMORPHONE HYDROCHLORIDE 0.5 MG: 1 INJECTION, SOLUTION INTRAMUSCULAR; INTRAVENOUS; SUBCUTANEOUS at 21:15

## 2025-02-09 RX ADMIN — SODIUM CHLORIDE 1000 ML: 0.9 INJECTION, SOLUTION INTRAVENOUS at 20:47

## 2025-02-09 RX ADMIN — KETOROLAC TROMETHAMINE 15 MG: 15 INJECTION, SOLUTION INTRAMUSCULAR; INTRAVENOUS at 20:45

## 2025-02-09 ASSESSMENT — COLUMBIA-SUICIDE SEVERITY RATING SCALE - C-SSRS
2. HAVE YOU ACTUALLY HAD ANY THOUGHTS OF KILLING YOURSELF IN THE PAST MONTH?: NO
1. IN THE PAST MONTH, HAVE YOU WISHED YOU WERE DEAD OR WISHED YOU COULD GO TO SLEEP AND NOT WAKE UP?: NO

## 2025-02-09 ASSESSMENT — ACTIVITIES OF DAILY LIVING (ADL)
ADLS_ACUITY_SCORE: 41
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
## 2025-02-10 NOTE — ED TRIAGE NOTES
"Pt reports that he has been experiencing right flank pain that radiates into right groin that has been ongoing for \"years,\" but pain intensified last night. Vomiting today.      Triage Assessment (Adult)       Row Name 02/09/25 1944          Triage Assessment    Airway WDL WDL        Respiratory WDL    Respiratory WDL WDL        Skin Circulation/Temperature WDL    Skin Circulation/Temperature WDL WDL        Cardiac WDL    Cardiac WDL WDL        Peripheral/Neurovascular WDL    Peripheral Neurovascular WDL WDL        Cognitive/Neuro/Behavioral WDL    Cognitive/Neuro/Behavioral WDL WDL                     "

## 2025-02-10 NOTE — DISCHARGE INSTRUCTIONS
Drink plenty of fluids.  Zofran 4 mg oral disintegrating tablet every 8 hours as needed for nausea.   Tylenol 650 mg every 4-6 hours as needed for pain.  Ibuprofen 600 mg every 6-8 hours as needed for pain  (take with food, stop if causing stomach pains.)  Oxycodone 5 mg every 6 hours as needed for moderate/severe pain.  Do not drive or drink alcohol with this medication.    I have sent referral for visit with urology if you are not passing the stone in the next couple days.  Someone should contact you.    Return to the emergency department for fevers, vomiting and unable to keep fluids down, not tolerating the pain, or any other symptoms of concern.

## 2025-02-11 ENCOUNTER — VIRTUAL VISIT (OUTPATIENT)
Dept: UROLOGY | Facility: CLINIC | Age: 55
End: 2025-02-11
Attending: NURSE PRACTITIONER
Payer: COMMERCIAL

## 2025-02-11 DIAGNOSIS — N20.1 RIGHT URETERAL STONE: Primary | ICD-10-CM

## 2025-02-11 RX ORDER — DIMENHYDRINATE 50 MG
50 TABLET ORAL
Qty: 30 TABLET | Refills: 0 | Status: SHIPPED | OUTPATIENT
Start: 2025-02-11

## 2025-02-11 RX ORDER — TAMSULOSIN HYDROCHLORIDE 0.4 MG/1
0.4 CAPSULE ORAL DAILY
Qty: 30 CAPSULE | Refills: 0 | Status: SHIPPED | OUTPATIENT
Start: 2025-02-11

## 2025-02-11 NOTE — PROGRESS NOTES
Urology Video Office Visit    Video-Visit Details    Type of service:  Video Visit    Video Start Time: 0900    Video End Time: 0936    Originating Location (pt. Location): Home    Distant Location (provider location):  Off-site     Platform used for Video Visit: ThumbAd           Assessment and Plan:     Assessment: 54 year old male with a right 4mm mid ureteral stone    Plan:  -Reviewed CT scan with patient. Noted right mid ureteral stone. Noted bilateral nonobstructing stones.   -Discussed that stone has a high likelihood of passage. Discussed option of continuing with MET x 3-4 weeks vs a definitive stone procedure. He would like to continue with MET at this time.   -Please use acetaminophen, ibuprofen, dimenhydrinate, and oxycodone PRN for pain control. Will start on tamsulosin 0.4mg PO daily to help with stone passage.   -If having severe flank pain, fevers, chills, nausea, or vomiting please notify Urology clinic or be seen in the ER.   -RTC in 4 weeks with CT scan     Susana Mead CNP  Department of Urology  February 10, 2025    I spent a total of 35 minutes spent on the date of the encounter doing chart review, history and exam, documentation, and further activities as noted above.          Chief Complaint:   Right Ureteral Stone         History of Present Illness:    Linden Cruz is a pleasant 54 year old male who presents with concerns of a right ureteral stone. PMHx: Migraine, DDD, hypothyroidism,  HLD, and ED.    Mr. Cruz was seen in the ED on 2/9/25 for concerns of right flank pain.     CT scan on 2/9/25 (images personally reviewed) revealed a right 4mm mid ureteral stone with HUN. Noted right nonobstructing renal stone. Noted nonobstructing left renal stones without hydronephrosis.     Intermittent right groin pain. Is using OTC analgesics and oxycodone PRN with good pain control at this time. Denies any f/c/n/v, gross hematuria, or dysuria.     This is his first stone episode. Family  history of nephrolithiasis.     Stone Risk Factors: topiramate for migraines. He has been on for several years.  Dosage was just increased d/t uncontrolled migraines.          Past Medical History:     Past Medical History:   Diagnosis Date    Chest pain     Esophageal reflux     Migraines     Mixed hyperlipidemia     DANIEL (obstructive sleep apnea)     Other specified gastritis without mention of hemorrhage     H. Pylori, diag 12/06            Past Surgical History:     Past Surgical History:   Procedure Laterality Date    CHOLECYSTECTOMY, LAPOROSCOPIC  2004    COLONOSCOPY N/A 4/15/2022    Procedure: COLONOSCOPY, WITH POLYPECTOMY;  Surgeon: Kenney Lee DO;  Location: [facility]    INJECT EPIDURAL LUMBAR Right 1/10/2020    Procedure: INJECTION, SPINE, LUMBAR 5 and Sacral 1 Bilateral EPIDURAL;  Surgeon: Jose C Fields MD;  Location: [facility]    INJECT EPIDURAL TRANSFORAMINAL Bilateral 7/8/2021    Procedure: Bilateral L5-S1 transforaminal Epidural Steroid Injection with fluoroscopic guidance and contrast.;  Surgeon: Jose C Fields MD;  Location: [facility]    SURGICAL HISTORY OF -   12/2006    upper GI, [facility]            Medications     Current Outpatient Medications   Medication Sig Dispense Refill    fluticasone (FLONASE) 50 MCG/ACT nasal spray Spray 2 sprays into both nostrils daily. 16 mL 3    gemfibrozil (LOPID) 600 MG tablet TAKE 1 TABLET BY MOUTH TWICE A  tablet 3    Ibuprofen (ADVIL PO)       levothyroxine (SYNTHROID/LEVOTHROID) 150 MCG tablet Take 1 tablet (150 mcg) by mouth daily. 90 tablet 3    loratadine (CLARITIN) 10 MG tablet TAKE 1 TABLET (10 MG) BY MOUTH DAILY. 90 tablet 1    Multiple Vitamin (MULTI VITAMIN MENS PO) Take  by mouth.      omeprazole (PRILOSEC) 40 MG DR capsule TAKE 1 CAPSULE BY MOUTH EVERY DAY 90 capsule 1    ondansetron (ZOFRAN ODT) 4 MG ODT tab Take 1 tablet (4 mg) by mouth every 8 hours as needed for nausea. 10 tablet 0    oxyCODONE (ROXICODONE) 5 MG tablet Take 1 tablet (5  mg) by mouth every 6 hours as needed for severe pain. 12 tablet 0    sildenafil (VIAGRA) 100 MG tablet Take 0.5-1 tablet 1 hour prior to intercourse as needed 12 tablet 5    sildenafil (VIAGRA) 50 MG tablet TAKE 0.5-2 TABLETS 1 HOUR PRIOR TO INTERCOURSE AS NEEDED. 8 tablet 0    SUMAtriptan (IMITREX) 100 MG tablet Take 1 tablet (100 mg) by mouth at onset of headache for migraine. Max 2 tablets in 24 hours 12 tablet 3    topiramate (TOPAMAX) 50 MG tablet Take 1.5 tablets (75 mg) by mouth daily. 135 tablet 3    trimethoprim-polymyxin b (POLYTRIM) 02643-2.1 UNIT/ML-% ophthalmic solution Place 1-2 drops into both eyes every 4 hours 10 mL 0     No current facility-administered medications for this visit.            Family History:     Family History   Problem Relation Age of Onset    Diabetes Maternal Grandmother     C.A.D. Maternal Grandfather     Diabetes Maternal Grandfather     Cancer Maternal Grandfather         lung cancer    Cerebrovascular Disease Paternal Grandmother     C.A.D. Paternal Grandfather     Other - See Comments Mother         bilateral carotid occlusion, SVG bypass to both.    Asthma No family hx of     Hypertension No family hx of     Breast Cancer No family hx of     Cancer - colorectal No family hx of     Prostate Cancer No family hx of     Alcohol/Drug No family hx of     Allergies No family hx of     Alzheimer Disease No family hx of     Anesthesia Reaction No family hx of     Arthritis No family hx of     Blood Disease No family hx of     Cardiovascular No family hx of     Circulatory No family hx of     Congenital Anomalies No family hx of     Connective Tissue Disorder No family hx of     Depression No family hx of     Endocrine Disease No family hx of     Eye Disorder No family hx of     Genetic Disorder No family hx of     Gastrointestinal Disease No family hx of     Genitourinary Problems No family hx of     Gynecology No family hx of     Heart Disease No family hx of     Lipids No family  hx of     Musculoskeletal Disorder No family hx of     Neurologic Disorder No family hx of     Obesity No family hx of     Osteoporosis No family hx of     Psychotic Disorder No family hx of     Respiratory No family hx of     Thyroid Disease No family hx of     Hearing Loss No family hx of     Coronary Artery Disease No family hx of     Mental Illness No family hx of     Depression/Anxiety No family hx of     Known Genetic Syndrome No family hx of             Social History:     Social History     Socioeconomic History    Marital status:      Spouse name: Not on file    Number of children: 0    Years of education: Not on file    Highest education level: Not on file   Occupational History     Employer: [employer]     Comment: [address] [employer]   Tobacco Use    Smoking status: Former     Current packs/day: 0.00     Types: Cigarettes     Quit date: 2000     Years since quittin.1     Passive exposure: Past    Smokeless tobacco: Never   Vaping Use    Vaping status: Never Used   Substance and Sexual Activity    Alcohol use: Yes     Alcohol/week: 0.0 standard drinks of alcohol     Comment: 1/month    Drug use: No    Sexual activity: Not Currently     Partners: Female     Birth control/protection: Surgical     Comment: vasectomy   Other Topics Concern    Parent/sibling w/ CABG, MI or angioplasty before 65F 55M? No     Service Not Asked    Blood Transfusions Not Asked    Caffeine Concern Yes     Comment: large intake per day    Occupational Exposure Not Asked    Hobby Hazards Not Asked    Sleep Concern Not Asked    Stress Concern Not Asked    Weight Concern Not Asked    Special Diet Yes     Comment: low cholesterol     Back Care Not Asked    Exercise No    Bike Helmet Not Asked    Seat Belt Not Asked    Self-Exams Not Asked   Social History Narrative    Dairy/d 2-3 servings/d.     Caffeine 2-3 servings/d    Exercise 1-2 x week    Sunscreen used - NO, uses sunscreen in summer months    Seatbelts  used - YES    Working smoke/CO detectors in the home - YES    Guns stored in the home - YES    Self Breast Exams - NA    Self Testicular Exam - YES    Eye Exam up to date - YES    Dental Exam up to date - YES    Pap Smear up to date - NA    Mammogram up to date - NA    PSA up to date - NA    Dexa Scan up to date - NA    Flex Sig / Colonoscopy up to date - NA    Immunizations up to date - YES    Abuse: Current or Past(Physical, Sexual or Emotional)- NO    Do you feel safe in your environment - YES    Claudia Salamanca, [facility]  1/29/2010             Social Drivers of Health     Financial Resource Strain: Low Risk  (9/23/2024)    Financial Resource Strain     Within the past 12 months, have you or your family members you live with been unable to get utilities (heat, electricity) when it was really needed?: No   Food Insecurity: Low Risk  (9/23/2024)    Food Insecurity     Within the past 12 months, did you worry that your food would run out before you got money to buy more?: No     Within the past 12 months, did the food you bought just not last and you didn t have money to get more?: No   Transportation Needs: Low Risk  (9/23/2024)    Transportation Needs     Within the past 12 months, has lack of transportation kept you from medical appointments, getting your medicines, non-medical meetings or appointments, work, or from getting things that you need?: No   Physical Activity: Unknown (9/23/2024)    Exercise Vital Sign     Days of Exercise per Week: 1 day     Minutes of Exercise per Session: Not on file   Stress: Stress Concern Present (9/23/2024)    [employer] [place] [employer] - Occupational Stress Questionnaire     Feeling of Stress : To some extent   Social Connections: Unknown (9/23/2024)    Social Connection and Isolation Panel [NHANES]     Frequency of Communication with Friends and Family: Not on file     Frequency of Social Gatherings with Friends and Family: Once a week     Attends Gnosticism Services:  Not on file     Active Member of Clubs or Organizations: Not on file     Attends Club or Organization Meetings: Not on file     Marital Status: Not on file   Interpersonal Safety: Low Risk  (11/22/2024)    Interpersonal Safety     Do you feel physically and emotionally safe where you currently live?: Yes     Within the past 12 months, have you been hit, slapped, kicked or otherwise physically hurt by someone?: No     Within the past 12 months, have you been humiliated or emotionally abused in other ways by your partner or ex-partner?: No   Housing Stability: Low Risk  (9/23/2024)    Housing Stability     Do you have housing? : Yes     Are you worried about losing your housing?: No            Allergies:   No known drug allergy         Review of Systems:  From intake questionnaire   Negative 14 system review except as noted on HPI, nurse's note.         Physical Exam:   General Appearance: Well groomed, hygenic  Eyes: No redness, discharge  Respiratory: No cough, no respiratory distress or labored breathing  Musculoskeletal: Grossly normal, full range of motion in upper extremities, no gross deficits  Skin: No discoloration or apparent rashes  Neurologic - No tremors  Psychiatric - Alert and oriented  The rest of a comprehensive physical examination is deferred due to video visit restrictions        Labs:    I personally reviewed all applicable laboratory data and went over findings with patient  Significant for:    CBC RESULTS:  Recent Labs   Lab Test 02/09/25 2041 04/20/20  0918   WBC 12.4* 6.7   HGB 14.5 14.6    319        BMP RESULTS:  Recent Labs   Lab Test 02/09/25  2041 10/09/23  1732 07/24/23  0731 03/10/22  0747 06/04/21  1441 04/20/20  0918 01/27/20  0825 01/11/19  0818    140 141 141 140 140 140 139   POTASSIUM 3.5 3.8 3.9 4.1 3.6 3.6 4.3 4.1   CHLORIDE 104 108* 108* 112* 110* 110* 114* 111*   CO2 20* 20* 23 23 24 23 20 23   ANIONGAP 16* 12 10 6 6 7 6 5   * 118* 97 112* 89 104* 103* 92    BUN 16.9 14.9 13.9 14 12 17 16 11   CR 0.91 0.89 1.09 0.92 1.03 0.95 0.85 0.85   GFRESTIMATED >90 >90 82 >90 84 >90 >90 >90   GFRESTBLACK  --   --   --   --  >90 >90 >90 >90   JEFFY 9.2 9.2 8.9 8.7 8.2* 8.6 8.4* 8.3*       UA RESULTS:   Recent Labs   Lab Test 02/09/25  2047 10/24/18  0840   SG 1.022 <=1.005   URINEPH 7.5* 5.5   NITRITE Negative Negative   RBCU >182*  --    WBCU 0  --        CALCIUM RESULTS  Lab Results   Component Value Date    JEFFY 9.2 02/09/2025    JEFFY 9.2 10/09/2023    JEFFY 8.9 07/24/2023    JEFFY 8.2 06/04/2021    JEFFY 8.6 04/20/2020    JEFFY 8.4 01/27/2020           Imaging:    I personally reviewed all applicable imaging and went over the below findings with patient.    Results for orders placed or performed during the hospital encounter of 02/09/25   CT Abdomen Pelvis w/o Contrast    Narrative    EXAM: CT ABDOMEN PELVIS W/O CONTRAST  LOCATION: [facility]  DATE: 2/9/2025    INDICATION: right flank pain radiating to RLQ and groin  COMPARISON: None.  TECHNIQUE: CT scan of the abdomen and pelvis was performed without IV contrast. Multiplanar reformats were obtained. Dose reduction techniques were used.  CONTRAST: None.    FINDINGS:   LOWER CHEST: Normal.    HEPATOBILIARY: Cholecystectomy. No bile duct dilatation. Mild diffuse hepatic steatosis. Benign 2 cm right hepatic lobe cyst requires no follow-up. A 2.5 x 2.5 x 1.5 cm focus of low attenuation and capsular traction anterior aspect hepatic segment IV/V   probably represents benign focal fatty infiltration (series 3 images 56).    PANCREAS: Normal.    SPLEEN: Normal.    ADRENAL GLANDS: Normal.    KIDNEYS/BLADDER: A 4 x 2 x 2 mm stone mid right ureter at the pelvic brim causing mild proximal hydronephrosis and ureterectasis. Nonobstructing 4 mm stone lower pole right kidney. Three small 2 mm nonobstructing left lower pole stones. Benign 3 cm right   renal cyst requires no follow-up.    BOWEL: Normal appendix. No bowel  obstruction or inflammatory change.    LYMPH NODES: Normal.    VASCULATURE: Normal.    PELVIC ORGANS: Normal.    MUSCULOSKELETAL: Degenerative disc disease lumbosacral interspace.      Impression    IMPRESSION:   1.  A 4 mm obstructing right midureteral stone.  2.  Nonobstructing 4 mm right renal stone and three small 2 mm nonobstructing left renal stones.   3.  A 2.5 x 2.5 x 1.5 cm focus of low attenuation and capsular traction anterior aspect hepatic segment IV/V probably represents benign focal fatty infiltration. As a conservative measure as it recommend comparison with any older available outside   abdominal imaging to confirm stability or nonurgent MRI liver.

## 2025-02-11 NOTE — PATIENT INSTRUCTIONS
UROLOGY CLINIC VISIT PATIENT INSTRUCTIONS    -If having severe flank pain, fevers, chills, nausea, or vomiting please notify Urology clinic or be seen in the ER.       If you have any issues, questions or concerns in the meantime, do not hesitate to contact us at [facility] at [phone] or via [employer].     Susana Mead, CNP  Department of Urology      Medicines to Control Your Kidney Stone Symptoms    Control Pain: First Line Treatment    Dramamine (Please use the drowsy version, nongeneric formulation)  Available over the counter  **This medicine will cause increased drowsiness. DON T DRIVE OR OPERATE MACHINERY FOR 6 HOURS**    How to take:   Take 50 mg at bedtime every night until the stone passes  In addition, take 50 mg every 6 hours as needed    What it does:  Decreases spasm of the ureter  Decreases recurrence of pain for next 24 hours  Decreases severe pain  Decreases nausea  Will help you sleep    Ibuprofen (Advil or Motrin)  Available over the counter  **Please do not take if advise to avoid NSAIDS, history of stomach ulcers/bleeding issues, blood thinners, or already on NSAIDS**    How to take:   Take 2 to 4 (200 mg) tablets every 6 hours for the first 48 hours. After that, use only as needed    What it does:  Decreases pain  Prevents spasm of the ureter    Acetaminophen (Tylenol)   Available over the counter    How to Take:  Take 2 (500 mg) tablets every 6 hours as needed. Do not exceed 8 tablets (4,000 mg total) in 24 hours    What it does:  Highly effective in controlling pain      Control pain: second line treatment (if you still have severe pain 1 hour after trying all of the above)    Narcotics (oxycodone)     How to take   Take 1 tablets every 6 hours as needed    Narcotics have major side effects:   Confusion, disorientation and sleepiness. DO NOT DRIVE OR OPERATE MACHINERY WITHIN 24 HOURS.   Nausea. Take Dramamine, Zofran or Haldol to help control this.   May cause  constipation (hard, dry stools). Start over-the-counter Miralax (1/2 to 1 capful) as needed if experiencing constipation.   Trouble sleeping      Other medicines we may give you:    Tamsulosin (Flomax): Take 0.4 mg daily with food     What it does:   May decrease stone pain   May help stones pass faster   May make surgery more successful by improving access to stone   May decrease discomfort from ureteral stent, if used    Possible side effects:   Lightheadedness when standing too quickly (especially in older people)   Stuffy nose

## 2025-02-11 NOTE — NURSING NOTE
Current patient location: [address] [facility] [address]    Is the patient currently in the state of [place]? YES    Visit mode: VIDEO    If the visit is dropped, the patient can be reconnected by:VIDEO VISIT: Text to cell phone:   Telephone Information:   Mobile [phone]   Mobile Not on file.       Will anyone else be joining the visit? NO  (If patient encounters technical issues they should call [phone])    Are changes needed to the allergy or medication list? Yes pt has requested removal of medications     Are refills needed on medications prescribed by this physician? NO- new pt     Rooming Documentation:  Not applicable    Reason for visit: Consult (Right ureteral stone - CT 2/10 )    Sherry Osorio VVF    PT has pain in upper abdomen lately- has had ongoing pain from the kidney stones that come and go.

## 2025-02-14 ENCOUNTER — MYC MEDICAL ADVICE (OUTPATIENT)
Dept: UROLOGY | Facility: CLINIC | Age: 55
End: 2025-02-14
Payer: COMMERCIAL

## 2025-02-18 ENCOUNTER — VIRTUAL VISIT (OUTPATIENT)
Dept: UROLOGY | Facility: CLINIC | Age: 55
End: 2025-02-18
Payer: COMMERCIAL

## 2025-02-18 VITALS — WEIGHT: 210 LBS | HEIGHT: 72 IN | BODY MASS INDEX: 28.44 KG/M2

## 2025-02-18 DIAGNOSIS — R10.9 RIGHT FLANK PAIN: ICD-10-CM

## 2025-02-18 DIAGNOSIS — N20.1 RIGHT URETERAL STONE: Primary | ICD-10-CM

## 2025-02-18 RX ORDER — OXYCODONE HYDROCHLORIDE 5 MG/1
5 TABLET ORAL EVERY 6 HOURS PRN
Qty: 12 TABLET | Refills: 0 | Status: SHIPPED | OUTPATIENT
Start: 2025-02-18

## 2025-02-18 ASSESSMENT — PAIN SCALES - GENERAL: PAINLEVEL_OUTOF10: MILD PAIN (3)

## 2025-02-18 NOTE — NURSING NOTE
Current patient location: [address] [facility] [address]    Is the patient currently in the state of [place]? YES    Visit mode: VIDEO    If the visit is dropped, the patient can be reconnected by:VIDEO VISIT: Text to cell phone:   Telephone Information:   Mobile [phone]   Mobile Not on file.       Will anyone else be joining the visit? NO  (If patient encounters technical issues they should call [phone])    Are changes needed to the allergy or medication list? No    Are refills needed on medications prescribed by this physician? NO    Rooming Documentation:  Questionnaire(s) completed    Reason for visit: Consult    Bobbilynn Grossaint VVF

## 2025-02-18 NOTE — PROGRESS NOTES
[facility]   CHIEF COMPLAINT   It was my pleasure to see Linden Cruz who is a 54 year old male for follow-up of RIGHT ureteral stone.      HPI   Linden Cruz is a very pleasant 54 year old male    Initially seen by Susana Mead 2/11/2025:  Linden Curz is a pleasant 54 year old male who presents with concerns of a right ureteral stone. PMHx: Migraine, DDD, hypothyroidism,  HLD, and ED.     Mr. Cruz was seen in the ED on 2/9/25 for concerns of right flank pain.      CT scan on 2/9/25 (images personally reviewed) revealed a right 4mm mid ureteral stone with HUN. Noted right nonobstructing renal stone. Noted nonobstructing left renal stones without hydronephrosis.      Intermittent right groin pain. Is using OTC analgesics and oxycodone PRN with good pain control at this time. Denies any f/c/n/v, gross hematuria, or dysuria.      This is his first stone episode. Family history of nephrolithiasis.      Stone Risk Factors: topiramate for migraines. He has been on for several years.  Dosage was just increased d/t uncontrolled migraines.     TODAY 2/18/2025:  He has been having consistent pain   Managing with Ibuprofen - 800mg every 6 hours and Tylenol every 6 hours  Taking Oxycodone at night to help sleep   Taking tamsulosin 0.4mg (Flomax)     PHYSICAL EXAM  Patient is a 54 year old  male   Vitals: Height 1.829 m (6'), weight 95.3 kg (210 lb).  Body mass index is 28.48 kg/m .  General Appearance Adult:   Alert, no acute distress, oriented  Neuro: Alert, oriented, speech and mentation normal  Psych: affect and mood normal     UA RESULTS:  Recent Labs   Lab Test 02/09/25 2047 10/24/18  0840   COLOR Yellow Yellow   APPEARANCE Slightly Cloudy* Clear   URINEGLC Negative Negative   URINEBILI Negative Negative   URINEKETONE 60* Negative   SG 1.022 <=1.005   UBLD Large* Negative   URINEPH 7.5* 5.5   PROTEIN 10* Negative   UROBILINOGEN  --  0.2   NITRITE Negative Negative   LEUKEST Negative Negative   RBCU >182*   --    WBCU 0  --       Creatinine   Date Value Ref Range Status   02/09/2025 0.91 0.67 - 1.17 mg/dL Final   06/04/2021 1.03 0.66 - 1.25 mg/dL Final      IMAGING:  All pertinent imaging reviewed:    All imaging studies reviewed by me.  I personally reviewed these imaging films.  A formal report from radiology will follow.    CT ABD/PEL 2/9/2025:  FINDINGS:   LOWER CHEST: Normal.     HEPATOBILIARY: Cholecystectomy. No bile duct dilatation. Mild diffuse hepatic steatosis. Benign 2 cm right hepatic lobe cyst requires no follow-up. A 2.5 x 2.5 x 1.5 cm focus of low attenuation and capsular traction anterior aspect hepatic segment IV/V   probably represents benign focal fatty infiltration (series 3 images 56).     PANCREAS: Normal.     SPLEEN: Normal.     ADRENAL GLANDS: Normal.     KIDNEYS/BLADDER: A 4 x 2 x 2 mm stone mid right ureter at the pelvic brim causing mild proximal hydronephrosis and ureterectasis. Nonobstructing 4 mm stone lower pole right kidney. Three small 2 mm nonobstructing left lower pole stones. Benign 3 cm right   renal cyst requires no follow-up.     BOWEL: Normal appendix. No bowel obstruction or inflammatory change.     LYMPH NODES: Normal.     VASCULATURE: Normal.     PELVIC ORGANS: Normal.     MUSCULOSKELETAL: Degenerative disc disease lumbosacral interspace.                                                          IMPRESSION:   1.  A 4 mm obstructing right midureteral stone.  2.  Nonobstructing 4 mm right renal stone and three small 2 mm nonobstructing left renal stones.   3.  A 2.5 x 2.5 x 1.5 cm focus of low attenuation and capsular traction anterior aspect hepatic segment IV/V probably represents benign focal fatty infiltration. As a conservative measure as it recommend comparison with any older available outside   abdominal imaging to confirm stability or nonurgent MRI liver.            ASSESSMENT and PLAN  54 year old man with a 4mm RIGHT distal ureteral stone    RIGHT distal ureteral  stone  - I reviewed the labs with no UTI and stable Cr  - I reviewed the CT images personally and agree with the radiologist impression  - he is currently on Medical Expulsive Therapy (MET) with tamsulosin 0.4mg (Flomax)   - Refill of Oxycodone sent to pharmacy  - We discussed plan for Ureteroscopy, laser lithotripsy and stone removal  - Order for surgery placed      Time spent: 20 minutes spent on the date of the encounter doing chart review, history and exam, documentation and further activities as noted above.    gM Aguilar MD   Urology  [facility]  [facility] Phone: [phone]  [facility] Phone: [phone]        Virtual Visit Details    Type of service:  Video Visit     Originating Location (pt. Location): Home / Car    Distant Location (provider location):  On-site  Platform used for Video Visit: [employer]

## 2025-02-20 ENCOUNTER — CARE COORDINATION (OUTPATIENT)
Dept: UROLOGY | Facility: CLINIC | Age: 55
End: 2025-02-20

## 2025-02-20 ENCOUNTER — OFFICE VISIT (OUTPATIENT)
Dept: FAMILY MEDICINE | Facility: OTHER | Age: 55
End: 2025-02-20
Payer: COMMERCIAL

## 2025-02-20 VITALS
BODY MASS INDEX: 32.07 KG/M2 | TEMPERATURE: 97.2 F | HEIGHT: 70 IN | HEART RATE: 71 BPM | SYSTOLIC BLOOD PRESSURE: 130 MMHG | WEIGHT: 224 LBS | OXYGEN SATURATION: 98 % | RESPIRATION RATE: 16 BRPM | DIASTOLIC BLOOD PRESSURE: 88 MMHG

## 2025-02-20 DIAGNOSIS — N20.1 RIGHT URETERAL STONE: ICD-10-CM

## 2025-02-20 DIAGNOSIS — E78.1 HYPERTRIGLYCERIDEMIA: ICD-10-CM

## 2025-02-20 DIAGNOSIS — E03.8 SUBCLINICAL HYPOTHYROIDISM: ICD-10-CM

## 2025-02-20 DIAGNOSIS — Z01.818 PREOP GENERAL PHYSICAL EXAM: Primary | ICD-10-CM

## 2025-02-20 DIAGNOSIS — G43.109 MIGRAINE WITH AURA AND WITHOUT STATUS MIGRAINOSUS, NOT INTRACTABLE: ICD-10-CM

## 2025-02-20 DIAGNOSIS — J02.9 SORE THROAT: ICD-10-CM

## 2025-02-20 DIAGNOSIS — J02.0 STREP PHARYNGITIS: ICD-10-CM

## 2025-02-20 DIAGNOSIS — K21.9 GASTROESOPHAGEAL REFLUX DISEASE WITHOUT ESOPHAGITIS: ICD-10-CM

## 2025-02-20 DIAGNOSIS — R73.03 PREDIABETES: ICD-10-CM

## 2025-02-20 LAB
ANION GAP SERPL CALCULATED.3IONS-SCNC: 12 MMOL/L (ref 7–15)
BUN SERPL-MCNC: 12.8 MG/DL (ref 6–20)
CALCIUM SERPL-MCNC: 8.7 MG/DL (ref 8.8–10.4)
CHLORIDE SERPL-SCNC: 106 MMOL/L (ref 98–107)
CREAT SERPL-MCNC: 0.99 MG/DL (ref 0.67–1.17)
DEPRECATED S PYO AG THROAT QL EIA: POSITIVE
EGFRCR SERPLBLD CKD-EPI 2021: >90 ML/MIN/1.73M2
ERYTHROCYTE [DISTWIDTH] IN BLOOD BY AUTOMATED COUNT: 12.4 % (ref 10–15)
EST. AVERAGE GLUCOSE BLD GHB EST-MCNC: 120 MG/DL
GLUCOSE SERPL-MCNC: 93 MG/DL (ref 70–99)
HBA1C MFR BLD: 5.8 % (ref 0–5.6)
HCO3 SERPL-SCNC: 22 MMOL/L (ref 22–29)
HCT VFR BLD AUTO: 43.9 % (ref 40–53)
HGB BLD-MCNC: 14.5 G/DL (ref 13.3–17.7)
MCH RBC QN AUTO: 31.8 PG (ref 26.5–33)
MCHC RBC AUTO-ENTMCNC: 33 G/DL (ref 31.5–36.5)
MCV RBC AUTO: 96 FL (ref 78–100)
PLATELET # BLD AUTO: 330 10E3/UL (ref 150–450)
POTASSIUM SERPL-SCNC: 4.7 MMOL/L (ref 3.4–5.3)
RBC # BLD AUTO: 4.56 10E6/UL (ref 4.4–5.9)
SODIUM SERPL-SCNC: 140 MMOL/L (ref 135–145)
T4 FREE SERPL-MCNC: 0.74 NG/DL (ref 0.9–1.7)
TSH SERPL DL<=0.005 MIU/L-ACNC: 18.57 UIU/ML (ref 0.3–4.2)
WBC # BLD AUTO: 7.5 10E3/UL (ref 4–11)

## 2025-02-20 RX ORDER — AMOXICILLIN 500 MG/1
500 CAPSULE ORAL 2 TIMES DAILY
Qty: 20 CAPSULE | Refills: 0 | Status: SHIPPED | OUTPATIENT
Start: 2025-02-20 | End: 2025-03-02

## 2025-02-20 ASSESSMENT — PAIN SCALES - GENERAL: PAINLEVEL_OUTOF10: MILD PAIN (2)

## 2025-02-20 NOTE — PATIENT INSTRUCTIONS
How to Take Your Medication Before Surgery  Preoperative Medication Instructions   Antiplatelet or Anticoagulation Medication Instructions   - We reviewed the medication list and the patient is not on an antiplatelet or anticoagulation medications.    Additional Medication Instructions   - Herbal medications and vitamins: DO NOT TAKE 14 days prior to surgery.   - fiber, laxatives: DO NOT TAKE day of surgery   - Antiepileptics (topiramate): Continue without modification.   - Opioids: Continue without modification.   956}   - triptans: DO NOT TAKE on day of surgery   - ibuprofen (Advil, Motrin): DO NOT TAKE 1 day before surgery.    - naproxen (Aleve, Naprosyn): DO NOT TAKE 4 days before surgery.    - anticholinergics: Continue without modification.    - sildenafil: DO NOT TAKE for 24 hours.   - Topicals: DO NOT TAKE day of surgery.       Patient Education   Preparing for Your Surgery  For Adults  Getting started  In most cases, a nurse will call to review your health history and instructions. They will give you an arrival time based on your scheduled surgery time. Please be ready to share:  Your doctor's clinic name and phone number  Your medical, surgical, and anesthesia history  A list of allergies and sensitivities  A list of medicines, including herbal treatments and over-the-counter drugs  Whether the patient has a legal guardian (ask how to send us the papers in advance)  Note: You may not receive a call if you were seen at our PAC (Preoperative Assessment Center).  Please tell us if you're pregnant--or if there's any chance you might be pregnant. Some surgeries may injure a fetus (unborn baby), so they require a pregnancy test. Surgeries that are safe for a fetus don't always need a test, and you can choose whether to have one.   Preparing for surgery  Within 10 to 30 days of surgery: Have a pre-op exam (sometimes called an H&P, or History and Physical). This can be done at a clinic or pre-operative center.  If  you're having a , you may not need this exam. Talk to your care team.  At your pre-op exam, talk to your care team about all medicines you take. (This includes CBD oil and any drugs, such as THC, marijuana, and other forms of cannabis.) If you need to stop any medicine before surgery, ask when to start taking it again.  This is for your safety. Many medicines and drugs can make you bleed too much during surgery. Some change how well surgery (anesthesia) drugs work.  Call your insurance company to let them know you're having surgery. (If you don't have insurance, call [phone].)  Call your clinic if there's any change in your health. This includes a scrape or scratch near the surgery site, or any signs of a cold (sore throat, runny nose, cough, rash, fever).  Eating and drinking guidelines  For your safety: Unless your surgeon tells you otherwise, follow the guidelines below.  Eat and drink as normal until 8 hours before you arrive for surgery. After that, no food or milk. You can spit out gum when you arrive.  Drink clear liquids until 2 hours before you arrive. These are liquids you can see through, like water, Gatorade, and Propel Water. They also include plain black coffee and tea (no cream or milk).  No alcohol for 24 hours before you arrive. The night before surgery, stop any drinks that contain THC.  If your care team tells you to take medicine on the morning of surgery, it's okay to take it with a sip of water. No other medicines or drugs are allowed (including CBD oil)--follow your care team's instructions.  If you have questions the day of surgery, call your hospital or surgery center.   Preventing infection  Shower or bathe the night before and the morning of surgery. Follow the instructions your clinic gave you. (If no instructions, use regular soap.)  Don't shave or clip hair near your surgery site. We'll remove the hair if needed.  Don't smoke or vape the morning of surgery. No chewing  tobacco for 6 hours before you arrive. A nicotine patch is okay. You may spit out nicotine gum when you arrive.  For some surgeries, the surgeon will tell you to fully quit smoking and nicotine.  We will make every effort to keep you safe from infection. We will:  Clean our hands often with soap and water (or an alcohol-based hand rub).  Clean the skin at your surgery site with a special soap that kills germs.  Give you a special gown to keep you warm. (Cold raises the risk of infection.)  Wear hair covers, masks, gowns, and gloves during surgery.  Give antibiotic medicine, if prescribed. Not all surgeries need this medicine.  What to bring on the day of surgery  Photo ID and insurance card  Copy of your health care directive, if you have one  Glasses and hearing aids (bring cases)  You can't wear contacts during surgery  Inhaler and eye drops, if you use them (tell us about these when you arrive)  CPAP machine or breathing device, if you use them  A few personal items, if spending the night  If you have . . .  A pacemaker, ICD (cardiac defibrillator), or other implant: Bring the ID card.  An implanted stimulator: Bring the remote control.  A legal guardian: Bring a copy of the certified (court-stamped) guardianship papers.  Please remove any jewelry, including body piercings. Leave jewelry and other valuables at home.  If you're going home the day of surgery  You must have a responsible adult drive you home. They should stay with you overnight as well.  If you don't have someone to stay with you, and you aren't safe to go home alone, we may keep you overnight. Insurance often won't pay for this.  After surgery  If it's hard to control your pain or you need more pain medicine, please call your surgeon's office.  Questions?   If you have any questions for your care team, list them here:    ____________________________________________________________________________________________________________________________________________________________________________________________________________________________________________________________  For informational purposes only. Not to replace the advice of your health care provider. Copyright   2003, 2019 [facility]. All rights reserved. Clinically reviewed by Marv Johnson MD. [employer] [id number] - REV 08/24.
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
## 2025-02-24 ENCOUNTER — ANESTHESIA EVENT (OUTPATIENT)
Dept: SURGERY | Facility: AMBULATORY SURGERY CENTER | Age: 55
End: 2025-02-24
Payer: COMMERCIAL

## 2025-02-24 ENCOUNTER — PATIENT OUTREACH (OUTPATIENT)
Dept: CARE COORDINATION | Facility: CLINIC | Age: 55
End: 2025-02-24
Payer: COMMERCIAL

## 2025-02-24 RX ORDER — ONDANSETRON 4 MG/1
4 TABLET, ORALLY DISINTEGRATING ORAL EVERY 30 MIN PRN
Status: CANCELLED | OUTPATIENT
Start: 2025-02-24

## 2025-02-24 RX ORDER — NALOXONE HYDROCHLORIDE 0.4 MG/ML
0.1 INJECTION, SOLUTION INTRAMUSCULAR; INTRAVENOUS; SUBCUTANEOUS
Status: CANCELLED | OUTPATIENT
Start: 2025-02-24

## 2025-02-24 RX ORDER — OXYCODONE HYDROCHLORIDE 5 MG/1
5 TABLET ORAL EVERY 4 HOURS PRN
Status: CANCELLED | OUTPATIENT
Start: 2025-02-24

## 2025-02-24 RX ORDER — HYDRALAZINE HYDROCHLORIDE 20 MG/ML
2.5-5 INJECTION INTRAMUSCULAR; INTRAVENOUS EVERY 10 MIN PRN
Status: CANCELLED | OUTPATIENT
Start: 2025-02-24

## 2025-02-24 RX ORDER — FENTANYL CITRATE 50 UG/ML
25 INJECTION, SOLUTION INTRAMUSCULAR; INTRAVENOUS EVERY 5 MIN PRN
Status: CANCELLED | OUTPATIENT
Start: 2025-02-24

## 2025-02-24 RX ORDER — FENTANYL CITRATE 50 UG/ML
50 INJECTION, SOLUTION INTRAMUSCULAR; INTRAVENOUS EVERY 5 MIN PRN
Status: CANCELLED | OUTPATIENT
Start: 2025-02-24

## 2025-02-24 RX ORDER — FENTANYL CITRATE 50 UG/ML
25 INJECTION, SOLUTION INTRAMUSCULAR; INTRAVENOUS
Status: CANCELLED | OUTPATIENT
Start: 2025-02-24

## 2025-02-24 RX ORDER — ONDANSETRON 2 MG/ML
4 INJECTION INTRAMUSCULAR; INTRAVENOUS EVERY 30 MIN PRN
Status: CANCELLED | OUTPATIENT
Start: 2025-02-24

## 2025-02-24 RX ORDER — DEXAMETHASONE SODIUM PHOSPHATE 4 MG/ML
4 INJECTION, SOLUTION INTRA-ARTICULAR; INTRALESIONAL; INTRAMUSCULAR; INTRAVENOUS; SOFT TISSUE
Status: CANCELLED | OUTPATIENT
Start: 2025-02-24

## 2025-02-24 RX ORDER — OXYCODONE HYDROCHLORIDE 5 MG/1
10 TABLET ORAL EVERY 4 HOURS PRN
Status: CANCELLED | OUTPATIENT
Start: 2025-02-24

## 2025-02-24 RX ORDER — METOPROLOL TARTRATE 1 MG/ML
1-2 INJECTION, SOLUTION INTRAVENOUS EVERY 5 MIN PRN
Status: CANCELLED | OUTPATIENT
Start: 2025-02-24

## 2025-02-24 RX ORDER — SODIUM CHLORIDE, SODIUM LACTATE, POTASSIUM CHLORIDE, CALCIUM CHLORIDE 600; 310; 30; 20 MG/100ML; MG/100ML; MG/100ML; MG/100ML
INJECTION, SOLUTION INTRAVENOUS CONTINUOUS
Status: CANCELLED | OUTPATIENT
Start: 2025-02-24

## 2025-02-24 ASSESSMENT — LIFESTYLE VARIABLES: TOBACCO_USE: 1

## 2025-02-24 NOTE — ANESTHESIA PREPROCEDURE EVALUATION
Anesthesia Pre-Procedure Evaluation    Patient: Linden Cruz   MRN: [id number] : 1970        Procedure : Procedure(s):  CYSTOSCOPY, RIGHT RETROGRADE PYELOGRAM, RIGHT URETEROSCOPY WITH LASER LITHOTRIPSY AND BASKET REMOVAL OF STONE, RIGHT URETERAL STENT PLACEMENT          Past Medical History:   Diagnosis Date    Chest pain     Esophageal reflux     Migraines     Mixed hyperlipidemia     DANIEL (obstructive sleep apnea)     Other specified gastritis without mention of hemorrhage     H. Pylori, diag       Past Surgical History:   Procedure Laterality Date    CHOLECYSTECTOMY, LAPOROSCOPIC      COLONOSCOPY N/A 4/15/2022    Procedure: COLONOSCOPY, WITH POLYPECTOMY;  Surgeon: Kenney Lee DO;  Location: [facility]    INJECT EPIDURAL LUMBAR Right 1/10/2020    Procedure: INJECTION, SPINE, LUMBAR 5 and Sacral 1 Bilateral EPIDURAL;  Surgeon: Jose C Fields MD;  Location: [facility]    INJECT EPIDURAL TRANSFORAMINAL Bilateral 2021    Procedure: Bilateral L5-S1 transforaminal Epidural Steroid Injection with fluoroscopic guidance and contrast.;  Surgeon: Jose C Fields MD;  Location: [facility]    SURGICAL HISTORY OF -   2006    upper GI, [facility]      No Known Allergies   Social History     Tobacco Use    Smoking status: Former     Current packs/day: 0.00     Types: Cigarettes     Quit date: 2000     Years since quittin.1     Passive exposure: Past    Smokeless tobacco: Never   Substance Use Topics    Alcohol use: Yes     Alcohol/week: 0.0 standard drinks of alcohol     Comment: 1/month      Wt Readings from Last 1 Encounters:   25 101.6 kg (224 lb)        Anesthesia Evaluation            ROS/MED HX  ENT/Pulmonary:     (+) sleep apnea, doesn't use CPAP,              tobacco use, Past use,                       Neurologic:     (+)      migraines,                          Cardiovascular:  - neg cardiovascular ROS  (-) murmur   METS/Exercise Tolerance:     Hematologic:       Musculoskeletal:  "Comment: Back pain      GI/Hepatic:     (+) GERD,       bowel prep,            Renal/Genitourinary:  - neg Renal ROS     Endo:     (+)               Obesity,       Psychiatric/Substance Use:  - neg psychiatric ROS     Infectious Disease:  - neg infectious disease ROS     Malignancy:  - neg malignancy ROS     Other:            Physical Exam    Airway        Mallampati: I   TM distance: > 3 FB   Neck ROM: full   Mouth opening: > 3 cm    Respiratory Devices and Support         Dental       (+) Completely normal teeth      Cardiovascular          Rhythm and rate: regular and normal (-) no murmur    Pulmonary           breath sounds clear to auscultation   (-) no rhonchi        OUTSIDE LABS:  CBC:   Lab Results   Component Value Date    WBC 7.5 02/20/2025    WBC 12.4 (H) 02/09/2025    HGB 14.5 02/20/2025    HGB 14.5 02/09/2025    HCT 43.9 02/20/2025    HCT 41.4 02/09/2025     02/20/2025     02/09/2025     BMP:   Lab Results   Component Value Date     02/20/2025     02/09/2025    POTASSIUM 4.7 02/20/2025    POTASSIUM 3.5 02/09/2025    CHLORIDE 106 02/20/2025    CHLORIDE 104 02/09/2025    CO2 22 02/20/2025    CO2 20 (L) 02/09/2025    BUN 12.8 02/20/2025    BUN 16.9 02/09/2025    CR 0.99 02/20/2025    CR 0.91 02/09/2025    GLC 93 02/20/2025     (H) 02/09/2025     COAGS:   Lab Results   Component Value Date    PTT 33 01/05/2015    INR 0.98 01/05/2015     POC: No results found for: \"BGM\", \"HCG\", \"HCGS\"  HEPATIC:   Lab Results   Component Value Date    ALBUMIN 4.9 02/09/2025    PROTTOTAL 7.7 02/09/2025    ALT 22 02/09/2025    AST 25 02/09/2025    ALKPHOS 155 (H) 02/09/2025    BILITOTAL 0.6 02/09/2025     OTHER:   Lab Results   Component Value Date    A1C 5.8 (H) 02/20/2025    JEFFY 8.7 (L) 02/20/2025    TSH 18.57 (H) 02/20/2025    T4 0.74 (L) 02/20/2025    CRP <2.9 06/04/2021    SED 5 06/04/2021       Anesthesia Plan    ASA Status:  2    NPO Status:  NPO Appropriate    Anesthesia Type: General. "   Induction: Intravenous, Propofol.   Maintenance: Balanced.        Consents    Anesthesia Plan(s) and associated risks, benefits, and realistic alternatives discussed. Questions answered and patient/representative(s) expressed understanding.     - Discussed:     - Discussed with:  Patient      - Extended Intubation/Ventilatory Support Discussed: No.      - Patient is DNR/DNI Status: No     Use of blood products discussed: No .     Postoperative Care    Pain management: IV analgesics, Oral pain medications.   PONV prophylaxis: Ondansetron (or other 5HT-3), Dexamethasone or Solumedrol     Comments:    Other Comments: Anxiolytic/Sedating meds prior to procedure:Midazolam IV  Discussed common and potentially harmful risks for General Anesthesia.   These risks include, but were not limited to: Conversion to secured airway, Sore throat, Airway injury, Dental injury, Aspiration, PONV, Emergence delirium/agitation  Risks of invasive procedures were not discussed: N/A  All questions were answered.             Rivas Ng MD    I have reviewed the pertinent notes and labs in the chart from the past 30 days and (re)examined the patient.  Any updates or changes from those notes are reflected in this note.

## 2025-02-25 ENCOUNTER — HOSPITAL ENCOUNTER (OUTPATIENT)
Facility: AMBULATORY SURGERY CENTER | Age: 55
Discharge: HOME | End: 2025-02-25
Attending: UROLOGY
Payer: COMMERCIAL

## 2025-02-25 ENCOUNTER — ANCILLARY PROCEDURE (OUTPATIENT)
Dept: GENERAL RADIOLOGY | Facility: CLINIC | Age: 55
End: 2025-02-25
Attending: UROLOGY
Payer: COMMERCIAL

## 2025-02-25 ENCOUNTER — ANESTHESIA (OUTPATIENT)
Dept: SURGERY | Facility: AMBULATORY SURGERY CENTER | Age: 55
End: 2025-02-25
Payer: COMMERCIAL

## 2025-02-25 ENCOUNTER — NURSE TRIAGE (OUTPATIENT)
Dept: NURSING | Facility: CLINIC | Age: 55
End: 2025-02-25

## 2025-02-25 VITALS
TEMPERATURE: 97.5 F | HEART RATE: 75 BPM | OXYGEN SATURATION: 99 % | SYSTOLIC BLOOD PRESSURE: 138 MMHG | DIASTOLIC BLOOD PRESSURE: 96 MMHG | RESPIRATION RATE: 12 BRPM

## 2025-02-25 DIAGNOSIS — R10.9 RIGHT FLANK PAIN: ICD-10-CM

## 2025-02-25 DIAGNOSIS — R52 PAIN: ICD-10-CM

## 2025-02-25 DIAGNOSIS — N20.0 RIGHT KIDNEY STONE: Primary | ICD-10-CM

## 2025-02-25 DIAGNOSIS — N20.1 RIGHT URETERAL STONE: ICD-10-CM

## 2025-02-25 PROCEDURE — G8916 PT W IV AB GIVEN ON TIME: HCPCS

## 2025-02-25 PROCEDURE — 52352 CYSTOURETERO W/STONE REMOVE: CPT | Mod: RT

## 2025-02-25 PROCEDURE — 52332 CYSTOSCOPY AND TREATMENT: CPT | Mod: RT

## 2025-02-25 PROCEDURE — G8907 PT DOC NO EVENTS ON DISCHARG: HCPCS

## 2025-02-25 DEVICE — URETERAL STENT
Type: IMPLANTABLE DEVICE | Site: URETER | Status: FUNCTIONAL
Brand: PERCUFLEX™ PLUS

## 2025-02-25 RX ORDER — CEFAZOLIN SODIUM 2 G/50ML
2 SOLUTION INTRAVENOUS
Status: COMPLETED | OUTPATIENT
Start: 2025-02-25 | End: 2025-02-25

## 2025-02-25 RX ORDER — PROPOFOL 10 MG/ML
INJECTION, EMULSION INTRAVENOUS CONTINUOUS PRN
Status: DISCONTINUED | OUTPATIENT
Start: 2025-02-25 | End: 2025-02-25

## 2025-02-25 RX ORDER — ONDANSETRON 2 MG/ML
INJECTION INTRAMUSCULAR; INTRAVENOUS PRN
Status: DISCONTINUED | OUTPATIENT
Start: 2025-02-25 | End: 2025-02-25

## 2025-02-25 RX ORDER — CEFAZOLIN SODIUM 2 G/50ML
2 SOLUTION INTRAVENOUS SEE ADMIN INSTRUCTIONS
Status: DISCONTINUED | OUTPATIENT
Start: 2025-02-25 | End: 2025-02-26 | Stop reason: HOSPADM

## 2025-02-25 RX ORDER — OXYCODONE HYDROCHLORIDE 5 MG/1
5 TABLET ORAL EVERY 6 HOURS PRN
Qty: 9 TABLET | Refills: 0 | Status: SHIPPED | OUTPATIENT
Start: 2025-02-25

## 2025-02-25 RX ORDER — ACETAMINOPHEN 325 MG/1
975 TABLET ORAL ONCE
Status: COMPLETED | OUTPATIENT
Start: 2025-02-25 | End: 2025-02-25

## 2025-02-25 RX ORDER — LIDOCAINE 40 MG/G
CREAM TOPICAL
Status: DISCONTINUED | OUTPATIENT
Start: 2025-02-25 | End: 2025-02-26 | Stop reason: HOSPADM

## 2025-02-25 RX ORDER — ACETAMINOPHEN 325 MG/1
975 TABLET ORAL ONCE
Status: DISCONTINUED | OUTPATIENT
Start: 2025-02-25 | End: 2025-02-26 | Stop reason: HOSPADM

## 2025-02-25 RX ORDER — KETOROLAC TROMETHAMINE 10 MG/1
10 TABLET, FILM COATED ORAL EVERY 6 HOURS
Qty: 20 TABLET | Refills: 0 | Status: SHIPPED | OUTPATIENT
Start: 2025-02-25 | End: 2025-03-02

## 2025-02-25 RX ORDER — FUROSEMIDE 10 MG/ML
INJECTION INTRAMUSCULAR; INTRAVENOUS PRN
Status: DISCONTINUED | OUTPATIENT
Start: 2025-02-25 | End: 2025-02-25

## 2025-02-25 RX ORDER — LIDOCAINE HYDROCHLORIDE 20 MG/ML
INJECTION, SOLUTION INFILTRATION; PERINEURAL PRN
Status: DISCONTINUED | OUTPATIENT
Start: 2025-02-25 | End: 2025-02-25

## 2025-02-25 RX ORDER — ACETAMINOPHEN 650 MG/1
650 SUPPOSITORY RECTAL ONCE
Status: COMPLETED | OUTPATIENT
Start: 2025-02-25 | End: 2025-02-25

## 2025-02-25 RX ORDER — KETOROLAC TROMETHAMINE 30 MG/ML
INJECTION, SOLUTION INTRAMUSCULAR; INTRAVENOUS PRN
Status: DISCONTINUED | OUTPATIENT
Start: 2025-02-25 | End: 2025-02-25

## 2025-02-25 RX ORDER — OXYBUTYNIN CHLORIDE 5 MG/1
5 TABLET, EXTENDED RELEASE ORAL DAILY
Qty: 10 TABLET | Refills: 0 | Status: SHIPPED | OUTPATIENT
Start: 2025-02-25 | End: 2025-03-07

## 2025-02-25 RX ORDER — FENTANYL CITRATE 50 UG/ML
INJECTION, SOLUTION INTRAMUSCULAR; INTRAVENOUS PRN
Status: DISCONTINUED | OUTPATIENT
Start: 2025-02-25 | End: 2025-02-25

## 2025-02-25 RX ORDER — PROPOFOL 10 MG/ML
INJECTION, EMULSION INTRAVENOUS PRN
Status: DISCONTINUED | OUTPATIENT
Start: 2025-02-25 | End: 2025-02-25

## 2025-02-25 RX ORDER — SODIUM CHLORIDE, SODIUM LACTATE, POTASSIUM CHLORIDE, CALCIUM CHLORIDE 600; 310; 30; 20 MG/100ML; MG/100ML; MG/100ML; MG/100ML
INJECTION, SOLUTION INTRAVENOUS CONTINUOUS
Status: DISCONTINUED | OUTPATIENT
Start: 2025-02-25 | End: 2025-02-26 | Stop reason: HOSPADM

## 2025-02-25 RX ADMIN — PROPOFOL 200 MG: 10 INJECTION, EMULSION INTRAVENOUS at 11:56

## 2025-02-25 RX ADMIN — FUROSEMIDE 20 MG: 10 INJECTION INTRAMUSCULAR; INTRAVENOUS at 12:40

## 2025-02-25 RX ADMIN — PROPOFOL 50 MG: 10 INJECTION, EMULSION INTRAVENOUS at 11:59

## 2025-02-25 RX ADMIN — ACETAMINOPHEN 975 MG: 325 TABLET ORAL at 10:48

## 2025-02-25 RX ADMIN — PROPOFOL 100 MCG/KG/MIN: 10 INJECTION, EMULSION INTRAVENOUS at 11:55

## 2025-02-25 RX ADMIN — FENTANYL CITRATE 100 MCG: 50 INJECTION, SOLUTION INTRAMUSCULAR; INTRAVENOUS at 11:56

## 2025-02-25 RX ADMIN — CEFAZOLIN SODIUM 2 G: 2 SOLUTION INTRAVENOUS at 11:48

## 2025-02-25 RX ADMIN — SODIUM CHLORIDE, SODIUM LACTATE, POTASSIUM CHLORIDE, CALCIUM CHLORIDE: 600; 310; 30; 20 INJECTION, SOLUTION INTRAVENOUS at 11:48

## 2025-02-25 RX ADMIN — KETOROLAC TROMETHAMINE 30 MG: 30 INJECTION, SOLUTION INTRAMUSCULAR; INTRAVENOUS at 12:40

## 2025-02-25 RX ADMIN — LIDOCAINE HYDROCHLORIDE 100 MG: 20 INJECTION, SOLUTION INFILTRATION; PERINEURAL at 11:56

## 2025-02-25 RX ADMIN — ONDANSETRON 4 MG: 2 INJECTION INTRAMUSCULAR; INTRAVENOUS at 12:20

## 2025-02-25 NOTE — ANESTHESIA PROCEDURE NOTES
Airway       Patient location during procedure: OR  Staff -        CRNA: Carie Martins APRN CRNA       Performed By: CRNA  Consent for Airway        Urgency: elective  Indications and Patient Condition       Indications for airway management: lisseth-procedural       Induction type:intravenous       Mask difficulty assessment: 0 - not attempted    Final Airway Details       Final airway type: supraglottic airway    Supraglottic Airway Details        Type: LMA       Brand: I-Gel       LMA size: 5    Post intubation assessment        Placement verified by: capnometry, equal breath sounds and chest rise        Number of attempts at approach: 1       Secured with: silk tape       Ease of procedure: easy       Dentition: Intact and Unchanged

## 2025-02-25 NOTE — DISCHARGE INSTRUCTIONS
POSTOPERATIVE INSTRUCTIONS    Diagnosis-------------------------------   RIGHT kidney stone    Procedure-------------------------------  Procedure(s) (LRB):  CYSTOSCOPY, RIGHT RETROGRADE PYELOGRAM, RIGHT URETEROSCOPY WITH LASER LITHOTRIPSY [place] BASKET REMOVAL OF STONE, RIGHT URETERAL STENT PLACEMENT (Right)      Findings--------------------------------  Cystoscopy with no evidence of stone in the bladder. Right retrograde pyelogram with no clear evidence of stone ureter. Right semirigid and flexible ureteroscopy with no evidence of stone in the ureter and small stone removed from the lower pole of the kidney.     Home-going instructions-----------------         Activity Limitation:     - No driving or operating heavy machinery while on narcotic pain medication.     FOLLOW THESE INSTRUCTIONS AS INDICATED BELOW:  - Observe operative area for signs of excessive bleeding.  - You may shower.  - Increase fluid intake to promote clear urine.  - Resume usual diet as tolerated    What to expect while recovering-----------  - You may experience some intermittent bleeding that makes your urine pink or cherry colored. This is normal.  - However, if you are unable to urinate, passing large amount of clots, have sarita blood in your urine, or have a temperature >101 degrees, call the urology nurse on call, or present to your nearest emergency department.  - You are encouraged to walk daily, and have no activity restrictions.   - A URETERAL STENT has been placed that allows urine to flow unobstructed from your kidney into your bladder.  The stent has a curl in the kidney and a curl in the bladder.  The curl in the bladder can cause some urgency and frequency of urination as well as some mild blood in the urine.  The curl in the kidney can cause some mild flank discomfort.  This may be more noticeable when you urinate.  A URETERAL STENT is meant to be left in temporarily.  It must be removed or changed no later than 3 months  after it's insertion.  If it's not removed it can result in stone overgrowth on the stent that can cause pain, infection, and can be very difficult to remove.      Discharge Medications/instructions:   - Flomax (tamsulosin) to be taken daily until stent is removed  - Oxybutynin 5mg XL (Ditropan XL) to be taken daily until ureteral stent is removed  - Take Tylenol 1000mg every 6 hours for pain  - Take Toradol 10mg every 6 hours as needed for additional pain control  - Take Oxycodone 5mg every 4-6 hours only for break through pain  - Take Colace while taking Oxycodone to prevent constipation      Questions/concerns------------------------  [facility]: [phone]    Future appointments  Follow up as scheduled for ureteral stent removal       Mg Aguilar MD   Tylenol 975 mg was given at 1048.    You should not take more then 4,000 mg of tylenol/acetaminophen in a 24 hour period.\

## 2025-02-25 NOTE — ANESTHESIA CARE TRANSFER NOTE
Patient: Linden Cruz    Procedure: Procedure(s):  CYSTOSCOPY, RIGHT RETROGRADE PYELOGRAM, RIGHT URETEROSCOPY WITH LASER LITHOTRIPSY [place] BASKET REMOVAL OF STONE, RIGHT URETERAL STENT PLACEMENT       Diagnosis: Right ureteral stone [N20.1]  Right flank pain [R10.9]  Diagnosis Additional Information: No value filed.    Anesthesia Type:   General     Note:    Oropharynx: oral airway in place  Level of Consciousness: drowsy  Oxygen Supplementation: face mask  Level of Supplemental Oxygen (L/min / FiO2): 8  Independent Airway: airway patency satisfactory and stable  Dentition: dentition unchanged  Vital Signs Stable: post-procedure vital signs reviewed and stable  Report to RN Given: handoff report given  Patient transferred to: PACU  Comments: Anesthesia Care Transfer Note    Patient: Linden Cruz    Transferred to: PACU with supplemental O2    Patient vital signs: stable    Airway: none    Monitors on, VSS, breathing spontaneously, report to RN      Carie Porter CRNA   2/25/2025 12:54 PM   Handoff Report: Identifed the Patient, Identified the Reponsible Provider, Reviewed the pertinent medical history, Discussed the surgical course, Reviewed Intra-OP anesthesia mangement and issues during anesthesia, Set expectations for post-procedure period and Allowed opportunity for questions and acknowledgement of understanding    Vitals:  Vitals Value Taken Time   BP     Temp     Pulse 77 02/25/25 1252   Resp     SpO2 98 % 02/25/25 1252   Vitals shown include unfiled device data.    Electronically Signed By: WOJCIECH Graves CRNA  February 25, 2025  12:53 PM

## 2025-02-25 NOTE — OP NOTE
OPERATIVE REPORT  DATE OF SURGERY: 02/25/25  LOCATION OF SURGERY: [facility]  PREOPERATIVE DIAGNOSIS:  (N20.0) Right kidney stone  (primary encounter diagnosis)  (R10.9) Right flank pain  POSTOPERATIVE DIAGNOSIS: (N20.0) Right kidney stone  (primary encounter diagnosis)  (R10.9) Right flank pain     START TIME: 12:08 PM  END TIME: 12:43 PM    PROCEDURE PERFORMED:   1. Cystoscopy  2. RIGHT retrograde pyelogram  3. RIGHT ureteroscopy with basketing of stones  4. RIGHT JJ stent placement  5. <1hr physician fluoroscopy time      STAFF SURGEON: Mg Aguilar MD  ANESTHESIA: General.   ESTIMATED BLOOD LOSS: 2 mL.   DRAINS AND TUBES: RIGHT 6fr x 24cm ureteral stent, 16fr buchanan catheter  COMPLICATIONS: None.   DISPOSITION: PACU.   SPECIMENS OBTAINED:   ID Type Source Tests Collected by Time Destination   A : Right Kidney Stone Calculus/Stone Kidney, Right STONE ANALYSIS Mg Aguilar MD 2/25/2025 12:36 PM       SIGNIFICANT FINDINGS: Cystoscopy with no evidence of stone in the bladder.  Right retrograde pyelogram with no clear evidence of stone ureter.  Right semirigid and flexible ureteroscopy with no evidence of stone in the ureter and small stone removed from the lower pole of the kidney.     HISTORY OF PRESENT ILLNESS: Linden Cruz is a 54 year old man with a 4 mm right distal ureteral stone as well as a lower pole small kidney stone.  He has been on medical expulsive therapy plans made for ureteroscopy.  He continues to have intermittent flank pain.    OPERATION PERFORMED:   Informed consent was obtained and the patient was brought to the operating room where general anesthesia was induced. The patient was given appropriate preoperative antibiotics and positioned supine. The patient was then repositioned in dorsal lithotomy with all pressure points padded. We then performed a timeout, verifying the correct patient's site and procedure to be performed.    A 22 Liberian cystoscope was inserted atraumatically into  the bladder.  Cystoscopy was performed with no evidence of stones in the bladder.  The right ureteral orifice was identified and cannulated with a 0.035 sensor wire which is advanced up to the renal pelvis fluoroscopic guidance and the cystoscope was removed.  A semirigid ureteroscope was assembled and inserted atraumatically into the bladder.  Using an Amplatz Super Stiff wire, the ureteral orifice was cannulated and the ureteroscope was advanced using a railroad technique into the distal ureter.  There was no evidence of stone in the distal ureter and a gentle retrograde pyelogram was performed with no clear evidence of stone in the ureter.  The semirigid scope was able to advance atraumatically up to the proximal ureter with no evidence of stone in the ureter.  The Super Stiff wire was advanced to the renal pelvis and the semirigid scope was removed.  An 11-13 [place] by 46 cm ureteral access sheath was advanced over the Super Stiff wire to the proximal ureter under fluoroscopic guidance and the inner stylette and wire were removed.  Flexible ureteroscope was then advanced into the renal pelvis and complete pyeloscopy was performed with identification of a small kidney stone.  This was basketed with a 0 tip basket.  During basketing, the stone fragmented into few very small pieces which were removed.  The ureteroscope and access sheath were removed en bloc with no evidence of ureteral injury and no evidence of an impacted stone on the pleat visualization of the entire ureter.  Cystoscope was replaced in the bladder and a 6 [place] by 24 cm JJ ureteral stent was advanced over the wire with good curl noted in the renal pelvis fluoroscopically and in the bladder and direct vision.  A 16 [place] Lucian Velazco catheter was placed.  He received 20 mg IV Lasix and 30 mg IV Toradol.  He was monitored anesthesia and taken to the recovery room in stable condition.    Mg Aguilar MD   Urology  [facility]  [facility]  Clinic Phone [phone]

## 2025-02-25 NOTE — ANESTHESIA POSTPROCEDURE EVALUATION
Patient: Linden Cruz    Procedure: Procedure(s):  CYSTOSCOPY, RIGHT RETROGRADE PYELOGRAM, RIGHT URETEROSCOPY WITH LASER LITHOTRIPSY [place] BASKET REMOVAL OF STONE, RIGHT URETERAL STENT PLACEMENT       Anesthesia Type:  General    Note:  Disposition: Outpatient   Postop Pain Control: Uneventful            Sign Out: Well controlled pain   PONV: No   Neuro/Psych: Uneventful            Sign Out: Acceptable/Baseline neuro status   Airway/Respiratory: Uneventful            Sign Out: Acceptable/Baseline resp. status   CV/Hemodynamics: Uneventful            Sign Out: Acceptable CV status; No obvious hypovolemia; No obvious fluid overload   Other NRE: NONE   DID A NON-ROUTINE EVENT OCCUR? No           Last vitals:  Vitals Value Taken Time   /94 02/25/25 1300   Temp     Pulse 81 02/25/25 1308   Resp 12 02/25/25 1300   SpO2 98 % 02/25/25 1308   Vitals shown include unfiled device data.    Electronically Signed By: Rivas Ng MD  February 25, 2025  1:46 PM

## 2025-02-26 ENCOUNTER — TELEPHONE (OUTPATIENT)
Dept: UROLOGY | Facility: CLINIC | Age: 55
End: 2025-02-26

## 2025-02-26 DIAGNOSIS — K21.9 GASTROESOPHAGEAL REFLUX DISEASE WITHOUT ESOPHAGITIS: ICD-10-CM

## 2025-02-26 DIAGNOSIS — H69.93 DYSFUNCTION OF BOTH EUSTACHIAN TUBES: ICD-10-CM

## 2025-02-26 DIAGNOSIS — H90.6 MIXED CONDUCTIVE AND SENSORINEURAL HEARING LOSS OF BOTH EARS: ICD-10-CM

## 2025-02-26 NOTE — TELEPHONE ENCOUNTER
Urology procedure.   Has questions for on call.  I transferred Zachary to the [facility] [place] . I advised he tell them he does not want to speak to a triage nurse and that he wants to speak to the on call for Dr Aguilar.  Caller stated understanding and agreement.  Kimberly PATEL RN [facility]

## 2025-02-26 NOTE — TELEPHONE ENCOUNTER
[facility] Call Center    Phone Message    May a detailed message be left on voicemail: Yes    Reason for Call: Other: Patient called and was wondering if he could start taking ibuprofen. Please call patient back to discuss.      Action Taken: Other: [facility]    Travel Screening: Not Applicable     Date of Service:

## 2025-02-26 NOTE — TELEPHONE ENCOUNTER
Called pt back to discuss medication question.  I reviewed his AVS, and he was prescribed Ketorolac, Acetaminophen and Oxycodone.    Pt wondered if he could use Ibuprofen again, and I advised him to use the Ketorolac and Acetaminophen with Oxycodone as needed for break through pain.  Ketorolac is also an nonsteroidal anti-inflammatory medication like Ibuprofen.   He can discuss further pain management with Dr. Aguilar on March 4.    Pt verbalized understanding.      Stephanie Pabon RN

## 2025-02-27 ENCOUNTER — CARE COORDINATION (OUTPATIENT)
Dept: UROLOGY | Facility: CLINIC | Age: 55
End: 2025-02-27
Payer: COMMERCIAL

## 2025-02-27 RX ORDER — OMEPRAZOLE 40 MG/1
CAPSULE, DELAYED RELEASE ORAL
Qty: 90 CAPSULE | Refills: 1 | Status: SHIPPED | OUTPATIENT
Start: 2025-02-27

## 2025-02-27 RX ORDER — FLUTICASONE PROPIONATE 50 MCG
2 SPRAY, SUSPENSION (ML) NASAL DAILY
Qty: 48 ML | Refills: 1 | Status: SHIPPED | OUTPATIENT
Start: 2025-02-27

## 2025-02-27 NOTE — PROGRESS NOTES
Discussed with Dr. Aguilar who reviewed my chart messages and attached photo. Per Dr. Aguilar, things are looking okay and patient looks to be healing as expected. Per Dr. Aguilar, patient should drink a lot more water to flush things through.    Kenyetta Macias RN, BSN

## 2025-03-01 ENCOUNTER — NURSE TRIAGE (OUTPATIENT)
Dept: NURSING | Facility: CLINIC | Age: 55
End: 2025-03-01
Payer: COMMERCIAL

## 2025-03-02 NOTE — TELEPHONE ENCOUNTER
Nurse Triage SBAR    Is this a 2nd Level Triage? YES, LICENSED PRACTITIONER REVIEW IS REQUIRED    Situation: Hematuria post op    Background: Pt recently underwent surgery with Urology for kidney stones that were not passing. Stented and since that time (2/25) reports hematuria and passing clots. Sometimes urine is lighter pink, but then has returned to darker red, which today Pt states was very red and many chunky clots.     Assessment: Denies infection symptoms, fever, vomiting, or severe pain. Does endorse 6/10 pain while urinating, and clots as mentioned above. Previously, surgeon stated this is normal for the first day or two, but Pt is now day 4 post op    Protocol Recommended Disposition:   Call PCP Now    Recommendation: Will connect Pt to [facility] specialties for paging and provider recommendation       Does the patient meet one of the following criteria for ADS visit consideration? 16+ years old, with an [facility] PCP     TIP  Providers, please consider if this condition is appropriate for management at one of our Acute and Diagnostic Services sites.     If patient is a good candidate, please use dotphrase <dot>triageresponse and select Refer to ADS to document.    Reason for Disposition   [1] Caller has URGENT question AND [2] triager unable to answer question    Additional Information   Negative: Sounds like a life-threatening emergency to the triager   Negative: [1] Widespread rash AND [2] bright red, sunburn-like   Negative: [1] Vomiting AND [2] persists > 4 hours   Negative: [1] Vomiting AND [2] abdomen looks much more swollen than usual   Negative: [1] SEVERE headache AND [2] after spinal (epidural) anesthesia   Negative: [1] Drinking very little AND [2] dehydration suspected (e.g., no urine > 12 hours, very dry mouth, very lightheaded)   Negative: Patient sounds very sick or weak to the triager   Negative: Sounds like a serious complication to the triager   Negative: Fever > 100.4 F (38.0 C)   Negative: [1]  SEVERE post-op pain (e.g., excruciating, pain scale 8-10) AND [2] not controlled with pain medications    Protocols used: Post-Op Symptoms and Tjkvbimmi-C-SD

## 2025-03-04 ENCOUNTER — OFFICE VISIT (OUTPATIENT)
Dept: UROLOGY | Facility: CLINIC | Age: 55
End: 2025-03-04
Payer: COMMERCIAL

## 2025-03-04 VITALS — SYSTOLIC BLOOD PRESSURE: 136 MMHG | DIASTOLIC BLOOD PRESSURE: 93 MMHG | HEART RATE: 90 BPM

## 2025-03-04 DIAGNOSIS — N20.1 RIGHT URETERAL STONE: ICD-10-CM

## 2025-03-04 DIAGNOSIS — Z46.6 ENCOUNTER FOR REMOVAL OF URETERAL STENT: Primary | ICD-10-CM

## 2025-03-04 RX ORDER — CIPROFLOXACIN 500 MG/1
500 TABLET, FILM COATED ORAL ONCE
Status: COMPLETED | OUTPATIENT
Start: 2025-03-04 | End: 2025-03-04

## 2025-03-04 RX ORDER — LIDOCAINE HYDROCHLORIDE 20 MG/ML
JELLY TOPICAL ONCE
Status: COMPLETED | OUTPATIENT
Start: 2025-03-04 | End: 2025-03-04

## 2025-03-04 RX ADMIN — CIPROFLOXACIN 500 MG: 500 TABLET, FILM COATED ORAL at 09:10

## 2025-03-04 RX ADMIN — LIDOCAINE HYDROCHLORIDE: 20 JELLY TOPICAL at 09:12

## 2025-03-04 NOTE — PROGRESS NOTES
CYSTOSCOPY AND URETERAL STENT REMOVAL PROCEDURE NOTE:    Linden Cruz is a 54 year old male  who presents with ureteral stent for cystoscopy and ureteral stent removal.    Pt ID verified with patient: Yes     Procedure verified with patient: Yes     Procedure confirmed with physician and support staff: Yes     Consent form confirmed with physician and support staff.    Sign In  History and Physical Exam reviewed.  Informed Consent Discussed: Yes   Sign in Communication: Yes   Time Out:  Team Confirms the Correct Patient, Correct Procedure; Yes , Correct Site and Site Marking, Correct Position (if applicable).    Affirmation of Time Out: Yes   Sign Out:  Sign Out Discussion: Yes   Physician: Mg Aguilar MD    Linden Cruz is a 54 year old male with an indwelling ureteral stent in need of removal.      CYSTOSCOPY PROCEDURE:  After sterile preparation and draping of the patient,  a 17-Malawian flexible cystoscope was introduced via the urethra.  It was passed without difficulty into the bladder.  The urethra was open without evidence of stricture.  The ureteral orifices were orthotopic.  The double J stent was seen coming out the right side.  It was grasped with an alligator forceps and extracted intact without difficulty.  The patient tolerated the procedure well    A/P Successful stent removal  Prophylactic antibiotic ordered   Stone prevention counseling provided today    Watch for any new onset fevers, signs of UTI.  May expect some pain after removal.  If this is severe, or last many hours, you may need to return for replacement of stent.    Mg Aguilar MD   Urology  [facility]

## 2025-03-04 NOTE — NURSING NOTE
Linden Cruz's goals for this visit include:   Chief Complaint   Patient presents with    Cystoscopy     Stent removal        He requests these members of his care team be copied on today's visit information:       PCP: Nik Nina    Referring Provider:  Referred Self, MD  No address on file    BP (!) 136/93 (BP Location: Right arm, Patient Position: Sitting, Cuff Size: Adult Large)   Pulse 90   Provider aware.     Clinic Administered Medication Documentation     Patient was given   lidocaine (XYLOCAINE) 2 % external gel  . Prior to medication administration, verified patient's identity using patient's name and date of birth.    Clinic Administered Medication Documentation    Patient was given   ciprofloxacin (CIPRO) tablet 500 mg   . Prior to medication administration, verified patient's identity using patient's name and date of birth.    Tabitha Davison LPN          Do you need any medication refills at today's visit?     Tabitha Davison LPN on 3/4/2025 at 9:02 AM

## 2025-03-04 NOTE — PATIENT INSTRUCTIONS
"After Your Cystoscopy    What happens after the exam?    You may go back to your normal diet and activity as you feel ready, unless your doctor tells you not to.    For the next two days, you may notice:    Some blood in your urine  Some burning when you urinate (use the toilet)  An urge to urinate more often  Bladder spasms    These are normal after the procedure and should go away after a day or two.  To relieve these problems drink 6 to 8 large glasses of water each day (includes drinks at meals) as this will help clear the urine.  Take warm baths to relieve pain and bladder spasms.  Do not add anything to the bath water.  You may also take Tylenol (acetaminophen) for pain if needed.    When should I call my doctor?    A fever over 100F (38C) for more than a day. (Before you call the doctor, check your temperature under your tongue)  Chills  Failure to urinate: No urine comes out when you try to use the toilet. (Try soaking in a bathtub full of warm water. If still no urine, call your doctor)  A lot of blood in the urine, or blood clots larger than a nickel  Pain in the back or belly area (abdomen)  Pain or spasms that are not relieved by warm tub baths and pain medicine  Severe pain, burning or other problems while passing urine  Pain that gets worse after two days            AFTER YOUR CYSTOSCOPY        You have just completed a cystoscopy, or \"cysto\", which allowed your physician to learn more about your bladder (or to remove a stent placed after surgery). We suggest that you continue to avoid caffeine, fruit juice, and alcohol for the next 24 hours, however, you are encouraged to return to your normal activities.         A few things that are considered normal after your cystoscopy:     * Small amount of bleeding (or spotting) that clears within the next 24 hours     * Slight burning sensation with urination     * Sensation to of needing to avoid more frequently     * The feeling of \"air\" in your urine     * " Mild discomfort that is relieved with Tylenol        Please contact our office promptly if you:     * Develop a fever above 101 degrees     * Are unable to urinate     * Develop bright red blood that does not stop     * Severe pain or swelling         Please contact our office with any concerns or questions @[facility].

## 2025-03-04 NOTE — NURSING NOTE
One time dose of ciprofloxacin ordered per Dr. Aguilar for cysto stent removal.    Kenyetta Macias RN, BSN

## 2025-03-05 ENCOUNTER — HOSPITAL ENCOUNTER (EMERGENCY)
Facility: CLINIC | Age: 55
Discharge: LEFT BEFORE DISCHARGE | End: 2025-03-05
Attending: FAMILY MEDICINE
Payer: COMMERCIAL

## 2025-05-01 NOTE — PROGRESS NOTES
History of Present Illness - Linden Cruz is a 48 year old male presenting in clinic today for a recheck on Mixed conductive and sensorineural hearing loss of both ears and s/p T-Tube placement on 02/13/19  Overall patient is feeling much better he feels his hearing is improved initially had some discomfort in his ears clicking discomfort but now that is gone.  Again he feels his hearing is much much improved.    Present Symptoms include: no sx and they are   stable .  Linden denies otolgia, otorhea and facial pain/pressure.      BP Readings from Last 1 Encounters:   02/13/19 148/80       BP noted to be well controlled today in office.     Linden IS NOT a smoker/uses chewing tobacco.     Past Medical History -   Past Medical History:   Diagnosis Date     Chest pain      Esophageal reflux      Migraines      Mixed hyperlipidemia      DANIEL (obstructive sleep apnea)      Other specified gastritis without mention of hemorrhage     H. Pylori, diag 12/06       Current Medications -   Current Outpatient Medications:      fluticasone (FLONASE) 50 MCG/ACT spray, Spray 1-2 sprays into both nostrils daily, Disp: 1 Bottle, Rfl: 3     gemfibrozil (LOPID) 600 MG tablet, TAKE 1 TABLET (600 MG) BY MOUTH 2 TIMES DAILY, Disp: 180 tablet, Rfl: 3     Ibuprofen (ADVIL PO), , Disp: , Rfl:      levothyroxine (SYNTHROID/LEVOTHROID) 125 MCG tablet, Take 1 tablet (125 mcg) by mouth daily, Disp: 90 tablet, Rfl: 3     meloxicam (MOBIC) 15 MG tablet, Take 1 tablet (15 mg) by mouth daily, Disp: 30 tablet, Rfl: 5     Multiple Vitamin (MULTI VITAMIN MENS PO), Take  by mouth., Disp: , Rfl:      omeprazole (PRILOSEC) 40 MG DR capsule, Take 1 capsule (40 mg) by mouth daily, Disp: 90 capsule, Rfl: 3     ranitidine (ZANTAC) 150 MG capsule, Take 1 capsule (150 mg) by mouth 2 times daily, Disp: 60 capsule, Rfl: 5     SUMAtriptan (IMITREX) 100 MG tablet, TAKE 1 TAB BY MOUTH WITH ONSET OF MIGRAINE, MAY REPEAT ONCE AFTER 2 HOURS. MAX 2 TABS/24 HOURS.,  Disp: 18 tablet, Rfl: 5     tadalafil (CIALIS) 20 MG tablet, TAKE 1/2 TO 1 TABLET BY MOUTH AS NEEDED FOR ED, DO NOT USE WITH NITRO/TERAZOSIN/DOXAZOSIN, Disp: 8 tablet, Rfl: 8     topiramate (TOPAMAX) 50 MG tablet, Take 1 tablet (50 mg) by mouth daily, Disp: 90 tablet, Rfl: 3    Allergies -   Allergies   Allergen Reactions     No Known Drug Allergies        Social History -   Social History     Socioeconomic History     Marital status:      Spouse name: Not on file     Number of children: 0     Years of education: Not on file     Highest education level: Not on file   Occupational History     Employer: [employer]     Comment: road construction   Social Needs     Financial resource strain: Not on file     Food insecurity:     Worry: Not on file     Inability: Not on file     Transportation needs:     Medical: Not on file     Non-medical: Not on file   Tobacco Use     Smoking status: Former Smoker     Types: Cigarettes     Last attempt to quit: 2000     Years since quittin.2     Smokeless tobacco: Never Used   Substance and Sexual Activity     Alcohol use: Yes     Alcohol/week: 0.0 oz     Comment: 1/month     Drug use: No     Sexual activity: Yes     Partners: Female     Birth control/protection: Surgical     Comment: vasectomy   Lifestyle     Physical activity:     Days per week: Not on file     Minutes per session: Not on file     Stress: Not on file   Relationships     Social connections:     Talks on phone: Not on file     Gets together: Not on file     Attends Roman [employer] service: Not on file     Active member of club or organization: Not on file     Attends meetings of clubs or organizations: Not on file     Relationship status: Not on file     Intimate partner violence:     Fear of current or ex partner: Not on file     Emotionally abused: Not on file     Physically abused: Not on file     Forced sexual activity: Not on file   Other Topics Concern     Parent/sibling w/ CABG, MI or angioplasty  before 65F 55M? No      Service Not Asked     Blood Transfusions Not Asked     Caffeine Concern Yes     Comment: large intake per day     Occupational Exposure Not Asked     Hobby Hazards Not Asked     Sleep Concern Not Asked     Stress Concern Not Asked     Weight Concern Not Asked     Special Diet Yes     Comment: low cholesterol      Back Care Not Asked     Exercise No     Bike Helmet Not Asked     Seat Belt Not Asked     Self-Exams Not Asked   Social History Narrative    Dairy/d 2-3 servings/d.     Caffeine 2-3 servings/d    Exercise 1-2 x week    Sunscreen used - NO, uses sunscreen in summer months    Seatbelts used - YES    Working smoke/CO detectors in the home - YES    Guns stored in the home - YES    Self Breast Exams - NA    Self Testicular Exam - YES    Eye Exam up to date - YES    Dental Exam up to date - YES    Pap Smear up to date - NA    Mammogram up to date - NA    PSA up to date - NA    Dexa Scan up to date - NA    Flex Sig / Colonoscopy up to date - NA    Immunizations up to date - YES    Abuse: Current or Past(Physical, Sexual or Emotional)- NO    Do you feel safe in your environment - YES    Claudia Salamanca, [facility]  1/29/2010               Family History -   Family History   Problem Relation Age of Onset     Diabetes Maternal Grandmother      C.A.D. Maternal Grandfather      Diabetes Maternal Grandfather      Cancer Maternal Grandfather         lung cancer     Cerebrovascular Disease Paternal Grandmother      C.A.D. Paternal Grandfather      Other - See Comments Mother         bilateral carotid occlusion, SVG bypass to both.     Asthma No family hx of      Hypertension No family hx of      Breast Cancer No family hx of      Cancer - colorectal No family hx of      Prostate Cancer No family hx of      Alcohol/Drug No family hx of      Allergies No family hx of      Alzheimer Disease No family hx of      Anesthesia Reaction No family hx of      Arthritis No family hx of      Blood Disease No  family hx of      Cardiovascular No family hx of      Circulatory No family hx of      Congenital Anomalies No family hx of      Connective Tissue Disorder No family hx of      Depression No family hx of      Endocrine Disease No family hx of      Eye Disorder No family hx of      Genetic Disorder No family hx of      Gastrointestinal Disease No family hx of      Genitourinary Problems No family hx of      Gynecology No family hx of      Heart Disease No family hx of      Lipids No family hx of      Musculoskeletal Disorder No family hx of      Neurologic Disorder No family hx of      Obesity No family hx of      Osteoporosis No family hx of      Psychotic Disorder No family hx of      Respiratory No family hx of      Thyroid Disease No family hx of      Hearing Loss No family hx of      Coronary Artery Disease No family hx of      Mental Illness No family hx of      Depression/Anxiety No family hx of      Known Genetic Syndrome No family hx of        Review of Systems - As per HPI and PMHx, otherwise review of system review of the head and neck negative. Otherwise 10+ review of system is negative    Physical Exam  There were no vitals taken for this visit.  BMI: There is no height or weight on file to calculate BMI.    General - The patient is well nourished and well developed, and appears to have good nutritional status.  Alert and oriented to person and place, answers questions and cooperates with examination appropriately.    SKIN - No suspicious lesions or rashes.  Respiration - No respiratory distress.  Head and Face - Normocephalic and atraumatic, with no gross asymmetry noted of the contour of the facial features.  The facial nerve is intact, with strong symmetric movements.    Voice and Breathing - The patient was breathing comfortably without the use of accessory muscles. The patients voice was clear and strong, and had appropriate pitch and quality.    Ears - Bilateral pinna and EACs with normal appearing  overlying skin.  T-tube appreciated on the left tympanic membrane is clear.  I can see the middle ear space appears to be clear.  Ear canal clear and dry.  T-tube appreciated on the right side.  TM was again clear and external auditory canals clear.  Eyes - Extraocular movements intact.  Sclera were not icteric or injected, conjunctiva were pink and moist.    Mouth - Examination of the oral cavity showed pink, healthy oral mucosa. No lesions or ulcerations noted.  The tongue was mobile and midline, and the dentition were in good condition.      Throat - The walls of the oropharynx were smooth, pink, moist, symmetric, and had no lesions or ulcerations.  The tonsillar pillars and soft palate were symmetric. Tonsils are symmetric. The uvula was midline on elevation.    Neck - Normal midline excursion of the laryngotracheal complex during swallowing.  Full range of motion on passive movement.  Palpation of the occipital, submental, submandibular, internal jugular chain, and supraclavicular nodes did not demonstrate any abnormal lymph nodes or masses.  The carotid pulse was palpable bilaterally.  Palpation of the thyroid was soft and smooth, with no nodules or goiter appreciated.  The trachea was mobile and midline.    Nose - External contour is symmetric, no gross deflection or scars.  Nasal mucosa is pink and moist with no abnormal mucus.  The septum was midline and non-obstructive, turbinates of normal size and position.  No polyps, masses, or purulence noted on examination.    Neuro - Nonfocal neuro exam is normal, CN 2 through 12 intact, normal gait and muscle tone.      Performed in clinic today:  Audiologic Studies - An audiogram and tympanogram were performed today as part of the evaluation and personally reviewed. The tympanogram shows Type B curves on the right and Type B curves on the left, with High canal volumes and middle ear pressures.  The audiogram showed Mild to moderate high-frequency loss on the right  and Mild to moderate high-frequency loss slightly worse than on the right sideon the left.        A/P - Linden Cruz is a 48 year old male Patient presents with:  RECHECK: Mixed conductive and sensorineural hearing loss of both ears +2 more     Patient with stable sensorineural hearing loss.  Hearing protection again is again encouraged and reinforced.  Tubes appear to be in good position patent.    Linden should follow up in 1 year.      At Linden next appointment they will need a hearing test.      Savage Mejia MD       No

## 2025-05-06 ENCOUNTER — NURSE TRIAGE (OUTPATIENT)
Dept: FAMILY MEDICINE | Facility: OTHER | Age: 55
End: 2025-05-06
Payer: COMMERCIAL

## 2025-05-06 NOTE — TELEPHONE ENCOUNTER
Reason for Call:  Appointment Request    Patient requesting this type of appt:  left knee pain    Requested provider: Nik Nina    Reason patient unable to be scheduled: Not within requested timeframe    When does patient want to be seen/preferred time:  ASAP    Comments:   Patient was not open to a virtual and wants to be seen soon he said his knee is causing lots of discomfort    Could we send this information to you in [facility] or would you prefer to receive a phone call?:   Patient would prefer a phone call   Okay to leave a detailed message?: Yes at Cell number on file:    Telephone Information:   Mobile [phone]   Mobile        Call taken on 5/6/2025 at 5:56 PM by Sherry Moore

## 2025-05-07 NOTE — TELEPHONE ENCOUNTER
"Nurse Triage SBAR    Is this a 2nd Level Triage? NO    Situation/Background: Patient called with request for appointment for knee pain; RN called back to triage. Patient reporting LEFT knee pain for the past couple of weeks. Described as constant, dull aching now, but if he moves or turns/twists the knee if it severe/sharp.     Assessment: See assessment info below. Denies any red flag symptoms, redness, swelling or any other symptoms. Denies any triggering events or known cause.     Protocol Recommended Disposition:   See in Office Within 3 Days    Recommendation: Assisted with scheduling within disposition timeframe. RN reviewed red flag symptoms with patient and when to see emergency care. They agreed and understood.      Does the patient meet one of the following criteria for ADS visit consideration? 16+ years old, with an [facility] PCP     TIP  Providers, please consider if this condition is appropriate for management at one of our Acute and Diagnostic Services sites.     If patient is a good candidate, please use dotphrase <dot>triageresponse and select Refer to ADS to document.      Reason for Disposition   Patient wants to be seen   MODERATE pain (e.g., symptoms interfere with work or school, limping) and present > 3 days    Additional Information   Negative: Sounds like a life-threatening emergency to the triager   Negative: Followed a knee injury   Negative: Swollen knee joint and fever   Negative: Patient sounds very sick or weak to the triager   Negative: Can't move swollen joint at all   Negative: Thigh or calf pain and only 1 side and present > 1 hour   Negative: Thigh or calf swelling and only 1 side    Answer Assessment - Initial Assessment Questions  1. LOCATION and RADIATION: \"Where is the pain located?\"       LEFT knee, under the knee cap    2. QUALITY: \"What does the pain feel like?\"  (e.g., sharp, dull, aching, burning)      Constant aching; severe/sharp pain if twisting or moving it    3. SEVERITY: " "\"How bad is the pain?\" \"What does it keep you from doing?\"   (Scale 1-10; or mild, moderate, severe)      Constant, moderate/ severe at times    4. ONSET: \"When did the pain start?\" \"Does it come and go, or is it there all the time?\"      A couple weeks ago    5. RECURRENT: \"Have you had this pain before?\" If Yes, ask: \"When, and what happened then?\"      No    6. SETTING: \"Has there been any recent work, exercise or other activity that involved that part of the body?\"       No  7. AGGRAVATING FACTORS: \"What makes the knee pain worse?\" (e.g., walking, climbing stairs, running)      Moving makes it worse, twisting; morning time is better    8. ASSOCIATED SYMPTOMS: \"Is there any swelling or redness of the knee?\"      No    9. OTHER SYMPTOMS: \"Do you have any other symptoms?\" (e.g., chest pain, difficulty breathing, fever, calf pain)      No    10. PREGNANCY: \"Is there any chance you are pregnant?\" \"When was your last menstrual period?\"        no    Protocols used: Knee Pain-A-[place]    "

## 2025-05-08 ENCOUNTER — ANCILLARY PROCEDURE (OUTPATIENT)
Dept: GENERAL RADIOLOGY | Facility: OTHER | Age: 55
End: 2025-05-08
Payer: COMMERCIAL

## 2025-05-08 ENCOUNTER — OFFICE VISIT (OUTPATIENT)
Dept: FAMILY MEDICINE | Facility: OTHER | Age: 55
End: 2025-05-08
Payer: COMMERCIAL

## 2025-05-08 VITALS
WEIGHT: 221 LBS | OXYGEN SATURATION: 97 % | HEIGHT: 70 IN | TEMPERATURE: 97.2 F | HEART RATE: 92 BPM | DIASTOLIC BLOOD PRESSURE: 86 MMHG | RESPIRATION RATE: 16 BRPM | BODY MASS INDEX: 31.64 KG/M2 | SYSTOLIC BLOOD PRESSURE: 132 MMHG

## 2025-05-08 DIAGNOSIS — M25.562 ACUTE PAIN OF LEFT KNEE: Primary | ICD-10-CM

## 2025-05-08 DIAGNOSIS — M25.562 ACUTE PAIN OF LEFT KNEE: ICD-10-CM

## 2025-05-08 DIAGNOSIS — K21.9 GASTROESOPHAGEAL REFLUX DISEASE WITHOUT ESOPHAGITIS: ICD-10-CM

## 2025-05-08 DIAGNOSIS — Z87.11 HISTORY OF GASTRIC ULCER: ICD-10-CM

## 2025-05-08 DIAGNOSIS — G57.11 MERALGIA PARESTHETICA OF RIGHT SIDE: ICD-10-CM

## 2025-05-08 DIAGNOSIS — M99.05 SOMATIC DYSFUNCTION OF PELVIS REGION: ICD-10-CM

## 2025-05-08 DIAGNOSIS — M79.18 MYOFASCIAL PAIN: ICD-10-CM

## 2025-05-08 ASSESSMENT — PAIN SCALES - GENERAL: PAINLEVEL_OUTOF10: MILD PAIN (3)

## 2025-05-08 NOTE — PROGRESS NOTES
Assessment & Plan     Acute pain of left knee  Patient is a 54-year-old male with hypertriglyceridemia, GERD, and history of gastric ulcer presenting with concerns of progressive acute left knee pain over the past 2 weeks.  Exam overall reassuring without deformity, swelling, or erythema.  There is pain to palpation along medial joint line which could be suggestive of meniscal pathology.  Obtained XR today to evaluate joint structure, pending formal read by radiologist.  Discussed conservative management with as needed acetaminophen, ice, rest, knee compression devices, and topical analgesics.  Prescribed short course of oral diclofenac for further anti-inflammatory effect and pain management.  Oral diclofenac chosen given enteric-coated formulation given GERD and history of gastric ulcer.  Recommend evaluation by physical therapy and orthopedics, referrals placed. Discussed proper use of medication(s) and potential side effects. Patient understands and is agreeable to plan as discussed in clinic.  - XR Knee Left 1/2 Views; Future  - diclofenac (VOLTAREN) 50 MG EC tablet; Take 1 tablet (50 mg) by mouth 2 times daily.  - Orthopedic  Referral; Future  - Physical Therapy  Referral; Future    Gastroesophageal reflux disease without esophagitis  Continue with daily omeprazole, especially while taking daily oral diclofenac.     History of gastric ulcer  Cautioned use of chronic NSAIDs due to gastric ulcer history.     Meralgia paresthetica of right side  Myofascial pain  Somatic dysfunction of pelvis region  Physical therapy had been helpful with symptoms. Suppose to follow-up with spine specialist but appointment was canceled and not rescheduled. Placed updated referral to physical therapy to work on prior concerns and current left knee pain.   - Physical Therapy  Referral; Future        Subjective   Zachary is a 54 year old, presenting for the following health issues:  Knee Pain (Left )       "5/8/2025     9:49 AM   Additional Questions   Roomed by Danica MYLES     Knee Pain    History of Present Illness       Reason for visit:  Knee pain  Symptom onset:  1-2 weeks ago  Symptoms include:  Pain with movement  Symptom intensity:  Moderate  Symptom progression:  Worsening  Had these symptoms before:  No  What makes it worse:  Movement or twisting  What makes it better:  Have been taking ibuprofen but doesnt help much   He is taking medications regularly.        Presents with concerns of progressive left knee pain over the past 2 weeks.  Denies preceding accident, injury, or trauma prior to pain onset.  Pain is a constant dull ache but can be sharp and intense with movement or twisting.  Pain is better in the morning and progresses over the course of the day.  Has been wearing the compression device for further support which has been slightly helpful.  Taking as needed ibuprofen for pain relief with some symptom improvement.  Continues with current activities despite pain.  Associated intermittent catching/clicking sensation has been appreciated by patient.  Denies joint swelling.    Denies numbness, tingling, diminished sensation, or temperature discrepancy in left lower distal extremity. Denies prior/historical injury to knee.         Per RN Triage-  Situation/Background: Patient called with request for appointment for knee pain; RN called back to triage. Patient reporting LEFT knee pain for the past couple of weeks. Described as constant, dull aching now, but if he moves or turns/twists the knee if it severe/sharp.      Assessment: See assessment info below. Denies any red flag symptoms, redness, swelling or any other symptoms. Denies any triggering events or known cause.          Objective    /86   Pulse 92   Temp 97.2  F (36.2  C) (Temporal)   Resp 16   Ht 1.79 m (5' 10.47\")   Wt 100.2 kg (221 lb)   SpO2 97%   BMI 31.29 kg/m    Body mass index is 31.29 kg/m .  Physical Exam  Vitals reviewed. "   Constitutional:       General: He is not in acute distress.     Appearance: Normal appearance. He is not ill-appearing.   Cardiovascular:      Pulses:           Popliteal pulses are 2+ on the left side.   Pulmonary:      Effort: Pulmonary effort is normal. No respiratory distress.   Musculoskeletal:      Left knee: No swelling, deformity, effusion, bony tenderness or crepitus. Tenderness present over the medial joint line. No lateral joint line tenderness.      Right lower leg: No edema.      Left lower leg: No edema.      Comments: Left knee- negative anterior/posterior drawer, anterior Lachman, medial/lateral Lisa.    Skin:     General: Skin is warm and dry.      Capillary Refill: Capillary refill takes less than 2 seconds.      Findings: No lesion or rash.   Neurological:      Mental Status: He is alert.      Sensory: Sensation is intact. No sensory deficit.      Motor: Motor function is intact. No weakness.   Psychiatric:         Attention and Perception: Attention and perception normal.         Mood and Affect: Mood and affect normal.        XR pending formal read by radiologist.         Signed Electronically by: WOJCIECH You CNP

## 2025-05-08 NOTE — PATIENT INSTRUCTIONS
Today we completed an x-ray of the left knee to evaluate for possible underlying arthritis or calcifications along the soft tissue/tendons.  I will reach out via [employer] with x-ray report once images have been read by the radiologist.  In the meantime I would like you to continue wearing the knee compression device during times of activity for further support.  Please stop using over-the-counter ibuprofen and start taking the prescribed oral diclofenac tablets.  Oral diclofenac is an anti-inflammatory that is dosed 2 times a day to help with inflammation and pain.  This medication was chosen as it is a coated pill that helps protect the stomach.  Continue taking your omeprazole as you are on these anti-inflammatories to further protect the stomach.    I have placed referrals to both physical therapy and orthopedics for further evaluation.  Please see physical therapy before seeing orthopedic provider.    You can continue with as needed acetaminophen (Tylenol), topical pain relievers such as lidocaine, IcyHot, Biofreeze, or Voltaren, and ice for further symptom management.

## 2025-05-09 ENCOUNTER — RESULTS FOLLOW-UP (OUTPATIENT)
Dept: FAMILY MEDICINE | Facility: OTHER | Age: 55
End: 2025-05-09

## 2025-05-09 PROBLEM — M99.05 SOMATIC DYSFUNCTION OF PELVIS REGION: Status: RESOLVED | Noted: 2024-11-22 | Resolved: 2025-05-09

## 2025-05-09 PROBLEM — G57.11 MERALGIA PARESTHETICA OF RIGHT SIDE: Status: RESOLVED | Noted: 2024-11-22 | Resolved: 2025-05-09

## 2025-05-09 PROBLEM — M79.18 MYOFASCIAL PAIN: Status: RESOLVED | Noted: 2024-11-22 | Resolved: 2025-05-09

## 2025-05-23 ENCOUNTER — ANCILLARY PROCEDURE (OUTPATIENT)
Dept: GENERAL RADIOLOGY | Facility: CLINIC | Age: 55
End: 2025-05-23
Attending: NURSE PRACTITIONER
Payer: COMMERCIAL

## 2025-05-23 DIAGNOSIS — M25.562 LEFT KNEE PAIN, UNSPECIFIED CHRONICITY: ICD-10-CM

## 2025-05-23 PROCEDURE — 73560 X-RAY EXAM OF KNEE 1 OR 2: CPT | Mod: TC | Performed by: RADIOLOGY

## 2025-06-02 ENCOUNTER — HOSPITAL ENCOUNTER (OUTPATIENT)
Dept: MRI IMAGING | Facility: CLINIC | Age: 55
Discharge: HOME | End: 2025-06-02
Attending: NURSE PRACTITIONER | Admitting: NURSE PRACTITIONER
Payer: COMMERCIAL

## 2025-06-02 DIAGNOSIS — M89.9 LESION OF LEFT FEMUR: ICD-10-CM

## 2025-06-02 PROCEDURE — 73720 MRI LWR EXTREMITY W/O&W/DYE: CPT | Mod: LT

## 2025-06-02 PROCEDURE — 255N000002 HC RX 255 OP 636: Performed by: NURSE PRACTITIONER

## 2025-06-02 PROCEDURE — 73720 MRI LWR EXTREMITY W/O&W/DYE: CPT | Mod: 26 | Performed by: RADIOLOGY

## 2025-06-02 PROCEDURE — A9585 GADOBUTROL INJECTION: HCPCS | Performed by: NURSE PRACTITIONER

## 2025-06-02 RX ORDER — GADOBUTROL 604.72 MG/ML
9.5 INJECTION INTRAVENOUS ONCE
Status: COMPLETED | OUTPATIENT
Start: 2025-06-02 | End: 2025-06-02

## 2025-06-02 RX ADMIN — GADOBUTROL 9.5 ML: 604.72 INJECTION INTRAVENOUS at 09:27

## 2025-06-09 ENCOUNTER — THERAPY VISIT (OUTPATIENT)
Dept: PHYSICAL THERAPY | Facility: CLINIC | Age: 55
End: 2025-06-09
Payer: COMMERCIAL

## 2025-06-09 DIAGNOSIS — G57.11 MERALGIA PARESTHETICA OF RIGHT SIDE: ICD-10-CM

## 2025-06-09 DIAGNOSIS — M79.18 MYOFASCIAL PAIN: ICD-10-CM

## 2025-06-09 DIAGNOSIS — M25.562 ACUTE PAIN OF LEFT KNEE: Primary | ICD-10-CM

## 2025-06-09 DIAGNOSIS — M99.05 SOMATIC DYSFUNCTION OF PELVIS REGION: ICD-10-CM

## 2025-06-09 PROCEDURE — 97161 PT EVAL LOW COMPLEX 20 MIN: CPT | Mod: GP | Performed by: PHYSICAL THERAPIST

## 2025-06-09 PROCEDURE — 97530 THERAPEUTIC ACTIVITIES: CPT | Mod: GP | Performed by: PHYSICAL THERAPIST

## 2025-06-09 ASSESSMENT — ACTIVITIES OF DAILY LIVING (ADL)
KNEE_ACTIVITY_OF_DAILY_LIVING_SUM: 42
PLEASE_INDICATE_YOR_PRIMARY_REASON_FOR_REFERRAL_TO_THERAPY:: KNEE
GIVING WAY, BUCKLING OR SHIFTING OF KNEE: I HAVE THE SYMPTOM BUT IT DOES NOT AFFECT MY ACTIVITY
AS_A_RESULT_OF_YOUR_KNEE_INJURY,_HOW_WOULD_YOU_RATE_YOUR_CURRENT_LEVEL_OF_DAILY_ACTIVITY?: NEARLY NORMAL
WALK: ACTIVITY IS MINIMALLY DIFFICULT
GO DOWN STAIRS: ACTIVITY IS SOMEWHAT DIFFICULT
GIVING WAY, BUCKLING OR SHIFTING OF KNEE: I HAVE THE SYMPTOM BUT IT DOES NOT AFFECT MY ACTIVITY
STIFFNESS: THE SYMPTOM AFFECTS MY ACTIVITY SLIGHTLY
LIMPING: I HAVE THE SYMPTOM BUT IT DOES NOT AFFECT MY ACTIVITY
RISE FROM A CHAIR: ACTIVITY IS MINIMALLY DIFFICULT
SQUAT: ACTIVITY IS SOMEWHAT DIFFICULT
SIT WITH YOUR KNEE BENT: ACTIVITY IS MINIMALLY DIFFICULT
KNEEL ON THE FRONT OF YOUR KNEE: ACTIVITY IS FAIRLY DIFFICULT
LIMPING: I HAVE THE SYMPTOM BUT IT DOES NOT AFFECT MY ACTIVITY
SIT WITH YOUR KNEE BENT: ACTIVITY IS MINIMALLY DIFFICULT
SQUAT: ACTIVITY IS SOMEWHAT DIFFICULT
PAIN: THE SYMPTOM AFFECTS MY ACTIVITY SLIGHTLY
GO UP STAIRS: ACTIVITY IS SOMEWHAT DIFFICULT
HOW_WOULD_YOU_RATE_THE_OVERALL_FUNCTION_OF_YOUR_KNEE_DURING_YOUR_USUAL_DAILY_ACTIVITIES?: ABNORMAL
KNEEL ON THE FRONT OF YOUR KNEE: ACTIVITY IS FAIRLY DIFFICULT
HOW_WOULD_YOU_RATE_THE_CURRENT_FUNCTION_OF_YOUR_KNEE_DURING_YOUR_USUAL_DAILY_ACTIVITIES_ON_A_SCALE_FROM_0_TO_100_WITH_100_BEING_YOUR_LEVEL_OF_KNEE_FUNCTION_PRIOR_TO_YOUR_INJURY_AND_0_BEING_THE_INABILITY_TO_PERFORM_ANY_OF_YOUR_USUAL_DAILY_ACTIVITIES?: 50
HOW_WOULD_YOU_RATE_THE_CURRENT_FUNCTION_OF_YOUR_KNEE_DURING_YOUR_USUAL_DAILY_ACTIVITIES_ON_A_SCALE_FROM_0_TO_100_WITH_100_BEING_YOUR_LEVEL_OF_KNEE_FUNCTION_PRIOR_TO_YOUR_INJURY_AND_0_BEING_THE_INABILITY_TO_PERFORM_ANY_OF_YOUR_USUAL_DAILY_ACTIVITIES?: 50
STIFFNESS: THE SYMPTOM AFFECTS MY ACTIVITY SLIGHTLY
AS_A_RESULT_OF_YOUR_KNEE_INJURY,_HOW_WOULD_YOU_RATE_YOUR_CURRENT_LEVEL_OF_DAILY_ACTIVITY?: NEARLY NORMAL
GO UP STAIRS: ACTIVITY IS SOMEWHAT DIFFICULT
RISE FROM A CHAIR: ACTIVITY IS MINIMALLY DIFFICULT
WALK: ACTIVITY IS MINIMALLY DIFFICULT
STAND: ACTIVITY IS NOT DIFFICULT
PAIN: THE SYMPTOM AFFECTS MY ACTIVITY SLIGHTLY
STAND: ACTIVITY IS NOT DIFFICULT
HOW_WOULD_YOU_RATE_THE_OVERALL_FUNCTION_OF_YOUR_KNEE_DURING_YOUR_USUAL_DAILY_ACTIVITIES?: ABNORMAL
GO DOWN STAIRS: ACTIVITY IS SOMEWHAT DIFFICULT

## 2025-06-09 NOTE — PROGRESS NOTES
PHYSICAL THERAPY EVALUATION  Type of Visit: Evaluation       Fall Risk Screen:  Have you fallen 2 or more times in the past year?: No  Have you fallen and had an injury in the past year?: No    Subjective         Presenting condition or subjective complaint: L knee pain, progressive over 2 week period, started mid to late April. No specific incident or injury, but does note he was doing more kneeling around that time (working on go cart with his son). Saw primary care and then orthopedics. Had X-ray, MRI, and steroid injection. Pain is less frequent, but popping and brief sharp pains still present. Also has hx of R sided meralgia paresthetica and thigh numbness. Was having some improvement with previous PT, but was unable to continue attending due to busy schedule so would like to return to treatment for that as well.  Date of onset: 05/08/25 (date of referral)    Relevant medical history: Migraines or headaches   Dates & types of surgery: kidney stone removal    Prior diagnostic imaging/testing results: MRI; X-ray     Prior therapy history for the same diagnosis, illness or injury: No      Prior Level of Function  Transfers:   Ambulation:   ADL:   IADL:     Living Environment  Social support: With a significant other or spouse   Type of home: House; Multi-level   Stairs to enter the home: Yes 2 Is there a railing: Yes     Ramp: No   Stairs inside the home: Yes 30 Is there a railing: Yes     Help at home: None  Equipment owned:       Employment: Yes sales  Hobbies/Interests: sports fishing    Patient goals for therapy: less pain and more mobility    Pain assessment: Pain present  See objective evaluation for additional pain details     Objective   KNEE EVALUATION  PAIN: Pain Level at Rest: 4/10  Pain Level with Use: 6/10  Pain Location: anterior and medial knee  Pain Quality: Aching and Sharp  Pain Frequency: intermittent  Pain is Worst: progresses throughout day depending on activity level  Pain is Exacerbated By:  "stairs, kneeling, prolonged walking, uneven surfaces, prolonged sitting  Pain is Relieved By: heat, NSAIDs, and rest  Pain Progression: Improved since steroid injection 5/23/25  INTEGUMENTARY (edema, incisions): Joint line 41 cm B  POSTURE:   GAIT:  Weightbearing Status: WBAT  Assistive Device(s): Brace (patellar stabilizer)  Gait Deviations: Stride length decreased  BALANCE/PROPRIOCEPTION:   WEIGHTBEARING ALIGNMENT:   NON-WEIGHTBEARING ALIGNMENT:   ROM:   (Degrees) Left AROM Left PROM  Right AROM Right PROM   Knee Flexion 145  140 +pain    Knee Extension 5 hyperext  2 hyperext + pain    Pain:   End feel:     STRENGTH:   Pain: - none + mild ++ moderate +++ severe  Strength Scale: 0-5/5 Left Right   Knee Flexion 5 5   Knee Extension 5 5   Quad Set Fair- good Fair- good       SPECIAL TESTS: (+) Drop test L, (+) Valgus at 30 L, (-) valgus at 0 L; (-) varus at 0 and 30 B  FUNCTIONAL TESTS: Step up: L 6\" ++pain and compensating w/ functional valgus (more pain descending from 6\" step)  PALPATION: L Pes anserine, L subpatellar bursa (w/ knee shifted inferior)    Additional assessment for R sided meralgia paresthetica in future as indicated    Assessment & Plan   CLINICAL IMPRESSIONS  Medical Diagnosis: Acute pain of left knee; Meralgia paresthetica of right side; Myofascial pain; Somatic dysfunction of pelvis region    Treatment Diagnosis: Acute pain of left knee; Meralgia paresthetica of right side; Myofascial pain; Somatic dysfunction of pelvis region   Impression/Assessment: Patient is a 54 year old male with left knee and R thigh complaints.  The following significant findings have been identified: Pain, Inflammation, Impaired muscle performance, and Decreased activity tolerance. These impairments interfere with their ability to perform self care tasks, recreational activities, household chores, household mobility, and community mobility as compared to previous level of function.     Clinical Decision Making " (Complexity):  Clinical Presentation: Stable/Uncomplicated  Clinical Presentation Rationale: based on medical and personal factors listed in PT evaluation  Clinical Decision Making (Complexity): Low complexity    PLAN OF CARE  Treatment Interventions:  Modalities: Cupping, E-stim, Hot Pack, Ultrasound  Interventions: Manual Therapy, Neuromuscular Re-education, Therapeutic Activity, Therapeutic Exercise, Self-Care/Home Management    Long Term Goals     PT Goal 1  Goal Identifier: Stairs  Goal Description: Pt will be able to ascend/descend stairs in normal reciprocal pattern w/o pain increase  Rationale: to maximize safety and independence with performance of ADLs and functional tasks;to maximize safety and independence within the home;to maximize safety and independence within the community  Target Date: 09/01/25      Frequency of Treatment: 1x/week  Duration of Treatment: x4-6 weeks, tapering to 2x/month x 2-3 months    Recommended Referrals to Other Professionals: none  Education Assessment:   Learner/Method: Patient;Listening    Risks and benefits of evaluation/treatment have been explained.   Patient/Family/caregiver agrees with Plan of Care.     Evaluation Time:     PT Eval, Low Complexity Minutes (45102): 24       Signing Clinician: Amanda Hilligoss, PT

## 2025-06-15 DIAGNOSIS — H90.6 MIXED CONDUCTIVE AND SENSORINEURAL HEARING LOSS OF BOTH EARS: ICD-10-CM

## 2025-06-15 DIAGNOSIS — K21.9 GASTROESOPHAGEAL REFLUX DISEASE WITHOUT ESOPHAGITIS: ICD-10-CM

## 2025-06-15 DIAGNOSIS — E03.9 HYPOTHYROIDISM, UNSPECIFIED TYPE: ICD-10-CM

## 2025-06-15 DIAGNOSIS — H69.93 DYSFUNCTION OF BOTH EUSTACHIAN TUBES: ICD-10-CM

## 2025-06-16 RX ORDER — LEVOTHYROXINE SODIUM 175 UG/1
175 TABLET ORAL
Qty: 90 TABLET | Refills: 1 | OUTPATIENT
Start: 2025-06-16

## 2025-06-16 RX ORDER — OMEPRAZOLE 40 MG/1
40 CAPSULE, DELAYED RELEASE ORAL DAILY
Qty: 90 CAPSULE | Refills: 1 | OUTPATIENT
Start: 2025-06-16

## 2025-06-16 RX ORDER — LORATADINE 10 MG/1
1 TABLET ORAL DAILY
Qty: 90 TABLET | Refills: 1 | Status: SHIPPED | OUTPATIENT
Start: 2025-06-16

## 2025-06-30 ENCOUNTER — THERAPY VISIT (OUTPATIENT)
Dept: PHYSICAL THERAPY | Facility: CLINIC | Age: 55
End: 2025-06-30
Payer: COMMERCIAL

## 2025-06-30 DIAGNOSIS — M99.05 SOMATIC DYSFUNCTION OF PELVIS REGION: ICD-10-CM

## 2025-06-30 DIAGNOSIS — M79.18 MYOFASCIAL PAIN: ICD-10-CM

## 2025-06-30 DIAGNOSIS — M25.562 ACUTE PAIN OF LEFT KNEE: ICD-10-CM

## 2025-06-30 DIAGNOSIS — G57.11 MERALGIA PARESTHETICA OF RIGHT SIDE: ICD-10-CM

## 2025-06-30 PROCEDURE — 97139 UNLISTED THERAPEUTIC PX: CPT | Mod: GP | Performed by: PHYSICAL THERAPIST

## 2025-06-30 PROCEDURE — 97140 MANUAL THERAPY 1/> REGIONS: CPT | Mod: GP | Performed by: PHYSICAL THERAPIST

## 2025-06-30 PROCEDURE — 97530 THERAPEUTIC ACTIVITIES: CPT | Mod: GP | Performed by: PHYSICAL THERAPIST

## 2025-07-03 DIAGNOSIS — G43.109 MIGRAINE WITH AURA AND WITHOUT STATUS MIGRAINOSUS, NOT INTRACTABLE: ICD-10-CM

## 2025-07-03 RX ORDER — SUMATRIPTAN SUCCINATE 100 MG/1
100 TABLET ORAL
Qty: 9 TABLET | Refills: 1 | Status: SHIPPED | OUTPATIENT
Start: 2025-07-03

## 2025-07-09 ENCOUNTER — THERAPY VISIT (OUTPATIENT)
Dept: PHYSICAL THERAPY | Facility: CLINIC | Age: 55
End: 2025-07-09
Payer: COMMERCIAL

## 2025-07-09 DIAGNOSIS — G57.11 MERALGIA PARESTHETICA OF RIGHT SIDE: Primary | ICD-10-CM

## 2025-07-09 DIAGNOSIS — M25.562 ACUTE PAIN OF LEFT KNEE: ICD-10-CM

## 2025-07-09 DIAGNOSIS — M99.05 SOMATIC DYSFUNCTION OF PELVIS REGION: ICD-10-CM

## 2025-07-09 DIAGNOSIS — M79.18 MYOFASCIAL PAIN: ICD-10-CM

## 2025-07-09 PROCEDURE — 97110 THERAPEUTIC EXERCISES: CPT | Mod: GP | Performed by: PHYSICAL THERAPIST

## 2025-07-09 PROCEDURE — 97140 MANUAL THERAPY 1/> REGIONS: CPT | Mod: GP | Performed by: PHYSICAL THERAPIST

## 2025-07-09 PROCEDURE — 97139 UNLISTED THERAPEUTIC PX: CPT | Mod: GP | Performed by: PHYSICAL THERAPIST

## 2025-08-04 ENCOUNTER — THERAPY VISIT (OUTPATIENT)
Dept: PHYSICAL THERAPY | Facility: CLINIC | Age: 55
End: 2025-08-04
Payer: COMMERCIAL

## 2025-08-04 DIAGNOSIS — M99.05 SOMATIC DYSFUNCTION OF PELVIS REGION: ICD-10-CM

## 2025-08-04 DIAGNOSIS — M25.562 ACUTE PAIN OF LEFT KNEE: ICD-10-CM

## 2025-08-04 DIAGNOSIS — G57.11 MERALGIA PARESTHETICA OF RIGHT SIDE: Primary | ICD-10-CM

## 2025-08-04 DIAGNOSIS — M79.18 MYOFASCIAL PAIN: ICD-10-CM

## 2025-08-04 PROCEDURE — 97110 THERAPEUTIC EXERCISES: CPT | Mod: GP | Performed by: PHYSICAL THERAPIST

## 2025-08-04 PROCEDURE — 97140 MANUAL THERAPY 1/> REGIONS: CPT | Mod: GP | Performed by: PHYSICAL THERAPIST

## 2025-08-06 ENCOUNTER — THERAPY VISIT (OUTPATIENT)
Dept: PHYSICAL THERAPY | Facility: CLINIC | Age: 55
End: 2025-08-06
Payer: COMMERCIAL

## 2025-08-06 DIAGNOSIS — M25.562 ACUTE PAIN OF LEFT KNEE: ICD-10-CM

## 2025-08-06 DIAGNOSIS — G57.11 MERALGIA PARESTHETICA OF RIGHT SIDE: Primary | ICD-10-CM

## 2025-08-06 DIAGNOSIS — M79.18 MYOFASCIAL PAIN: ICD-10-CM

## 2025-08-06 DIAGNOSIS — M99.05 SOMATIC DYSFUNCTION OF PELVIS REGION: ICD-10-CM

## 2025-08-06 PROCEDURE — 97110 THERAPEUTIC EXERCISES: CPT | Mod: GP | Performed by: PHYSICAL THERAPIST

## 2025-08-06 PROCEDURE — 97014 ELECTRIC STIMULATION THERAPY: CPT | Mod: GP | Performed by: PHYSICAL THERAPIST

## 2025-08-14 ENCOUNTER — OFFICE VISIT (OUTPATIENT)
Dept: FAMILY MEDICINE | Facility: OTHER | Age: 55
End: 2025-08-14
Payer: COMMERCIAL

## 2025-08-14 VITALS
OXYGEN SATURATION: 98 % | WEIGHT: 226 LBS | DIASTOLIC BLOOD PRESSURE: 84 MMHG | TEMPERATURE: 97.4 F | HEIGHT: 70 IN | RESPIRATION RATE: 16 BRPM | BODY MASS INDEX: 32.35 KG/M2 | SYSTOLIC BLOOD PRESSURE: 136 MMHG | HEART RATE: 92 BPM

## 2025-08-14 DIAGNOSIS — R53.83 FATIGUE, UNSPECIFIED TYPE: ICD-10-CM

## 2025-08-14 DIAGNOSIS — K21.9 GASTROESOPHAGEAL REFLUX DISEASE WITHOUT ESOPHAGITIS: ICD-10-CM

## 2025-08-14 DIAGNOSIS — G43.109 MIGRAINE WITH AURA AND WITHOUT STATUS MIGRAINOSUS, NOT INTRACTABLE: ICD-10-CM

## 2025-08-14 DIAGNOSIS — E78.1 HYPERTRIGLYCERIDEMIA: ICD-10-CM

## 2025-08-14 DIAGNOSIS — E03.9 HYPOTHYROIDISM, UNSPECIFIED TYPE: ICD-10-CM

## 2025-08-14 DIAGNOSIS — Z20.828 MONO EXPOSURE: ICD-10-CM

## 2025-08-14 DIAGNOSIS — R73.03 PREDIABETES: ICD-10-CM

## 2025-08-14 DIAGNOSIS — R11.2 NAUSEA AND VOMITING, UNSPECIFIED VOMITING TYPE: Primary | ICD-10-CM

## 2025-08-14 DIAGNOSIS — M54.2 CERVICALGIA: ICD-10-CM

## 2025-08-14 DIAGNOSIS — M51.362 DEGENERATION OF INTERVERTEBRAL DISC OF LUMBAR REGION WITH DISCOGENIC BACK PAIN AND LOWER EXTREMITY PAIN: ICD-10-CM

## 2025-08-14 LAB
ALBUMIN SERPL BCG-MCNC: 4.8 G/DL (ref 3.5–5.2)
ALP SERPL-CCNC: 146 U/L (ref 40–150)
ALT SERPL W P-5'-P-CCNC: 21 U/L (ref 0–70)
ANION GAP SERPL CALCULATED.3IONS-SCNC: 15 MMOL/L (ref 7–15)
AST SERPL W P-5'-P-CCNC: 31 U/L (ref 0–45)
BILIRUB SERPL-MCNC: 0.6 MG/DL
BUN SERPL-MCNC: 15.1 MG/DL (ref 6–20)
CALCIUM SERPL-MCNC: 9.7 MG/DL (ref 8.8–10.4)
CHLORIDE SERPL-SCNC: 103 MMOL/L (ref 98–107)
CHOLEST SERPL-MCNC: 238 MG/DL
CREAT SERPL-MCNC: 0.94 MG/DL (ref 0.67–1.17)
EGFRCR SERPLBLD CKD-EPI 2021: >90 ML/MIN/1.73M2
ERYTHROCYTE [DISTWIDTH] IN BLOOD BY AUTOMATED COUNT: 12.5 % (ref 10–15)
EST. AVERAGE GLUCOSE BLD GHB EST-MCNC: 128 MG/DL
FASTING STATUS PATIENT QL REPORTED: NO
FASTING STATUS PATIENT QL REPORTED: NO
GLUCOSE SERPL-MCNC: 88 MG/DL (ref 70–99)
HBA1C MFR BLD: 6.1 % (ref 0–5.6)
HCO3 SERPL-SCNC: 22 MMOL/L (ref 22–29)
HCT VFR BLD AUTO: 46.2 % (ref 40–53)
HDLC SERPL-MCNC: 42 MG/DL
HGB BLD-MCNC: 15.6 G/DL (ref 13.3–17.7)
LDLC SERPL CALC-MCNC: 157 MG/DL
MCH RBC QN AUTO: 31 PG (ref 26.5–33)
MCHC RBC AUTO-ENTMCNC: 33.8 G/DL (ref 31.5–36.5)
MCV RBC AUTO: 91.8 FL (ref 78–100)
MONOCYTES NFR BLD AUTO: NEGATIVE %
NONHDLC SERPL-MCNC: 196 MG/DL
PLATELET # BLD AUTO: 322 10E3/UL (ref 150–450)
POTASSIUM SERPL-SCNC: 4.4 MMOL/L (ref 3.4–5.3)
PROT SERPL-MCNC: 7.9 G/DL (ref 6.4–8.3)
RBC # BLD AUTO: 5.03 10E6/UL (ref 4.4–5.9)
SODIUM SERPL-SCNC: 140 MMOL/L (ref 135–145)
T4 FREE SERPL-MCNC: 0.78 NG/DL (ref 0.9–1.7)
TRIGL SERPL-MCNC: 196 MG/DL
TSH SERPL DL<=0.005 MIU/L-ACNC: 24.24 UIU/ML (ref 0.3–4.2)
WBC # BLD AUTO: 8.67 10E3/UL (ref 4–11)

## 2025-08-14 RX ORDER — ONDANSETRON 8 MG/1
8 TABLET, FILM COATED ORAL EVERY 8 HOURS PRN
Qty: 30 TABLET | Refills: 0 | Status: SHIPPED | OUTPATIENT
Start: 2025-08-14

## 2025-08-14 RX ORDER — ONDANSETRON 8 MG/1
TABLET, ORALLY DISINTEGRATING ORAL
COMMUNITY
Start: 2025-07-16 | End: 2025-08-14

## 2025-08-14 RX ORDER — LEVOTHYROXINE SODIUM 175 UG/1
175 TABLET ORAL
Qty: 90 TABLET | Refills: 1 | Status: SHIPPED | OUTPATIENT
Start: 2025-08-14 | End: 2025-08-14

## 2025-08-14 RX ORDER — GEMFIBROZIL 600 MG/1
TABLET, FILM COATED ORAL
Qty: 180 TABLET | Refills: 3 | Status: SHIPPED | OUTPATIENT
Start: 2025-08-14

## 2025-08-14 RX ORDER — OMEPRAZOLE 40 MG/1
CAPSULE, DELAYED RELEASE ORAL
Qty: 90 CAPSULE | Refills: 3 | Status: SHIPPED | OUTPATIENT
Start: 2025-08-14

## 2025-08-14 RX ORDER — METHOCARBAMOL 500 MG/1
500 TABLET, FILM COATED ORAL 4 TIMES DAILY PRN
Qty: 60 TABLET | Refills: 0 | Status: SHIPPED | OUTPATIENT
Start: 2025-08-14

## 2025-08-14 RX ORDER — LEVOTHYROXINE SODIUM 200 UG/1
200 TABLET ORAL
Qty: 90 TABLET | Refills: 0 | Status: SHIPPED | OUTPATIENT
Start: 2025-08-14

## 2025-08-14 RX ORDER — METHYLPREDNISOLONE 4 MG/1
TABLET ORAL
Qty: 21 TABLET | Refills: 0 | Status: SHIPPED | OUTPATIENT
Start: 2025-08-14

## 2025-08-14 ASSESSMENT — ENCOUNTER SYMPTOMS: VOMITING: 1

## 2025-08-16 DIAGNOSIS — H90.6 MIXED CONDUCTIVE AND SENSORINEURAL HEARING LOSS OF BOTH EARS: ICD-10-CM

## 2025-08-16 DIAGNOSIS — H69.93 DYSFUNCTION OF BOTH EUSTACHIAN TUBES: ICD-10-CM

## 2025-08-18 ENCOUNTER — TELEPHONE (OUTPATIENT)
Dept: FAMILY MEDICINE | Facility: OTHER | Age: 55
End: 2025-08-18
Payer: COMMERCIAL

## 2025-08-18 RX ORDER — FLUTICASONE PROPIONATE 50 MCG
2 SPRAY, SUSPENSION (ML) NASAL DAILY
Qty: 48 ML | Refills: 1 | Status: SHIPPED | OUTPATIENT
Start: 2025-08-18

## 2025-08-19 ENCOUNTER — MYC MEDICAL ADVICE (OUTPATIENT)
Dept: FAMILY MEDICINE | Facility: OTHER | Age: 55
End: 2025-08-19
Payer: COMMERCIAL

## 2025-08-19 DIAGNOSIS — Z15.2 CLASS 1 OBESITY DUE TO DISRUPTION OF MC4R PATHWAY WITH SERIOUS COMORBIDITY AND BODY MASS INDEX (BMI) OF 32.0 TO 32.9 IN ADULT: Primary | ICD-10-CM

## 2025-08-19 DIAGNOSIS — E88.82 CLASS 1 OBESITY DUE TO DISRUPTION OF MC4R PATHWAY WITH SERIOUS COMORBIDITY AND BODY MASS INDEX (BMI) OF 32.0 TO 32.9 IN ADULT: Primary | ICD-10-CM

## 2025-08-19 DIAGNOSIS — E66.811 CLASS 1 OBESITY DUE TO DISRUPTION OF MC4R PATHWAY WITH SERIOUS COMORBIDITY AND BODY MASS INDEX (BMI) OF 32.0 TO 32.9 IN ADULT: Primary | ICD-10-CM

## 2025-08-25 ENCOUNTER — PATIENT OUTREACH (OUTPATIENT)
Dept: CARE COORDINATION | Facility: CLINIC | Age: 55
End: 2025-08-25
Payer: COMMERCIAL

## 2025-08-26 DIAGNOSIS — E66.811 CLASS 1 OBESITY DUE TO DISRUPTION OF MC4R PATHWAY WITH SERIOUS COMORBIDITY AND BODY MASS INDEX (BMI) OF 32.0 TO 32.9 IN ADULT: ICD-10-CM

## 2025-08-26 DIAGNOSIS — E88.82 CLASS 1 OBESITY DUE TO DISRUPTION OF MC4R PATHWAY WITH SERIOUS COMORBIDITY AND BODY MASS INDEX (BMI) OF 32.0 TO 32.9 IN ADULT: ICD-10-CM

## 2025-08-26 DIAGNOSIS — Z15.2 CLASS 1 OBESITY DUE TO DISRUPTION OF MC4R PATHWAY WITH SERIOUS COMORBIDITY AND BODY MASS INDEX (BMI) OF 32.0 TO 32.9 IN ADULT: ICD-10-CM

## 2025-08-27 ENCOUNTER — THERAPY VISIT (OUTPATIENT)
Dept: PHYSICAL THERAPY | Facility: CLINIC | Age: 55
End: 2025-08-27
Payer: COMMERCIAL

## 2025-08-27 DIAGNOSIS — M99.05 SOMATIC DYSFUNCTION OF PELVIS REGION: ICD-10-CM

## 2025-08-27 DIAGNOSIS — M79.18 MYOFASCIAL PAIN: ICD-10-CM

## 2025-08-27 DIAGNOSIS — M25.562 ACUTE PAIN OF LEFT KNEE: ICD-10-CM

## 2025-08-27 DIAGNOSIS — G57.11 MERALGIA PARESTHETICA OF RIGHT SIDE: Primary | ICD-10-CM

## 2025-08-27 PROCEDURE — 97112 NEUROMUSCULAR REEDUCATION: CPT | Mod: GP | Performed by: PHYSICAL THERAPIST

## 2025-08-27 PROCEDURE — 97110 THERAPEUTIC EXERCISES: CPT | Mod: GP | Performed by: PHYSICAL THERAPIST

## 2025-08-27 PROCEDURE — 97140 MANUAL THERAPY 1/> REGIONS: CPT | Mod: GP | Performed by: PHYSICAL THERAPIST

## 2025-09-02 ENCOUNTER — TELEPHONE (OUTPATIENT)
Dept: FAMILY MEDICINE | Facility: OTHER | Age: 55
End: 2025-09-02
Payer: COMMERCIAL

## (undated) DEVICE — NDL SPINAL 22GA 5" QUINCKE 405148

## (undated) DEVICE — SYR 05ML LL W/O NDL

## (undated) DEVICE — Device

## (undated) DEVICE — ENDO SEAL BX PORT BPS-A

## (undated) DEVICE — DRSG TELFA 2X3"

## (undated) DEVICE — GUIDEWIRE SENSOR DUAL FLEX STR 0.035"X150CM M0066703080

## (undated) DEVICE — SOL NACL 0.9% IRRIG 3000ML BAG 07972-08

## (undated) DEVICE — TRAY PROCEDURE SUPPORT PAIN MANAGEMENT 332114

## (undated) DEVICE — KIT ENDO TURNOVER/PROCEDURE CARRY-ON 101822

## (undated) DEVICE — GLOVE BIOGEL PI MICRO SZ 7.5 48575

## (undated) DEVICE — GOWN XLG DISP 9545

## (undated) DEVICE — PREP CHLORAPREP 26ML TINTED ORANGE  260815

## (undated) DEVICE — LUBRICATING JELLY 4.25OZ

## (undated) DEVICE — SOL WATER IRRIG 1000ML BOTTLE 07139-09

## (undated) DEVICE — DEVICE CATH STABILIZATION STATLOCK FOLEY 2-WAY FOL0102

## (undated) DEVICE — TUBING IV EXTENSION SET 34"

## (undated) DEVICE — SYR 10ML LL W/O NDL

## (undated) DEVICE — GLOVE EXAM NITRILE LG

## (undated) DEVICE — NDL ECLIPSE 18GA 1.5"

## (undated) DEVICE — DRSG BANDAID 1X3" FABRIC

## (undated) DEVICE — SYR 30ML LL W/O NDL

## (undated) DEVICE — TUBING SUCTION 12"X1/4" N612

## (undated) DEVICE — GLOVE PROTEXIS W/NEU-THERA 7.5  2D73TE75

## (undated) RX ORDER — ACETAMINOPHEN 325 MG/1
TABLET ORAL
Status: DISPENSED
Start: 2025-02-25

## (undated) RX ORDER — ONDANSETRON 2 MG/ML
INJECTION INTRAMUSCULAR; INTRAVENOUS
Status: DISPENSED
Start: 2025-02-25

## (undated) RX ORDER — CEFAZOLIN SODIUM 2 G/50ML
SOLUTION INTRAVENOUS
Status: DISPENSED
Start: 2025-02-25

## (undated) RX ORDER — PROPOFOL 10 MG/ML
INJECTION, EMULSION INTRAVENOUS
Status: DISPENSED
Start: 2025-02-25

## (undated) RX ORDER — LIDOCAINE HYDROCHLORIDE 10 MG/ML
INJECTION, SOLUTION EPIDURAL; INFILTRATION; INTRACAUDAL; PERINEURAL
Status: DISPENSED
Start: 2020-01-10

## (undated) RX ORDER — FENTANYL CITRATE 50 UG/ML
INJECTION, SOLUTION INTRAMUSCULAR; INTRAVENOUS
Status: DISPENSED
Start: 2025-02-25